# Patient Record
Sex: MALE | Race: WHITE | Employment: FULL TIME | URBAN - NONMETROPOLITAN AREA
[De-identification: names, ages, dates, MRNs, and addresses within clinical notes are randomized per-mention and may not be internally consistent; named-entity substitution may affect disease eponyms.]

---

## 2023-07-18 ENCOUNTER — HOSPITAL ENCOUNTER (OUTPATIENT)
Dept: PAIN MANAGEMENT | Age: 63
Discharge: HOME | End: 2023-07-18
Payer: COMMERCIAL

## 2023-07-18 VITALS
HEART RATE: 72 BPM | TEMPERATURE: 97.5 F | SYSTOLIC BLOOD PRESSURE: 130 MMHG | RESPIRATION RATE: 16 BRPM | OXYGEN SATURATION: 95 % | DIASTOLIC BLOOD PRESSURE: 75 MMHG

## 2023-07-18 PROCEDURE — 2580000003 HC RX 258

## 2023-07-18 PROCEDURE — 6360000002 HC RX W HCPCS

## 2023-07-18 PROCEDURE — A4216 STERILE WATER/SALINE, 10 ML: HCPCS

## 2023-07-18 PROCEDURE — 2500000003 HC RX 250 WO HCPCS

## 2023-07-18 PROCEDURE — 62323 NJX INTERLAMINAR LMBR/SAC: CPT

## 2023-07-18 RX ORDER — GLIPIZIDE AND METFORMIN HCL 2.5; 25 MG/1; MG/1
1 TABLET, FILM COATED ORAL
COMMUNITY

## 2023-07-18 RX ORDER — LIDOCAINE HYDROCHLORIDE 10 MG/ML
5 INJECTION, SOLUTION EPIDURAL; INFILTRATION; INTRACAUDAL; PERINEURAL ONCE
Status: DISCONTINUED | OUTPATIENT
Start: 2023-07-18 | End: 2023-07-20 | Stop reason: HOSPADM

## 2023-07-18 RX ORDER — METHYLPREDNISOLONE ACETATE 80 MG/ML
80 INJECTION, SUSPENSION INTRA-ARTICULAR; INTRALESIONAL; INTRAMUSCULAR; SOFT TISSUE ONCE
Status: DISCONTINUED | OUTPATIENT
Start: 2023-07-18 | End: 2023-07-20 | Stop reason: HOSPADM

## 2023-07-18 RX ORDER — PRAVASTATIN SODIUM 10 MG
10 TABLET ORAL DAILY
COMMUNITY

## 2023-07-18 RX ORDER — CYCLOBENZAPRINE HCL 10 MG
10 TABLET ORAL 2 TIMES DAILY PRN
COMMUNITY

## 2023-07-18 RX ORDER — LISINOPRIL 5 MG/1
5 TABLET ORAL DAILY
COMMUNITY

## 2023-07-18 RX ORDER — SODIUM CHLORIDE 9 MG/ML
5 INJECTION INTRAVENOUS ONCE
Status: DISCONTINUED | OUTPATIENT
Start: 2023-07-18 | End: 2023-07-20 | Stop reason: HOSPADM

## 2023-07-18 ASSESSMENT — PAIN - FUNCTIONAL ASSESSMENT
PAIN_FUNCTIONAL_ASSESSMENT: PREVENTS OR INTERFERES SOME ACTIVE ACTIVITIES AND ADLS
PAIN_FUNCTIONAL_ASSESSMENT: PREVENTS OR INTERFERES SOME ACTIVE ACTIVITIES AND ADLS
PAIN_FUNCTIONAL_ASSESSMENT: NONE - DENIES PAIN

## 2023-07-18 ASSESSMENT — PAIN DESCRIPTION - DESCRIPTORS
DESCRIPTORS: ACHING;DISCOMFORT
DESCRIPTORS: ACHING;DISCOMFORT

## 2023-07-18 ASSESSMENT — PAIN DESCRIPTION - FREQUENCY: FREQUENCY: INTERMITTENT

## 2023-07-18 ASSESSMENT — PAIN SCALES - GENERAL: PAINLEVEL_OUTOF10: 5

## 2023-07-18 ASSESSMENT — PAIN DESCRIPTION - ORIENTATION: ORIENTATION: LOWER

## 2023-07-18 ASSESSMENT — PAIN DESCRIPTION - LOCATION: LOCATION: BACK

## 2023-07-18 ASSESSMENT — PAIN DESCRIPTION - DIRECTION: RADIATING_TOWARDS: RIGHT SIDE

## 2023-07-18 NOTE — INTERVAL H&P NOTE
Update History & Physical    The patient's History and Physical  was reviewed with the patient and I examined the patient. There was no change. The surgical site was confirmed by the patient and me. Plan: The risks, benefits, expected outcome, and alternative to the recommended procedure have been discussed with the patient. Patient understands and wants to proceed with the procedure.      Electronically signed by Zenia Lovett MD on 7/18/2023 at 11:57 AM

## 2023-10-03 ENCOUNTER — HOSPITAL ENCOUNTER (OUTPATIENT)
Dept: GENERAL RADIOLOGY | Facility: HOSPITAL | Age: 63
Discharge: HOME | End: 2023-10-03
Admitting: NURSE PRACTITIONER
Payer: COMMERCIAL

## 2023-10-03 PROCEDURE — 87205 SMEAR GRAM STAIN: CPT | Performed by: NURSE PRACTITIONER

## 2023-10-03 PROCEDURE — 87070 CULTURE OTHR SPECIMN AEROBIC: CPT | Performed by: NURSE PRACTITIONER

## 2023-10-03 PROCEDURE — 71101 X-RAY EXAM UNILAT RIBS/CHEST: CPT

## 2023-10-31 ENCOUNTER — HOSPITAL ENCOUNTER (OUTPATIENT)
Dept: PAIN MANAGEMENT | Age: 63
Discharge: HOME | End: 2023-10-31
Payer: COMMERCIAL

## 2023-10-31 VITALS
SYSTOLIC BLOOD PRESSURE: 137 MMHG | OXYGEN SATURATION: 94 % | DIASTOLIC BLOOD PRESSURE: 82 MMHG | RESPIRATION RATE: 18 BRPM | HEART RATE: 71 BPM | TEMPERATURE: 97.2 F

## 2023-10-31 DIAGNOSIS — R52 PAIN MANAGEMENT: ICD-10-CM

## 2023-10-31 PROCEDURE — 62323 NJX INTERLAMINAR LMBR/SAC: CPT

## 2023-10-31 PROCEDURE — 6360000002 HC RX W HCPCS

## 2023-10-31 PROCEDURE — A4216 STERILE WATER/SALINE, 10 ML: HCPCS

## 2023-10-31 PROCEDURE — 2580000003 HC RX 258

## 2023-10-31 PROCEDURE — 2500000003 HC RX 250 WO HCPCS

## 2023-10-31 RX ORDER — SODIUM CHLORIDE 9 MG/ML
5 INJECTION INTRAVENOUS ONCE
Status: DISCONTINUED | OUTPATIENT
Start: 2023-10-31 | End: 2023-11-02 | Stop reason: HOSPADM

## 2023-10-31 RX ORDER — METHYLPREDNISOLONE ACETATE 80 MG/ML
80 INJECTION, SUSPENSION INTRA-ARTICULAR; INTRALESIONAL; INTRAMUSCULAR; SOFT TISSUE ONCE
Status: DISCONTINUED | OUTPATIENT
Start: 2023-10-31 | End: 2023-11-02 | Stop reason: HOSPADM

## 2023-10-31 RX ORDER — LIDOCAINE HYDROCHLORIDE 10 MG/ML
5 INJECTION, SOLUTION EPIDURAL; INFILTRATION; INTRACAUDAL; PERINEURAL ONCE
Status: DISCONTINUED | OUTPATIENT
Start: 2023-10-31 | End: 2023-11-02 | Stop reason: HOSPADM

## 2023-10-31 ASSESSMENT — PAIN DESCRIPTION - FREQUENCY: FREQUENCY: INTERMITTENT

## 2023-10-31 ASSESSMENT — PAIN DESCRIPTION - DIRECTION: RADIATING_TOWARDS: DOES NOT RADIATE

## 2023-10-31 ASSESSMENT — PAIN - FUNCTIONAL ASSESSMENT
PAIN_FUNCTIONAL_ASSESSMENT: PREVENTS OR INTERFERES SOME ACTIVE ACTIVITIES AND ADLS
PAIN_FUNCTIONAL_ASSESSMENT: 0-10

## 2023-10-31 ASSESSMENT — PAIN DESCRIPTION - DESCRIPTORS: DESCRIPTORS: ACHING;DULL

## 2023-10-31 ASSESSMENT — PAIN DESCRIPTION - LOCATION: LOCATION: BACK

## 2023-10-31 ASSESSMENT — PAIN SCALES - GENERAL: PAINLEVEL_OUTOF10: 4

## 2023-10-31 ASSESSMENT — PAIN DESCRIPTION - ORIENTATION: ORIENTATION: LOWER

## 2023-10-31 ASSESSMENT — PAIN DESCRIPTION - PAIN TYPE: TYPE: CHRONIC PAIN

## 2023-10-31 NOTE — INTERVAL H&P NOTE
Update History & Physical    The patient's History and Physical  was reviewed with the patient and I examined the patient. There was no change. The surgical site was confirmed by the patient and me. Plan: The risks, benefits, expected outcome, and alternative to the recommended procedure have been discussed with the patient. Patient understands and wants to proceed with the procedure.      Electronically signed by Rashmi Blair MD on 10/31/2023 at 1:28 PM

## 2024-02-13 ENCOUNTER — HOSPITAL ENCOUNTER (OUTPATIENT)
Dept: PAIN MANAGEMENT | Age: 64
Discharge: HOME | End: 2024-02-13
Payer: COMMERCIAL

## 2024-02-13 VITALS
DIASTOLIC BLOOD PRESSURE: 86 MMHG | HEART RATE: 74 BPM | RESPIRATION RATE: 18 BRPM | SYSTOLIC BLOOD PRESSURE: 145 MMHG | OXYGEN SATURATION: 94 %

## 2024-02-13 VITALS
HEART RATE: 80 BPM | RESPIRATION RATE: 18 BRPM | SYSTOLIC BLOOD PRESSURE: 123 MMHG | TEMPERATURE: 97.1 F | DIASTOLIC BLOOD PRESSURE: 68 MMHG | OXYGEN SATURATION: 96 %

## 2024-02-13 DIAGNOSIS — R52 PAIN MANAGEMENT: ICD-10-CM

## 2024-02-13 PROCEDURE — 62323 NJX INTERLAMINAR LMBR/SAC: CPT

## 2024-02-13 PROCEDURE — 6360000002 HC RX W HCPCS

## 2024-02-13 PROCEDURE — 2500000003 HC RX 250 WO HCPCS

## 2024-02-13 PROCEDURE — 2580000003 HC RX 258

## 2024-02-13 PROCEDURE — A4216 STERILE WATER/SALINE, 10 ML: HCPCS

## 2024-02-13 RX ORDER — SODIUM CHLORIDE 9 MG/ML
5 INJECTION INTRAVENOUS ONCE
Status: DISCONTINUED | OUTPATIENT
Start: 2024-02-13 | End: 2024-02-15 | Stop reason: HOSPADM

## 2024-02-13 RX ORDER — METHYLPREDNISOLONE ACETATE 80 MG/ML
80 INJECTION, SUSPENSION INTRA-ARTICULAR; INTRALESIONAL; INTRAMUSCULAR; SOFT TISSUE ONCE
Status: DISCONTINUED | OUTPATIENT
Start: 2024-02-13 | End: 2024-02-15 | Stop reason: HOSPADM

## 2024-02-13 RX ORDER — LIDOCAINE HYDROCHLORIDE 10 MG/ML
5 INJECTION, SOLUTION EPIDURAL; INFILTRATION; INTRACAUDAL; PERINEURAL ONCE
Status: DISCONTINUED | OUTPATIENT
Start: 2024-02-13 | End: 2024-02-15 | Stop reason: HOSPADM

## 2024-02-13 NOTE — INTERVAL H&P NOTE
Update History & Physical    The patient's History and Physical  was reviewed with the patient and I examined the patient. There was no change. The surgical site was confirmed by the patient and me.     Plan: The risks, benefits, expected outcome, and alternative to the recommended procedure have been discussed with the patient. Patient understands and wants to proceed with the procedure.     Electronically signed by Jt Lloyd MD on 2/13/2024 at 9:33 AM

## 2024-05-14 ENCOUNTER — HOSPITAL ENCOUNTER (OUTPATIENT)
Dept: PAIN MANAGEMENT | Age: 64
Discharge: HOME | End: 2024-05-14
Payer: COMMERCIAL

## 2024-05-14 VITALS
RESPIRATION RATE: 18 BRPM | SYSTOLIC BLOOD PRESSURE: 145 MMHG | DIASTOLIC BLOOD PRESSURE: 82 MMHG | TEMPERATURE: 97.8 F | OXYGEN SATURATION: 94 % | HEART RATE: 72 BPM

## 2024-05-14 DIAGNOSIS — R52 PAIN MANAGEMENT: ICD-10-CM

## 2024-05-14 PROCEDURE — 6360000002 HC RX W HCPCS

## 2024-05-14 PROCEDURE — 2500000003 HC RX 250 WO HCPCS

## 2024-05-14 PROCEDURE — 62323 NJX INTERLAMINAR LMBR/SAC: CPT

## 2024-05-14 PROCEDURE — 2580000003 HC RX 258

## 2024-05-14 PROCEDURE — A4216 STERILE WATER/SALINE, 10 ML: HCPCS

## 2024-05-14 RX ORDER — LIDOCAINE HYDROCHLORIDE 10 MG/ML
5 INJECTION, SOLUTION EPIDURAL; INFILTRATION; INTRACAUDAL; PERINEURAL ONCE
Status: DISCONTINUED | OUTPATIENT
Start: 2024-05-14 | End: 2024-05-16 | Stop reason: HOSPADM

## 2024-05-14 RX ORDER — SODIUM CHLORIDE 9 MG/ML
5 INJECTION, SOLUTION INTRAMUSCULAR; INTRAVENOUS; SUBCUTANEOUS ONCE
Status: DISCONTINUED | OUTPATIENT
Start: 2024-05-14 | End: 2024-05-16 | Stop reason: HOSPADM

## 2024-05-14 RX ORDER — METHYLPREDNISOLONE ACETATE 80 MG/ML
80 INJECTION, SUSPENSION INTRA-ARTICULAR; INTRALESIONAL; INTRAMUSCULAR; SOFT TISSUE ONCE
Status: DISCONTINUED | OUTPATIENT
Start: 2024-05-14 | End: 2024-05-16 | Stop reason: HOSPADM

## 2024-05-14 ASSESSMENT — PAIN - FUNCTIONAL ASSESSMENT: PAIN_FUNCTIONAL_ASSESSMENT: 0-10

## 2024-05-14 NOTE — INTERVAL H&P NOTE
Update History & Physical    The patient's History and Physical  was reviewed with the patient and I examined the patient. There was no change. The surgical site was confirmed by the patient and me.     Plan: The risks, benefits, expected outcome, and alternative to the recommended procedure have been discussed with the patient. Patient understands and wants to proceed with the procedure.     Electronically signed by Jt Lloyd MD on 5/14/2024 at 9:37 AM

## 2024-08-20 ENCOUNTER — HOSPITAL ENCOUNTER (OUTPATIENT)
Dept: PAIN MANAGEMENT | Age: 64
Discharge: HOME | End: 2024-08-20
Payer: COMMERCIAL

## 2024-08-20 VITALS
TEMPERATURE: 96.5 F | DIASTOLIC BLOOD PRESSURE: 91 MMHG | SYSTOLIC BLOOD PRESSURE: 143 MMHG | HEART RATE: 73 BPM | RESPIRATION RATE: 18 BRPM | OXYGEN SATURATION: 95 %

## 2024-08-20 DIAGNOSIS — R52 PAIN MANAGEMENT: ICD-10-CM

## 2024-08-20 PROCEDURE — 2500000003 HC RX 250 WO HCPCS

## 2024-08-20 PROCEDURE — 62323 NJX INTERLAMINAR LMBR/SAC: CPT

## 2024-08-20 PROCEDURE — 6360000002 HC RX W HCPCS

## 2024-08-20 PROCEDURE — 2580000003 HC RX 258

## 2024-08-20 RX ORDER — LIDOCAINE HYDROCHLORIDE 10 MG/ML
5 INJECTION, SOLUTION EPIDURAL; INFILTRATION; INTRACAUDAL; PERINEURAL ONCE
Status: DISCONTINUED | OUTPATIENT
Start: 2024-08-20 | End: 2024-08-22 | Stop reason: HOSPADM

## 2024-08-20 RX ORDER — METHYLPREDNISOLONE ACETATE 80 MG/ML
80 INJECTION, SUSPENSION INTRA-ARTICULAR; INTRALESIONAL; INTRAMUSCULAR; SOFT TISSUE ONCE
Status: DISCONTINUED | OUTPATIENT
Start: 2024-08-20 | End: 2024-08-22 | Stop reason: HOSPADM

## 2024-08-20 RX ORDER — SODIUM CHLORIDE 9 MG/ML
5 INJECTION, SOLUTION INTRAMUSCULAR; INTRAVENOUS; SUBCUTANEOUS ONCE
Status: DISCONTINUED | OUTPATIENT
Start: 2024-08-20 | End: 2024-08-22 | Stop reason: HOSPADM

## 2024-08-20 ASSESSMENT — PAIN - FUNCTIONAL ASSESSMENT: PAIN_FUNCTIONAL_ASSESSMENT: NONE - DENIES PAIN

## 2024-08-20 NOTE — INTERVAL H&P NOTE
Update History & Physical    The patient's History and Physical  was reviewed with the patient and I examined the patient. There was no change. The surgical site was confirmed by the patient and me.     Plan: The risks, benefits, expected outcome, and alternative to the recommended procedure have been discussed with the patient. Patient understands and wants to proceed with the procedure.     Electronically signed by Jt Lloyd MD on 8/20/2024 at 12:04 PM

## 2024-11-26 ENCOUNTER — HOSPITAL ENCOUNTER (OUTPATIENT)
Dept: PAIN MANAGEMENT | Age: 64
Discharge: HOME | End: 2024-11-26
Payer: COMMERCIAL

## 2024-11-26 VITALS
SYSTOLIC BLOOD PRESSURE: 154 MMHG | RESPIRATION RATE: 16 BRPM | HEART RATE: 72 BPM | OXYGEN SATURATION: 94 % | DIASTOLIC BLOOD PRESSURE: 95 MMHG

## 2024-11-26 DIAGNOSIS — R52 PAIN MANAGEMENT: ICD-10-CM

## 2024-11-26 PROCEDURE — 2580000003 HC RX 258

## 2024-11-26 PROCEDURE — 62323 NJX INTERLAMINAR LMBR/SAC: CPT

## 2024-11-26 PROCEDURE — 6360000002 HC RX W HCPCS

## 2024-11-26 RX ORDER — SODIUM CHLORIDE 9 MG/ML
5 INJECTION, SOLUTION INTRAMUSCULAR; INTRAVENOUS; SUBCUTANEOUS ONCE
Status: DISCONTINUED | OUTPATIENT
Start: 2024-11-26 | End: 2024-11-28 | Stop reason: HOSPADM

## 2024-11-26 RX ORDER — TRIAMCINOLONE ACETONIDE 40 MG/ML
80 INJECTION, SUSPENSION INTRA-ARTICULAR; INTRAMUSCULAR ONCE
Status: DISCONTINUED | OUTPATIENT
Start: 2024-11-26 | End: 2024-11-28 | Stop reason: HOSPADM

## 2024-11-26 RX ORDER — LIDOCAINE HYDROCHLORIDE 10 MG/ML
5 INJECTION, SOLUTION EPIDURAL; INFILTRATION; INTRACAUDAL; PERINEURAL ONCE
Status: DISCONTINUED | OUTPATIENT
Start: 2024-11-26 | End: 2024-11-28 | Stop reason: HOSPADM

## 2024-11-26 ASSESSMENT — PAIN - FUNCTIONAL ASSESSMENT: PAIN_FUNCTIONAL_ASSESSMENT: 0-10

## 2024-11-26 NOTE — INTERVAL H&P NOTE
Update History & Physical    The patient's History and Physical   was reviewed with the patient and I examined the patient. There was no change. The surgical site was confirmed by the patient and me.     Plan: The risks, benefits, expected outcome, and alternative to the recommended procedure have been discussed with the patient. Patient understands and wants to proceed with the procedure.     Electronically signed by Jt Lloyd MD on 11/26/2024 at 1:15 PM

## 2025-01-28 ENCOUNTER — TRANSCRIBE ORDERS (OUTPATIENT)
Dept: ADMINISTRATIVE | Facility: HOSPITAL | Age: 65
End: 2025-01-28
Payer: COMMERCIAL

## 2025-01-28 DIAGNOSIS — M54.16 LUMBAR RADICULOPATHY: Primary | ICD-10-CM

## 2025-02-11 ENCOUNTER — OFFICE VISIT (OUTPATIENT)
Age: 65
End: 2025-02-11
Payer: COMMERCIAL

## 2025-02-11 VITALS — HEIGHT: 71 IN | BODY MASS INDEX: 31.92 KG/M2 | WEIGHT: 228 LBS

## 2025-02-11 DIAGNOSIS — M25.511 RIGHT SHOULDER PAIN, UNSPECIFIED CHRONICITY: Primary | ICD-10-CM

## 2025-02-11 DIAGNOSIS — S43.014A ANTERIOR SHOULDER DISLOCATION, RIGHT, INITIAL ENCOUNTER: ICD-10-CM

## 2025-02-11 PROCEDURE — 99204 OFFICE O/P NEW MOD 45 MIN: CPT | Performed by: ORTHOPAEDIC SURGERY

## 2025-02-11 RX ORDER — CELECOXIB 200 MG/1
CAPSULE ORAL
COMMUNITY
Start: 2025-02-02

## 2025-02-11 ASSESSMENT — ENCOUNTER SYMPTOMS
ALLERGIC/IMMUNOLOGIC NEGATIVE: 1
RESPIRATORY NEGATIVE: 1
EYES NEGATIVE: 1
GASTROINTESTINAL NEGATIVE: 1

## 2025-02-11 NOTE — PROGRESS NOTES
[facility]  [facility]  [address]  [facility] [place] [facility] [place] [address] [phone]       Rodney Garcia (:  1960) is a 64 y.o. male,New patient, here for evaluation of the following chief complaint(s):  Consultation (Right Shoulder)        Assessment & Plan  1. Right shoulder dislocation.  The patient experienced a mechanical dislocation of the right shoulder after a fall approximately 1.5 weeks ago. He was treated in the emergency room where the shoulder was reduced under anesthesia. X-rays show a congruent glenohumeral joint with minor arthritic changes and acromioclavicular joint arthrosis, which are consistent with age-related changes. The presence of a type 3 acromion predisposes him to rotator cuff impingement and potential injury. Given his history of type 2 diabetes and previous frozen shoulder, he is at a higher risk for adhesive capsulitis. An MRI of the shoulder will be ordered to assess the soft tissues. He is advised to continue using the sling for immobilization and to engage in gentle range of motion exercises. Formal physical therapy will be considered based on his functional recovery at the 3-week marvel.    2. Type 2 diabetes mellitus.  The patient is currently managed with metformin. His diabetic condition increases his risk for adhesive capsulitis.    3. Back pain.  He is scheduled for an MRI of his back on Thursday and has a follow-up appointment with Dr. Flynn in [place] on the .    Follow-up  The patient will follow up in 2 weeks.    PROCEDURE  The patient had a plate inserted in his shoulder following a previous injury.       ICD-10-CM    1. Right shoulder pain, unspecified chronicity  M25.511 XR SHOULDER RIGHT (MIN 2 VIEWS)      2. Anterior shoulder dislocation, right, initial encounter  S43.014A           Return in about 2 weeks (around 2025).    SUBJECTIVE/OBJECTIVE:  History of Present Illness  The

## 2025-02-13 ENCOUNTER — HOSPITAL ENCOUNTER (OUTPATIENT)
Dept: MRI IMAGING | Facility: HOSPITAL | Age: 65
Discharge: HOME | End: 2025-02-13
Admitting: PHYSICAL MEDICINE & REHABILITATION
Payer: COMMERCIAL

## 2025-02-13 DIAGNOSIS — M54.16 LUMBAR RADICULOPATHY: ICD-10-CM

## 2025-02-13 PROCEDURE — 72148 MRI LUMBAR SPINE W/O DYE: CPT

## 2025-03-04 ENCOUNTER — TELEPHONE (OUTPATIENT)
Age: 65
End: 2025-03-04

## 2025-03-04 NOTE — TELEPHONE ENCOUNTER
Returned patients phone call and he stated he wanted to hold off on MRI and any treatment at this time, patient stated he would call when he is ready to pursue any intervention

## 2025-03-18 ENCOUNTER — HOSPITAL ENCOUNTER (OUTPATIENT)
Dept: PAIN MANAGEMENT | Age: 65
Discharge: HOME | End: 2025-03-18
Payer: COMMERCIAL

## 2025-03-18 VITALS
DIASTOLIC BLOOD PRESSURE: 85 MMHG | SYSTOLIC BLOOD PRESSURE: 139 MMHG | HEART RATE: 68 BPM | RESPIRATION RATE: 16 BRPM | OXYGEN SATURATION: 97 % | TEMPERATURE: 96 F

## 2025-03-18 DIAGNOSIS — R52 PAIN MANAGEMENT: ICD-10-CM

## 2025-03-18 PROCEDURE — 2580000003 HC RX 258

## 2025-03-18 PROCEDURE — 6360000002 HC RX W HCPCS

## 2025-03-18 PROCEDURE — 62323 NJX INTERLAMINAR LMBR/SAC: CPT

## 2025-03-18 RX ORDER — SODIUM CHLORIDE 9 MG/ML
5 INJECTION, SOLUTION INTRAMUSCULAR; INTRAVENOUS; SUBCUTANEOUS ONCE
Status: DISCONTINUED | OUTPATIENT
Start: 2025-03-18 | End: 2025-03-20 | Stop reason: HOSPADM

## 2025-03-18 RX ORDER — TRIAMCINOLONE ACETONIDE 40 MG/ML
80 INJECTION, SUSPENSION INTRA-ARTICULAR; INTRAMUSCULAR ONCE
Status: DISCONTINUED | OUTPATIENT
Start: 2025-03-18 | End: 2025-03-20 | Stop reason: HOSPADM

## 2025-03-18 RX ORDER — LIDOCAINE HYDROCHLORIDE 10 MG/ML
5 INJECTION, SOLUTION EPIDURAL; INFILTRATION; INTRACAUDAL; PERINEURAL ONCE
Status: DISCONTINUED | OUTPATIENT
Start: 2025-03-18 | End: 2025-03-20 | Stop reason: HOSPADM

## 2025-03-18 ASSESSMENT — PAIN - FUNCTIONAL ASSESSMENT: PAIN_FUNCTIONAL_ASSESSMENT: 0-10

## 2025-03-18 NOTE — INTERVAL H&P NOTE
Update History & Physical    The patient's History and Physical  was reviewed with the patient and I examined the patient. There was no change. The surgical site was confirmed by the patient and me.     Plan: The risks, benefits, expected outcome, and alternative to the recommended procedure have been discussed with the patient. Patient understands and wants to proceed with the procedure.     Electronically signed by Jt Lloyd MD on 3/18/2025 at 11:41 AM

## 2025-03-18 NOTE — PROGRESS NOTES
Procedure:  Level of Consciousness: [x]Alert [x]Oriented []Disoriented []Lethargic  Anxiety Level: [x]Calm []Anxious []Depressed []Other  Skin: [x]Warm [x]Dry []Cool []Moist []Intact []Other  Cardiovascular: []Palpitations: [x]Never []Occasionally []Frequently  Chest Pain: [x]No []Yes  Respiratory:  []Unlabored []Labored []Cough ([] Productive []Unproductive)  HCG Required: [x]No []Yes   Results: []Negative []Positive  Knowledge Level:        [x]Patient/Other verbalized understanding of pre-procedure instructions.        [x]Assessment of post-op care needs (transportation, responsible caregiver)        [x]Able to discuss health care problems and how to deal with it.  Factors that Affect Teaching:        Language Barrier: [x]No []Yes - why:        Hearing Loss:        [x]No []Yes            Corrective Device:  []Yes []No        Vision Loss:           []No [x]Yes            Corrective Device:  [x]Yes []No        Memory Loss:       [x]No []Yes            []Short Term []Long Term  Motivational Level:  [x]Asks Questions                  []Extremely Anxious       [x]Seems Interested               []Seems Uninterested                  []Denies need for Education  Risk for Injury:  [x]Patient oriented to person, place and time  []History of frequent falls/loss of balance  Nutritional:  []Change in appetite   []Weight Gain   []Weight Loss  Functional:  []Requires assistance with ADL's

## 2025-05-05 ENCOUNTER — HOSPITAL ENCOUNTER (INPATIENT)
Age: 65
LOS: 13 days | Discharge: NURSING, REHAB OR LONG-TERM CARE | DRG: 853 | End: 2025-05-19
Attending: PEDIATRICS | Admitting: STUDENT IN AN ORGANIZED HEALTH CARE EDUCATION/TRAINING PROGRAM
Payer: COMMERCIAL

## 2025-05-05 ENCOUNTER — APPOINTMENT (OUTPATIENT)
Dept: GENERAL RADIOLOGY | Age: 65
DRG: 853 | End: 2025-05-05
Payer: COMMERCIAL

## 2025-05-05 DIAGNOSIS — Q21.12 PFO (PATENT FORAMEN OVALE): ICD-10-CM

## 2025-05-05 DIAGNOSIS — B34.8 RHINOVIRUS INFECTION: ICD-10-CM

## 2025-05-05 DIAGNOSIS — G06.1 SPINAL EPIDURAL ABSCESS: ICD-10-CM

## 2025-05-05 DIAGNOSIS — G06.1 ABSCESS IN EPIDURAL SPACE OF LUMBAR SPINE: ICD-10-CM

## 2025-05-05 DIAGNOSIS — M25.552 LEFT HIP PAIN: ICD-10-CM

## 2025-05-05 DIAGNOSIS — R55 SYNCOPE, UNSPECIFIED SYNCOPE TYPE: ICD-10-CM

## 2025-05-05 DIAGNOSIS — R41.82 ALTERED MENTAL STATUS, UNSPECIFIED ALTERED MENTAL STATUS TYPE: ICD-10-CM

## 2025-05-05 DIAGNOSIS — A41.9 SEPSIS, DUE TO UNSPECIFIED ORGANISM, UNSPECIFIED WHETHER ACUTE ORGAN DYSFUNCTION PRESENT (HCC): Primary | ICD-10-CM

## 2025-05-05 DIAGNOSIS — M43.16 SPONDYLOLISTHESIS OF LUMBAR REGION: ICD-10-CM

## 2025-05-05 DIAGNOSIS — I38 ENDOCARDITIS, UNSPECIFIED CHRONICITY, UNSPECIFIED ENDOCARDITIS TYPE: ICD-10-CM

## 2025-05-05 DIAGNOSIS — R53.1 GENERALIZED WEAKNESS: ICD-10-CM

## 2025-05-05 LAB
ALBUMIN SERPL-MCNC: 3.2 G/DL (ref 3.5–5.2)
ALP SERPL-CCNC: 122 U/L (ref 40–129)
ALT SERPL-CCNC: 39 U/L (ref 10–50)
ANION GAP SERPL CALCULATED.3IONS-SCNC: 18 MMOL/L (ref 8–16)
AST SERPL-CCNC: 27 U/L (ref 10–50)
BACTERIA URNS QL MICRO: NEGATIVE /HPF
BASOPHILS # BLD: 0.1 K/UL (ref 0–0.2)
BASOPHILS NFR BLD: 0.4 % (ref 0–1)
BILIRUB SERPL-MCNC: 0.7 MG/DL (ref 0.2–1.2)
BILIRUB UR QL STRIP: NEGATIVE
BUN SERPL-MCNC: 13 MG/DL (ref 8–23)
CALCIUM SERPL-MCNC: 9.3 MG/DL (ref 8.8–10.2)
CHLORIDE SERPL-SCNC: 96 MMOL/L (ref 98–107)
CLARITY UR: CLEAR
CO2 SERPL-SCNC: 20 MMOL/L (ref 22–29)
COLOR UR: YELLOW
CREAT SERPL-MCNC: 0.5 MG/DL (ref 0.7–1.2)
CRYSTALS URNS MICRO: ABNORMAL /HPF
EOSINOPHIL # BLD: 0 K/UL (ref 0–0.6)
EOSINOPHIL NFR BLD: 0.1 % (ref 0–5)
EPI CELLS #/AREA URNS AUTO: 1 /HPF (ref 0–5)
ERYTHROCYTE [DISTWIDTH] IN BLOOD BY AUTOMATED COUNT: 13.3 % (ref 11.5–14.5)
GLUCOSE SERPL-MCNC: 208 MG/DL (ref 70–99)
GLUCOSE UR STRIP.AUTO-MCNC: =>1000 MG/DL
HCT VFR BLD AUTO: 39.1 % (ref 42–52)
HGB BLD-MCNC: 13 G/DL (ref 14–18)
HGB UR STRIP.AUTO-MCNC: ABNORMAL MG/L
HYALINE CASTS #/AREA URNS AUTO: 1 /HPF (ref 0–8)
IMM GRANULOCYTES # BLD: 0.1 K/UL
KETONES UR STRIP.AUTO-MCNC: 80 MG/DL
LEUKOCYTE ESTERASE UR QL STRIP.AUTO: NEGATIVE
LYMPHOCYTES # BLD: 0.8 K/UL (ref 1.1–4.5)
LYMPHOCYTES NFR BLD: 5.1 % (ref 20–40)
MCH RBC QN AUTO: 29.9 PG (ref 27–31)
MCHC RBC AUTO-ENTMCNC: 33.2 G/DL (ref 33–37)
MCV RBC AUTO: 89.9 FL (ref 80–94)
MONOCYTES # BLD: 0.9 K/UL (ref 0–0.9)
MONOCYTES NFR BLD: 6.3 % (ref 0–10)
NEUTROPHILS # BLD: 13 K/UL (ref 1.5–7.5)
NEUTS SEG NFR BLD: 87.4 % (ref 50–65)
NITRITE UR QL STRIP.AUTO: NEGATIVE
PH UR STRIP.AUTO: 5 [PH] (ref 5–8)
PLATELET # BLD AUTO: 389 K/UL (ref 130–400)
PMV BLD AUTO: 9.3 FL (ref 9.4–12.4)
POTASSIUM SERPL-SCNC: 3.8 MMOL/L (ref 3.5–5.1)
PROT SERPL-MCNC: 6.7 G/DL (ref 6.4–8.3)
PROT UR STRIP.AUTO-MCNC: 30 MG/DL
RBC # BLD AUTO: 4.35 M/UL (ref 4.7–6.1)
RBC #/AREA URNS AUTO: 6 /HPF (ref 0–4)
SODIUM SERPL-SCNC: 134 MMOL/L (ref 136–145)
SP GR UR STRIP.AUTO: 1.04 (ref 1–1.03)
UROBILINOGEN UR STRIP.AUTO-MCNC: 0.2 E.U./DL
WBC # BLD AUTO: 14.9 K/UL (ref 4.8–10.8)
WBC #/AREA URNS AUTO: 1 /HPF (ref 0–5)

## 2025-05-05 PROCEDURE — 86140 C-REACTIVE PROTEIN: CPT

## 2025-05-05 PROCEDURE — 84484 ASSAY OF TROPONIN QUANT: CPT

## 2025-05-05 PROCEDURE — 81001 URINALYSIS AUTO W/SCOPE: CPT

## 2025-05-05 PROCEDURE — 85652 RBC SED RATE AUTOMATED: CPT

## 2025-05-05 PROCEDURE — 83880 ASSAY OF NATRIURETIC PEPTIDE: CPT

## 2025-05-05 PROCEDURE — 99285 EMERGENCY DEPT VISIT HI MDM: CPT

## 2025-05-05 PROCEDURE — 71045 X-RAY EXAM CHEST 1 VIEW: CPT

## 2025-05-05 PROCEDURE — 85025 COMPLETE CBC W/AUTO DIFF WBC: CPT

## 2025-05-05 PROCEDURE — 73502 X-RAY EXAM HIP UNI 2-3 VIEWS: CPT

## 2025-05-05 PROCEDURE — 80053 COMPREHEN METABOLIC PANEL: CPT

## 2025-05-05 PROCEDURE — 84145 PROCALCITONIN (PCT): CPT

## 2025-05-05 PROCEDURE — 83735 ASSAY OF MAGNESIUM: CPT

## 2025-05-05 PROCEDURE — 93005 ELECTROCARDIOGRAM TRACING: CPT | Performed by: PEDIATRICS

## 2025-05-05 PROCEDURE — 96365 THER/PROPH/DIAG IV INF INIT: CPT

## 2025-05-05 PROCEDURE — 36415 COLL VENOUS BLD VENIPUNCTURE: CPT

## 2025-05-05 RX ORDER — TRAMADOL HYDROCHLORIDE 50 MG/1
50 TABLET ORAL EVERY 6 HOURS PRN
Status: ON HOLD | COMMUNITY
Start: 2025-05-05 | End: 2025-05-19 | Stop reason: HOSPADM

## 2025-05-05 RX ORDER — SODIUM CHLORIDE 0.9 % (FLUSH) 0.9 %
5-40 SYRINGE (ML) INJECTION EVERY 12 HOURS SCHEDULED
Status: DISCONTINUED | OUTPATIENT
Start: 2025-05-05 | End: 2025-05-06 | Stop reason: SDUPTHER

## 2025-05-05 RX ORDER — SODIUM CHLORIDE 0.9 % (FLUSH) 0.9 %
5-40 SYRINGE (ML) INJECTION PRN
Status: DISCONTINUED | OUTPATIENT
Start: 2025-05-05 | End: 2025-05-06 | Stop reason: SDUPTHER

## 2025-05-05 RX ORDER — 0.9 % SODIUM CHLORIDE 0.9 %
30 INTRAVENOUS SOLUTION INTRAVENOUS ONCE
Status: COMPLETED | OUTPATIENT
Start: 2025-05-05 | End: 2025-05-06

## 2025-05-05 RX ORDER — SODIUM CHLORIDE 9 MG/ML
INJECTION, SOLUTION INTRAVENOUS PRN
Status: DISCONTINUED | OUTPATIENT
Start: 2025-05-05 | End: 2025-05-06 | Stop reason: SDUPTHER

## 2025-05-05 ASSESSMENT — PAIN DESCRIPTION - DESCRIPTORS: DESCRIPTORS: ACHING;DISCOMFORT

## 2025-05-05 ASSESSMENT — PAIN SCALES - GENERAL: PAINLEVEL_OUTOF10: 4

## 2025-05-05 ASSESSMENT — PAIN - FUNCTIONAL ASSESSMENT: PAIN_FUNCTIONAL_ASSESSMENT: 0-10

## 2025-05-05 ASSESSMENT — PAIN DESCRIPTION - LOCATION: LOCATION: HIP

## 2025-05-05 ASSESSMENT — PAIN DESCRIPTION - ORIENTATION: ORIENTATION: LEFT

## 2025-05-05 ASSESSMENT — PAIN DESCRIPTION - PAIN TYPE: TYPE: ACUTE PAIN

## 2025-05-06 ENCOUNTER — APPOINTMENT (OUTPATIENT)
Dept: CT IMAGING | Age: 65
DRG: 853 | End: 2025-05-06
Payer: COMMERCIAL

## 2025-05-06 ENCOUNTER — APPOINTMENT (OUTPATIENT)
Age: 65
DRG: 853 | End: 2025-05-06
Attending: STUDENT IN AN ORGANIZED HEALTH CARE EDUCATION/TRAINING PROGRAM
Payer: COMMERCIAL

## 2025-05-06 PROBLEM — J96.01 ACUTE HYPOXIC RESPIRATORY FAILURE (HCC): Status: ACTIVE | Noted: 2025-05-06

## 2025-05-06 LAB
ANION GAP SERPL CALCULATED.3IONS-SCNC: 16 MMOL/L (ref 8–16)
B PARAP IS1001 DNA NPH QL NAA+NON-PROBE: NOT DETECTED
B PERT.PT PRMT NPH QL NAA+NON-PROBE: NOT DETECTED
BASE EXCESS ARTERIAL: -5.2 MMOL/L (ref -2–2)
BASOPHILS # BLD: 0.1 K/UL (ref 0–0.2)
BASOPHILS NFR BLD: 0.4 % (ref 0–1)
BNP BLD-MCNC: 108 PG/ML (ref 0–124)
BUN SERPL-MCNC: 12 MG/DL (ref 8–23)
C PNEUM DNA NPH QL NAA+NON-PROBE: NOT DETECTED
CALCIUM SERPL-MCNC: 8.8 MG/DL (ref 8.8–10.2)
CARBOXYHEMOGLOBIN ARTERIAL: 2.2 % (ref 0–5)
CHLORIDE SERPL-SCNC: 103 MMOL/L (ref 98–107)
CO2 SERPL-SCNC: 18 MMOL/L (ref 22–29)
CREAT SERPL-MCNC: 0.5 MG/DL (ref 0.7–1.2)
CRP SERPL-MCNC: 453 MG/L (ref 0–5)
ECHO AO ASC DIAM: 2.9 CM
ECHO AO ASCENDING AORTA INDEX: 1.35 CM/M2
ECHO AO ROOT DIAM: 1.8 CM
ECHO AO ROOT INDEX: 0.84 CM/M2
ECHO AO SINUS VALSALVA DIAM: 3.6 CM
ECHO AO SINUS VALSALVA INDEX: 1.67 CM/M2
ECHO AO ST JNCT DIAM: 2.8 CM
ECHO AV AREA PEAK VELOCITY: 3.3 CM2
ECHO AV AREA VTI: 3.4 CM2
ECHO AV AREA/BSA PEAK VELOCITY: 1.5 CM2/M2
ECHO AV AREA/BSA VTI: 1.6 CM2/M2
ECHO AV MEAN GRADIENT: 5 MMHG
ECHO AV MEAN GRADIENT: 5 MMHG
ECHO AV MEAN VELOCITY: 1 M/S
ECHO AV MEAN VELOCITY: 1 M/S
ECHO AV PEAK GRADIENT: 8 MMHG
ECHO AV PEAK VELOCITY: 1.4 M/S
ECHO AV PEAK VELOCITY: 1.4 M/S
ECHO AV VTI: 24.7 CM
ECHO BSA: 2.18 M2
ECHO EST RA PRESSURE: 3 MMHG
ECHO IVC PROX: 1 CM
ECHO LA AREA 4C: 17.4 CM2
ECHO LA DIAMETER INDEX: 1.67 CM/M2
ECHO LA DIAMETER: 3.6 CM
ECHO LA MAJOR AXIS: 5.4 CM
ECHO LA TO AORTIC ROOT RATIO: 2
ECHO LA VOL MOD A4C: 45 ML (ref 18–58)
ECHO LA VOLUME INDEX MOD A4C: 21 ML/M2 (ref 16–34)
ECHO LV E' LATERAL VELOCITY: 12 CM/S
ECHO LV E' SEPTAL VELOCITY: 8.49 CM/S
ECHO LV EDV A2C: 122 ML
ECHO LV EDV A4C: 120 ML
ECHO LV EDV INDEX A4C: 56 ML/M2
ECHO LV EDV NDEX A2C: 57 ML/M2
ECHO LV EF PHYSICIAN: 55 %
ECHO LV EJECTION FRACTION A2C: 56 %
ECHO LV EJECTION FRACTION A4C: 56 %
ECHO LV EJECTION FRACTION BIPLANE: 56 % (ref 55–100)
ECHO LV ESV A2C: 53 ML
ECHO LV ESV A4C: 53 ML
ECHO LV ESV INDEX A2C: 25 ML/M2
ECHO LV ESV INDEX A4C: 25 ML/M2
ECHO LV FRACTIONAL SHORTENING: 27 % (ref 28–44)
ECHO LV INTERNAL DIMENSION DIASTOLE INDEX: 1.72 CM/M2
ECHO LV INTERNAL DIMENSION DIASTOLIC: 3.7 CM (ref 4.2–5.9)
ECHO LV INTERNAL DIMENSION SYSTOLIC INDEX: 1.26 CM/M2
ECHO LV INTERNAL DIMENSION SYSTOLIC: 2.7 CM
ECHO LV IVSD: 1.2 CM (ref 0.6–1)
ECHO LV MASS 2D: 129.3 G (ref 88–224)
ECHO LV MASS INDEX 2D: 60.2 G/M2 (ref 49–115)
ECHO LV POSTERIOR WALL DIASTOLIC: 1 CM (ref 0.6–1)
ECHO LV RELATIVE WALL THICKNESS RATIO: 0.54
ECHO LVOT AREA: 3.1 CM2
ECHO LVOT AV VTI INDEX: 1.09
ECHO LVOT DIAM: 2 CM
ECHO LVOT MEAN GRADIENT: 3 MMHG
ECHO LVOT MEAN GRADIENT: 3 MMHG
ECHO LVOT PEAK GRADIENT: 9 MMHG
ECHO LVOT PEAK VELOCITY: 1.5 M/S
ECHO LVOT STROKE VOLUME INDEX: 39.4 ML/M2
ECHO LVOT SV: 84.8 ML
ECHO LVOT VTI: 27 CM
ECHO MV A VELOCITY: 0.83 M/S
ECHO MV AREA VTI: 5.3 CM2
ECHO MV E DECELERATION TIME (DT): 55 MS
ECHO MV E VELOCITY: 0.49 M/S
ECHO MV E/A RATIO: 0.59
ECHO MV E/E' LATERAL: 4.08
ECHO MV E/E' RATIO (AVERAGED): 4.93
ECHO MV E/E' SEPTAL: 5.77
ECHO MV LVOT VTI INDEX: 0.59
ECHO MV MAX VELOCITY: 0.7 M/S
ECHO MV MEAN GRADIENT: 1 MMHG
ECHO MV MEAN VELOCITY: 0.5 M/S
ECHO MV PEAK GRADIENT: 2 MMHG
ECHO MV VTI: 15.9 CM
ECHO RA AREA 4C: 11.1 CM2
ECHO RA END SYSTOLIC VOLUME APICAL 4 CHAMBER INDEX BSA: 10 ML/M2
ECHO RA VOLUME: 21 ML
ECHO RIGHT VENTRICULAR SYSTOLIC PRESSURE (RVSP): 22 MMHG
ECHO RV BASAL DIMENSION: 3.5 CM
ECHO RV INTERNAL DIMENSION: 3.1 CM
ECHO RV LONGITUDINAL DIMENSION: 7.5 CM
ECHO RV MID DIMENSION: 2.8 CM
ECHO RV TAPSE: 1.8 CM (ref 1.7–?)
ECHO TV REGURGITANT MAX VELOCITY: 2.16 M/S
ECHO TV REGURGITANT PEAK GRADIENT: 19 MMHG
EKG P AXIS: 22 DEGREES
EKG P AXIS: 32 DEGREES
EKG P-R INTERVAL: 140 MS
EKG P-R INTERVAL: 140 MS
EKG Q-T INTERVAL: 234 MS
EKG Q-T INTERVAL: 296 MS
EKG QRS DURATION: 72 MS
EKG QRS DURATION: 76 MS
EKG QTC CALCULATION (BAZETT): 344 MS
EKG QTC CALCULATION (BAZETT): 406 MS
EKG T AXIS: 27 DEGREES
EKG T AXIS: 43 DEGREES
EOSINOPHIL # BLD: 0.1 K/UL (ref 0–0.6)
EOSINOPHIL NFR BLD: 0.4 % (ref 0–5)
ERYTHROCYTE [DISTWIDTH] IN BLOOD BY AUTOMATED COUNT: 13.5 % (ref 11.5–14.5)
ERYTHROCYTE [SEDIMENTATION RATE] IN BLOOD BY WESTERGREN METHOD: 66 MM/HR (ref 0–15)
FIO2: 21 %
FLUAV RNA NPH QL NAA+NON-PROBE: NOT DETECTED
FLUBV RNA NPH QL NAA+NON-PROBE: NOT DETECTED
GLUCOSE BLD-MCNC: 117 MG/DL (ref 70–99)
GLUCOSE BLD-MCNC: 141 MG/DL (ref 70–99)
GLUCOSE BLD-MCNC: 150 MG/DL (ref 70–99)
GLUCOSE BLD-MCNC: 168 MG/DL (ref 70–99)
GLUCOSE BLD-MCNC: 249 MG/DL (ref 70–99)
GLUCOSE SERPL-MCNC: 143 MG/DL (ref 70–99)
HADV DNA NPH QL NAA+NON-PROBE: NOT DETECTED
HCO3 ARTERIAL: 18.6 MMOL/L (ref 22–26)
HCOV 229E RNA NPH QL NAA+NON-PROBE: NOT DETECTED
HCOV HKU1 RNA NPH QL NAA+NON-PROBE: NOT DETECTED
HCOV NL63 RNA NPH QL NAA+NON-PROBE: NOT DETECTED
HCOV OC43 RNA NPH QL NAA+NON-PROBE: NOT DETECTED
HCT VFR BLD AUTO: 37.2 % (ref 42–52)
HEMOGLOBIN, ART, EXTENDED: 12 G/DL (ref 14–18)
HGB BLD-MCNC: 11.9 G/DL (ref 14–18)
HMPV RNA NPH QL NAA+NON-PROBE: NOT DETECTED
HPIV1 RNA NPH QL NAA+NON-PROBE: NOT DETECTED
HPIV2 RNA NPH QL NAA+NON-PROBE: NOT DETECTED
HPIV3 RNA NPH QL NAA+NON-PROBE: NOT DETECTED
HPIV4 RNA NPH QL NAA+NON-PROBE: NOT DETECTED
IMM GRANULOCYTES # BLD: 0.1 K/UL
LACTATE BLDV-SCNC: 1.6 MG/DL (ref 0.5–1.9)
LACTATE BLDV-SCNC: 2 MG/DL (ref 0.5–1.9)
LYMPHOCYTES # BLD: 1.1 K/UL (ref 1.1–4.5)
LYMPHOCYTES NFR BLD: 7.5 % (ref 20–40)
M PNEUMO DNA NPH QL NAA+NON-PROBE: NOT DETECTED
MAGNESIUM SERPL-MCNC: 2.1 MG/DL (ref 1.6–2.4)
MCH RBC QN AUTO: 29.2 PG (ref 27–31)
MCHC RBC AUTO-ENTMCNC: 32 G/DL (ref 33–37)
MCV RBC AUTO: 91.4 FL (ref 80–94)
METHEMOGLOBIN ARTERIAL: 1.2 %
MODE: ABNORMAL
MONOCYTES # BLD: 1.2 K/UL (ref 0–0.9)
MONOCYTES NFR BLD: 8.6 % (ref 0–10)
MRSA DNA SPEC QL NAA+PROBE: NOT DETECTED
NEUTROPHILS # BLD: 11.6 K/UL (ref 1.5–7.5)
NEUTS SEG NFR BLD: 82.3 % (ref 50–65)
O2 CONTENT ARTERIAL: 15.6 ML/DL
O2 SAT, ARTERIAL: 92.2 %
O2 THERAPY: ABNORMAL
PCO2 ARTERIAL: 30 MMHG (ref 35–45)
PERFORMED ON: ABNORMAL
PH ARTERIAL: 7.4 (ref 7.35–7.45)
PLATELET # BLD AUTO: 342 K/UL (ref 130–400)
PMV BLD AUTO: 8.8 FL (ref 9.4–12.4)
PO2 ARTERIAL: 60 MMHG (ref 80–100)
POTASSIUM BLD-SCNC: 3.2 MMOL/L
POTASSIUM SERPL-SCNC: 3.7 MMOL/L (ref 3.5–5)
PROCALCITONIN: 0.58 NG/ML (ref 0–0.09)
RBC # BLD AUTO: 4.07 M/UL (ref 4.7–6.1)
RSV RNA NPH QL NAA+NON-PROBE: NOT DETECTED
RV+EV RNA NPH QL NAA+NON-PROBE: DETECTED
SAMPLE SOURCE: ABNORMAL
SARS-COV-2 RNA NPH QL NAA+NON-PROBE: NOT DETECTED
SODIUM SERPL-SCNC: 137 MMOL/L (ref 136–145)
SPONT RATE(BPM): 16
TROPONIN, HIGH SENSITIVITY: 16 NG/L (ref 0–22)
WBC # BLD AUTO: 14 K/UL (ref 4.8–10.8)

## 2025-05-06 PROCEDURE — 70450 CT HEAD/BRAIN W/O DYE: CPT

## 2025-05-06 PROCEDURE — 6360000002 HC RX W HCPCS: Performed by: STUDENT IN AN ORGANIZED HEALTH CARE EDUCATION/TRAINING PROGRAM

## 2025-05-06 PROCEDURE — 72131 CT LUMBAR SPINE W/O DYE: CPT

## 2025-05-06 PROCEDURE — 71275 CT ANGIOGRAPHY CHEST: CPT

## 2025-05-06 PROCEDURE — 6360000002 HC RX W HCPCS: Performed by: PEDIATRICS

## 2025-05-06 PROCEDURE — 2500000003 HC RX 250 WO HCPCS: Performed by: PEDIATRICS

## 2025-05-06 PROCEDURE — 94760 N-INVAS EAR/PLS OXIMETRY 1: CPT

## 2025-05-06 PROCEDURE — 87040 BLOOD CULTURE FOR BACTERIA: CPT

## 2025-05-06 PROCEDURE — 6370000000 HC RX 637 (ALT 250 FOR IP): Performed by: STUDENT IN AN ORGANIZED HEALTH CARE EDUCATION/TRAINING PROGRAM

## 2025-05-06 PROCEDURE — 83605 ASSAY OF LACTIC ACID: CPT

## 2025-05-06 PROCEDURE — 87077 CULTURE AEROBIC IDENTIFY: CPT

## 2025-05-06 PROCEDURE — 87150 DNA/RNA AMPLIFIED PROBE: CPT

## 2025-05-06 PROCEDURE — 87641 MR-STAPH DNA AMP PROBE: CPT

## 2025-05-06 PROCEDURE — 93306 TTE W/DOPPLER COMPLETE: CPT

## 2025-05-06 PROCEDURE — 2580000003 HC RX 258: Performed by: PEDIATRICS

## 2025-05-06 PROCEDURE — 87186 SC STD MICRODIL/AGAR DIL: CPT

## 2025-05-06 PROCEDURE — 73701 CT LOWER EXTREMITY W/DYE: CPT

## 2025-05-06 PROCEDURE — 36600 WITHDRAWAL OF ARTERIAL BLOOD: CPT

## 2025-05-06 PROCEDURE — 93306 TTE W/DOPPLER COMPLETE: CPT | Performed by: INTERNAL MEDICINE

## 2025-05-06 PROCEDURE — 80048 BASIC METABOLIC PNL TOTAL CA: CPT

## 2025-05-06 PROCEDURE — 97165 OT EVAL LOW COMPLEX 30 MIN: CPT

## 2025-05-06 PROCEDURE — 2580000003 HC RX 258: Performed by: STUDENT IN AN ORGANIZED HEALTH CARE EDUCATION/TRAINING PROGRAM

## 2025-05-06 PROCEDURE — 6360000004 HC RX CONTRAST MEDICATION: Performed by: PEDIATRICS

## 2025-05-06 PROCEDURE — 97530 THERAPEUTIC ACTIVITIES: CPT

## 2025-05-06 PROCEDURE — 82803 BLOOD GASES ANY COMBINATION: CPT

## 2025-05-06 PROCEDURE — 36415 COLL VENOUS BLD VENIPUNCTURE: CPT

## 2025-05-06 PROCEDURE — 97162 PT EVAL MOD COMPLEX 30 MIN: CPT

## 2025-05-06 PROCEDURE — 6370000000 HC RX 637 (ALT 250 FOR IP): Performed by: INTERNAL MEDICINE

## 2025-05-06 PROCEDURE — 93010 ELECTROCARDIOGRAM REPORT: CPT | Performed by: INTERNAL MEDICINE

## 2025-05-06 PROCEDURE — 87076 CULTURE ANAEROBE IDENT EACH: CPT

## 2025-05-06 PROCEDURE — 1200000000 HC SEMI PRIVATE

## 2025-05-06 PROCEDURE — 82962 GLUCOSE BLOOD TEST: CPT

## 2025-05-06 PROCEDURE — 0202U NFCT DS 22 TRGT SARS-COV-2: CPT

## 2025-05-06 PROCEDURE — 85025 COMPLETE CBC W/AUTO DIFF WBC: CPT

## 2025-05-06 RX ORDER — ACETAMINOPHEN 325 MG/1
650 TABLET ORAL EVERY 6 HOURS PRN
Status: DISCONTINUED | OUTPATIENT
Start: 2025-05-06 | End: 2025-05-19 | Stop reason: HOSPADM

## 2025-05-06 RX ORDER — GLUCAGON 1 MG/ML
1 KIT INJECTION PRN
Status: DISCONTINUED | OUTPATIENT
Start: 2025-05-06 | End: 2025-05-19 | Stop reason: HOSPADM

## 2025-05-06 RX ORDER — SODIUM CHLORIDE 9 MG/ML
INJECTION, SOLUTION INTRAVENOUS CONTINUOUS
Status: ACTIVE | OUTPATIENT
Start: 2025-05-06 | End: 2025-05-07

## 2025-05-06 RX ORDER — POLYETHYLENE GLYCOL 3350 17 G/17G
17 POWDER, FOR SOLUTION ORAL DAILY PRN
Status: DISCONTINUED | OUTPATIENT
Start: 2025-05-06 | End: 2025-05-13

## 2025-05-06 RX ORDER — ONDANSETRON 2 MG/ML
4 INJECTION INTRAMUSCULAR; INTRAVENOUS EVERY 6 HOURS PRN
Status: DISCONTINUED | OUTPATIENT
Start: 2025-05-06 | End: 2025-05-19 | Stop reason: HOSPADM

## 2025-05-06 RX ORDER — DEXTROSE MONOHYDRATE 100 MG/ML
INJECTION, SOLUTION INTRAVENOUS CONTINUOUS PRN
Status: DISCONTINUED | OUTPATIENT
Start: 2025-05-06 | End: 2025-05-19 | Stop reason: HOSPADM

## 2025-05-06 RX ORDER — ALBUTEROL SULFATE 0.83 MG/ML
2.5 SOLUTION RESPIRATORY (INHALATION) EVERY 6 HOURS PRN
Status: DISCONTINUED | OUTPATIENT
Start: 2025-05-06 | End: 2025-05-19 | Stop reason: HOSPADM

## 2025-05-06 RX ORDER — INSULIN GLARGINE 100 [IU]/ML
0.25 INJECTION, SOLUTION SUBCUTANEOUS DAILY
Status: DISCONTINUED | OUTPATIENT
Start: 2025-05-06 | End: 2025-05-08

## 2025-05-06 RX ORDER — PRAVASTATIN SODIUM 20 MG
10 TABLET ORAL DAILY
Status: DISCONTINUED | OUTPATIENT
Start: 2025-05-06 | End: 2025-05-19 | Stop reason: HOSPADM

## 2025-05-06 RX ORDER — SODIUM CHLORIDE 0.9 % (FLUSH) 0.9 %
5-40 SYRINGE (ML) INJECTION EVERY 12 HOURS SCHEDULED
Status: DISCONTINUED | OUTPATIENT
Start: 2025-05-06 | End: 2025-05-19 | Stop reason: HOSPADM

## 2025-05-06 RX ORDER — SODIUM CHLORIDE 9 MG/ML
INJECTION, SOLUTION INTRAVENOUS PRN
Status: DISCONTINUED | OUTPATIENT
Start: 2025-05-06 | End: 2025-05-19 | Stop reason: HOSPADM

## 2025-05-06 RX ORDER — BENZONATATE 100 MG/1
100 CAPSULE ORAL 3 TIMES DAILY PRN
Status: DISCONTINUED | OUTPATIENT
Start: 2025-05-06 | End: 2025-05-19 | Stop reason: HOSPADM

## 2025-05-06 RX ORDER — INSULIN LISPRO 100 [IU]/ML
0-8 INJECTION, SOLUTION INTRAVENOUS; SUBCUTANEOUS
Status: DISCONTINUED | OUTPATIENT
Start: 2025-05-06 | End: 2025-05-14

## 2025-05-06 RX ORDER — ENOXAPARIN SODIUM 100 MG/ML
40 INJECTION SUBCUTANEOUS DAILY
Status: DISCONTINUED | OUTPATIENT
Start: 2025-05-06 | End: 2025-05-09

## 2025-05-06 RX ORDER — ONDANSETRON 4 MG/1
4 TABLET, ORALLY DISINTEGRATING ORAL EVERY 8 HOURS PRN
Status: DISCONTINUED | OUTPATIENT
Start: 2025-05-06 | End: 2025-05-19 | Stop reason: HOSPADM

## 2025-05-06 RX ORDER — TRAMADOL HYDROCHLORIDE 50 MG/1
50 TABLET ORAL EVERY 6 HOURS PRN
Status: DISCONTINUED | OUTPATIENT
Start: 2025-05-06 | End: 2025-05-07

## 2025-05-06 RX ORDER — IOPAMIDOL 755 MG/ML
70 INJECTION, SOLUTION INTRAVASCULAR
Status: COMPLETED | OUTPATIENT
Start: 2025-05-06 | End: 2025-05-06

## 2025-05-06 RX ORDER — SODIUM CHLORIDE 0.9 % (FLUSH) 0.9 %
5-40 SYRINGE (ML) INJECTION PRN
Status: DISCONTINUED | OUTPATIENT
Start: 2025-05-06 | End: 2025-05-19 | Stop reason: HOSPADM

## 2025-05-06 RX ADMIN — SODIUM CHLORIDE: 0.9 INJECTION, SOLUTION INTRAVENOUS at 03:04

## 2025-05-06 RX ADMIN — TIZANIDINE 4 MG: 4 TABLET ORAL at 08:45

## 2025-05-06 RX ADMIN — CEFEPIME 2000 MG: 2 INJECTION, POWDER, FOR SOLUTION INTRAVENOUS at 22:30

## 2025-05-06 RX ADMIN — SODIUM CHLORIDE, PRESERVATIVE FREE 10 ML: 5 INJECTION INTRAVENOUS at 08:55

## 2025-05-06 RX ADMIN — SODIUM CHLORIDE 2259 ML: 0.9 INJECTION, SOLUTION INTRAVENOUS at 00:55

## 2025-05-06 RX ADMIN — CEFEPIME 2000 MG: 2 INJECTION, POWDER, FOR SOLUTION INTRAVENOUS at 06:22

## 2025-05-06 RX ADMIN — CEFEPIME 2000 MG: 2 INJECTION, POWDER, FOR SOLUTION INTRAVENOUS at 00:56

## 2025-05-06 RX ADMIN — ACETAMINOPHEN 650 MG: 325 TABLET ORAL at 08:35

## 2025-05-06 RX ADMIN — VANCOMYCIN HYDROCHLORIDE 2250 MG: 10 INJECTION, POWDER, LYOPHILIZED, FOR SOLUTION INTRAVENOUS at 01:24

## 2025-05-06 RX ADMIN — PRAVASTATIN SODIUM 10 MG: 20 TABLET ORAL at 08:35

## 2025-05-06 RX ADMIN — TRAMADOL HYDROCHLORIDE 50 MG: 50 TABLET, COATED ORAL at 20:15

## 2025-05-06 RX ADMIN — INSULIN GLARGINE 24 UNITS: 100 INJECTION, SOLUTION SUBCUTANEOUS at 08:36

## 2025-05-06 RX ADMIN — TRAMADOL HYDROCHLORIDE 50 MG: 50 TABLET, COATED ORAL at 08:45

## 2025-05-06 RX ADMIN — VANCOMYCIN HYDROCHLORIDE 1500 MG: 10 INJECTION, POWDER, LYOPHILIZED, FOR SOLUTION INTRAVENOUS at 12:04

## 2025-05-06 RX ADMIN — TIZANIDINE 4 MG: 4 TABLET ORAL at 20:15

## 2025-05-06 RX ADMIN — IOPAMIDOL 70 ML: 755 INJECTION, SOLUTION INTRAVENOUS at 00:36

## 2025-05-06 RX ADMIN — ENOXAPARIN SODIUM 40 MG: 100 INJECTION SUBCUTANEOUS at 08:35

## 2025-05-06 RX ADMIN — CEFEPIME 2000 MG: 2 INJECTION, POWDER, FOR SOLUTION INTRAVENOUS at 15:30

## 2025-05-06 ASSESSMENT — PAIN DESCRIPTION - DESCRIPTORS: DESCRIPTORS: ACHING

## 2025-05-06 ASSESSMENT — PAIN SCALES - GENERAL
PAINLEVEL_OUTOF10: 6
PAINLEVEL_OUTOF10: 4

## 2025-05-06 ASSESSMENT — PAIN DESCRIPTION - ORIENTATION: ORIENTATION: MID

## 2025-05-06 ASSESSMENT — PAIN DESCRIPTION - LOCATION: LOCATION: GENERALIZED

## 2025-05-06 NOTE — PROGRESS NOTES
Occupational Therapy  Facility/Department: [facility] 4 ONCOLOGY UNIT  Occupational Therapy Initial Assessment    Name: Rodney Garcia  : 1960  MRN: [id number]  Date of Service: 2025    Discharge Recommendations:  Patient would benefit from continued therapy after discharge          Patient Diagnosis(es): The primary encounter diagnosis was Sepsis, due to unspecified organism, unspecified whether acute organ dysfunction present (HCC). Diagnoses of Altered mental status, unspecified altered mental status type, Generalized weakness, Left hip pain, Rhinovirus infection, and Syncope, unspecified syncope type were also pertinent to this visit.  Past Medical History:  has no past medical history on file.  Past Surgical History:  has a past surgical history that includes Ankle fracture surgery and shoulder surgery ().    Treatment Diagnosis: Acute hypoxic respiratory failure      Assessment  Assessment: Evaluation completed and treatment initiated. Pt would benefit from further skilled occupational therapy services to upgrade safety and functional independence.  Treatment Diagnosis: Acute hypoxic respiratory failure  REQUIRES OT FOLLOW-UP: Yes  Activity Tolerance  Activity Tolerance: Patient limited by fatigue     Plan  Occupational Therapy Plan  Times Per Week: 3-5    Restrictions  Restrictions/Precautions  Restrictions/Precautions: Isolation, Fall Risk  Activity Level: Up with Assist  Required Braces or Orthoses?: No  Position Activity Restriction  Other Position/Activity Restrictions: droplet precautions-rhinovirus    Subjective  General  Chart Reviewed: Yes  Patient assessed for rehabilitation services?: Yes  Family / Caregiver Present: No  Diagnosis: Acute hypoxic respiratory failure  Subjective  Subjective: Pt in bed upon arrival and agreeable to participate in therapy with encouragement     Social/Functional History  Social/Functional History  Lives With: Alone  Type of Home: House  Home Layout: Two level, Able  to Live on Main level with bedroom/bathroom  Home Access: Stairs to enter with rails  Entrance Stairs - Number of Steps: 4  Bathroom Shower/Tub: Walk-in shower  Bathroom Equipment: None  Home Equipment: None  Has the patient had two or more falls in the past year or any fall with injury in the past year?: Yes  Prior Level of Assist for ADLs: Independent  Prior Level of Assist for Homemaking: Independent  Prior Level of Assist for Ambulation: Independent household ambulator, with or without device  Prior Level of Assist for Transfers: Independent  Active : Yes    Objective  Temp: 97.7 °F (36.5 °C)  Pulse: 84  Heart Rate Source: Monitor  Respirations: 17  SpO2: 93 %  O2 Device: None (Room air)  BP: 120/62  MAP (Calculated): 81  BP Location: Right upper arm  BP Method: Automatic  Patient Position: Supine          Observation/Palpation  Posture: Fair  Observation: pt diaphoretic in bed (RN alerted)  Safety Devices  Type of Devices: Call light within reach;Heels elevated for pressure relief;Bed alarm in place;Left in bed;Nurse notified;Patient at risk for falls     Toilet Transfers  Toilet Transfer: Unable to assess  Toilet Transfers Comments: Will likely require SS for toilet t/f     ADL  Feeding: Independent;Setup  Grooming: Supervision  UE Bathing: Contact guard assistance  LE Bathing: Maximum assistance;Moderate assistance  UE Dressing: Contact guard assistance  LE Dressing: Maximum assistance;Moderate assistance  Putting On/Taking Off Footwear: Dependent/Total  Toileting: Moderate assistance;Maximum assistance     Activity Tolerance  Activity Tolerance: Patient limited by fatigue;Patient limited by pain;Treatment limited secondary to decreased cognition;Patient limited by endurance;Other (comment)  Activity Tolerance Comments: easily falls asleep  Bed mobility  Supine to Sit: Partial/Moderate assistance;Substantial/Maximal assistance;2 Person assistance  Sit to Supine: Partial/Moderate assistance  Scooting:

## 2025-05-06 NOTE — ED NOTES
ED TO INPATIENT SBAR HANDOFF    Patient Name: Rodney Garcia   : 1960  64 y.o.   Family/Caregiver Present: No  Code Status Order: Full Code    C-SSRS: Risk of Suicide: No Risk  Sitter No  Restraints:         Situation  Chief Complaint:   Chief Complaint   Patient presents with    Fall     Left hip pain     Patient Diagnosis: Acute hypoxic respiratory failure (HCC) [J96.01]     Brief Description of Patient's Condition: Family at the bedside . Patient states that he has had recent episodes of falling. He states that he was diagnosed a few weeks ago with a TIA. He was on a plavix regimen but he states that this was stopped on Friday of this past week. Today he states he has fallen at least twice. The second time he had a witnessed fall with his wife present he had just taken a shower and fell off the stool in the bathroom and hit his head. She denies any LOC. He complains of left hip pain. No obvious deformity is noted but he has a palpable raised are on the left hip. Wife states that he is scheduled for a halter monitor to be placed . The wife also states that he did have a left hip ultrasound done earlier today. Wife states that he told here that he had fell earlier this morning . She says she went to work and discovered him around 6pm this evening after the second fall   Mental Status: oriented and alert  Arrived from: home    Imaging:   CTA PULMONARY W CONTRAST   Final Result       1. No pulmonary embolus evident   2. Bibasilar atelectasis   3. Coronary atherosclerotic disease.   4. Low-density liver lesion in the left lobe measuring 1.3 cm.  Indeterminate on this exam.   5. Mild thickening of the distal esophageal wall.  Correlate with any evidence of reflux on clinical history.                   All CT scans are performed using dose optimization techniques as appropriate to the performed exam and includes at least one of the following:  Automated exposure control, adjustment of the mA and/or kV according  to size, and the use of iterative    reconstruction technique.        All CT scans are performed using dose optimization techniques as appropriate to the performed exam and include    at least one of the following: Automated exposure control, adjustment of the mA and/or kV according to size, and the use of iterative reconstruction technique.        ______________________________________    Electronically signed by: OPAL CHOU M.D.   Date:     05/06/2025   Time:    01:05       CT LUMBAR SPINE WO CONTRAST   Final Result   Impression:       No acute lumbar abnormality       Degenerative changes as described.  No severe central canal stenosis. Severe bilateral foraminal stenosis at L5-L6.       Six non-rib bearing lumbar elements        All CT scans are performed using dose optimization techniques as appropriate to the performed exam and include    at least one of the following: Automated exposure control, adjustment of the mA and/or kV according to size, and the use of iterative reconstruction technique.        ______________________________________    Electronically signed by: LILIA KIRK M.D.   Date:     05/06/2025   Time:    01:01       CT HIP LEFT W CONTRAST   Final Result       1. Multifocal osteoarthritis   2. Some variable density in the gluteus vickey which may reflect muscle contusion or strain   3. Superficial soft tissue swelling lateral to the hip.   4. Findings of the hamstring as noted with distal calcification.  Hamstring injury not excluded with additional fragmented enthesophyte close to the ischial tuberosity.        All CT scans are performed using dose optimization techniques as appropriate to the performed exam and include    at least one of the following: Automated exposure control, adjustment of the mA and/or kV according to size, and the use of iterative reconstruction technique.        ______________________________________    Electronically signed by: OPAL CHOU M.D.   Date:

## 2025-05-06 NOTE — PROGRESS NOTES
Physical Therapy  Facility/Department: [facility] ONCOLOGY UNIT  Physical Therapy Initial Assessment    Name: Rodney Garcia  : 1960  MRN: [id number]  Date of Service: 2025    Discharge Recommendations:  Continue to assess pending progress, 24 hour supervision or assist, Patient would benefit from continued therapy after discharge          Patient Diagnosis(es): The primary encounter diagnosis was Sepsis, due to unspecified organism, unspecified whether acute organ dysfunction present (HCC). Diagnoses of Altered mental status, unspecified altered mental status type, Generalized weakness, Left hip pain, Rhinovirus infection, and Syncope, unspecified syncope type were also pertinent to this visit.  Past Medical History:  has no past medical history on file.  Past Surgical History:  has a past surgical history that includes Ankle fracture surgery and shoulder surgery ().    Assessment  Body Structures, Functions, Activity Limitations Requiring Skilled Therapeutic Intervention: Decreased functional mobility ;Decreased ADL status;Decreased ROM;Decreased cognition;Decreased safe awareness;Decreased body mechanics;Decreased strength;Decreased endurance;Decreased balance;Decreased posture;Increased pain;Decreased coordination  Assessment: Pt. will benefit from cont. PT to decrease impairments. Pt. a fall risk and should not attempt mobility on her own. Pt needs 24 hr care. Pt. was diaphoretic with mobility. RN notified. Pt. encouraged to sit up in chair, but declined today.  Treatment Diagnosis: impaired gait and mobility  Therapy Prognosis: Good  Decision Making: Medium Complexity  Barriers to Learning: cognition, drowsy  Requires PT Follow-Up: Yes  Activity Tolerance  Activity Tolerance: Patient limited by fatigue;Patient limited by pain;Treatment limited secondary to decreased cognition;Patient limited by endurance;Other (comment)  Activity Tolerance Comments: easily falls asleep    Plan  Physical Therapy  on EOB x 5 mins SBA, diaphoretic  Transfers  Sit to Stand: Partial/Moderate assistance;Minimal Assistance;2 Person Assistance  Stand to Sit: Partial/Moderate assistance;Minimal Assistance;2 Person Assistance  Comment: unable to take steps  Ambulation  Comments: unable to take steps     Balance  Posture: Poor  Sitting - Static: Fair;+  Sitting - Dynamic: Fair;-  Standing - Static: Poor;+  Standing - Dynamic: Poor          OutComes Score                                                  AM-PAC - Mobility              Tinneti Score       Goals  Short Term Goals  Time Frame for Short Term Goals: 2 wks  Short Term Goal 1: supine to sit indep  Short Term Goal 2: sit to stand indep  Short Term Goal 3: amb. 100' with RW SBA  Short Term Goal 4: bed to chair SBA  Patient Goals   Patient Goals : go home       Education  Patient Education  Education Given To: Patient  Education Provided: Role of Therapy;Plan of Care;Transfer Training;Mobility Training;Fall Prevention Strategies  Education Provided Comments: use of call light, staff A  Education Method: Demonstration;Verbal  Barriers to Learning: Cognition  Education Outcome: Continued education needed;Unable to verbalize;Unable to demonstrate understanding      Therapy Time   Individual Concurrent Group Co-treatment   Time In           Time Out           Minutes                   Mel Rivera, PT     Electronically signed by Mel Rivera, PT on 5/6/2025 at 1:48 PM

## 2025-05-06 NOTE — PROGRESS NOTES
[facility]   Pharmacy Pharmacokinetic Monitoring Service - Vancomycin     Rodney Garcia is a 64 y.o. male starting on vancomycin therapy for sepsis. Pharmacy consulted by Dr. Farley for monitoring and adjustment.    Target Concentration: Goal AUC/HENRI 400-600 mg*hr/L    Additional Antimicrobials: Cefepime    Pertinent Laboratory Values:   Wt Readings from Last 1 Encounters:   05/05/25 94.8 kg (209 lb)     Temp Readings from Last 1 Encounters:   05/05/25 99.1 °F (37.3 °C)     Estimated Creatinine Clearance: 178 mL/min (A) (based on SCr of 0.5 mg/dL (L)).  Recent Labs     05/05/25  2255   CREATININE 0.5*   BUN 13   WBC 14.9*     Procalcitonin: None    Pertinent Cultures:  Culture Date Source Results   5/5/25 Respiratory panel Sent   5/5/25 Blood  x2 Sent   MRSA Nasal Swab: not ordered. Order placed by pharmacy.    Plan:  Dosing recommendations based on Bayesian software  Start vancomycin 2250 mg IV once, followed by 1500 mg IV q 12 hrs   Anticipated AUC of 467 and trough concentration of 10.5 at steady state  Renal labs as indicated   Vancomycin concentration ordered for 5/7 @ 1200   Pharmacy will continue to monitor patient and adjust therapy as indicated    Thank you for the consult,  DONALDO ALVAREZ RPH  5/6/2025 12:13 AM

## 2025-05-06 NOTE — PLAN OF CARE
Problem: Pain  Goal: Verbalizes/displays adequate comfort level or baseline comfort level  Outcome: Progressing     Problem: Safety - Adult  Goal: Free from fall injury  Outcome: Progressing  Flowsheets (Taken 5/6/2025 1045)  Free From Fall Injury: Instruct family/caregiver on patient safety

## 2025-05-06 NOTE — PLAN OF CARE
Patient admitted this a.m.  Writer assumes care of patient this a.m.  Patient seen and examined.  Doing well.  Laying comfortably in bed in no acute distress.  Continue current workup and management

## 2025-05-06 NOTE — ED PROVIDER NOTES
[facility] 4 ONCOLOGY UNIT  eMERGENCY dEPARTMENT eNCOUnter      Pt Name: Rodney Garcia  MRN: [id number]  Birthdate 1960  Date of evaluation: 5/5/2025  Provider: Mel Farley MD    CHIEF COMPLAINT       Chief Complaint   Patient presents with    Fall     Left hip pain         HISTORY OF PRESENT ILLNESS   (Location/Symptom, Timing/Onset,Context/Setting, Quality, Duration, Modifying Factors, Severity)  Note limiting factors.   Rodney Garcia is a 64 y.o. male who presents to the emergency department with left hip pain and fall.  Patient states that he has been having left hip pain and low back pain for the past 5 days.  Patient states that he has \"a knot\" overlying his left hip.  Patient had an ultrasound performed of his left hip at 11 AM today.  Patient later fell around 6 PM.  Patient states \"I lost my balance.\"  Patient was so weak that his wife states he could not get up.\"  Patient states that he was told that he would need a CT scan of his left hip before they can decide what the ultrasound results meant.  Patient denies head injury or loss of consciousness.  Patient states \"I have back pain over my L4-L5 region.\"    HPI    NursingNotes were reviewed.    REVIEW OF SYSTEMS    (2-9 systems for level 4, 10 or more for level 5)     Review of Systems   Constitutional:  Negative for chills and fever.   HENT:  Negative for congestion and rhinorrhea.    Respiratory:  Negative for cough and shortness of breath.    Cardiovascular:  Negative for chest pain and palpitations.   Gastrointestinal:  Negative for abdominal pain, diarrhea, nausea and vomiting.   Genitourinary:  Negative for difficulty urinating and dysuria.   Musculoskeletal:  Positive for back pain and gait problem. Negative for neck pain.        Left hip pain and swelling   Skin:  Negative for color change and rash.   Neurological:  Positive for weakness. Negative for syncope and numbness.   Psychiatric/Behavioral:  Negative for agitation and confusion.    All  reviewed    64-year-old male with history of recent falls and generalized weakness presents to the emergency department after falling at home.  Lab, EKG, and radiology results reviewed.  Patient is noted to be tachycardic and tachypneic on arrival.  Patient is ill in appearance.  Patient is started on IV fluids and IV antibiotics including cefepime and vancomycin.  Patient's white blood cell count is elevated at 14.9 with 87% neutrophils.  CRP is 453.  Sed rate 66.  Lactic acid is 2.0.  Patient is positive for rhinovirus infection.  Discussed with , hospitalist, who will admit patient for further evaluation and treatment.    Is this patient to be included in the SEP-1 core measure? Yes SEP-1 CORE MEASURE DATA      Sepsis Criteria   Severe Sepsis Criteria   Septic Shock Criteria       Must meet 2:    []Temp >100.9 F (38.3 C) or < 96.8 F (36 C)  [x]HR > 90  []RR > 20  [x]WBC > 12 or < 4 or 10% bands    AND:    [x] Infection Confirmed or Suspected.     Must meet 1:    []Lactate > 2       or   []Signs of Organ Dysfunction:    - SBP < 90 or MAP < 65  -Creatinine > 2 or increased from baseline  -Urine Output < 0.5 ml/kg/hr  -Bilirubin > 2  -INR > 1.5 (not anticoagulated)  -Platelets < 100,000  -Acute Respiratory Failure as evidenced by new need for NIPPV or mechanical ventilation   Must meet 1:    []Lactate > 4        or   []SBP < 90 or MAP < 65 for at least two readings in the first hour after fluid bolus administration    []Vasopressors initiated (if hypotension persists after fluid resuscitation)   Patient Vitals for the past 6 hrs:   BP Temp Pulse Resp SpO2 Height Weight Percent Weight Change   05/06/25 0300 (!) 160/71 98.4 °F (36.9 °C) (!) 101 20 -- -- -- --   05/06/25 0358 -- -- (!) 101 -- -- -- -- --   05/06/25 0733 (!) 160/71 -- -- -- -- 1.803 m (5' 11\") 94.8 kg (209 lb) 0   05/06/25 0806 136/63 97.5 °F (36.4 °C) (!) 106 16 96 % -- -- --   05/06/25 0831 -- -- (!) 108 17 98 % -- -- --      Recent Labs

## 2025-05-06 NOTE — H&P
Hospitalist: History and Physical    Date: 2025 Time: 1:52 AM    Name: Rodney Garcia : 1960 MRN: [id number]    Code Status: Full Code No additional code details    PCP: Tu Puente MD    Patient's Chief Complaint Is: Weakness, multiple falls  HPI: Patient is a 64 y.o. male with a past medical history of  CVA, PFO, DM, HNT ad HLD . Patient presented to [facility] ED due weakness and inability to ambulate. He fell at 600 PM this evening and was unable to get off the ground. Daughter said that he feels very weak to the point he cannot move. He recently seen at [facility] where he was diagnosed with CVA and sepsis. Patient denies fever, chills, visual problem, dysphagia,  shortness of breath, chest pain, cough, bowel or bladder incontinence, abdominal pain, diarrhea or dysuria.   Upon evaluation in ED, he was found to have HR of 120, RR of 30, oxygen saturation of 90 %.  Blood work obtained in ED significant for leukocytosis, elevated procal, elevated lactic acid, hyponatremia and hyperglycemia.    CTA chest reported as :  1. No pulmonary embolus evident  2. Bibasilar atelectasis  3. Coronary atherosclerotic disease.  4. Low-density liver lesion in the left lobe measuring 1.3 cm.  Indeterminate on this exam.  5. Mild thickening of the distal esophageal wall.  Correlate with any evidence of reflux on clinical history.    CT head :  1. No acute intracranial abnormality. Chronic changes as described.     CT Lumbar spine:  No acute lumbar abnormality     Degenerative changes as described.  No severe central canal stenosis. Severe bilateral foraminal stenosis at L5-L6.    CT left hip :  1. Multifocal osteoarthritis  2. Some variable density in the gluteus vickey which may reflect muscle contusion or strain  3. Superficial soft tissue swelling lateral to the hip.  4. Findings of the hamstring as noted with distal calcification.  Hamstring injury not excluded with additional fragmented  from Contacting Others or Doint Things Outside the Home?: Not on file     Physical Sign of Abuse Present: Not on file   Housing Stability: Low Risk  (4/7/2025)    Received from [facility]    Housing Stability Vital Sign     Unable to Pay for Housing in the Last Year: No     Number of Times Moved in the Last Year: 0     Homeless in the Last Year: No     Allergies   Allergen Reactions    Other Other (See Comments)     Prior to Admission medications    Medication Sig Start Date End Date Taking? Authorizing Provider   traMADol (ULTRAM) 50 MG tablet Take 1 tablet by mouth every 6 hours as needed. 5/5/25 5/10/25 Yes Cameron Wong MD   tiZANidine (ZANAFLEX) 4 MG tablet Take 1 tablet by mouth every 6 hours as needed 5/5/25  Yes Cameron Wong MD   celecoxib (CELEBREX) 200 MG capsule TAKE 1 CAPSULE BY MOUTH TWICE DAILY WITH FOOD 2/2/25   Cameron Wong MD   empagliflozin (JARDIANCE) 10 MG tablet Take 1 tablet by mouth daily    Cameron Wong MD   glipiZIDE-metFORMIN (METAGLIP) 2.5-250 MG per tablet Take 1 tablet by mouth 2 times daily (before meals)    Cameron Wong MD   lisinopril (PRINIVIL;ZESTRIL) 5 MG tablet Take 1 tablet by mouth daily    Cameron Wong MD   pravastatin (PRAVACHOL) 10 MG tablet Take 1 tablet by mouth daily    ProviderCameron MD I have reviewed all pertinent history. Prior medical records and laboratory evaluation reviewed. Imaging independently reviewed.    Review of Systems:   As per HPI, otherwise all other ROS performed and found to be negative at this time.    Physical Exam:  Vitals:    05/06/25 0101   BP: 136/82   Pulse: (!) 119   Resp: 18   Temp: 99.7 °F (37.6 °C)   SpO2: 93%     CONSTITUTIONAL: Appears well developed and nourished, well groomed, in no apparent distress.   PSYCH: Judgement and insight are normal. Mental status alert and oriented to person, place and time. Mood and affect are normal  EYES: MARIAN, symmetrical.

## 2025-05-06 NOTE — ED NOTES
Family at the bedside . Patient states that he has had recent episodes of falling. He states that he was diagnosed a few weeks ago with a TIA. He was on a plavix regimen but he states that this was stopped on Friday of this past week. Today he states he has fallen at least twice. The second time he had a witnessed fall with his wife present he had just taken a shower and fell off the stool in the bathroom and hit his head. She denies any LOC. He complains of left hip pain. No obvious deformity is noted but he has a palpable raised are on the left hip. Wife states that he is scheduled for a halter monitor to be placed . The wife also states that he did have a left hip ultrasound done earlier today. Wife states that he told here that he had fell earlier this morning . She says she went to work and discovered him around 6pm this evening after the second fall

## 2025-05-06 NOTE — PROGRESS NOTES
4 Eyes Skin Assessment     NAME:  Rodney Garcia  YOB: 1960  MEDICAL RECORD NUMBER:  [id number]    The patient is being assessed for  Admission    I agree that at least one RN has performed a thorough Head to Toe Skin Assessment on the patient. ALL assessment sites listed below have been assessed.      Areas assessed by both nurses:    Head, Face, Ears, Shoulders, Back, Chest, Arms, Elbows, Hands, Sacrum. Buttock, Coccyx, Ischium, Legs. Feet and Heels, and Under Medical Devices         Does the Patient have a Wound? No noted wound(s)       Lorenzo Prevention initiated by RN: No  Wound Care Orders initiated by RN: No    Pressure Injury (Stage 3,4, Unstageable, DTI, NWPT, and Complex wounds) if present, place Wound referral order by RN under : No    New Ostomies, if present place, Ostomy referral order under : No     Nurse 1 eSignature: Electronically signed by Hermila Leal RN on 5/6/25 at 1:33 PM CDT    **SHARE this note so that the co-signing nurse can place an eSignature**    Nurse 2 eSignature: Electronically signed by Jenny Leal RN on 5/6/25 at 1:35 PM CDT

## 2025-05-07 ENCOUNTER — APPOINTMENT (OUTPATIENT)
Dept: ULTRASOUND IMAGING | Age: 65
DRG: 853 | End: 2025-05-07
Payer: COMMERCIAL

## 2025-05-07 ENCOUNTER — OUTSIDE FACILITY SERVICE (OUTPATIENT)
Age: 65
End: 2025-05-07
Payer: COMMERCIAL

## 2025-05-07 PROBLEM — R26.2 AMBULATORY DYSFUNCTION: Status: ACTIVE | Noted: 2025-05-07

## 2025-05-07 PROBLEM — E11.9 DIABETES (HCC): Status: ACTIVE | Noted: 2025-05-07

## 2025-05-07 PROBLEM — L03.116 CELLULITIS OF LEFT HIP: Status: ACTIVE | Noted: 2025-05-07

## 2025-05-07 LAB
A BAUMANNII DNA BLD POS QL NAA+NON-PROBE: NOT DETECTED
ANION GAP SERPL CALCULATED.3IONS-SCNC: 14 MMOL/L (ref 8–16)
BASOPHILS # BLD: 0.1 K/UL (ref 0–0.2)
BASOPHILS NFR BLD: 0.5 % (ref 0–1)
BUN SERPL-MCNC: 16 MG/DL (ref 8–23)
C ALBICANS DNA BLD POS QL NAA+NON-PROBE: NOT DETECTED
C AURIS DNA BLD POS QL NAA+PROBE: NOT DETECTED
C GLABRATA DNA BLD POS QL NAA+NON-PROBE: NOT DETECTED
C KRUSEI DNA BLD POS QL NAA+NON-PROBE: NOT DETECTED
C PARAP DNA BLD POS QL NAA+NON-PROBE: NOT DETECTED
C TROPICLS DNA BLD POS QL NAA+NON-PROBE: NOT DETECTED
CALCIUM SERPL-MCNC: 8.7 MG/DL (ref 8.8–10.2)
CHLORIDE SERPL-SCNC: 101 MMOL/L (ref 98–107)
CO2 SERPL-SCNC: 20 MMOL/L (ref 22–29)
CREAT SERPL-MCNC: 0.5 MG/DL (ref 0.7–1.2)
CRYPTOCOCCUS NEOFORMANS/GATTII BY PCR: NOT DETECTED
E CLOAC COMP DNA BLD POS NAA+NON-PROBE: NOT DETECTED
E COLI DNA BLD POS QL NAA+NON-PROBE: NOT DETECTED
E FAECALIS DNA BLD POS QL NAA+PROBE: NOT DETECTED
E FAECIUM DNA BLD POS QL NAA+PROBE: NOT DETECTED
ENTEROBACT DNA BLD POS QL NAA+NON-PROBE: NOT DETECTED
ENTEROCOC DNA BLD POS QL NAA+NON-PROBE: NOT DETECTED
EOSINOPHIL # BLD: 0.2 K/UL (ref 0–0.6)
EOSINOPHIL NFR BLD: 1.5 % (ref 0–5)
ERYTHROCYTE [DISTWIDTH] IN BLOOD BY AUTOMATED COUNT: 13.7 % (ref 11.5–14.5)
GLUCOSE BLD-MCNC: 157 MG/DL (ref 70–99)
GLUCOSE BLD-MCNC: 187 MG/DL (ref 70–99)
GLUCOSE BLD-MCNC: 222 MG/DL (ref 70–99)
GLUCOSE BLD-MCNC: 236 MG/DL (ref 70–99)
GLUCOSE SERPL-MCNC: 149 MG/DL (ref 70–99)
GN BLD CULTURE PNL BLD POS NAA+PROBE: NOT DETECTED
GP B STREP DNA BLD POS QL NAA+NON-PROBE: NOT DETECTED
HBA1C MFR BLD: 9.3 % (ref 4–5.6)
HCT VFR BLD AUTO: 35.2 % (ref 42–52)
HGB BLD-MCNC: 11.3 G/DL (ref 14–18)
IMM GRANULOCYTES # BLD: 0.3 K/UL
K OXYTOCA DNA BLD POS QL NAA+NON-PROBE: NOT DETECTED
K PNEUMON DNA SPEC QL NAA+PROBE: NOT DETECTED
K. AEROGENES DNA SPEC QL NAA+PROBE: NOT DETECTED
L MONOCYTOG DNA BLD POS QL NAA+NON-PROBE: NOT DETECTED
LYMPHOCYTES # BLD: 1.6 K/UL (ref 1.1–4.5)
LYMPHOCYTES NFR BLD: 11.6 % (ref 20–40)
MAGNESIUM SERPL-MCNC: 1.8 MG/DL (ref 1.6–2.4)
MCH RBC QN AUTO: 29.7 PG (ref 27–31)
MCHC RBC AUTO-ENTMCNC: 32.1 G/DL (ref 33–37)
MCV RBC AUTO: 92.4 FL (ref 80–94)
MONOCYTES # BLD: 1.1 K/UL (ref 0–0.9)
MONOCYTES NFR BLD: 7.7 % (ref 0–10)
N MEN DNA BLD POS QL NAA+NON-PROBE: NOT DETECTED
NEUTROPHILS # BLD: 10.5 K/UL (ref 1.5–7.5)
NEUTS SEG NFR BLD: 76.8 % (ref 50–65)
P AERUGINOSA DNA BLD POS NAA+NON-PROBE: NOT DETECTED
PERFORMED ON: ABNORMAL
PLATELET # BLD AUTO: 339 K/UL (ref 130–400)
PMV BLD AUTO: 9.2 FL (ref 9.4–12.4)
POTASSIUM SERPL-SCNC: 3.3 MMOL/L (ref 3.5–5)
PROTEUS SP DNA BLD POS QL NAA+NON-PROBE: NOT DETECTED
RBC # BLD AUTO: 3.81 M/UL (ref 4.7–6.1)
S AUREUS DNA BLD POS QL NAA+NON-PROBE: NOT DETECTED
S AUREUS+CONS DNA BLD POS NAA+NON-PROBE: NOT DETECTED
S EPIDERMIDIS DNA BLD POS QL NAA+PROBE: NOT DETECTED
S LUGDUNENSIS DNA BLD POS QL NAA+PROBE: NOT DETECTED
S MALTOPH DNA BLD POS QL NAA+PROBE: NOT DETECTED
S MARCESCENS DNA BLD POS NAA+NON-PROBE: NOT DETECTED
S PNEUM DNA BLD POS QL NAA+NON-PROBE: NOT DETECTED
S PYO DNA BLD POS QL NAA+NON-PROBE: NOT DETECTED
SALMONELLA DNA BLD POS QL NAA+PROBE: NOT DETECTED
SODIUM SERPL-SCNC: 135 MMOL/L (ref 136–145)
STREPTOCOCCUS DNA BLD POS NAA+NON-PROBE: DETECTED
VANCOMYCIN TROUGH SERPL-MCNC: 6.4 UG/ML (ref 10–20)
WBC # BLD AUTO: 13.6 K/UL (ref 4.8–10.8)

## 2025-05-07 PROCEDURE — 76882 US LMTD JT/FCL EVL NVASC XTR: CPT

## 2025-05-07 PROCEDURE — 82962 GLUCOSE BLOOD TEST: CPT

## 2025-05-07 PROCEDURE — 6370000000 HC RX 637 (ALT 250 FOR IP): Performed by: STUDENT IN AN ORGANIZED HEALTH CARE EDUCATION/TRAINING PROGRAM

## 2025-05-07 PROCEDURE — 6360000002 HC RX W HCPCS: Performed by: PEDIATRICS

## 2025-05-07 PROCEDURE — 1200000000 HC SEMI PRIVATE

## 2025-05-07 PROCEDURE — 87077 CULTURE AEROBIC IDENTIFY: CPT

## 2025-05-07 PROCEDURE — 2580000003 HC RX 258: Performed by: STUDENT IN AN ORGANIZED HEALTH CARE EDUCATION/TRAINING PROGRAM

## 2025-05-07 PROCEDURE — 94760 N-INVAS EAR/PLS OXIMETRY 1: CPT

## 2025-05-07 PROCEDURE — 36415 COLL VENOUS BLD VENIPUNCTURE: CPT

## 2025-05-07 PROCEDURE — 2500000003 HC RX 250 WO HCPCS: Performed by: STUDENT IN AN ORGANIZED HEALTH CARE EDUCATION/TRAINING PROGRAM

## 2025-05-07 PROCEDURE — 6370000000 HC RX 637 (ALT 250 FOR IP): Performed by: NURSE PRACTITIONER

## 2025-05-07 PROCEDURE — 6360000002 HC RX W HCPCS: Performed by: STUDENT IN AN ORGANIZED HEALTH CARE EDUCATION/TRAINING PROGRAM

## 2025-05-07 PROCEDURE — 80202 ASSAY OF VANCOMYCIN: CPT

## 2025-05-07 PROCEDURE — 87040 BLOOD CULTURE FOR BACTERIA: CPT

## 2025-05-07 PROCEDURE — 80048 BASIC METABOLIC PNL TOTAL CA: CPT

## 2025-05-07 PROCEDURE — 83735 ASSAY OF MAGNESIUM: CPT

## 2025-05-07 PROCEDURE — 83036 HEMOGLOBIN GLYCOSYLATED A1C: CPT

## 2025-05-07 PROCEDURE — 2580000003 HC RX 258: Performed by: PEDIATRICS

## 2025-05-07 PROCEDURE — 6370000000 HC RX 637 (ALT 250 FOR IP): Performed by: INTERNAL MEDICINE

## 2025-05-07 PROCEDURE — 99222 1ST HOSP IP/OBS MODERATE 55: CPT

## 2025-05-07 PROCEDURE — 85025 COMPLETE CBC W/AUTO DIFF WBC: CPT

## 2025-05-07 RX ORDER — POTASSIUM CHLORIDE 7.45 MG/ML
10 INJECTION INTRAVENOUS PRN
Status: DISCONTINUED | OUTPATIENT
Start: 2025-05-07 | End: 2025-05-19 | Stop reason: HOSPADM

## 2025-05-07 RX ORDER — OXYCODONE AND ACETAMINOPHEN 10; 325 MG/1; MG/1
1 TABLET ORAL EVERY 4 HOURS PRN
Refills: 0 | Status: DISCONTINUED | OUTPATIENT
Start: 2025-05-07 | End: 2025-05-11

## 2025-05-07 RX ORDER — POTASSIUM CHLORIDE 1500 MG/1
40 TABLET, EXTENDED RELEASE ORAL PRN
Status: DISCONTINUED | OUTPATIENT
Start: 2025-05-07 | End: 2025-05-19 | Stop reason: HOSPADM

## 2025-05-07 RX ADMIN — POTASSIUM CHLORIDE 40 MEQ: 1500 TABLET, EXTENDED RELEASE ORAL at 09:09

## 2025-05-07 RX ADMIN — VANCOMYCIN HYDROCHLORIDE 1500 MG: 10 INJECTION, POWDER, LYOPHILIZED, FOR SOLUTION INTRAVENOUS at 01:14

## 2025-05-07 RX ADMIN — PRAVASTATIN SODIUM 10 MG: 20 TABLET ORAL at 08:04

## 2025-05-07 RX ADMIN — VANCOMYCIN HYDROCHLORIDE 1250 MG: 10 INJECTION, POWDER, LYOPHILIZED, FOR SOLUTION INTRAVENOUS at 13:39

## 2025-05-07 RX ADMIN — INSULIN LISPRO 2 UNITS: 100 INJECTION, SOLUTION INTRAVENOUS; SUBCUTANEOUS at 21:55

## 2025-05-07 RX ADMIN — SODIUM CHLORIDE, PRESERVATIVE FREE 10 ML: 5 INJECTION INTRAVENOUS at 21:56

## 2025-05-07 RX ADMIN — CEFEPIME 2000 MG: 2 INJECTION, POWDER, FOR SOLUTION INTRAVENOUS at 08:02

## 2025-05-07 RX ADMIN — OXYCODONE AND ACETAMINOPHEN 1 TABLET: 10; 325 TABLET ORAL at 17:25

## 2025-05-07 RX ADMIN — TRAMADOL HYDROCHLORIDE 50 MG: 50 TABLET, COATED ORAL at 04:30

## 2025-05-07 RX ADMIN — TIZANIDINE 4 MG: 4 TABLET ORAL at 23:31

## 2025-05-07 RX ADMIN — TIZANIDINE 4 MG: 4 TABLET ORAL at 04:31

## 2025-05-07 RX ADMIN — TIZANIDINE 4 MG: 4 TABLET ORAL at 10:24

## 2025-05-07 RX ADMIN — OXYCODONE AND ACETAMINOPHEN 1 TABLET: 10; 325 TABLET ORAL at 10:22

## 2025-05-07 RX ADMIN — TIZANIDINE 4 MG: 4 TABLET ORAL at 17:31

## 2025-05-07 RX ADMIN — OXYCODONE AND ACETAMINOPHEN 1 TABLET: 10; 325 TABLET ORAL at 21:55

## 2025-05-07 RX ADMIN — INSULIN GLARGINE 24 UNITS: 100 INJECTION, SOLUTION SUBCUTANEOUS at 08:08

## 2025-05-07 RX ADMIN — CEFEPIME 2000 MG: 2 INJECTION, POWDER, FOR SOLUTION INTRAVENOUS at 23:33

## 2025-05-07 RX ADMIN — ACETAMINOPHEN 650 MG: 325 TABLET ORAL at 15:31

## 2025-05-07 RX ADMIN — CEFEPIME 2000 MG: 2 INJECTION, POWDER, FOR SOLUTION INTRAVENOUS at 15:32

## 2025-05-07 RX ADMIN — INSULIN LISPRO 2 UNITS: 100 INJECTION, SOLUTION INTRAVENOUS; SUBCUTANEOUS at 12:21

## 2025-05-07 RX ADMIN — ENOXAPARIN SODIUM 40 MG: 100 INJECTION SUBCUTANEOUS at 08:04

## 2025-05-07 ASSESSMENT — PAIN SCALES - GENERAL
PAINLEVEL_OUTOF10: 8
PAINLEVEL_OUTOF10: 1
PAINLEVEL_OUTOF10: 6
PAINLEVEL_OUTOF10: 6
PAINLEVEL_OUTOF10: 3

## 2025-05-07 ASSESSMENT — PAIN DESCRIPTION - DESCRIPTORS
DESCRIPTORS: ACHING;DISCOMFORT
DESCRIPTORS: ACHING;DISCOMFORT

## 2025-05-07 ASSESSMENT — PAIN DESCRIPTION - LOCATION
LOCATION: GENERALIZED
LOCATION: BACK
LOCATION: HIP
LOCATION: HIP

## 2025-05-07 ASSESSMENT — PAIN DESCRIPTION - ORIENTATION
ORIENTATION: LEFT
ORIENTATION: LEFT

## 2025-05-07 ASSESSMENT — PAIN SCALES - WONG BAKER: WONGBAKER_NUMERICALRESPONSE: HURTS LITTLE MORE

## 2025-05-07 NOTE — CONSULTS
INFECTIOUS DISEASES CONSULT NOTE    Patient:  Rodney Garcia 64 y.o. male  ROOM # [unfilled]  YOB: 1960  MRN: [id number]  CSN:  [id number]  Admit date: 5/5/2025   Admitting Physician: Curly Cleaning MD  Primary Care Physician: Tu Puente MD  REFERRING PROVIDER: No ref. provider found    Reason for Consultation: \"Strep bacteremia\"    History of Present Illness/Chief Complaint: Pleasant 64-year-old man.  Hard to get definite history from the patient.  It was hard for me to piece together from his description a clear sequence of events and time course for his symptoms.  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  He indicates he was moving some balbuena and they fell on him.  He describes being choked for a while but unable to wiggle himself out.  He indicates he went to [facility] and was evaluated.  He was treated and as best I can tell the symptoms resolved.  He did not report any hip pain or discomfort at that time.  He indicates about a week ago he noticed a knot or swelling like area along the left hip.  He describes having some weakness and just feeling somewhat unsteady.  He indicates he was able to work all week last week.  Indicates he works as a  at [employer].  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  Indicates on Friday of last week he went to a primary care physician's office to evaluate the swelling in the left hip area.  He indicates he was treated and released.  He describes them indicating no definite abscess was identified at that time.  Sounds like he had some persistent symptoms through the weekend and some ongoing problems with weakness and soreness involving the left leg.  He indicates he had a fall at home on Monday.  He indicates due to ongoing symptoms on Monday family called an ambulance and he wanted to be brought to [facility] for further evaluation.  He also indicates over the past

## 2025-05-07 NOTE — PROGRESS NOTES
[facility]   Pharmacy Pharmacokinetic Monitoring Service - Vancomycin    Consulting Provider: Dr. Farley  Indication: Sepsis  Therapeutic Target: AUC/HENRI 400-600 mg*hr/L  Day of Therapy: 2  Additional Antimicrobials: cefepime    Pertinent Laboratory Values:   Wt Readings from Last 1 Encounters:   05/06/25 94.8 kg (209 lb)     Temp Readings from Last 1 Encounters:   05/07/25 98.1 °F (36.7 °C) (Oral)     Estimated Creatinine Clearance: 178 mL/min (A) (based on SCr of 0.5 mg/dL (L)).  Recent Labs     05/06/25  0537 05/07/25  0243   CREATININE 0.5* 0.5*   BUN 12 16   WBC 14.0* 13.6*     Procalcitonin: 0.58 5/5    Pertinent Cultures:  Culture Date Source Results   5/6/25 Blood x2 G(+) cocci in chains and/or pairs resembling strep   5/6/25 Blood, molecular strep   5/7/25 Blood x2 Sent    MRSA Nasal Swab: N/A. Non-respiratory infection.    Recent vancomycin administrations                     vancomycin (VANCOCIN) 1500 mg in sodium chloride 0.9 % 250 mL IVPB (mg) 1,500 mg New Bag 05/07/25 0114     1,500 mg New Bag 05/06/25 1204    vancomycin (VANCOCIN) 2,250 mg in sodium chloride 0.9 % 500 mL IVPB (mg) 2,250 mg New Bag 05/06/25 0124                    Assessment:  Date/Time Current Dose Concentration Timing of Concentration (h) AUC   5/7/25 1250 1500mg IV q12h 6.4 10 h 48 min 375   Note: Serum concentrations collected for AUC dosing may appear elevated if collected in close proximity to the dose administered, this is not necessarily an indication of toxicity    Plan:  Current dosing regimen is sub-therapeutic  Increase dose to 1250mg IV q8h  Repeat vancomycin concentration ordered for 5/8 @ 1300   Pharmacy will continue to monitor patient and adjust therapy as indicated    Loading dose: N/A  Regimen: 1250 mg IV every 8 hours.  Start time: 13:14 on 05/07/2025  Exposure target: AUC24 (range)400-600 mg/L.hr   AIQ35-59: 442 mg/L.hr  AUC24,ss: 466 mg/L.hr  Probability of AUC24 > 400: 88 %  Ctrough,ss: 12.1

## 2025-05-07 NOTE — PLAN OF CARE
Problem: Pain  Goal: Verbalizes/displays adequate comfort level or baseline comfort level  Outcome: Progressing     Problem: Safety - Adult  Goal: Free from fall injury  Outcome: Progressing  Flowsheets (Taken 5/7/2025 1251)  Free From Fall Injury: Instruct family/caregiver on patient safety     Problem: ABCDS Injury Assessment  Goal: Absence of physical injury  Outcome: Progressing     Problem: Chronic Conditions and Co-morbidities  Goal: Patient's chronic conditions and co-morbidity symptoms are monitored and maintained or improved  Outcome: Progressing

## 2025-05-07 NOTE — PROGRESS NOTES
Occupational Therapy  Facility/Department: [facility] ONCOLOGY UNIT    NAME: Rodney Garcia  : 1960  MRN: [id number]    Date of Service: 2025    Attempt. Nursing just got pt back to bed upon arrival. Pt reports he is worn out. Will follow up at a later time.     Zoe Suárez OT  Electronically signed by Zoe Suárez OT on 2025 at 11:27 AM

## 2025-05-07 NOTE — CONSULTS
Orthopaedic Inpatient Consultation    NAME:  Rodney Garcia   :    1960  MRN:    [id number]    2025  9:31 PM        CHIEF COMPLAINT:  left hip pain      HISTORY OF PRESENT ILLNESS:   The patient is a 64 y.o. male who presents with the above complaint. He reports recent diagnosis and treatment of CVA and sepsis at [facility] that lead to residual weakness over the last few weeks. He fell on  and was unable to stand up on his own. He presented [facility] ED shortly after. His chief complaint at time of admission is left hip pain that was present prior to fall, but worsened by trauma. Left hip CT showed possible hamstring injury/avulsion and left hip XR showed possible femoral acetabular impingement. Orthopedics consulted for further management.     Past Medical History:    History reviewed. No pertinent past medical history.    Past Surgical History:        Procedure Laterality Date    ANKLE FRACTURE SURGERY      SHOULDER SURGERY         Current Medications:   Prior to Admission medications    Medication Sig Start Date End Date Taking? Authorizing Provider   traMADol (ULTRAM) 50 MG tablet Take 1 tablet by mouth every 6 hours as needed. 5/5/25 5/10/25 Yes Cameron Wong MD   celecoxib (CELEBREX) 200 MG capsule TAKE 1 CAPSULE BY MOUTH TWICE DAILY WITH FOOD 25  Yes Cameron Wong MD   lisinopril (PRINIVIL;ZESTRIL) 5 MG tablet Take 1 tablet by mouth daily   Yes Cameron Wong MD   pravastatin (PRAVACHOL) 10 MG tablet Take 1 tablet by mouth daily   Yes Cameron Wong MD   tiZANidine (ZANAFLEX) 4 MG tablet Take 1 tablet by mouth every 6 hours as needed 25   Cameron Wong MD   empagliflozin (JARDIANCE) 10 MG tablet Take 1 tablet by mouth daily    Cameron Wong MD   glipiZIDE-metFORMIN (METAGLIP) 2.5-250 MG per tablet Take 1 tablet by mouth 2 times daily (before meals)    Cameron Wong MD       Allergies:  Patient has no active

## 2025-05-07 NOTE — PROGRESS NOTES
Physical Therapy  Name: Rodney Garcia  MRN:  [id number]  Date of service:  5/7/2025    Pt in bed, nsg present    nsg staff reports that pt just got back to bed from chair and is worn out   pt declines therapy at this time  nsg staff report they used ss to tfer pt bed<>chair    Electronically signed by Mckenzie Modi PTA on 5/7/2025 at 11:15 AM

## 2025-05-07 NOTE — PROGRESS NOTES
[facility]      Progress Note    Patient:  Rodney Garcia  YOB: 1960  Date of Service: 5/7/2025  MRN: [id number]   Acct: [id number]   Primary Care Physician: Tu Puente MD  Advance Directive: Full Code  Admit Date: 5/5/2025       Hospital Day: 1    Portions of this note have been copied forward, however, updated to reflect the most current clinical status of this patient.     CHIEF COMPLAINT weakness and fall    SUBJECTIVE: Patient complaining of severe left hip pain      CUMULATIVE HOSPITAL COURSE:   Patient is 64-year-old male with past medical history of CVA PFO diabetes hypertension and hyperlipidemia.  He presented to [facility] due to weakness and inability to ambulate.  He fell around 6 PM and was unable to get up off the ground.  Daughter reports he has been so weak lately he cannot move around.  He was seen recently at [facility] where he was diagnosed with CVA and sepsis.  Patient denies fever chills visual problem dysphagia shortness of breath chest pain cough or urinary symptoms.  ER eval white count 14.9 hemoglobin 13 hematocrit 39.1 glucose 208  Pro-Brandon 0.58 hemoglobin A1c 9.3.  X-ray hip and pelvis shows moderate to severe bilateral hip osteoarthritic changes prominent femoral head neck offset may suggest cam type femoral acetabular impingement no acute fracture, CTA pulmonary no embolus bilateral atelectasis, lumbar spine shows degenerative changes but no acute process CT of the left hip shows multifocal osteoarthritis superficial soft tissues swelling in the lateral to the hip.  Ultrasound of the area was done today showed cellulitis without visible abscess.  Will consult Ortho about possible impingement.  And hamstring injury. ID consulted as well.  Patient blood culture positive in 1 bottle for strep and these were repeated today  Objective:   VITALS:  /65   Pulse 89   Temp 98.1 °F (36.7 °C) (Oral)   Resp 18   Ht 1.803 m (5' 11\")   Wt 94.8

## 2025-05-08 ENCOUNTER — APPOINTMENT (OUTPATIENT)
Dept: MRI IMAGING | Age: 65
DRG: 853 | End: 2025-05-08
Payer: COMMERCIAL

## 2025-05-08 ENCOUNTER — HOSPITAL ENCOUNTER (INPATIENT)
Age: 65
Discharge: HOME | DRG: 853 | End: 2025-05-10
Attending: INTERNAL MEDICINE
Payer: COMMERCIAL

## 2025-05-08 ENCOUNTER — APPOINTMENT (OUTPATIENT)
Dept: ULTRASOUND IMAGING | Age: 65
DRG: 853 | End: 2025-05-08
Payer: COMMERCIAL

## 2025-05-08 ENCOUNTER — APPOINTMENT (OUTPATIENT)
Age: 65
DRG: 853 | End: 2025-05-08
Attending: INTERNAL MEDICINE
Payer: COMMERCIAL

## 2025-05-08 DIAGNOSIS — J96.01 ACUTE HYPOXIC RESPIRATORY FAILURE (HCC): Primary | ICD-10-CM

## 2025-05-08 PROBLEM — I63.9 CVA (CEREBRAL VASCULAR ACCIDENT) (HCC): Status: ACTIVE | Noted: 2025-05-08

## 2025-05-08 PROBLEM — R78.81 BACTEREMIA: Status: ACTIVE | Noted: 2025-05-08

## 2025-05-08 LAB
ANION GAP SERPL CALCULATED.3IONS-SCNC: 10 MMOL/L (ref 8–16)
BASOPHILS # BLD: 0.1 K/UL (ref 0–0.2)
BASOPHILS NFR BLD: 0.7 % (ref 0–1)
BUN SERPL-MCNC: 15 MG/DL (ref 8–23)
CALCIUM SERPL-MCNC: 9 MG/DL (ref 8.8–10.2)
CHLORIDE SERPL-SCNC: 100 MMOL/L (ref 98–107)
CO2 SERPL-SCNC: 25 MMOL/L (ref 22–29)
CREAT SERPL-MCNC: 0.7 MG/DL (ref 0.7–1.2)
ECHO AV MEAN GRADIENT: 3 MMHG
ECHO AV MEAN VELOCITY: 0.8 M/S
ECHO AV PEAK GRADIENT: 6 MMHG
ECHO AV PEAK VELOCITY: 1.2 M/S
ECHO AV VTI: 25.6 CM
ECHO BSA: 2.18 M2
ECHO BSA: 2.18 M2
ECHO LV EF PHYSICIAN: 60 %
ECHO MV MAX VELOCITY: 0.6 M/S
ECHO MV MEAN GRADIENT: 1 MMHG
ECHO MV MEAN VELOCITY: 0.4 M/S
ECHO MV PEAK GRADIENT: 2 MMHG
ECHO MV VTI: 16.1 CM
EOSINOPHIL # BLD: 0.3 K/UL (ref 0–0.6)
EOSINOPHIL NFR BLD: 1.8 % (ref 0–5)
ERYTHROCYTE [DISTWIDTH] IN BLOOD BY AUTOMATED COUNT: 13.6 % (ref 11.5–14.5)
GLUCOSE BLD-MCNC: 150 MG/DL (ref 70–99)
GLUCOSE BLD-MCNC: 167 MG/DL (ref 70–99)
GLUCOSE BLD-MCNC: 174 MG/DL (ref 70–99)
GLUCOSE BLD-MCNC: 198 MG/DL (ref 70–99)
GLUCOSE BLD-MCNC: 225 MG/DL (ref 70–99)
GLUCOSE SERPL-MCNC: 188 MG/DL (ref 70–99)
HCT VFR BLD AUTO: 33.5 % (ref 42–52)
HGB BLD-MCNC: 10.7 G/DL (ref 14–18)
IMM GRANULOCYTES # BLD: 0.6 K/UL
LYMPHOCYTES # BLD: 1.4 K/UL (ref 1.1–4.5)
LYMPHOCYTES NFR BLD: 9.5 % (ref 20–40)
MAGNESIUM SERPL-MCNC: 1.9 MG/DL (ref 1.6–2.4)
MCH RBC QN AUTO: 29.7 PG (ref 27–31)
MCHC RBC AUTO-ENTMCNC: 31.9 G/DL (ref 33–37)
MCV RBC AUTO: 93.1 FL (ref 80–94)
MONOCYTES # BLD: 0.8 K/UL (ref 0–0.9)
MONOCYTES NFR BLD: 5.9 % (ref 0–10)
NEUTROPHILS # BLD: 11 K/UL (ref 1.5–7.5)
NEUTS SEG NFR BLD: 78.1 % (ref 50–65)
PERFORMED ON: ABNORMAL
PLATELET # BLD AUTO: 366 K/UL (ref 130–400)
PMV BLD AUTO: 9.1 FL (ref 9.4–12.4)
POTASSIUM SERPL-SCNC: 3.5 MMOL/L (ref 3.5–5)
RBC # BLD AUTO: 3.6 M/UL (ref 4.7–6.1)
SODIUM SERPL-SCNC: 135 MMOL/L (ref 136–145)
WBC # BLD AUTO: 14.1 K/UL (ref 4.8–10.8)

## 2025-05-08 PROCEDURE — 72149 MRI LUMBAR SPINE W/DYE: CPT

## 2025-05-08 PROCEDURE — 2580000003 HC RX 258: Performed by: STUDENT IN AN ORGANIZED HEALTH CARE EDUCATION/TRAINING PROGRAM

## 2025-05-08 PROCEDURE — 93320 DOPPLER ECHO COMPLETE: CPT

## 2025-05-08 PROCEDURE — 2709999900 HC NON-CHARGEABLE SUPPLY: Performed by: INTERNAL MEDICINE

## 2025-05-08 PROCEDURE — 80048 BASIC METABOLIC PNL TOTAL CA: CPT

## 2025-05-08 PROCEDURE — 6360000002 HC RX W HCPCS: Performed by: NURSE PRACTITIONER

## 2025-05-08 PROCEDURE — 99152 MOD SED SAME PHYS/QHP 5/>YRS: CPT | Performed by: INTERNAL MEDICINE

## 2025-05-08 PROCEDURE — 6360000004 HC RX CONTRAST MEDICATION: Performed by: NURSE PRACTITIONER

## 2025-05-08 PROCEDURE — 6370000000 HC RX 637 (ALT 250 FOR IP): Performed by: INTERNAL MEDICINE

## 2025-05-08 PROCEDURE — 94760 N-INVAS EAR/PLS OXIMETRY 1: CPT

## 2025-05-08 PROCEDURE — 6360000002 HC RX W HCPCS: Performed by: STUDENT IN AN ORGANIZED HEALTH CARE EDUCATION/TRAINING PROGRAM

## 2025-05-08 PROCEDURE — 72158 MRI LUMBAR SPINE W/O & W/DYE: CPT

## 2025-05-08 PROCEDURE — 2500000003 HC RX 250 WO HCPCS: Performed by: STUDENT IN AN ORGANIZED HEALTH CARE EDUCATION/TRAINING PROGRAM

## 2025-05-08 PROCEDURE — 85025 COMPLETE CBC W/AUTO DIFF WBC: CPT

## 2025-05-08 PROCEDURE — 6370000000 HC RX 637 (ALT 250 FOR IP): Performed by: STUDENT IN AN ORGANIZED HEALTH CARE EDUCATION/TRAINING PROGRAM

## 2025-05-08 PROCEDURE — 6360000002 HC RX W HCPCS: Performed by: INTERNAL MEDICINE

## 2025-05-08 PROCEDURE — 2500000003 HC RX 250 WO HCPCS: Performed by: NURSE PRACTITIONER

## 2025-05-08 PROCEDURE — 76705 ECHO EXAM OF ABDOMEN: CPT

## 2025-05-08 PROCEDURE — A9577 INJ MULTIHANCE: HCPCS | Performed by: NURSE PRACTITIONER

## 2025-05-08 PROCEDURE — B24CZZ4 ULTRASONOGRAPHY OF PERICARDIUM, TRANSESOPHAGEAL: ICD-10-PCS | Performed by: INTERNAL MEDICINE

## 2025-05-08 PROCEDURE — 6370000000 HC RX 637 (ALT 250 FOR IP): Performed by: NURSE PRACTITIONER

## 2025-05-08 PROCEDURE — 99222 1ST HOSP IP/OBS MODERATE 55: CPT | Performed by: INTERNAL MEDICINE

## 2025-05-08 PROCEDURE — 97530 THERAPEUTIC ACTIVITIES: CPT

## 2025-05-08 PROCEDURE — 82962 GLUCOSE BLOOD TEST: CPT

## 2025-05-08 PROCEDURE — 83735 ASSAY OF MAGNESIUM: CPT

## 2025-05-08 PROCEDURE — 93312 ECHO TRANSESOPHAGEAL: CPT | Performed by: INTERNAL MEDICINE

## 2025-05-08 PROCEDURE — 93325 DOPPLER ECHO COLOR FLOW MAPG: CPT | Performed by: INTERNAL MEDICINE

## 2025-05-08 PROCEDURE — 36415 COLL VENOUS BLD VENIPUNCTURE: CPT

## 2025-05-08 PROCEDURE — 93320 DOPPLER ECHO COMPLETE: CPT | Performed by: INTERNAL MEDICINE

## 2025-05-08 PROCEDURE — 1200000000 HC SEMI PRIVATE

## 2025-05-08 RX ORDER — LIDOCAINE HYDROCHLORIDE 20 MG/ML
SOLUTION OROPHARYNGEAL PRN
Status: COMPLETED | OUTPATIENT
Start: 2025-05-08 | End: 2025-05-08

## 2025-05-08 RX ORDER — INSULIN GLARGINE 100 [IU]/ML
15 INJECTION, SOLUTION SUBCUTANEOUS 2 TIMES DAILY
Status: DISCONTINUED | OUTPATIENT
Start: 2025-05-08 | End: 2025-05-11

## 2025-05-08 RX ORDER — SODIUM CHLORIDE 9 MG/ML
INJECTION, SOLUTION INTRAVENOUS CONTINUOUS
Status: CANCELLED | OUTPATIENT
Start: 2025-05-08

## 2025-05-08 RX ORDER — FENTANYL CITRATE 50 UG/ML
INJECTION, SOLUTION INTRAMUSCULAR; INTRAVENOUS PRN
Status: COMPLETED | OUTPATIENT
Start: 2025-05-08 | End: 2025-05-08

## 2025-05-08 RX ORDER — MIDAZOLAM HYDROCHLORIDE 1 MG/ML
INJECTION, SOLUTION INTRAMUSCULAR; INTRAVENOUS PRN
Status: COMPLETED | OUTPATIENT
Start: 2025-05-08 | End: 2025-05-08

## 2025-05-08 RX ADMIN — FENTANYL CITRATE 50 MCG: 50 INJECTION INTRAMUSCULAR; INTRAVENOUS at 15:21

## 2025-05-08 RX ADMIN — SODIUM CHLORIDE, PRESERVATIVE FREE 10 ML: 5 INJECTION INTRAVENOUS at 20:01

## 2025-05-08 RX ADMIN — PRAVASTATIN SODIUM 10 MG: 20 TABLET ORAL at 08:01

## 2025-05-08 RX ADMIN — ENOXAPARIN SODIUM 40 MG: 100 INJECTION SUBCUTANEOUS at 08:01

## 2025-05-08 RX ADMIN — OXYCODONE AND ACETAMINOPHEN 1 TABLET: 10; 325 TABLET ORAL at 13:01

## 2025-05-08 RX ADMIN — TIZANIDINE 4 MG: 4 TABLET ORAL at 08:01

## 2025-05-08 RX ADMIN — GADOBENATE DIMEGLUMINE 20 ML: 529 INJECTION, SOLUTION INTRAVENOUS at 17:57

## 2025-05-08 RX ADMIN — CEFEPIME 2000 MG: 2 INJECTION, POWDER, FOR SOLUTION INTRAVENOUS at 09:30

## 2025-05-08 RX ADMIN — TIZANIDINE 4 MG: 4 TABLET ORAL at 20:01

## 2025-05-08 RX ADMIN — INSULIN GLARGINE 15 UNITS: 100 INJECTION, SOLUTION SUBCUTANEOUS at 20:01

## 2025-05-08 RX ADMIN — ACETAMINOPHEN 650 MG: 325 TABLET ORAL at 19:36

## 2025-05-08 RX ADMIN — MIDAZOLAM 1 MG: 1 INJECTION INTRAMUSCULAR; INTRAVENOUS at 15:21

## 2025-05-08 RX ADMIN — SODIUM CHLORIDE, PRESERVATIVE FREE 10 ML: 5 INJECTION INTRAVENOUS at 08:02

## 2025-05-08 RX ADMIN — OXYCODONE AND ACETAMINOPHEN 1 TABLET: 10; 325 TABLET ORAL at 18:16

## 2025-05-08 RX ADMIN — WATER 2000 MG: 1 INJECTION INTRAMUSCULAR; INTRAVENOUS; SUBCUTANEOUS at 18:12

## 2025-05-08 RX ADMIN — OXYCODONE AND ACETAMINOPHEN 1 TABLET: 10; 325 TABLET ORAL at 08:01

## 2025-05-08 RX ADMIN — LIDOCAINE HYDROCHLORIDE 15 ML: 20 SOLUTION ORAL; TOPICAL at 15:21

## 2025-05-08 RX ADMIN — TIZANIDINE 4 MG: 4 TABLET ORAL at 14:04

## 2025-05-08 RX ADMIN — INSULIN GLARGINE 24 UNITS: 100 INJECTION, SOLUTION SUBCUTANEOUS at 08:01

## 2025-05-08 RX ADMIN — OXYCODONE AND ACETAMINOPHEN 1 TABLET: 10; 325 TABLET ORAL at 03:41

## 2025-05-08 RX ADMIN — INSULIN LISPRO 2 UNITS: 100 INJECTION, SOLUTION INTRAVENOUS; SUBCUTANEOUS at 08:01

## 2025-05-08 ASSESSMENT — PAIN DESCRIPTION - ORIENTATION
ORIENTATION: RIGHT;LEFT
ORIENTATION: RIGHT;LEFT
ORIENTATION: LEFT
ORIENTATION: LEFT;RIGHT
ORIENTATION: RIGHT

## 2025-05-08 ASSESSMENT — PAIN - FUNCTIONAL ASSESSMENT
PAIN_FUNCTIONAL_ASSESSMENT: PREVENTS OR INTERFERES SOME ACTIVE ACTIVITIES AND ADLS
PAIN_FUNCTIONAL_ASSESSMENT: PREVENTS OR INTERFERES SOME ACTIVE ACTIVITIES AND ADLS

## 2025-05-08 ASSESSMENT — PAIN SCALES - GENERAL
PAINLEVEL_OUTOF10: 6
PAINLEVEL_OUTOF10: 4
PAINLEVEL_OUTOF10: 7
PAINLEVEL_OUTOF10: 6
PAINLEVEL_OUTOF10: 5
PAINLEVEL_OUTOF10: 7

## 2025-05-08 ASSESSMENT — PAIN DESCRIPTION - LOCATION
LOCATION: HIP
LOCATION: HIP;LEG
LOCATION: HIP

## 2025-05-08 ASSESSMENT — PAIN DESCRIPTION - FREQUENCY
FREQUENCY: CONTINUOUS
FREQUENCY: CONTINUOUS

## 2025-05-08 ASSESSMENT — PAIN DESCRIPTION - PAIN TYPE
TYPE: ACUTE PAIN
TYPE: ACUTE PAIN;CHRONIC PAIN

## 2025-05-08 ASSESSMENT — PAIN DESCRIPTION - DESCRIPTORS
DESCRIPTORS: ACHING;DISCOMFORT
DESCRIPTORS: ACHING;DISCOMFORT

## 2025-05-08 ASSESSMENT — PAIN DESCRIPTION - ONSET
ONSET: ON-GOING
ONSET: ON-GOING

## 2025-05-08 NOTE — CONSULTS
[facility]  Cardiology Consult      Requesting MD:  Curly Cleaning MD   Admit Status:         History obtained from:   [] Patient  [] Other (specify):     PROBLEM LIST:    Patient Active Problem List    Diagnosis Date Noted    Cellulitis of left hip 05/07/2025     Priority: Low    Ambulatory dysfunction 05/07/2025     Priority: Low    Diabetes (HCC) 05/07/2025     Priority: Low    Acute hypoxic respiratory failure (HCC) 05/06/2025     Priority: Low    Right shoulder pain 02/11/2025     Priority: Low    Anterior shoulder dislocation, right, initial encounter 02/11/2025     Priority: Low     1.  History of PFO with prior CVA 2007, briefly on Plavix.  2.  Left hip cellulitis with streptococcal bacteremia presentation 5/5/2025.    PRESENTATION: Rodney Garcia is a 64 y.o. year old male who presents with increased fatigue with reported fall 4/2/2025 apparently diagnosed with a right frontal CVA.  Was briefly on Plavix.  Noted a knot on his left thigh with increasing fatigue and weakness and came to the ER.  Blood cultures noted to be positive with the first set with Streptococcus.  CT scans with suggestion of cellulitis left hip area.  CTA with no PE.  Troponins negative.  .  ESR 66.  Asked evaluate patient for VILMA.  Currently on cefepime.    REVIEW OF SYSTEMS:  Review of Systems   Constitutional:  Positive for fatigue. Negative for activity change and diaphoresis.   HENT:  Negative for hearing loss, nosebleeds and tinnitus.    Eyes:  Negative for visual disturbance.   Respiratory:  Negative for cough, shortness of breath and wheezing.    Cardiovascular:  Negative for chest pain, palpitations and leg swelling.   Gastrointestinal:  Negative for abdominal distention, abdominal pain, blood in stool, diarrhea and vomiting.   Endocrine: Negative for cold intolerance, heat intolerance, polydipsia, polyphagia and polyuria.   Genitourinary:  Negative for difficulty urinating, flank pain

## 2025-05-08 NOTE — PROCEDURES
PROCEDURE NOTE  Date: 5/8/2025   Name: Rodney Garcia  YOB: 1960    Procedures    Transesophageal echocardiogram preliminary note:    No evidence to suggest mitral endocarditis by VILMA with no vegetations noted.  Trace MR, trace TR.  Aortic valve unremarkable.  Interatrial septum is thinned and somewhat floppy suggestive of atrial septal aneurysm with evidence of small PFO and positive bubble study without provocation with right-to-left shunting.    Plan: Consider MRI head to rule out prior history of CVA with old infarcts.  Would suggest a ZIO monitor at discharge and monitor on telemetry in hospital.  Will make an appointment with structural cardiology as well as an outpatient.  If MRI suggestive of prior infarcts, would strongly suggest consideration for anticoagulation with Eliquis.

## 2025-05-08 NOTE — PROGRESS NOTES
Occupational Therapy     05/08/25 1127   Subjective   Subjective Pt in bed upon arrival for therapy. Pt agreeable to paticipate.   Pain Assessment   Pain Assessment 0-10   Pain Level 5   Pain Location Hip   Pain Orientation Left;Right   Pain Descriptors Aching;Discomfort   Functional Pain Assessment Prevents or interferes some active activities and ADLs   Pain Type Acute pain;Chronic pain   Pain Frequency Continuous   Pain Onset On-going   Non-Pharmaceutical Pain Intervention(s) Ambulation/Increased Activity;Repositioned;Rest   Response to Pain Intervention Patient satisfied   Vitals   Temp 98.4 °F (36.9 °C)   Pulse 85   Heart Rate Source Monitor   Respirations 18   SpO2 92 %   O2 Device None (Room air)   /69   MAP (Calculated) 86   BP Location Left upper arm   Patient Position Supine   Cognition   Overall Cognitive Status WFL   Orientation   Overall Orientation Status WFL   Bed Mobility Training   Bed Mobility Training Yes   Overall Level of Assistance Partial/Moderate assistance   Interventions Verbal cues;Tactile cues;Manual cues   Supine to Sit Partial/Moderate assistance   Scooting Partial/Moderate assistance   Transfer Training   Transfer Training Yes   Overall Level of Assistance Partial/Moderate assistance;2 Person assistance   Interventions Verbal cues;Tactile cues;Manual cues   Sit to Stand Partial/Moderate assistance;2 Person assistance   Stand to Sit Partial/Moderate assistance;2 Person assistance   Bed to Chair Partial/Moderate assistance;2 Person assistance  (stand-pivot transfer.)   Balance   Sitting Intact   Standing Impaired   Standing - Static Poor   Standing - Dynamic Poor   ADL   Feeding Independent;Setup   Grooming Supervision   UE Bathing Contact guard assistance   LE Bathing Maximum assistance;Moderate assistance   UE Dressing Contact guard assistance   LE Dressing Maximum assistance;Moderate assistance   Putting On/Taking Off Footwear Dependent/Total   Toileting Maximum assistance

## 2025-05-08 NOTE — PLAN OF CARE
Problem: Pain  Goal: Verbalizes/displays adequate comfort level or baseline comfort level  5/8/2025 0039 by Bowen Guallpa RN  Outcome: Progressing  5/7/2025 1633 by Tegan Diehl LPN  Outcome: Progressing     Problem: Safety - Adult  Goal: Free from fall injury  5/8/2025 0039 by Bowen Guallpa RN  Outcome: Progressing  5/7/2025 1633 by Tegan Diehl LPN  Outcome: Progressing  Flowsheets (Taken 5/7/2025 1251)  Free From Fall Injury: Instruct family/caregiver on patient safety     Problem: ABCDS Injury Assessment  Goal: Absence of physical injury  5/8/2025 0039 by Bowen Guallpa RN  Outcome: Progressing  5/7/2025 1633 by Tegan Diehl LPN  Outcome: Progressing     Problem: Chronic Conditions and Co-morbidities  Goal: Patient's chronic conditions and co-morbidity symptoms are monitored and maintained or improved  5/8/2025 0039 by Bowen Guallpa RN  Outcome: Progressing  Flowsheets (Taken 5/7/2025 2158)  Care Plan - Patient's Chronic Conditions and Co-Morbidity Symptoms are Monitored and Maintained or Improved:   Monitor and assess patient's chronic conditions and comorbid symptoms for stability, deterioration, or improvement   Collaborate with multidisciplinary team to address chronic and comorbid conditions and prevent exacerbation or deterioration   Update acute care plan with appropriate goals if chronic or comorbid symptoms are exacerbated and prevent overall improvement and discharge  5/7/2025 1633 by Tegan Diehl LPN  Outcome: Progressing     Problem: Skin/Tissue Integrity  Goal: Skin integrity remains intact  Description: 1.  Monitor for areas of redness and/or skin breakdown2.  Assess vascular access sites hourly3.  Every 4-6 hours minimum:  Change oxygen saturation probe site4.  Every 4-6 hours:  If on nasal continuous positive airway pressure, respiratory therapy assess nares and determine need for appliance change or resting period  Outcome: Progressing

## 2025-05-08 NOTE — PROGRESS NOTES
Occupational Therapy     05/08/25 1404   Subjective   Subjective Pt up in the recliner and ready to get back to bed. Pt tolerated sitting up in recliner for 2 hours this date.   Pain Assessment   Pain Assessment 0-10   Pain Level 4   Pain Location Hip   Pain Orientation Right;Left   Pain Descriptors Aching;Discomfort   Functional Pain Assessment Prevents or interferes some active activities and ADLs   Pain Type Acute pain   Pain Frequency Continuous   Pain Onset On-going   Non-Pharmaceutical Pain Intervention(s) Ambulation/Increased Activity;Repositioned;Rest   Response to Pain Intervention Patient satisfied   Cognition   Overall Cognitive Status WFL   Orientation   Overall Orientation Status WFL   Bed Mobility Training   Bed Mobility Training Yes   Overall Level of Assistance Partial/Moderate assistance   Interventions Verbal cues;Tactile cues;Manual cues   Supine to Sit Partial/Moderate assistance   Sit to Supine Partial/Moderate assistance   Scooting Partial/Moderate assistance   Transfer Training   Transfer Training Yes   Overall Level of Assistance Partial/Moderate assistance  (Nathalie Stematt)   Interventions Verbal cues;Tactile cues;Manual cues   Sit to Stand Partial/Moderate assistance   Stand to Sit Partial/Moderate assistance   Bed to Chair Partial/Moderate assistance  (Nathalie Stedy)   Balance   Sitting Intact   Standing Impaired   Standing - Static Poor   Standing - Dynamic Poor   ADL   Feeding Independent;Setup   Grooming Supervision   UE Bathing Contact guard assistance   LE Bathing Maximum assistance;Moderate assistance   UE Dressing Contact guard assistance   LE Dressing Maximum assistance;Moderate assistance   Putting On/Taking Off Footwear Dependent/Total   Toileting Maximum assistance   Functional Mobility Maximum assistance   Functional Mobility Skilled Clinical Factors Mod assist Nathalie Rosa   Assessment   Assessment Tx focused on transfer back to bed using Nathalie Stematt. Pt able to complete STS mobility

## 2025-05-08 NOTE — PROGRESS NOTES
[facility]      Progress Note    Patient:  Rodney Garcia  YOB: 1960  Date of Service: 5/8/2025  MRN: [id number]   Acct: [id number]   Primary Care Physician: Tu Puente MD  Advance Directive: Full Code  Admit Date: 5/5/2025       Hospital Day: 2    Portions of this note have been copied forward, however, updated to reflect the most current clinical status of this patient.     CHIEF COMPLAINT weakness and fall    SUBJECTIVE: Patient complaining of severe left hip pain      CUMULATIVE HOSPITAL COURSE:   Patient is 64-year-old male with past medical history of CVA PFO diabetes hypertension and hyperlipidemia.  He presented to [facility] due to weakness and inability to ambulate.  He fell around 6 PM and was unable to get up off the ground.  Daughter reports he has been so weak lately he cannot move around.  He was seen recently at [facility] where he was diagnosed with CVA and sepsis.  Patient denies fever chills visual problem dysphagia shortness of breath chest pain cough or urinary symptoms.  ER eval white count 14.9 hemoglobin 13 hematocrit 39.1 glucose 208  Pro-Brandon 0.58 hemoglobin A1c 9.3.  X-ray hip and pelvis shows moderate to severe bilateral hip osteoarthritic changes prominent femoral head neck offset may suggest cam type femoral acetabular impingement no acute fracture, CTA pulmonary no embolus bilateral atelectasis, lumbar spine shows degenerative changes but no acute process CT of the left hip shows multifocal osteoarthritis superficial soft tissues swelling in the lateral to the hip.  Ultrasound of the area was done today showed cellulitis without visible abscess.  Will consult Ortho about possible impingement.  And hamstring injury. ID consulted as well.  Patient blood culture positive in 1 bottle for strep and these were repeated today.     5/8/2025  Patient more alert today and got a clear picture of the sequence of events.Pt had a fall at home early April

## 2025-05-08 NOTE — PROGRESS NOTES
Physical Therapy  Name: Rodney Garcia  MRN:  [id number]  Date of service:  5/8/2025 05/08/25 1048   Restrictions/Precautions   Restrictions/Precautions Isolation;Fall Risk   Activity Level Up with Assist   Position Activity Restriction   Other Position/Activity Restrictions droplet precautions-rhinovirus   General   Family/Caregiver Present No   Referring Practitioner Curly Cleaning MD   Subjective   Subjective agreeable to attempt transfer to chair.   Oxygen Therapy   O2 Device None (Room air)   Bed Mobility   Supine to Sit Moderate assistance;Maximal assistance   Transfers   Sit to Stand Partial/Moderate assistance;Minimal Assistance;2 Person Assistance   Stand to Sit Partial/Moderate assistance;Minimal Assistance;2 Person Assistance   Bed to Chair Partial/Moderate assistance;2 Person Assistance   Exercises   Hip Abduction 10   Knee Short Arc Quad 10   Ankle Pumps 10   Short Term Goals   Time Frame for Short Term Goals 2 wks   Short Term Goal 1 supine to sit indep   Short Term Goal 2 sit to stand indep   Short Term Goal 3 amb. 100' with RW SBA   Short Term Goal 4 bed to chair SBA   Conditions Requiring Skilled Therapeutic Intervention   Body Structures, Functions, Activity Limitations Requiring Skilled Therapeutic Intervention Decreased functional mobility ;Decreased ADL status;Decreased ROM;Decreased cognition;Decreased safe awareness;Decreased body mechanics;Decreased strength;Decreased endurance;Decreased balance;Decreased posture;Increased pain;Decreased coordination   Assessment Patient was able to take small steps to chair.   Treatment Diagnosis impaired gait and mobility   Treatment Initiated  t/fs, bed mobility   Discharge Recommendations Continue to assess pending progress;24 hour supervision or assist;Patient would benefit from continued therapy after discharge   Activity Tolerance   Activity Tolerance Patient limited by fatigue;Patient limited by pain;Treatment limited secondary to decreased

## 2025-05-08 NOTE — PROGRESS NOTES
TRANSFER - OUT REPORT:    Verbal report given to Kira on Rodney Garcia being transferred to [facility] for routine progression of patient care       Report consisted of patient's Situation, Background, Assessment and   Recommendations(SBAR).     Information from the following report(s) Nurse Handoff Report was reviewed with the receiving nurse.    Opportunity for questions and clarification was provided.      Patient transported with:   Registered Nurse

## 2025-05-08 NOTE — PLAN OF CARE
Patient continues to work to meet care plan goals while hospitalized, education provided.      Problem: Pain  Goal: Verbalizes/displays adequate comfort level or baseline comfort level  5/8/2025 0039 by Bowen Guallpa RN  Outcome: Progressing     Problem: Safety - Adult  Goal: Free from fall injury  5/8/2025 1345 by Mary Smyth RN  Outcome: Progressing  5/8/2025 0039 by Bowen Guallpa RN  Outcome: Progressing     Problem: ABCDS Injury Assessment  Goal: Absence of physical injury  5/8/2025 1345 by Mary Smyth RN  Outcome: Progressing  5/8/2025 0039 by Bowen Guallpa RN  Outcome: Progressing     Problem: Chronic Conditions and Co-morbidities  Goal: Patient's chronic conditions and co-morbidity symptoms are monitored and maintained or improved  Recent Flowsheet Documentation  Taken 5/8/2025 0930 by Mary Smyth RN  Care Plan - Patient's Chronic Conditions and Co-Morbidity Symptoms are Monitored and Maintained or Improved:   Collaborate with multidisciplinary team to address chronic and comorbid conditions and prevent exacerbation or deterioration   Monitor and assess patient's chronic conditions and comorbid symptoms for stability, deterioration, or improvement   Update acute care plan with appropriate goals if chronic or comorbid symptoms are exacerbated and prevent overall improvement and discharge  5/8/2025 0039 by Bowen Guallpa RN  Outcome: Progressing  Flowsheets (Taken 5/7/2025 2158)  Care Plan - Patient's Chronic Conditions and Co-Morbidity Symptoms are Monitored and Maintained or Improved:   Monitor and assess patient's chronic conditions and comorbid symptoms for stability, deterioration, or improvement   Collaborate with multidisciplinary team to address chronic and comorbid conditions and prevent exacerbation or deterioration   Update acute care plan with appropriate goals if chronic or comorbid symptoms are exacerbated and prevent overall improvement and

## 2025-05-09 ENCOUNTER — OUTSIDE FACILITY SERVICE (OUTPATIENT)
Age: 65
End: 2025-05-09

## 2025-05-09 ENCOUNTER — APPOINTMENT (OUTPATIENT)
Dept: MRI IMAGING | Age: 65
DRG: 853 | End: 2025-05-09
Payer: COMMERCIAL

## 2025-05-09 LAB
ALBUMIN SERPL-MCNC: 2.7 G/DL (ref 3.5–5.2)
ALP SERPL-CCNC: 139 U/L (ref 40–129)
ALT SERPL-CCNC: 129 U/L (ref 10–50)
ANION GAP SERPL CALCULATED.3IONS-SCNC: 16 MMOL/L (ref 8–16)
AST SERPL-CCNC: 79 U/L (ref 10–50)
BASOPHILS # BLD: 0.1 K/UL (ref 0–0.2)
BASOPHILS NFR BLD: 0.7 % (ref 0–1)
BILIRUB SERPL-MCNC: 0.8 MG/DL (ref 0.2–1.2)
BUN SERPL-MCNC: 12 MG/DL (ref 8–23)
CALCIUM SERPL-MCNC: 9.1 MG/DL (ref 8.8–10.2)
CHLORIDE SERPL-SCNC: 96 MMOL/L (ref 98–107)
CO2 SERPL-SCNC: 24 MMOL/L (ref 22–29)
CREAT SERPL-MCNC: 0.4 MG/DL (ref 0.7–1.2)
EOSINOPHIL # BLD: 0.3 K/UL (ref 0–0.6)
EOSINOPHIL NFR BLD: 1.4 % (ref 0–5)
ERYTHROCYTE [DISTWIDTH] IN BLOOD BY AUTOMATED COUNT: 13.7 % (ref 11.5–14.5)
GLUCOSE BLD-MCNC: 157 MG/DL (ref 70–99)
GLUCOSE BLD-MCNC: 247 MG/DL (ref 70–99)
GLUCOSE BLD-MCNC: 269 MG/DL (ref 70–99)
GLUCOSE BLD-MCNC: 290 MG/DL (ref 70–99)
GLUCOSE SERPL-MCNC: 197 MG/DL (ref 70–99)
HCT VFR BLD AUTO: 35.8 % (ref 42–52)
HGB BLD-MCNC: 11.7 G/DL (ref 14–18)
IMM GRANULOCYTES # BLD: 1.1 K/UL
LYMPHOCYTES # BLD: 1.2 K/UL (ref 1.1–4.5)
LYMPHOCYTES NFR BLD: 6.2 % (ref 20–40)
MAGNESIUM SERPL-MCNC: 1.8 MG/DL (ref 1.6–2.4)
MCH RBC QN AUTO: 29.5 PG (ref 27–31)
MCHC RBC AUTO-ENTMCNC: 32.7 G/DL (ref 33–37)
MCV RBC AUTO: 90.2 FL (ref 80–94)
MONOCYTES # BLD: 1.2 K/UL (ref 0–0.9)
MONOCYTES NFR BLD: 5.9 % (ref 0–10)
NEUTROPHILS # BLD: 15.5 K/UL (ref 1.5–7.5)
NEUTS SEG NFR BLD: 80 % (ref 50–65)
PERFORMED ON: ABNORMAL
PLATELET # BLD AUTO: 376 K/UL (ref 130–400)
PMV BLD AUTO: 9 FL (ref 9.4–12.4)
POTASSIUM SERPL-SCNC: 3.3 MMOL/L (ref 3.5–5)
PROT SERPL-MCNC: 6.1 G/DL (ref 6.4–8.3)
RBC # BLD AUTO: 3.97 M/UL (ref 4.7–6.1)
SODIUM SERPL-SCNC: 136 MMOL/L (ref 136–145)
WBC # BLD AUTO: 19.4 K/UL (ref 4.8–10.8)

## 2025-05-09 PROCEDURE — 6370000000 HC RX 637 (ALT 250 FOR IP): Performed by: INTERNAL MEDICINE

## 2025-05-09 PROCEDURE — 99223 1ST HOSP IP/OBS HIGH 75: CPT | Performed by: PSYCHIATRY & NEUROLOGY

## 2025-05-09 PROCEDURE — 1200000000 HC SEMI PRIVATE

## 2025-05-09 PROCEDURE — 85025 COMPLETE CBC W/AUTO DIFF WBC: CPT

## 2025-05-09 PROCEDURE — A9577 INJ MULTIHANCE: HCPCS | Performed by: PSYCHIATRY & NEUROLOGY

## 2025-05-09 PROCEDURE — 82962 GLUCOSE BLOOD TEST: CPT

## 2025-05-09 PROCEDURE — 99232 SBSQ HOSP IP/OBS MODERATE 35: CPT | Performed by: INTERNAL MEDICINE

## 2025-05-09 PROCEDURE — 6370000000 HC RX 637 (ALT 250 FOR IP): Performed by: NURSE PRACTITIONER

## 2025-05-09 PROCEDURE — 99223 1ST HOSP IP/OBS HIGH 75: CPT | Performed by: NEUROLOGICAL SURGERY

## 2025-05-09 PROCEDURE — 94760 N-INVAS EAR/PLS OXIMETRY 1: CPT

## 2025-05-09 PROCEDURE — 6360000002 HC RX W HCPCS: Performed by: NURSE PRACTITIONER

## 2025-05-09 PROCEDURE — 80053 COMPREHEN METABOLIC PANEL: CPT

## 2025-05-09 PROCEDURE — 6370000000 HC RX 637 (ALT 250 FOR IP): Performed by: STUDENT IN AN ORGANIZED HEALTH CARE EDUCATION/TRAINING PROGRAM

## 2025-05-09 PROCEDURE — 70553 MRI BRAIN STEM W/O & W/DYE: CPT

## 2025-05-09 PROCEDURE — 2500000003 HC RX 250 WO HCPCS: Performed by: STUDENT IN AN ORGANIZED HEALTH CARE EDUCATION/TRAINING PROGRAM

## 2025-05-09 PROCEDURE — 6360000002 HC RX W HCPCS: Performed by: STUDENT IN AN ORGANIZED HEALTH CARE EDUCATION/TRAINING PROGRAM

## 2025-05-09 PROCEDURE — 83735 ASSAY OF MAGNESIUM: CPT

## 2025-05-09 PROCEDURE — 36415 COLL VENOUS BLD VENIPUNCTURE: CPT

## 2025-05-09 PROCEDURE — 2500000003 HC RX 250 WO HCPCS: Performed by: NURSE PRACTITIONER

## 2025-05-09 PROCEDURE — OUTSIDEPOS PR OUTSIDE POS PLACEHOLDER: Performed by: INTERNAL MEDICINE

## 2025-05-09 PROCEDURE — 6360000004 HC RX CONTRAST MEDICATION: Performed by: PSYCHIATRY & NEUROLOGY

## 2025-05-09 PROCEDURE — 97530 THERAPEUTIC ACTIVITIES: CPT

## 2025-05-09 RX ORDER — HYDROMORPHONE HYDROCHLORIDE 1 MG/ML
1 INJECTION, SOLUTION INTRAMUSCULAR; INTRAVENOUS; SUBCUTANEOUS ONCE
Status: COMPLETED | OUTPATIENT
Start: 2025-05-09 | End: 2025-05-09

## 2025-05-09 RX ORDER — ENOXAPARIN SODIUM 100 MG/ML
40 INJECTION SUBCUTANEOUS DAILY
Status: DISCONTINUED | OUTPATIENT
Start: 2025-05-10 | End: 2025-05-19 | Stop reason: HOSPADM

## 2025-05-09 RX ADMIN — HYDROMORPHONE HYDROCHLORIDE 1 MG: 1 INJECTION, SOLUTION INTRAMUSCULAR; INTRAVENOUS; SUBCUTANEOUS at 11:41

## 2025-05-09 RX ADMIN — ENOXAPARIN SODIUM 40 MG: 100 INJECTION SUBCUTANEOUS at 08:46

## 2025-05-09 RX ADMIN — GADOBENATE DIMEGLUMINE 20 ML: 529 INJECTION, SOLUTION INTRAVENOUS at 12:16

## 2025-05-09 RX ADMIN — INSULIN LISPRO 4 UNITS: 100 INJECTION, SOLUTION INTRAVENOUS; SUBCUTANEOUS at 17:14

## 2025-05-09 RX ADMIN — TIZANIDINE 4 MG: 4 TABLET ORAL at 23:30

## 2025-05-09 RX ADMIN — OXYCODONE AND ACETAMINOPHEN 1 TABLET: 10; 325 TABLET ORAL at 04:07

## 2025-05-09 RX ADMIN — WATER 2000 MG: 1 INJECTION INTRAMUSCULAR; INTRAVENOUS; SUBCUTANEOUS at 17:15

## 2025-05-09 RX ADMIN — TIZANIDINE 4 MG: 4 TABLET ORAL at 17:13

## 2025-05-09 RX ADMIN — OXYCODONE AND ACETAMINOPHEN 1 TABLET: 10; 325 TABLET ORAL at 21:03

## 2025-05-09 RX ADMIN — TIZANIDINE 4 MG: 4 TABLET ORAL at 04:07

## 2025-05-09 RX ADMIN — SODIUM CHLORIDE, PRESERVATIVE FREE 10 ML: 5 INJECTION INTRAVENOUS at 08:53

## 2025-05-09 RX ADMIN — INSULIN LISPRO 4 UNITS: 100 INJECTION, SOLUTION INTRAVENOUS; SUBCUTANEOUS at 21:04

## 2025-05-09 RX ADMIN — OXYCODONE AND ACETAMINOPHEN 1 TABLET: 10; 325 TABLET ORAL at 17:14

## 2025-05-09 RX ADMIN — INSULIN GLARGINE 15 UNITS: 100 INJECTION, SOLUTION SUBCUTANEOUS at 08:47

## 2025-05-09 RX ADMIN — INSULIN GLARGINE 15 UNITS: 100 INJECTION, SOLUTION SUBCUTANEOUS at 21:04

## 2025-05-09 RX ADMIN — INSULIN LISPRO 2 UNITS: 100 INJECTION, SOLUTION INTRAVENOUS; SUBCUTANEOUS at 11:42

## 2025-05-09 RX ADMIN — OXYCODONE AND ACETAMINOPHEN 1 TABLET: 10; 325 TABLET ORAL at 12:47

## 2025-05-09 RX ADMIN — SODIUM CHLORIDE, PRESERVATIVE FREE 10 ML: 5 INJECTION INTRAVENOUS at 21:04

## 2025-05-09 RX ADMIN — APIXABAN 5 MG: 5 TABLET, FILM COATED ORAL at 09:20

## 2025-05-09 RX ADMIN — TIZANIDINE 4 MG: 4 TABLET ORAL at 10:49

## 2025-05-09 RX ADMIN — PRAVASTATIN SODIUM 10 MG: 20 TABLET ORAL at 08:47

## 2025-05-09 RX ADMIN — OXYCODONE AND ACETAMINOPHEN 1 TABLET: 10; 325 TABLET ORAL at 08:50

## 2025-05-09 ASSESSMENT — PAIN DESCRIPTION - ORIENTATION
ORIENTATION: RIGHT;LEFT;MID
ORIENTATION: LOWER

## 2025-05-09 ASSESSMENT — PAIN DESCRIPTION - PAIN TYPE: TYPE: ACUTE PAIN

## 2025-05-09 ASSESSMENT — PAIN - FUNCTIONAL ASSESSMENT: PAIN_FUNCTIONAL_ASSESSMENT: PREVENTS OR INTERFERES SOME ACTIVE ACTIVITIES AND ADLS

## 2025-05-09 ASSESSMENT — PAIN SCALES - GENERAL
PAINLEVEL_OUTOF10: 6
PAINLEVEL_OUTOF10: 6
PAINLEVEL_OUTOF10: 5
PAINLEVEL_OUTOF10: 6
PAINLEVEL_OUTOF10: 7
PAINLEVEL_OUTOF10: 5
PAINLEVEL_OUTOF10: 6
PAINLEVEL_OUTOF10: 7

## 2025-05-09 ASSESSMENT — PAIN DESCRIPTION - ONSET: ONSET: ON-GOING

## 2025-05-09 ASSESSMENT — PAIN DESCRIPTION - DESCRIPTORS: DESCRIPTORS: ACHING;DISCOMFORT

## 2025-05-09 ASSESSMENT — PAIN DESCRIPTION - FREQUENCY: FREQUENCY: CONTINUOUS

## 2025-05-09 ASSESSMENT — PAIN DESCRIPTION - LOCATION
LOCATION: BACK
LOCATION: BACK
LOCATION: BACK;HIP
LOCATION: BACK

## 2025-05-09 NOTE — PROGRESS NOTES
Infectious Diseases Progress Note    Patient:  Rodney Garcia  YOB: 1960  MRN: [id number]   Admit date: 5/5/2025   Admitting Physician: Curly Cleaning MD  Primary Care Physician: Tu Puente MD    Chief Complaint/Interval History: He is a little more awake and interactive than he was when I saw him the other day.  Remains a little bit difficult to get a clear description of symptoms from him.  I do get the sense that some of the primary issues were fever and low back pain.  He had a hard time telling me when it started.  My impression was early April.  Per discussion with hospitalist yesterday, he had had positive blood cultures locally in early April for Streptococcus intermedius.  Organism similar to what he is having here.  We have talked yesterday about arrangements for VILMA.  Cardiology was consulted.  VILMA did not show vegetations.  Also talked with hospitalist yesterday about MRI of the lumbar spine with and without gadolinium.  He appears to have epidural abscesses paraspinal abscess-see results below.  He indicates he was able to walk short distances with a walker and assistance yesterday.  He indicated both legs were functioning equally.  He has had some low back and left hip pain.  He has no rash or skin itching with antibiotic treatment.  Also talked with hospitalist yesterday about transitioning from cefepime to ceftriaxone.    In/Out    Intake/Output Summary (Last 24 hours) at 5/9/2025 0817  Last data filed at 5/9/2025 0817  Gross per 24 hour   Intake --   Output 730 ml   Net -730 ml     Allergies: No Active Allergies  Current Meds: insulin glargine (LANTUS) injection vial 15 Units, BID  cefTRIAXone (ROCEPHIN) 2,000 mg in sterile water 20 mL IV syringe, Q24H  potassium chloride (KLOR-CON M) extended release tablet 40 mEq, PRN   Or  potassium bicarb-citric acid (EFFER-K) effervescent tablet 40 mEq, PRN   Or  potassium chloride 10 mEq/100 mL IVPB (Peripheral Line),  05/07/25  0243 05/08/25  0203   * 135*   K 3.3* 3.5    100   CO2 20* 25   BUN 16 15   CREATININE 0.5* 0.7   GLUCOSE 149* 188*     CultureResults:  Blood cultures May 5, 2025-gram-positive cocci in pairs and chains  Blood cultures May 6, 2025-Streptococcus intermedius  Blood cultures May 7, 2025-gram-positive cocci in pairs and chains  Blood cultures May 7, 2025-no growth to date    Radiology:     MRI lumbar spine with and without gadolinium enhancement:  IMPRESSION:  1. Multiple enhancing epidural abscesses at L3-L4 to the S2 sacral canal, causing severe spinal canal stenosis at these levels.  2. Multiple intramuscular abscesses in bilateral paraspinous muscles and inferior left psoas.  3. Moderate bilateral facet joint effusions at L4-L5.  Septic joint cannot be excluded.  4. Mild marrow edema in the L5 vertebral body, bilateral L4-L5 pedicles and facets as well as L4-L5 spinous processes.  Osteomyelitis cannot be excluded.  5. Degenerative changes as above.    Liver ultrasound done yesterday:  IMPRESSION:  1.  Non visualization of the low density lesion seen on the recent CT. MRI liver mass protocol is recommended for better characterization.  2. Cholecystectomy, again noted.  3. No biliary tree dilatation.  4. Fatty liver.  5. Complete obscuration of the pancreas by bowel gas.    Additional Studies Reviewed:  Transesophageal echocardiogram done yesterday:    Left Ventricle: Normal left ventricular systolic function with a visually estimated EF of 55 - 60%. Left ventricle size is normal. Normal wall thickness. Normal wall motion. Normal diastolic function.    Right Ventricle: Right ventricle size is normal. Normal systolic function.    Aortic Valve: Trileaflet valve. No regurgitation. No stenosis.    Mitral Valve: Valve structure is normal. Trace regurgitation. No stenosis noted.    Tricuspid Valve: Trace regurgitation.    Left Atrium: Left atrium size is normal. Normal sized appendage. Normal

## 2025-05-09 NOTE — PLAN OF CARE
Problem: Pain  Goal: Verbalizes/displays adequate comfort level or baseline comfort level  5/9/2025 1239 by Beth Francisco RN  Outcome: Progressing  5/9/2025 0023 by Bowen Guallpa RN  Outcome: Progressing     Problem: Safety - Adult  Goal: Free from fall injury  5/9/2025 1239 by Beth Francisco RN  Outcome: Progressing  Flowsheets (Taken 5/9/2025 1238)  Free From Fall Injury: Instruct family/caregiver on patient safety  5/9/2025 0023 by Bowen Guallpa RN  Outcome: Progressing     Problem: ABCDS Injury Assessment  Goal: Absence of physical injury  5/9/2025 1239 by Beth Francisco RN  Outcome: Progressing  Flowsheets (Taken 5/9/2025 1238)  Absence of Physical Injury: Implement safety measures based on patient assessment  5/9/2025 0023 by Bowen Guallpa RN  Outcome: Progressing     Problem: Chronic Conditions and Co-morbidities  Goal: Patient's chronic conditions and co-morbidity symptoms are monitored and maintained or improved  5/9/2025 1239 by Beth Francisco RN  Outcome: Progressing  5/9/2025 0023 by Bowen Guallpa RN  Outcome: Progressing  Flowsheets (Taken 5/8/2025 2003)  Care Plan - Patient's Chronic Conditions and Co-Morbidity Symptoms are Monitored and Maintained or Improved:   Monitor and assess patient's chronic conditions and comorbid symptoms for stability, deterioration, or improvement   Collaborate with multidisciplinary team to address chronic and comorbid conditions and prevent exacerbation or deterioration   Update acute care plan with appropriate goals if chronic or comorbid symptoms are exacerbated and prevent overall improvement and discharge     Problem: Skin/Tissue Integrity  Goal: Skin integrity remains intact  Description: 1.  Monitor for areas of redness and/or skin breakdown2.  Assess vascular access sites hourly3.  Every 4-6 hours minimum:  Change oxygen saturation probe site4.  Every 4-6 hours:  If on nasal continuous positive airway pressure,

## 2025-05-09 NOTE — PROGRESS NOTES
Occupational Therapy     05/09/25 1200   Subjective   Subjective Pt in bed upon arrival for therapy. Pt agreeable to participate with encouragement.   Pain Assessment   Pain Assessment 0-10   Pain Level 6   Pain Location Back;Hip   Pain Orientation Right;Left;Mid   Pain Descriptors Aching;Discomfort   Functional Pain Assessment Prevents or interferes some active activities and ADLs   Pain Type Acute pain   Pain Frequency Continuous   Pain Onset On-going   Non-Pharmaceutical Pain Intervention(s) Ambulation/Increased Activity;Repositioned;Rest   Response to Pain Intervention None (pain unchanged or no improvement)   Cognition   Overall Cognitive Status WFL   Orientation   Overall Orientation Status WFL   Bed Mobility Training   Bed Mobility Training Yes   Overall Level of Assistance Partial/Moderate assistance   Interventions Verbal cues;Tactile cues;Manual cues   Supine to Sit Partial/Moderate assistance   Scooting Minimal assistance;Partial/Moderate assistance   Transfer Training   Transfer Training Yes   Overall Level of Assistance Minimal assistance;Partial/Moderate assistance;2 Person assistance   Interventions Verbal cues;Tactile cues;Manual cues   Sit to Stand Minimal assistance;Partial/Moderate assistance;2 Person assistance   Stand to Sit Minimal assistance;Partial/Moderate assistance;2 Person assistance   Bed to Chair Minimal assistance;Partial/Moderate assistance;2 Person assistance   Balance   Sitting Intact   Standing Impaired  (knees flexed)   Standing - Static Poor   Standing - Dynamic Poor   Assessment   Assessment Tx focused on sitting EOB to don socks with Max assist. Pt instructed on STS mobility with HH assist x2. Pt required Mod assist x2 to maintain standing balance while trying to take a step. Pt used RW and only required Min assist x2 for transfer to recliner. Pt assisted with repositioning in bed for comfort. Chair alarm donned with all needs in reach.   Activity Tolerance Patient limited by

## 2025-05-09 NOTE — PROGRESS NOTES
M.D. Date:     04/14/2025 Time:    09:07     CT CHEST WO CONTRAST  Result Date: 4/13/2025  EXAM: CHEST CT WITHOUT CONTRAST HISTORY: Leukocytosis, weakness, pneumonia. TECHNIQUE: CT acquisition of the chest from the thoracic inlet to the upper abdomen without IV contrast administration.  2-D coronal and sagittal reformatted images were obtained from the axial source images. Limited diagnostic sensitivity without contrast. IV Contrast: None. CT Dose Reduction Techniques Performed: Yes. COMPARISON: None. FINDINGS: Breathing motion degrades diagnoses sensitivity. Airways are patent. Scattered calcified pulmonary granulomata. No segmental consolidation, pleural effusion or pneumothorax. Mild increased interstitial markings and prominence of interlobular septa. Heart appears enlarged without pericardial effusion. Calcified mediastinal and hilar lymph nodes without lymphadenopathy. Normal aorta. Coronary artery calcification. Small left hepatic lobe cyst. Changes of cholecystectomy. Degenerative spine changes. Healed left clavicular fracture. Chronic appearing bone changes and lytic appearance of the left scapular body without associated soft tissue mass. Healed right fifth and 6th rib fractures. Nonspecific sclerotic focus in the right 4th rib. IMPRESSION: 1.  Cardiomegaly with interstitial prominence and prominence of interlobular septae suggestive of early congestive failure or fluid overload. No associated pleural effusion. 2. No evidence of lung consolidation. 3. No intrathoracic lymphadenopathy. 4. Coronary artery calcification and remote granulomatous changes with degenerative spine changes. 5. Healed left clavicular fracture and healed right fifth and 6th rib fractures. 6. Chronic appearing bone changes with lytic appearance involving the left scapular body possibly representing remote traumatic changes and nonfusion of a fracture. A lytic bone lesion cannot entirely be excluded. No associated soft tissue mass.  MD Mook, MD 5/9/2025 12:42 PM    Thisdictation was generated by voice recognition computer software.  Although all attempts are made to edit the dictation for accuracy, there may be errors in the transcription that are not intended.

## 2025-05-09 NOTE — CONSULTS
[facility] Neurosurgery Consultation        REASON FOR CONSULTATION:  Back pain, epidural abscess      HISTORY OF PRESENT ILLNESS:      The patient is a 64 y.o. male who presented to the [facility] ED with complaints of weakness, hip pain and multiple recent falls.  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  He has also been treated for a stroke within the past month.  He has continued with complaints of dizziness, weakness and multiple falls.  In the ED he was found to be tachycardic, tachypneic, with an elevated WBC count, lactic acid and procaclitonin.  He was admitted with possible sepsis and blood cultures have grown strep.  He has complained of back pain and hip pain since admission.  He does have a history of chronic back pain and was recently evaluated for surgery in [place] and he was told he was not a candidate for surgery because his Hgb A1c was 9.4%.  He does see Dr. Lloyd in pain management and receives injections.      He underwent an MRI as part of his infectious workup and this revealed evidence of widespread infection in the lumbar spine with a likely epidural abscess.    He was recently started on Eliquis due to his history of stroke.    MEDICAL HISTORY:             History reviewed. No pertinent past medical history.    Past Surgical History:   Procedure Laterality Date    ANKLE FRACTURE SURGERY      SHOULDER SURGERY  2008         Current Facility-Administered Medications:     [Held by provider] apixaban (ELIQUIS) tablet 5 mg, 5 mg, Oral, BID, Jovany Delvalle MD, 5 mg at 05/09/25 0920    [START ON 5/10/2025] enoxaparin (LOVENOX) injection 40 mg, 40 mg, SubCUTAneous, Daily, Jovany Delvalle MD    insulin glargine (LANTUS) injection vial 15 Units, 15 Units, SubCUTAneous, BID, Jaky Arzate APRN - CNP, 15 Units at 05/09/25 0847    cefTRIAXone (ROCEPHIN) 2,000  intraparenchymal hematomas. 3. Numerous punctate susceptibility artifacts scattered in bilateral cerebral hemispheres and bilateral cerebellum, not visualized on prior GRE sequence (SWI was not performed).  Findings could represent new tiny intraparenchymal hematomas versus remote microhemorrhage or cerebral amyloid angiopathy. 4. Interval resolution of previously seen tiny subacute infarcts. 5. Chronic white matter microvascular ischemic changes.   ______________________________________ Electronically signed by: KUSUM AGUILLON M.D. Date:     05/09/2025 Time:    12:47     MRI LUMBAR SPINE W WO CONTRAST  Result Date: 5/9/2025  1. Multiple enhancing epidural abscesses at L3-L4 to the S2 sacral canal, causing severe spinal canal stenosis at these levels. 2. Multiple intramuscular abscesses in bilateral paraspinous muscles and inferior left psoas. 3. Moderate bilateral facet joint effusions at L4-L5.  Septic joint cannot be excluded. 4. Mild marrow edema in the L5 vertebral body, bilateral L4-L5 pedicles and facets as well as L4-L5 spinous processes.  Osteomyelitis cannot be excluded. 5. Degenerative changes as above.     ______________________________________ Electronically signed by: KUSUM AGUILLON M.D. Date:     05/09/2025 Time:    07:32     US LIVER  Result Date: 5/9/2025   1.  Non visualization of the low density lesion seen on the recent CT. MRI liver mass protocol is recommended for better characterization. 2. Cholecystectomy, again noted. 3. No biliary tree dilatation. 4. Fatty liver. 5. Complete obscuration of the pancreas by bowel gas.          ______________________________________ Electronically signed by: LAKE OLMOS M.D. Date:     05/09/2025 Time:    06:28         IMAGING:    MRI lumbar spine reviewed.  There is grade I spondylolisthesis at L4/5.  There are baseline degenerative changes.  From L3~L5 there is a ventral epidural fluid collection consistent with abscess.  There is either septic destruction or

## 2025-05-09 NOTE — CARE COORDINATION
Case Management Assessment  Initial Evaluation    Date/Time of Evaluation: 5/9/2025 10:22 AM  Assessment Completed by: SINTIA Deras    If patient is discharged prior to next notation, then this note serves as note for discharge by case management.    Patient Name: Rodney Garcia                   YOB: 1960  Diagnosis: Generalized weakness [R53.1]  Left hip pain [M25.552]  Rhinovirus infection [B34.8]  Altered mental status, unspecified altered mental status type [R41.82]  Syncope, unspecified syncope type [R55]  Sepsis, due to unspecified organism, unspecified whether acute organ dysfunction present (HCC) [A41.9]  Acute hypoxic respiratory failure (HCC) [J96.01]                   Date / Time: 5/5/2025  9:31 PM    Patient Admission Status: Inpatient   Readmission Risk (Low < 19, Mod (19-27), High > 27): Readmission Risk Score: 14.8    Current PCP: Tu Puente MD  PCP verified by CM? Yes    Chart Reviewed: Yes      History Provided by: Patient  Patient Orientation: Alert and Oriented    Patient Cognition: Alert    Hospitalization in the last 30 days (Readmission):  No    If yes, Readmission Assessment in CM Navigator will be completed.    Advance Directives:      Code Status: Full Code   Patient's Primary Decision Maker is: Legal Next of Kin      Discharge Planning:    Patient lives with: Spouse/Significant Other Type of Home: House  Primary Care Giver: Self  Patient Support Systems include: Spouse/Significant Other, Children   Current Financial resources: Other (Comment) ([employer])  Current community resources: None  Current services prior to admission: None            Current DME:              Type of Home Care services:  None    ADLS  Prior functional level: Independent in ADLs/IADLs  Current functional level: Independent in ADLs/IADLs    PT AM-PAC:   /24  OT AM-PAC:   /24    Family can provide assistance at DC: Yes  Would you like Case Management to discuss the discharge plan with any other  family members/significant others, and if so, who? Yes (spouse)  Plans to Return to Present Housing: Yes  Other Identified Issues/Barriers to RETURNING to current housing: na  Potential Assistance needed at discharge: N/A            Potential DME:    Patient expects to discharge to: House  Plan for transportation at discharge: Family    Financial    Payor: [employer] / Plan: [employer] / Product Type: *No Product type* /     Does insurance require precert for SNF: Yes    Potential assistance Purchasing Medications: No  Meds-to-Beds request: Yes      [facility] [address] [place] [address] [address] [facility] -  [phone] - F [phone] [address] [facility] [place] [address]  Phone: [phone] Fax: [phone]      Notes:    Factors facilitating achievement of predicted outcomes: Family support, Motivated, Cooperative, and Pleasant    Barriers to discharge: Decreased endurance and Medical complications    Additional Case Management Notes: SW met with pt to discuss dc planning, pt lives with spouse and daughter, he is independent but has a cane if needed, he wants to return home at dc, denies any other needs    The Plan for Transition of Care is related to the following treatment goals of Generalized weakness [R53.1]  Left hip pain [M25.552]  Rhinovirus infection [B34.8]  Altered mental status, unspecified altered mental status type [R41.82]  Syncope, unspecified syncope type [R55]  Sepsis, due to unspecified organism, unspecified whether acute organ dysfunction present (HCC) [A41.9]  Acute hypoxic respiratory failure (HCC) [J96.01]    IF APPLICABLE: The Patient and/or patient representative Rodney and his family were provided with a choice of provider and agrees with the discharge plan. Freedom of choice list with basic dialogue that supports the patient's individualized plan of care/goals and shares the quality data associated with the providers was provided to: Patient   Patient Representative Name:

## 2025-05-09 NOTE — PLAN OF CARE
Problem: Pain  Goal: Verbalizes/displays adequate comfort level or baseline comfort level  Outcome: Progressing     Problem: Safety - Adult  Goal: Free from fall injury  5/9/2025 0023 by Bowen Guallpa RN  Outcome: Progressing  5/8/2025 1345 by Mary Smyth RN  Outcome: Progressing     Problem: ABCDS Injury Assessment  Goal: Absence of physical injury  5/9/2025 0023 by Bowen Guallpa RN  Outcome: Progressing  5/8/2025 1345 by Mary Smyth RN  Outcome: Progressing     Problem: Chronic Conditions and Co-morbidities  Goal: Patient's chronic conditions and co-morbidity symptoms are monitored and maintained or improved  Outcome: Progressing  Flowsheets (Taken 5/8/2025 2003)  Care Plan - Patient's Chronic Conditions and Co-Morbidity Symptoms are Monitored and Maintained or Improved:   Monitor and assess patient's chronic conditions and comorbid symptoms for stability, deterioration, or improvement   Collaborate with multidisciplinary team to address chronic and comorbid conditions and prevent exacerbation or deterioration   Update acute care plan with appropriate goals if chronic or comorbid symptoms are exacerbated and prevent overall improvement and discharge     Problem: Skin/Tissue Integrity  Goal: Skin integrity remains intact  Description: 1.  Monitor for areas of redness and/or skin breakdown2.  Assess vascular access sites hourly3.  Every 4-6 hours minimum:  Change oxygen saturation probe site4.  Every 4-6 hours:  If on nasal continuous positive airway pressure, respiratory therapy assess nares and determine need for appliance change or resting period  5/9/2025 0023 by Bowen Guallpa RN  Outcome: Progressing  Flowsheets (Taken 5/8/2025 2003)  Skin Integrity Remains Intact:   Monitor for areas of redness and/or skin breakdown   Assess vascular access sites hourly   Turn and reposition as indicated   Assess need for specialty bed  5/8/2025 1345 by Mary Smyth RN  Outcome:

## 2025-05-09 NOTE — CONSULTS
[facility] Consult      Patient:   Rodney Garcia  MR#:    [id number]  Account Number:                   [id number]      Room:    [facility]420-02   YOB: 1960  Date of Progress Note: 5/9/2025  Time of Note                           11:03 AM  Attending Physician:  Curly Cleaning MD  Consulting Physician:  Leif Reyna DO       CHIEF COMPLAINT:  Prior stroke, epidural abscess, PFO      HISTORY OF PRESENT ILLNESS:   This is a 64 y.o. male who was admitted with worsening back pain, bacteremia, mulitple epidural abscesses.  The patient also had a recent stroke about a month ago with MRI showing acute infarcts in the left MCA distribution and subacute infarcts in the bilateral cerebral hemispheres.  Appearing somewhat embolic.  CTA head neck at that time was negative.  Echo at that time was showed a slightly reduced ejection fraction, bubble study was nondiagnostic.  VILMA here did reveal a PFO.    REVIEW OF SYSTEMS:  Constitutional - No fever or chills.    HENT -  No Scalp tenderness.  No tinnitus or significant hearing loss.  No nose bleeding, no sore throat.  Eyes - No sudden vision change or eye pain  Respiratory - No significant shortness of breath or cough  Cardiovascular - No chest pain. No palpitations or significant leg swelling  Gastrointestinal - No abdominal swelling or pain.    Genitourinary - No difficulty urinating, dysuria  Musculoskeletal - No back pain or myalgia.  Skin - No color change or rash  Neurologic - No seizures.  No lateralizing weakness.  No numbness.  Hematologic - No easy bruising or spontaneous bleeding.  Psychiatric - No anxiety.     PAST MEDICAL HISTORY:  History reviewed. No pertinent past medical history.    PAST SURGICAL HISTORY:      Procedure Laterality Date    ANKLE FRACTURE SURGERY      SHOULDER SURGERY  2008       SOCIAL HISTORY:   TOBACCO:   reports that he is a non-smoker but has been exposed to tobacco smoke. He quit smokeless tobacco use about 5     Tele, apolinaro at discharge    Cardiology following, RoPE score is 5, should consider PFO closure, planned follow up with structural cardiology as an outpatient, will not be able to anticoagulate at present due to spinal epidural abscess.  Neurosurgery evaluation pending. Infectious disease following on Rocephin.     Antiplatelet therapy when able, statin, risk factor maximization.       Please feel free to call with any questions.   [phone] (cell phone).    Leif Reyna DO  Board Certified Neurology

## 2025-05-09 NOTE — PROGRESS NOTES
[facility]      [facility] Note    Patient:  Rodney Garcia  YOB: 1960  Date of Service: 5/9/2025  MRN: [id number]   Acct: [id number]   Primary Care Physician: Tu Puente MD  Advance Directive: Full Code  Admit Date: 5/5/2025       Hospital Day: 3    Portions of this note have been copied forward, however, updated to reflect the most current clinical status of this patient.     CHIEF COMPLAINT weakness and fall    SUBJECTIVE: Patient complaining of severe left hip pain      CUMULATIVE HOSPITAL COURSE:   Patient is 64-year-old male with past medical history of CVA PFO diabetes hypertension and hyperlipidemia.  He presented to [facility] due to weakness and inability to ambulate.  He fell around 6 PM and was unable to get up off the ground.  Daughter reports he has been so weak lately he cannot move around.  He was seen recently at [facility] where he was diagnosed with CVA and sepsis.  Patient denies fever chills visual problem dysphagia shortness of breath chest pain cough or urinary symptoms.  ER eval white count 14.9 hemoglobin 13 hematocrit 39.1 glucose 208  Pro-Brandon 0.58 hemoglobin A1c 9.3.  X-ray hip and pelvis shows moderate to severe bilateral hip osteoarthritic changes prominent femoral head neck offset may suggest cam type femoral acetabular impingement no acute fracture, CTA pulmonary no embolus bilateral atelectasis, lumbar spine shows degenerative changes but no acute process CT of the left hip shows multifocal osteoarthritis superficial soft tissues swelling in the lateral to the hip.  Ultrasound of the area was done today showed cellulitis without visible abscess.  Will consult Ortho about possible impingement.  And hamstring injury. ID consulted as well.  Patient blood culture positive in 1 bottle for strep and these were repeated today.     5/8/2025  Patient more alert today and got a clear picture of the sequence of events.Pt had a fall at home early April  collection.  Generalized involutional atrophy, mild.  Chronic microvascular change of the periventricular white matter, mild.  No acute large vessel territorial infarct is seen.  The bony cranium appears normal. The visualized paranasal sinuses are clear. Soft tissues without significant abnormality.      1.  No acute intracranial abnormality.  Chronic changes as described.  All CT scans are performed using dose optimization techniques as appropriate to the performed exam and include at least one of the following: Automated exposure control, adjustment of the mA and/or kV according to size, and the use of iterative reconstruction technique.  ______________________________________ Electronically signed by: LILIA KIRK M.D. Date:     05/06/2025 Time:    01:00     Ultrasound lower extremity left non vascular  Result Date: 5/5/2025  EXAM:  ULTRASOUND OF THE LEFT LOWER EXTREMITY (NONVASCULAR) COMPARISON:  None available. HISTORY:  Pain and mass along the left posterior buttock/hip for 1 week. Recent stroke. TECHNIQUE:  Transverse and longitudinal gray-scale and color-flow sonographic images were obtained through the site of clinical concern in the left posterior hip/buttock. Cine images were also provided. FINDINGS:  Edematous appearance of the subcutaneous tissues at the site of clinical concern with some ill-defined subcutaneous fluid without a discrete drainable fluid collection. No hypervascular soft tissue mass identified. IMPRESSION:   Edematous appearance of the subcutaneous tissues at the site of clinical concern with ill-defined subcutaneous fluid without a discrete drainable fluid collection. No hypervascular soft tissue mass identified. ______________________________________ Electronically signed by: SAM WHARTON M.D. Date:     05/05/2025 Time:    14:43             Assessment/Plan   Principal Problem:    Cellulitis of left hip  Active Problems:    Ambulatory dysfunction    Diabetes (HCC)

## 2025-05-10 ENCOUNTER — OUTSIDE FACILITY SERVICE (OUTPATIENT)
Age: 65
End: 2025-05-10

## 2025-05-10 ENCOUNTER — APPOINTMENT (OUTPATIENT)
Dept: GENERAL RADIOLOGY | Age: 65
DRG: 853 | End: 2025-05-10
Payer: COMMERCIAL

## 2025-05-10 PROBLEM — G06.1 ABSCESS IN EPIDURAL SPACE OF LUMBAR SPINE: Status: ACTIVE | Noted: 2025-05-10

## 2025-05-10 LAB
ALBUMIN SERPL-MCNC: 2.4 G/DL (ref 3.5–5.2)
ALP SERPL-CCNC: 125 U/L (ref 40–129)
ALT SERPL-CCNC: 93 U/L (ref 10–50)
ANION GAP SERPL CALCULATED.3IONS-SCNC: 12 MMOL/L (ref 8–16)
AST SERPL-CCNC: 46 U/L (ref 10–50)
BACTERIA BLD CULT ORG #2: ABNORMAL
BACTERIA BLD CULT: ABNORMAL
BACTERIA BLD CULT: ABNORMAL
BACTERIA URNS QL MICRO: NEGATIVE /HPF
BASOPHILS # BLD: 0.2 K/UL (ref 0–0.2)
BASOPHILS NFR BLD: 0.9 % (ref 0–1)
BILIRUB SERPL-MCNC: 0.4 MG/DL (ref 0.2–1.2)
BILIRUB UR QL STRIP: NEGATIVE
BUN SERPL-MCNC: 10 MG/DL (ref 8–23)
CALCIUM SERPL-MCNC: 8.5 MG/DL (ref 8.8–10.2)
CHLORIDE SERPL-SCNC: 95 MMOL/L (ref 98–107)
CLARITY UR: CLEAR
CO2 SERPL-SCNC: 26 MMOL/L (ref 22–29)
COLOR UR: YELLOW
CREAT SERPL-MCNC: 0.5 MG/DL (ref 0.7–1.2)
CRYSTALS URNS MICRO: ABNORMAL /HPF
EOSINOPHIL # BLD: 0.3 K/UL (ref 0–0.6)
EOSINOPHIL NFR BLD: 1.7 % (ref 0–5)
EPI CELLS #/AREA URNS AUTO: 1 /HPF (ref 0–5)
ERYTHROCYTE [DISTWIDTH] IN BLOOD BY AUTOMATED COUNT: 13.8 % (ref 11.5–14.5)
GLUCOSE BLD-MCNC: 165 MG/DL (ref 70–99)
GLUCOSE BLD-MCNC: 188 MG/DL (ref 70–99)
GLUCOSE BLD-MCNC: 213 MG/DL (ref 70–99)
GLUCOSE BLD-MCNC: 259 MG/DL (ref 70–99)
GLUCOSE SERPL-MCNC: 150 MG/DL (ref 70–99)
GLUCOSE UR STRIP.AUTO-MCNC: =>1000 MG/DL
HCT VFR BLD AUTO: 35.2 % (ref 42–52)
HGB BLD-MCNC: 11.4 G/DL (ref 14–18)
HGB UR STRIP.AUTO-MCNC: NEGATIVE MG/L
HYALINE CASTS #/AREA URNS AUTO: 2 /HPF (ref 0–8)
IMM GRANULOCYTES # BLD: 1.3 K/UL
KETONES UR STRIP.AUTO-MCNC: 15 MG/DL
LEUKOCYTE ESTERASE UR QL STRIP.AUTO: NEGATIVE
LYMPHOCYTES # BLD: 1.2 K/UL (ref 1.1–4.5)
LYMPHOCYTES NFR BLD: 7.1 % (ref 20–40)
MAGNESIUM SERPL-MCNC: 1.8 MG/DL (ref 1.6–2.4)
MCH RBC QN AUTO: 29.5 PG (ref 27–31)
MCHC RBC AUTO-ENTMCNC: 32.4 G/DL (ref 33–37)
MCV RBC AUTO: 91 FL (ref 80–94)
MONOCYTES # BLD: 0.9 K/UL (ref 0–0.9)
MONOCYTES NFR BLD: 5.7 % (ref 0–10)
NEUTROPHILS # BLD: 12.4 K/UL (ref 1.5–7.5)
NEUTS SEG NFR BLD: 76.6 % (ref 50–65)
NITRITE UR QL STRIP.AUTO: NEGATIVE
ORGANISM: ABNORMAL
ORGANISM: ABNORMAL
PERFORMED ON: ABNORMAL
PH UR STRIP.AUTO: 5.5 [PH] (ref 5–8)
PLATELET # BLD AUTO: 398 K/UL (ref 130–400)
PMV BLD AUTO: 9.2 FL (ref 9.4–12.4)
POTASSIUM SERPL-SCNC: 3.1 MMOL/L (ref 3.5–5)
PROT SERPL-MCNC: 5.8 G/DL (ref 6.4–8.3)
PROT UR STRIP.AUTO-MCNC: 30 MG/DL
RBC # BLD AUTO: 3.87 M/UL (ref 4.7–6.1)
RBC #/AREA URNS AUTO: 5 /HPF (ref 0–4)
SODIUM SERPL-SCNC: 133 MMOL/L (ref 136–145)
SP GR UR STRIP.AUTO: 1.02 (ref 1–1.03)
UROBILINOGEN UR STRIP.AUTO-MCNC: 0.2 E.U./DL
WBC # BLD AUTO: 16.2 K/UL (ref 4.8–10.8)
WBC #/AREA URNS AUTO: 2 /HPF (ref 0–5)

## 2025-05-10 PROCEDURE — 6370000000 HC RX 637 (ALT 250 FOR IP): Performed by: INTERNAL MEDICINE

## 2025-05-10 PROCEDURE — 87040 BLOOD CULTURE FOR BACTERIA: CPT

## 2025-05-10 PROCEDURE — 6370000000 HC RX 637 (ALT 250 FOR IP): Performed by: STUDENT IN AN ORGANIZED HEALTH CARE EDUCATION/TRAINING PROGRAM

## 2025-05-10 PROCEDURE — 85025 COMPLETE CBC W/AUTO DIFF WBC: CPT

## 2025-05-10 PROCEDURE — 99233 SBSQ HOSP IP/OBS HIGH 50: CPT | Performed by: PSYCHIATRY & NEUROLOGY

## 2025-05-10 PROCEDURE — 82962 GLUCOSE BLOOD TEST: CPT

## 2025-05-10 PROCEDURE — 36415 COLL VENOUS BLD VENIPUNCTURE: CPT

## 2025-05-10 PROCEDURE — 99232 SBSQ HOSP IP/OBS MODERATE 35: CPT | Performed by: NEUROLOGICAL SURGERY

## 2025-05-10 PROCEDURE — 6360000002 HC RX W HCPCS: Performed by: NURSE PRACTITIONER

## 2025-05-10 PROCEDURE — 2500000003 HC RX 250 WO HCPCS: Performed by: STUDENT IN AN ORGANIZED HEALTH CARE EDUCATION/TRAINING PROGRAM

## 2025-05-10 PROCEDURE — 1200000000 HC SEMI PRIVATE

## 2025-05-10 PROCEDURE — 6370000000 HC RX 637 (ALT 250 FOR IP): Performed by: NURSE PRACTITIONER

## 2025-05-10 PROCEDURE — 6360000002 HC RX W HCPCS: Performed by: NEUROLOGICAL SURGERY

## 2025-05-10 PROCEDURE — 2500000003 HC RX 250 WO HCPCS: Performed by: NURSE PRACTITIONER

## 2025-05-10 PROCEDURE — 94760 N-INVAS EAR/PLS OXIMETRY 1: CPT

## 2025-05-10 PROCEDURE — OUTSIDEPOS PR OUTSIDE POS PLACEHOLDER: Performed by: INTERNAL MEDICINE

## 2025-05-10 PROCEDURE — 83735 ASSAY OF MAGNESIUM: CPT

## 2025-05-10 PROCEDURE — 80053 COMPREHEN METABOLIC PANEL: CPT

## 2025-05-10 PROCEDURE — 81001 URINALYSIS AUTO W/SCOPE: CPT

## 2025-05-10 RX ORDER — HYDROMORPHONE HYDROCHLORIDE 1 MG/ML
1 INJECTION, SOLUTION INTRAMUSCULAR; INTRAVENOUS; SUBCUTANEOUS
Status: DISCONTINUED | OUTPATIENT
Start: 2025-05-10 | End: 2025-05-11

## 2025-05-10 RX ORDER — HYDROMORPHONE HYDROCHLORIDE 1 MG/ML
0.5 INJECTION, SOLUTION INTRAMUSCULAR; INTRAVENOUS; SUBCUTANEOUS
Status: DISCONTINUED | OUTPATIENT
Start: 2025-05-10 | End: 2025-05-11

## 2025-05-10 RX ORDER — HYDROMORPHONE HYDROCHLORIDE 1 MG/ML
0.5 INJECTION, SOLUTION INTRAMUSCULAR; INTRAVENOUS; SUBCUTANEOUS ONCE
Status: DISCONTINUED | OUTPATIENT
Start: 2025-05-10 | End: 2025-05-10

## 2025-05-10 RX ADMIN — POTASSIUM CHLORIDE 40 MEQ: 1500 TABLET, EXTENDED RELEASE ORAL at 05:54

## 2025-05-10 RX ADMIN — INSULIN LISPRO 4 UNITS: 100 INJECTION, SOLUTION INTRAVENOUS; SUBCUTANEOUS at 12:22

## 2025-05-10 RX ADMIN — TIZANIDINE 4 MG: 4 TABLET ORAL at 11:05

## 2025-05-10 RX ADMIN — HYDROMORPHONE HYDROCHLORIDE 1 MG: 1 INJECTION, SOLUTION INTRAMUSCULAR; INTRAVENOUS; SUBCUTANEOUS at 17:19

## 2025-05-10 RX ADMIN — WATER 2000 MG: 1 INJECTION INTRAMUSCULAR; INTRAVENOUS; SUBCUTANEOUS at 17:31

## 2025-05-10 RX ADMIN — PRAVASTATIN SODIUM 10 MG: 20 TABLET ORAL at 11:05

## 2025-05-10 RX ADMIN — OXYCODONE AND ACETAMINOPHEN 1 TABLET: 10; 325 TABLET ORAL at 01:04

## 2025-05-10 RX ADMIN — HYDROMORPHONE HYDROCHLORIDE 1 MG: 1 INJECTION, SOLUTION INTRAMUSCULAR; INTRAVENOUS; SUBCUTANEOUS at 20:29

## 2025-05-10 RX ADMIN — INSULIN GLARGINE 15 UNITS: 100 INJECTION, SOLUTION SUBCUTANEOUS at 20:37

## 2025-05-10 RX ADMIN — OXYCODONE AND ACETAMINOPHEN 1 TABLET: 10; 325 TABLET ORAL at 11:05

## 2025-05-10 RX ADMIN — TIZANIDINE 4 MG: 4 TABLET ORAL at 05:00

## 2025-05-10 RX ADMIN — SODIUM CHLORIDE, PRESERVATIVE FREE 10 ML: 5 INJECTION INTRAVENOUS at 09:43

## 2025-05-10 RX ADMIN — TIZANIDINE 4 MG: 4 TABLET ORAL at 17:18

## 2025-05-10 RX ADMIN — POTASSIUM CHLORIDE 40 MEQ: 1500 TABLET, EXTENDED RELEASE ORAL at 11:06

## 2025-05-10 RX ADMIN — INSULIN GLARGINE 15 UNITS: 100 INJECTION, SOLUTION SUBCUTANEOUS at 11:06

## 2025-05-10 RX ADMIN — HYDROMORPHONE HYDROCHLORIDE 1 MG: 1 INJECTION, SOLUTION INTRAMUSCULAR; INTRAVENOUS; SUBCUTANEOUS at 23:25

## 2025-05-10 RX ADMIN — SODIUM CHLORIDE, PRESERVATIVE FREE 10 ML: 5 INJECTION INTRAVENOUS at 20:37

## 2025-05-10 RX ADMIN — TIZANIDINE 4 MG: 4 TABLET ORAL at 23:25

## 2025-05-10 RX ADMIN — INSULIN LISPRO 2 UNITS: 100 INJECTION, SOLUTION INTRAVENOUS; SUBCUTANEOUS at 17:18

## 2025-05-10 RX ADMIN — OXYCODONE AND ACETAMINOPHEN 1 TABLET: 10; 325 TABLET ORAL at 19:28

## 2025-05-10 RX ADMIN — HYDROMORPHONE HYDROCHLORIDE 1 MG: 1 INJECTION, SOLUTION INTRAMUSCULAR; INTRAVENOUS; SUBCUTANEOUS at 09:43

## 2025-05-10 RX ADMIN — OXYCODONE AND ACETAMINOPHEN 1 TABLET: 10; 325 TABLET ORAL at 15:03

## 2025-05-10 RX ADMIN — OXYCODONE AND ACETAMINOPHEN 1 TABLET: 10; 325 TABLET ORAL at 05:00

## 2025-05-10 RX ADMIN — HYDROMORPHONE HYDROCHLORIDE 1 MG: 1 INJECTION, SOLUTION INTRAMUSCULAR; INTRAVENOUS; SUBCUTANEOUS at 13:52

## 2025-05-10 ASSESSMENT — PAIN DESCRIPTION - LOCATION
LOCATION: BACK

## 2025-05-10 ASSESSMENT — PAIN DESCRIPTION - ORIENTATION
ORIENTATION: MID;LOWER
ORIENTATION: LOWER

## 2025-05-10 ASSESSMENT — PAIN - FUNCTIONAL ASSESSMENT
PAIN_FUNCTIONAL_ASSESSMENT: PREVENTS OR INTERFERES SOME ACTIVE ACTIVITIES AND ADLS
PAIN_FUNCTIONAL_ASSESSMENT: PREVENTS OR INTERFERES WITH MANY ACTIVE NOT PASSIVE ACTIVITIES

## 2025-05-10 ASSESSMENT — PAIN SCALES - GENERAL
PAINLEVEL_OUTOF10: 7
PAINLEVEL_OUTOF10: 6
PAINLEVEL_OUTOF10: 9
PAINLEVEL_OUTOF10: 8
PAINLEVEL_OUTOF10: 6
PAINLEVEL_OUTOF10: 7
PAINLEVEL_OUTOF10: 9
PAINLEVEL_OUTOF10: 7
PAINLEVEL_OUTOF10: 7

## 2025-05-10 ASSESSMENT — PAIN DESCRIPTION - DESCRIPTORS
DESCRIPTORS: DISCOMFORT;SHARP
DESCRIPTORS: ACHING;SHOOTING
DESCRIPTORS: DISCOMFORT
DESCRIPTORS: SHARP
DESCRIPTORS: DISCOMFORT

## 2025-05-10 NOTE — PROGRESS NOTES
At 1222, when this RN administered Insulin to patient, patient asked when his next dose of pain medication would be available. This RN had previously written all available times of next due dose on the board for patient. RN stated to patient his next dose of dilaudid would be available at 1243. This RN relayed to patient that he would need to request his pain medication since it is a PRN order.    At 1345, RN was doing hourly rounds on patient. Pt asked, \"Did I get my dose of pain medication at 1243?\"   This RN replied, \"No, you have to ask for it, remember?\"  Pt states, \"I'm asking.\"    This RN returns directly to the room with IV dilaudid. Pt asks RN, \"So, if I'm asleep, I don't get my pain medicine?\"  RN replied, \"It is not policy to wake a sleeping patient to administer pain medication.\"  Pt replies, \"Well, what happened to treating the patient first?\"     RN restated that the patient's request for pain medication would be fulfilled.    Patient is currently requiring oxygen, and in respect for patient safety, this RN continues to attempt education that the patient must request his pain medication since he is alert, oriented, and able to verbalize his needs.

## 2025-05-10 NOTE — PLAN OF CARE
Problem: Safety - Adult  Goal: Free from fall injury  Outcome: Progressing     Problem: ABCDS Injury Assessment  Goal: Absence of physical injury  Outcome: Progressing     Problem: Chronic Conditions and Co-morbidities  Goal: Patient's chronic conditions and co-morbidity symptoms are monitored and maintained or improved  Outcome: Progressing  Flowsheets (Taken 5/9/2025 2106)  Care Plan - Patient's Chronic Conditions and Co-Morbidity Symptoms are Monitored and Maintained or Improved:   Monitor and assess patient's chronic conditions and comorbid symptoms for stability, deterioration, or improvement   Collaborate with multidisciplinary team to address chronic and comorbid conditions and prevent exacerbation or deterioration   Update acute care plan with appropriate goals if chronic or comorbid symptoms are exacerbated and prevent overall improvement and discharge     Problem: Skin/Tissue Integrity  Goal: Skin integrity remains intact  Description: 1.  Monitor for areas of redness and/or skin breakdown2.  Assess vascular access sites hourly3.  Every 4-6 hours minimum:  Change oxygen saturation probe site4.  Every 4-6 hours:  If on nasal continuous positive airway pressure, respiratory therapy assess nares and determine need for appliance change or resting period  Outcome: Progressing  Flowsheets (Taken 5/9/2025 2106)  Skin Integrity Remains Intact:   Monitor for areas of redness and/or skin breakdown   Assess need for specialty bed   Positioning devices   Turn and reposition as indicated     Problem: Pain  Goal: Verbalizes/displays adequate comfort level or baseline comfort level  Outcome: Not Progressing

## 2025-05-10 NOTE — PLAN OF CARE
Problem: Pain  Goal: Verbalizes/displays adequate comfort level or baseline comfort level  5/10/2025 1402 by Chinyere Merida RN  Outcome: Not Progressing  5/10/2025 0636 by Bowen Guallpa RN  Outcome: Not Progressing     Problem: Safety - Adult  Goal: Free from fall injury  5/10/2025 1402 by Chinyere Merida RN  Outcome: Not Progressing  5/10/2025 0636 by Bowen Guallpa RN  Outcome: Progressing     Problem: ABCDS Injury Assessment  Goal: Absence of physical injury  5/10/2025 1402 by Chinyere Merida RN  Outcome: Not Progressing  5/10/2025 0636 by Bowen Guallpa RN  Outcome: Progressing     Problem: Chronic Conditions and Co-morbidities  Goal: Patient's chronic conditions and co-morbidity symptoms are monitored and maintained or improved  5/10/2025 1402 by Chinyere Merida RN  Outcome: Not Progressing  Flowsheets (Taken 5/10/2025 0943)  Care Plan - Patient's Chronic Conditions and Co-Morbidity Symptoms are Monitored and Maintained or Improved: Monitor and assess patient's chronic conditions and comorbid symptoms for stability, deterioration, or improvement  5/10/2025 0636 by Bowen Guallpa RN  Outcome: Progressing  Flowsheets (Taken 5/9/2025 2106)  Care Plan - Patient's Chronic Conditions and Co-Morbidity Symptoms are Monitored and Maintained or Improved:   Monitor and assess patient's chronic conditions and comorbid symptoms for stability, deterioration, or improvement   Collaborate with multidisciplinary team to address chronic and comorbid conditions and prevent exacerbation or deterioration   Update acute care plan with appropriate goals if chronic or comorbid symptoms are exacerbated and prevent overall improvement and discharge     Problem: Skin/Tissue Integrity  Goal: Skin integrity remains intact  Description: 1.  Monitor for areas of redness and/or skin breakdown2.  Assess vascular access sites hourly3.  Every 4-6 hours minimum:  Change oxygen saturation probe  site4.  Every 4-6 hours:  If on nasal continuous positive airway pressure, respiratory therapy assess nares and determine need for appliance change or resting period  5/10/2025 1402 by Chinyere Merida, RN  Outcome: Not Progressing  Flowsheets (Taken 5/10/2025 0943)  Skin Integrity Remains Intact: Monitor for areas of redness and/or skin breakdown  5/10/2025 0636 by Bowen Guallpa, LUCIANA  Outcome: Progressing  Flowsheets (Taken 5/9/2025 2106)  Skin Integrity Remains Intact:   Monitor for areas of redness and/or skin breakdown   Assess need for specialty bed   Positioning devices   Turn and reposition as indicated

## 2025-05-10 NOTE — PROGRESS NOTES
[facility] Neurology Progress Note      Patient:   Rodney Garcia  MR#:    [id number]   Room:    [facility]420-   YOB: 1960  Date of Progress Note: 5/10/2025  Time of Note                           12:51 PM  Consulting Physician:  Leif Reyna DO  Attending Physician:  Curly Cleaning MD      INTERVAL HISTORY:  No acute events, no new focal complaints, no worsening LE weakness.     REVIEW OF SYSTEMS:  Constitutional: No fevers No chills  Neck:No stiffness  Respiratory: No shortness of breath  Cardiovascular: No chest pain No palpitations  Gastrointestinal: No abdominal pain    Genitourinary: No Dysuria  Neurological: No headache, no confusion    PHYSICAL EXAM:    Constitutional -   BP (!) 143/81   Pulse (!) 103   Temp 97.2 °F (36.2 °C)   Resp 16   Ht 1.803 m (5' 11\")   Wt 96.3 kg (212 lb 4.9 oz)   SpO2 90%   BMI 29.61 kg/m²   General appearance: No acute distress   EYES -   Conjunctiva normal  Pupillary exam as below, see CN exam in the neurologic exam  ENT-    No scars, masses, or lesions over external nose or ears  Oropharynx without erythema, palate midline  Cardiovascular -   No clubbing, cyanosis, or edema   Pulmonary-   Good expansion, normal effort without use of accessory muscles  Musculoskeletal -   No significant wasting of muscles noted  Gait as below, see gait exam in the neurologic exam  Muscle strength, tone, stability as below see the motor exam in the neurologic exam.   No bony deformities  Skin -   Warm, dry, and intact to inspection and palpation.    No rash, erythema, or pallor  Psychiatric -   Mood, affect, and behavior appear normal    Memory as below see mental status examination in the neurologic exam        NEUROLOGICAL EXAM     Mental status    [x] Awake, alert, oriented   [x] Affect attention and concentration appear appropriate  [x] Recent and remote memory appears unremarkable  [x] Speech normal without dysarthria or aphasia, comprehension and repetition intact.  ventricle size is normal. Normal wall thickness. Normal wall motion. Normal diastolic function.   Right Ventricle: Right ventricle size is normal. Normal systolic function.   Aortic Valve: Trileaflet valve. No regurgitation. No stenosis.   Mitral Valve: Valve structure is normal. Trace regurgitation. No stenosis noted.   Tricuspid Valve: Trace regurgitation.   Left Atrium: Left atrium size is normal. Normal sized appendage. Normal appendage flow velocity. No left atrial appendage thrombus noted. No left atrial appendage mass noted.   Interatrial Septum: Grade II Positive (10 to 20 bubbles). Agitated saline study was positive without provocation. Right to left shunt was noted. PFO present. Hypermobile interatrial septum. The septum is bowing into the LA.   Right Atrium: Right atrium size is normal.   Pericardium: Trivial pericardial effusion present. No indication of cardiac tamponade.   Image quality is adequate. No evidence to suggest endocarditis by transesophageal echocardiography. Evidence of hypermobile/borderline atrial septal aneurysm with PFO and right-to-left shunting.       Lab Results   Component Value Date    WBC 16.2 (H) 05/10/2025    HGB 11.4 (L) 05/10/2025    HCT 35.2 (L) 05/10/2025    MCV 91.0 05/10/2025     05/10/2025     Lab Results   Component Value Date     (L) 05/10/2025    K 3.1 (L) 05/10/2025    CL 95 (L) 05/10/2025    CO2 26 05/10/2025    BUN 10 05/10/2025    CREATININE 0.5 (L) 05/10/2025    GLUCOSE 150 (H) 05/10/2025    CALCIUM 8.5 (L) 05/10/2025    BILITOT 0.4 05/10/2025    ALKPHOS 125 05/10/2025    AST 46 05/10/2025    ALT 93 (H) 05/10/2025    LABGLOM >90 05/10/2025   No results found for: \"INR\", \"PROTIME\"    RECORD REVIEW:   Previous medical records, medications were reviewed at today's visit.  Nursing/physician notes, imaging, labs and vitals reviewed. PT,OT and/or speech notes reviewed      ASSESSMENT:  64 y.o. admitted with Streptococcus intermedius bacteremia, back pain,

## 2025-05-10 NOTE — PROGRESS NOTES
05/10/25 1200   Subjective   Subjective Not right now (  OOB  ) I am not feeling too good my back is hurting..   PT Plan of Care   Saturday R       Electronically signed by Ruben Gerber PTA on 5/10/2025 at 12:08 PM

## 2025-05-10 NOTE — PROGRESS NOTES
NEUROSURGERY PROGRESS NOTE      Chief Complaint:   Chief Complaint   Patient presents with    Fall     Left hip pain         Interval Update:    Patient seen and examined.  He is lying comfortably in bed.  He continues to endorse soreness in his back.  He denies any lower extremity numbness or weakness and does not describe any clear radicular pain.  He remains on IV antibiotics.  Anticoagulation is being held.  He has been afebrile for the past 24h.      HPI:     The patient is a 64 y.o. male who presented to the [facility] ED with complaints of weakness, hip pain and multiple recent falls.  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  He has also been treated for a stroke within the past month.  He has continued with complaints of dizziness, weakness and multiple falls.  In the ED he was found to be tachycardic, tachypneic, with an elevated WBC count, lactic acid and procaclitonin.  He was admitted with possible sepsis and blood cultures have grown strep.  He has complained of back pain and hip pain since admission.  He does have a history of chronic back pain and was recently evaluated for surgery in [place] and he was told he was not a candidate for surgery because his Hgb A1c was 9.4%.  He does see Dr. Lloyd in pain management and receives injections.       He underwent an MRI as part of his infectious workup and this revealed evidence of widespread infection in the lumbar spine with a likely epidural abscess.     He was recently started on Eliquis due to his history of stroke.      Objective:    /66   Pulse 85   Temp 98.6 °F (37 °C)   Resp 14   Ht 1.803 m (5' 11\")   Wt 96.3 kg (212 lb 4.9 oz)   SpO2 94%   BMI 29.61 kg/m²       Intake/Output Summary (Last 24 hours) at 5/10/2025 0922  Last data filed at 5/9/2025 2026  Gross per 24 hour   Intake --

## 2025-05-10 NOTE — PROGRESS NOTES
Infectious Diseases Progress Note    Patient:  Rodney Garcia  YOB: 1960  MRN: [id number]   Admit date: 5/5/2025   Admitting Physician: Curly Cleaning MD  Primary Care Physician: Tu Puente MD    Chief Complaint: \"Well, a little better I think, my main problem is my pain medications.\"    Interval History: He reports no pain currently.  He indicat he had some ongoing problems with back pain.  He indicates he is asked for his pain medication in advance at times.  He indicates that he has been trying to take them on a schedule.  Explained there written for him to ask for when he is experiencing pain and needs relief.  I explained based on the frequency he is requiring medicines, at some point they may be able to transition him to a longer acting treatment.  He is not having fevers or chills.  He is not having diarrhea, rash, or skin itching.  He asked me a lot of questions about when he could return to work.  I explained his MRI and blood culture results.  I explained he was likely going to need surgery the beginning of next week and that we would be looking at at least a 6 to 8-week course of antibiotic therapy.  He wondered about short-term disability.  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  I explained with how long he has been ill, has ongoing back pain, need for surgery next week, need for IV antibiotic treatment, need for physical therapy that I felt his recovery would be measured over months not weeks.  It would seem short-term disability would be very appropriate for him based on what he will need.  He continues to lose his train of thought at times in conversation.  He has a hard time maintaining consistent focus.    In/Out    Intake/Output Summary (Last 24 hours) at 5/10/2025 1225  Last data filed at 5/9/2025 2026  Gross per 24 hour   Intake --   Output 300 ml   Net -300 ml     Allergies: No Active Allergies  Current Meds: HYDROmorphone HCl PF

## 2025-05-10 NOTE — PROGRESS NOTES
[facility]      [facility]    Patient:  Rodney Garcia  YOB: 1960  Date of Service: 5/10/2025  MRN: [id number]   Acct: [id number]   Primary Care Physician: Tu Puente MD  Advance Directive: Full Code  Admit Date: 5/5/2025       Hospital Day: 4    Portions of this note have been copied forward, however, updated to reflect the most current clinical status of this patient.     CHIEF COMPLAINT weakness and fall    SUBJECTIVE: Patient complaining of severe left hip pain      CUMULATIVE HOSPITAL COURSE:   Patient is 64-year-old male with past medical history of CVA PFO diabetes hypertension and hyperlipidemia.  He presented to [facility] due to weakness and inability to ambulate.  He fell around 6 PM and was unable to get up off the ground.  Daughter reports he has been so weak lately he cannot move around.  He was seen recently at [facility] where he was diagnosed with CVA and sepsis.  Patient denies fever chills visual problem dysphagia shortness of breath chest pain cough or urinary symptoms.  ER eval white count 14.9 hemoglobin 13 hematocrit 39.1 glucose 208  Pro-Brandon 0.58 hemoglobin A1c 9.3.  X-ray hip and pelvis shows moderate to severe bilateral hip osteoarthritic changes prominent femoral head neck offset may suggest cam type femoral acetabular impingement no acute fracture, CTA pulmonary no embolus bilateral atelectasis, lumbar spine shows degenerative changes but no acute process CT of the left hip shows multifocal osteoarthritis superficial soft tissues swelling in the lateral to the hip.  Ultrasound of the area was done today showed cellulitis without visible abscess.  Will consult Ortho about possible impingement.  And hamstring injury. ID consulted as well.  Patient blood culture positive in 1 bottle for strep and these were repeated today.     5/8/2025  Patient more alert today and got a clear picture of the sequence of events.Pt had a fall at home early  left hip was performed with IV contrast. Multiplanar reformats were made.  HISTORY:  Pain and swelling.  Recent fall.  COMPARISON:  Plain films same date.  FINDINGS:  Soft tissue swelling is present in the subcutaneous tissues lateral to the hip.  Mild skin thickening.  Correlate with any evidence of induration or inflammatory change.  No significant capsular distension from the fluid collection.  Osteoarthritis is present in the hip.  Degenerative changes are present in the sacroiliac joints, right hip and symphysis pubis..  Diverticulosis of the colon incidentally noted.  There is some variable density in the gluteus vickey centrally which could reflect muscle strain edema.  There is calcification indeterminate significance within the biceps.  Could reflect prior muscle injury.  Somewhat distant from the origin of the hamstring avulsion is not excluded.  Some enthesophyte formation is present along the ischial  tuberosity as well. No findings to indicate active contrast extravasation.       1. Multifocal osteoarthritis 2. Some variable density in the gluteus vickey which may reflect muscle contusion or strain 3. Superficial soft tissue swelling lateral to the hip. 4. Findings of the hamstring as noted with distal calcification.  Hamstring injury not excluded with additional fragmented enthesophyte close to the ischial tuberosity.  All CT scans are performed using dose optimization techniques as appropriate to the performed exam and include at least one of the following: Automated exposure control, adjustment of the mA and/or kV according to size, and the use of iterative reconstruction technique.  ______________________________________ Electronically signed by: OPAL CHOU M.D. Date:     05/06/2025 Time:    01:01     CT Head W/O Contrast  Result Date: 5/6/2025  EXAM: CT BRAIN WITHOUT CONTRAST    HISTORY:  Altered mental status, weakness    TECHNIQUE:  CT of the brain without intravenous contrast.  FINDINGS:

## 2025-05-11 ENCOUNTER — APPOINTMENT (OUTPATIENT)
Dept: GENERAL RADIOLOGY | Age: 65
DRG: 853 | End: 2025-05-11
Payer: COMMERCIAL

## 2025-05-11 LAB
ALBUMIN SERPL-MCNC: 2.3 G/DL (ref 3.5–5.2)
ALP SERPL-CCNC: 144 U/L (ref 40–129)
ALT SERPL-CCNC: 70 U/L (ref 10–50)
ANION GAP SERPL CALCULATED.3IONS-SCNC: 8 MMOL/L (ref 8–16)
AST SERPL-CCNC: 33 U/L (ref 10–50)
BASOPHILS # BLD: 0 K/UL (ref 0–0.2)
BASOPHILS NFR BLD: 0.2 % (ref 0–1)
BILIRUB SERPL-MCNC: 0.4 MG/DL (ref 0.2–1.2)
BUN SERPL-MCNC: 11 MG/DL (ref 8–23)
CALCIUM SERPL-MCNC: 8.2 MG/DL (ref 8.8–10.2)
CHLORIDE SERPL-SCNC: 95 MMOL/L (ref 98–107)
CO2 SERPL-SCNC: 30 MMOL/L (ref 22–29)
CREAT SERPL-MCNC: 0.5 MG/DL (ref 0.7–1.2)
EOSINOPHIL # BLD: 0.4 K/UL (ref 0–0.6)
EOSINOPHIL NFR BLD: 2.2 % (ref 0–5)
ERYTHROCYTE [DISTWIDTH] IN BLOOD BY AUTOMATED COUNT: 13.8 % (ref 11.5–14.5)
GLUCOSE BLD-MCNC: 174 MG/DL (ref 70–99)
GLUCOSE BLD-MCNC: 201 MG/DL (ref 70–99)
GLUCOSE BLD-MCNC: 202 MG/DL (ref 70–99)
GLUCOSE BLD-MCNC: 209 MG/DL (ref 70–99)
GLUCOSE SERPL-MCNC: 199 MG/DL (ref 70–99)
HCT VFR BLD AUTO: 34.8 % (ref 42–52)
HGB BLD-MCNC: 11.2 G/DL (ref 14–18)
IMM GRANULOCYTES # BLD: 1.8 K/UL
LYMPHOCYTES # BLD: 1.2 K/UL (ref 1.1–4.5)
LYMPHOCYTES NFR BLD: 7.3 % (ref 20–40)
MAGNESIUM SERPL-MCNC: 1.9 MG/DL (ref 1.6–2.4)
MCH RBC QN AUTO: 29.3 PG (ref 27–31)
MCHC RBC AUTO-ENTMCNC: 32.2 G/DL (ref 33–37)
MCV RBC AUTO: 91.1 FL (ref 80–94)
MONOCYTES # BLD: 1 K/UL (ref 0–0.9)
MONOCYTES NFR BLD: 6.3 % (ref 0–10)
NEUTROPHILS # BLD: 12 K/UL (ref 1.5–7.5)
NEUTS SEG NFR BLD: 73.2 % (ref 50–65)
PERFORMED ON: ABNORMAL
PLATELET # BLD AUTO: 419 K/UL (ref 130–400)
PMV BLD AUTO: 8.9 FL (ref 9.4–12.4)
POTASSIUM SERPL-SCNC: 3.5 MMOL/L (ref 3.5–5)
PROT SERPL-MCNC: 5.7 G/DL (ref 6.4–8.3)
RBC # BLD AUTO: 3.82 M/UL (ref 4.7–6.1)
SODIUM SERPL-SCNC: 133 MMOL/L (ref 136–145)
WBC # BLD AUTO: 16.3 K/UL (ref 4.8–10.8)

## 2025-05-11 PROCEDURE — 2500000003 HC RX 250 WO HCPCS: Performed by: NURSE PRACTITIONER

## 2025-05-11 PROCEDURE — 87040 BLOOD CULTURE FOR BACTERIA: CPT

## 2025-05-11 PROCEDURE — 94760 N-INVAS EAR/PLS OXIMETRY 1: CPT

## 2025-05-11 PROCEDURE — 1200000000 HC SEMI PRIVATE

## 2025-05-11 PROCEDURE — 6370000000 HC RX 637 (ALT 250 FOR IP): Performed by: STUDENT IN AN ORGANIZED HEALTH CARE EDUCATION/TRAINING PROGRAM

## 2025-05-11 PROCEDURE — 85025 COMPLETE CBC W/AUTO DIFF WBC: CPT

## 2025-05-11 PROCEDURE — 80053 COMPREHEN METABOLIC PANEL: CPT

## 2025-05-11 PROCEDURE — 2500000003 HC RX 250 WO HCPCS: Performed by: STUDENT IN AN ORGANIZED HEALTH CARE EDUCATION/TRAINING PROGRAM

## 2025-05-11 PROCEDURE — 99232 SBSQ HOSP IP/OBS MODERATE 35: CPT | Performed by: NEUROLOGICAL SURGERY

## 2025-05-11 PROCEDURE — 72110 X-RAY EXAM L-2 SPINE 4/>VWS: CPT

## 2025-05-11 PROCEDURE — 6370000000 HC RX 637 (ALT 250 FOR IP): Performed by: INTERNAL MEDICINE

## 2025-05-11 PROCEDURE — 99233 SBSQ HOSP IP/OBS HIGH 50: CPT | Performed by: PSYCHIATRY & NEUROLOGY

## 2025-05-11 PROCEDURE — 82962 GLUCOSE BLOOD TEST: CPT

## 2025-05-11 PROCEDURE — 6360000002 HC RX W HCPCS: Performed by: NURSE PRACTITIONER

## 2025-05-11 PROCEDURE — 6360000002 HC RX W HCPCS: Performed by: NEUROLOGICAL SURGERY

## 2025-05-11 PROCEDURE — 83735 ASSAY OF MAGNESIUM: CPT

## 2025-05-11 PROCEDURE — 36415 COLL VENOUS BLD VENIPUNCTURE: CPT

## 2025-05-11 PROCEDURE — 6360000002 HC RX W HCPCS: Performed by: INTERNAL MEDICINE

## 2025-05-11 PROCEDURE — 6370000000 HC RX 637 (ALT 250 FOR IP): Performed by: NURSE PRACTITIONER

## 2025-05-11 RX ORDER — MORPHINE SULFATE 2 MG/ML
2 INJECTION, SOLUTION INTRAMUSCULAR; INTRAVENOUS ONCE
Status: COMPLETED | OUTPATIENT
Start: 2025-05-11 | End: 2025-05-11

## 2025-05-11 RX ORDER — MORPHINE SULFATE 2 MG/ML
2 INJECTION, SOLUTION INTRAMUSCULAR; INTRAVENOUS
Status: DISCONTINUED | OUTPATIENT
Start: 2025-05-11 | End: 2025-05-15

## 2025-05-11 RX ORDER — OXYCODONE AND ACETAMINOPHEN 5; 325 MG/1; MG/1
1 TABLET ORAL EVERY 4 HOURS PRN
Refills: 0 | Status: DISCONTINUED | OUTPATIENT
Start: 2025-05-11 | End: 2025-05-15

## 2025-05-11 RX ORDER — INSULIN GLARGINE 100 [IU]/ML
17 INJECTION, SOLUTION SUBCUTANEOUS 2 TIMES DAILY
Status: DISCONTINUED | OUTPATIENT
Start: 2025-05-11 | End: 2025-05-19 | Stop reason: HOSPADM

## 2025-05-11 RX ADMIN — HYDROMORPHONE HYDROCHLORIDE 1 MG: 1 INJECTION, SOLUTION INTRAMUSCULAR; INTRAVENOUS; SUBCUTANEOUS at 02:32

## 2025-05-11 RX ADMIN — MORPHINE SULFATE 2 MG: 2 INJECTION, SOLUTION INTRAMUSCULAR; INTRAVENOUS at 08:06

## 2025-05-11 RX ADMIN — OXYCODONE AND ACETAMINOPHEN 1 TABLET: 10; 325 TABLET ORAL at 00:29

## 2025-05-11 RX ADMIN — MORPHINE SULFATE 2 MG: 2 INJECTION, SOLUTION INTRAMUSCULAR; INTRAVENOUS at 21:36

## 2025-05-11 RX ADMIN — HYDROMORPHONE HYDROCHLORIDE 1 MG: 1 INJECTION, SOLUTION INTRAMUSCULAR; INTRAVENOUS; SUBCUTANEOUS at 05:25

## 2025-05-11 RX ADMIN — INSULIN LISPRO 2 UNITS: 100 INJECTION, SOLUTION INTRAVENOUS; SUBCUTANEOUS at 21:22

## 2025-05-11 RX ADMIN — PRAVASTATIN SODIUM 10 MG: 20 TABLET ORAL at 07:24

## 2025-05-11 RX ADMIN — INSULIN LISPRO 2 UNITS: 100 INJECTION, SOLUTION INTRAVENOUS; SUBCUTANEOUS at 12:16

## 2025-05-11 RX ADMIN — OXYCODONE AND ACETAMINOPHEN 1 TABLET: 10; 325 TABLET ORAL at 04:21

## 2025-05-11 RX ADMIN — TIZANIDINE 4 MG: 4 TABLET ORAL at 21:36

## 2025-05-11 RX ADMIN — SODIUM CHLORIDE, PRESERVATIVE FREE 10 ML: 5 INJECTION INTRAVENOUS at 07:25

## 2025-05-11 RX ADMIN — INSULIN GLARGINE 15 UNITS: 100 INJECTION, SOLUTION SUBCUTANEOUS at 08:07

## 2025-05-11 RX ADMIN — WATER 2000 MG: 1 INJECTION INTRAMUSCULAR; INTRAVENOUS; SUBCUTANEOUS at 17:50

## 2025-05-11 RX ADMIN — OXYCODONE HYDROCHLORIDE AND ACETAMINOPHEN 1 TABLET: 5; 325 TABLET ORAL at 11:28

## 2025-05-11 RX ADMIN — SODIUM CHLORIDE, PRESERVATIVE FREE 10 ML: 5 INJECTION INTRAVENOUS at 21:23

## 2025-05-11 RX ADMIN — INSULIN LISPRO 2 UNITS: 100 INJECTION, SOLUTION INTRAVENOUS; SUBCUTANEOUS at 17:50

## 2025-05-11 RX ADMIN — INSULIN GLARGINE 17 UNITS: 100 INJECTION, SOLUTION SUBCUTANEOUS at 21:22

## 2025-05-11 RX ADMIN — POTASSIUM CHLORIDE 40 MEQ: 1500 TABLET, EXTENDED RELEASE ORAL at 05:25

## 2025-05-11 RX ADMIN — ENOXAPARIN SODIUM 40 MG: 100 INJECTION SUBCUTANEOUS at 07:25

## 2025-05-11 RX ADMIN — OXYCODONE HYDROCHLORIDE AND ACETAMINOPHEN 1 TABLET: 5; 325 TABLET ORAL at 20:17

## 2025-05-11 RX ADMIN — ACETAMINOPHEN 650 MG: 325 TABLET ORAL at 20:18

## 2025-05-11 ASSESSMENT — PAIN SCALES - GENERAL
PAINLEVEL_OUTOF10: 6
PAINLEVEL_OUTOF10: 7
PAINLEVEL_OUTOF10: 5
PAINLEVEL_OUTOF10: 7
PAINLEVEL_OUTOF10: 8
PAINLEVEL_OUTOF10: 7
PAINLEVEL_OUTOF10: 6
PAINLEVEL_OUTOF10: 7

## 2025-05-11 ASSESSMENT — PAIN DESCRIPTION - LOCATION
LOCATION: BACK

## 2025-05-11 ASSESSMENT — PAIN DESCRIPTION - DESCRIPTORS
DESCRIPTORS: ACHING;DISCOMFORT
DESCRIPTORS: ACHING;THROBBING
DESCRIPTORS: ACHING;DISCOMFORT

## 2025-05-11 ASSESSMENT — PAIN SCALES - WONG BAKER: WONGBAKER_NUMERICALRESPONSE: HURTS A LITTLE BIT

## 2025-05-11 ASSESSMENT — PAIN DESCRIPTION - ORIENTATION
ORIENTATION: LOWER

## 2025-05-11 NOTE — PROGRESS NOTES
NEUROSURGERY PROGRESS NOTE      Chief Complaint:   Chief Complaint   Patient presents with    Fall     Left hip pain         Interval Update:    Patient seen and examined.  He is lying comfortably in bed.  He continues to endorse soreness in his back.  He denies lower extremity numbness or weakness and does not describe any clear radicular pain.  He remains on IV antibiotics.  Anticoagulation is being held.  He has been afebrile.  He refused his standing x-rays yesterday due to complaints of back pain.  He also refused PT.  He does not appear particularly motivated to participate in his care.      HPI:     The patient is a 64 y.o. male who presented to the [facility] ED with complaints of weakness, hip pain and multiple recent falls.  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  He has also been treated for a stroke within the past month.  He has continued with complaints of dizziness, weakness and multiple falls.  In the ED he was found to be tachycardic, tachypneic, with an elevated WBC count, lactic acid and procaclitonin.  He was admitted with possible sepsis and blood cultures have grown strep.  He has complained of back pain and hip pain since admission.  He does have a history of chronic back pain and was recently evaluated for surgery in [place] and he was told he was not a candidate for surgery because his Hgb A1c was 9.4%.  He does see Dr. Lloyd in pain management and receives injections.       He underwent an MRI as part of his infectious workup and this revealed evidence of widespread infection in the lumbar spine with a likely epidural abscess.     He was recently started on Eliquis due to his history of stroke.      Objective:    /72   Pulse 95   Temp 97.5 °F (36.4 °C) (Temporal)   Resp 18   Ht 1.803 m (5' 11\")   Wt 96.3 kg (212 lb 4.9 oz)

## 2025-05-11 NOTE — PLAN OF CARE
Care plan reviewed      Problem: Pain  Goal: Verbalizes/displays adequate comfort level or baseline comfort level  5/10/2025 2351 by Li Johnson RN  Outcome: Progressing  5/10/2025 1402 by Chinyere Merida RN  Outcome: Not Progressing     Problem: Safety - Adult  Goal: Free from fall injury  5/10/2025 2351 by Li Johnson RN  Outcome: Progressing  5/10/2025 1402 by Chinyere Merida RN  Outcome: Not Progressing     Problem: ABCDS Injury Assessment  Goal: Absence of physical injury  5/10/2025 2351 by Li Johnson RN  Outcome: Progressing  5/10/2025 1402 by Chinyere Merida RN  Outcome: Not Progressing     Problem: Chronic Conditions and Co-morbidities  Goal: Patient's chronic conditions and co-morbidity symptoms are monitored and maintained or improved  5/10/2025 2351 by Li Johnson RN  Outcome: Progressing  Flowsheets (Taken 5/10/2025 2018)  Care Plan - Patient's Chronic Conditions and Co-Morbidity Symptoms are Monitored and Maintained or Improved: Monitor and assess patient's chronic conditions and comorbid symptoms for stability, deterioration, or improvement  5/10/2025 1402 by Chinyere Merida RN  Outcome: Not Progressing  Flowsheets (Taken 5/10/2025 0943)  Care Plan - Patient's Chronic Conditions and Co-Morbidity Symptoms are Monitored and Maintained or Improved: Monitor and assess patient's chronic conditions and comorbid symptoms for stability, deterioration, or improvement     Problem: Skin/Tissue Integrity  Goal: Skin integrity remains intact  Description: 1.  Monitor for areas of redness and/or skin breakdown2.  Assess vascular access sites hourly3.  Every 4-6 hours minimum:  Change oxygen saturation probe site4.  Every 4-6 hours:  If on nasal continuous positive airway pressure, respiratory therapy assess nares and determine need for appliance change or resting period  5/10/2025 2351 by Li Johnson RN  Outcome: Progressing  Flowsheets (Taken 5/10/2025 2348)  Skin Integrity Remains

## 2025-05-11 NOTE — PLAN OF CARE
Problem: Pain  Goal: Verbalizes/displays adequate comfort level or baseline comfort level  5/11/2025 0932 by Johnna Leal RN  Outcome: Progressing  5/10/2025 2351 by Li Johnson RN  Outcome: Progressing     Problem: Safety - Adult  Goal: Free from fall injury  5/11/2025 0932 by Johnna Leal RN  Outcome: Progressing  5/10/2025 2351 by Li Johnson RN  Outcome: Progressing     Problem: ABCDS Injury Assessment  Goal: Absence of physical injury  5/11/2025 0932 by Johnna Leal RN  Outcome: Progressing  5/10/2025 2351 by Li Johnson RN  Outcome: Progressing     Problem: Chronic Conditions and Co-morbidities  Goal: Patient's chronic conditions and co-morbidity symptoms are monitored and maintained or improved  5/11/2025 0932 by Johnna Leal RN  Outcome: Progressing  5/10/2025 2351 by Li Johnson RN  Outcome: Progressing  Flowsheets (Taken 5/10/2025 2018)  Care Plan - Patient's Chronic Conditions and Co-Morbidity Symptoms are Monitored and Maintained or Improved: Monitor and assess patient's chronic conditions and comorbid symptoms for stability, deterioration, or improvement     Problem: Skin/Tissue Integrity  Goal: Skin integrity remains intact  Description: 1.  Monitor for areas of redness and/or skin breakdown2.  Assess vascular access sites hourly3.  Every 4-6 hours minimum:  Change oxygen saturation probe site4.  Every 4-6 hours:  If on nasal continuous positive airway pressure, respiratory therapy assess nares and determine need for appliance change or resting period  5/11/2025 0932 by Johnna Leal RN  Outcome: Progressing  5/10/2025 2351 by Li Johnson RN  Outcome: Progressing  Flowsheets (Taken 5/10/2025 2348)  Skin Integrity Remains Intact: Monitor for areas of redness and/or skin breakdown

## 2025-05-11 NOTE — PROGRESS NOTES
[facility]      [facility]    Patient:  Rodney Garcia  YOB: 1960  Date of Service: 5/11/2025  MRN: [id number]   Acct: [id number]   Primary Care Physician: Tu Puente MD  Advance Directive: Full Code  Admit Date: 5/5/2025       Hospital Day: 5    Portions of this note have been copied forward, however, updated to reflect the most current clinical status of this patient.     CHIEF COMPLAINT weakness and fall    SUBJECTIVE: Patient complaining of severe left hip pain      CUMULATIVE HOSPITAL COURSE:   Patient is 64-year-old male with past medical history of CVA PFO diabetes hypertension and hyperlipidemia.  He presented to [facility] due to weakness and inability to ambulate.  He fell around 6 PM and was unable to get up off the ground.  Daughter reports he has been so weak lately he cannot move around.  He was seen recently at [facility] where he was diagnosed with CVA and sepsis.  Patient denies fever chills visual problem dysphagia shortness of breath chest pain cough or urinary symptoms.  ER eval white count 14.9 hemoglobin 13 hematocrit 39.1 glucose 208  Pro-Brandon 0.58 hemoglobin A1c 9.3.  X-ray hip and pelvis shows moderate to severe bilateral hip osteoarthritic changes prominent femoral head neck offset may suggest cam type femoral acetabular impingement no acute fracture, CTA pulmonary no embolus bilateral atelectasis, lumbar spine shows degenerative changes but no acute process CT of the left hip shows multifocal osteoarthritis superficial soft tissues swelling in the lateral to the hip.  Ultrasound of the area was done today showed cellulitis without visible abscess.  Will consult Ortho about possible impingement.  And hamstring injury. ID consulted as well.  Patient blood culture positive in 1 bottle for strep and these were repeated today.     5/8/2025  Patient more alert today and got a clear picture of the sequence of events.Pt had a fall at home early  Prominent femoral head neck offset may suggest cam type femoral acetabular impingement. No acute fracture or dislocation detected. Soft tissues intact.  No osseous lesions.  Degenerative changes inferior left SI joint.        ______________________________________ Electronically signed by: LEANDRA YU M.D. Date:     05/06/2025 Time:    06:29     CTA PULMONARY W CONTRAST  Result Date: 5/6/2025  EXAM:  CTA OF THE PULMONARY ARTERIES WITH CONTRAST  TECHNIQUE: CTA of the pulmonary arteries was performed with contrast. 2-D and 3-D reconstructions were performed.  HISTORY:  Difficulty breathing.  Decreased oxygen saturation.  COMPARISON:  None  FINDINGS:  Pulmonary arteries: No pulmonary embolus evident.  Lungs/pleura: Bibasilar atelectasis. No suspicious nodules.  Calcified granuloma on the right.  No consolidation.  There is some atelectasis along the lingula.  Mediastinum: No adenopathy.  Cardiovascular: No pericardial effusion. Coronary atherosclerosis.  Aorta is normal in caliber. No dissection evident.  Chest wall/axillae/thoracic inlet: No mass or adenopathy.  Imaged upper abdomen: Mild distal esophageal wall thickening.  Low density finding in the liver measuring 1.3 cm.  Indeterminate.  Bones: Old rib fractures are present.       1. No pulmonary embolus evident 2. Bibasilar atelectasis 3. Coronary atherosclerotic disease. 4. Low-density liver lesion in the left lobe measuring 1.3 cm.  Indeterminate on this exam. 5. Mild thickening of the distal esophageal wall.  Correlate with any evidence of reflux on clinical history.     All CT scans are performed using dose optimization techniques as appropriate to the performed exam and includes at least one of the following:  Automated exposure control, adjustment of the mA and/or kV according to size, and the use of iterative reconstruction technique.  All CT scans are performed using dose optimization techniques as appropriate to the performed exam and include at

## 2025-05-11 NOTE — PROGRESS NOTES
and repetition intact.   COMMENTS:   Cranial Nerves [x] No VF deficit to confrontation  [x] PERRLA, EOMI, no nystagmus, conjugate eye movements, no ptosis  [x] Face symmetric  [x] Facial sensation intact  [x] Tongue midline no atrophy or fasciculations present  [x] Palate midline, hearing to finger rub normal  [x] Shoulder shrug and SCM testing normal  COMMENTS:   Motor   [] 5/5 strength x 4 extremities  [x] Normal bulk and tone  [x] No tremor present  [x] No rigidity or bradykinesia noted  COMMENTS: 4+/5 BLE proximally, 5/5 distally    Sensory  [x] Sensation intact to light touch, pin prick, vibration, and proprioception BLE  [] Sensation intact to light touch, pin prick, vibration, and proprioception BUE  COMMENTS:   Coordination [x] FTN normal bilaterally   [] HTS normal bilaterally  [] VAN normal.   COMMENTS:   Reflexes  [] Symmetric and non-pathological  [x] Toes downgoing bilaterally  [x] No clonus present  COMMENTS: Hypoactive LE    Gait                  [] Normal steady gait    [] Ataxic    [] Spastic     [] Magnetic     [] Shuffling  [x] Not assessed  COMMENTS:        LABS/IMAGING:    MRI BRAIN W WO CONTRAST  Result Date: 5/9/2025  EXAM:  BRAIN MRI WITHOUT AND WITH CONTRAST  HISTORY:  Stroke.  Epidural abscess.  TECHNIQUE:  Multiplanar and multisequence MRI of the brain before and after the administration of 20 cc of MultiHance contrast intravenously.  COMPARISON:  Brain MRI dated 04/08/2025.  Brain CT dated 05/06/2025.  FINDINGS:  Motion artifacts are noted.  There has been interval development of two 4 mm T2 hyperintense lesions in subcortical white matter of bilateral frontal lobes, with susceptibility artifact, which could represent tiny intraparenchymal hematomas. There are numerous punctate susceptibility artifacts scattered in bilateral cerebral hemispheres and bilateral cerebellum, not visualized on prior GRE sequence (SWI was not performed).  There is no evidence associated T2/FLAIR hyperintensity.

## 2025-05-12 ENCOUNTER — OUTSIDE FACILITY SERVICE (OUTPATIENT)
Age: 65
End: 2025-05-12

## 2025-05-12 ENCOUNTER — ANESTHESIA EVENT (OUTPATIENT)
Dept: OPERATING ROOM | Age: 65
End: 2025-05-12
Payer: COMMERCIAL

## 2025-05-12 PROBLEM — L03.116 CELLULITIS OF LEFT HIP: Status: RESOLVED | Noted: 2025-05-07 | Resolved: 2025-05-12

## 2025-05-12 PROBLEM — G06.1 SPINAL EPIDURAL ABSCESS: Status: ACTIVE | Noted: 2025-05-05

## 2025-05-12 PROBLEM — M43.16 SPONDYLOLISTHESIS OF LUMBAR REGION: Status: ACTIVE | Noted: 2025-05-05

## 2025-05-12 LAB
ABO/RH: NORMAL
ALBUMIN SERPL-MCNC: 2.6 G/DL (ref 3.5–5.2)
ALP SERPL-CCNC: 110 U/L (ref 40–129)
ALT SERPL-CCNC: 53 U/L (ref 10–50)
ANION GAP SERPL CALCULATED.3IONS-SCNC: 11 MMOL/L (ref 8–16)
ANTIBODY SCREEN: NORMAL
AST SERPL-CCNC: 25 U/L (ref 10–50)
BACTERIA BLD CULT ORG #2: NORMAL
BASOPHILS # BLD: 0 K/UL (ref 0–0.2)
BASOPHILS NFR BLD: 0 % (ref 0–1)
BILIRUB SERPL-MCNC: 0.5 MG/DL (ref 0.2–1.2)
BUN SERPL-MCNC: 12 MG/DL (ref 8–23)
CALCIUM SERPL-MCNC: 8.6 MG/DL (ref 8.8–10.2)
CHLORIDE SERPL-SCNC: 95 MMOL/L (ref 98–107)
CO2 SERPL-SCNC: 30 MMOL/L (ref 22–29)
CREAT SERPL-MCNC: 0.4 MG/DL (ref 0.7–1.2)
CRP SERPL-MCNC: 228 MG/L (ref 0–5)
EOSINOPHIL # BLD: 0.16 K/UL (ref 0–0.6)
EOSINOPHIL NFR BLD: 1 % (ref 0–5)
ERYTHROCYTE [DISTWIDTH] IN BLOOD BY AUTOMATED COUNT: 14 % (ref 11.5–14.5)
GLUCOSE BLD-MCNC: 162 MG/DL (ref 70–99)
GLUCOSE BLD-MCNC: 186 MG/DL (ref 70–99)
GLUCOSE BLD-MCNC: 246 MG/DL (ref 70–99)
GLUCOSE BLD-MCNC: 283 MG/DL (ref 70–99)
GLUCOSE SERPL-MCNC: 178 MG/DL (ref 70–99)
HCT VFR BLD AUTO: 39.9 % (ref 42–52)
HGB BLD-MCNC: 12.7 G/DL (ref 14–18)
IMM GRANULOCYTES # BLD: 2.1 K/UL
LYMPHOCYTES # BLD: 1.9 K/UL (ref 1.1–4.5)
LYMPHOCYTES NFR BLD: 11 % (ref 20–40)
MACROCYTES BLD QL SMEAR: ABNORMAL
MAGNESIUM SERPL-MCNC: 2 MG/DL (ref 1.6–2.4)
MCH RBC QN AUTO: 29.5 PG (ref 27–31)
MCHC RBC AUTO-ENTMCNC: 31.8 G/DL (ref 33–37)
MCV RBC AUTO: 92.6 FL (ref 80–94)
MONOCYTES # BLD: 0.3 K/UL (ref 0–0.9)
MONOCYTES NFR BLD: 2 % (ref 0–10)
NEUTROPHILS # BLD: 13.6 K/UL (ref 1.5–7.5)
NEUTS BAND NFR BLD MANUAL: 4 % (ref 0–5)
NEUTS SEG NFR BLD: 81 % (ref 50–65)
OVALOCYTES BLD QL SMEAR: ABNORMAL
PERFORMED ON: ABNORMAL
PLATELET # BLD AUTO: 440 K/UL (ref 130–400)
PLATELET SLIDE REVIEW: ABNORMAL
PMV BLD AUTO: 9 FL (ref 9.4–12.4)
POLYCHROMASIA BLD QL SMEAR: ABNORMAL
POTASSIUM SERPL-SCNC: 3.4 MMOL/L (ref 3.5–5)
PROCALCITONIN: 0.21 NG/ML (ref 0–0.09)
PROT SERPL-MCNC: 5.5 G/DL (ref 6.4–8.3)
RBC # BLD AUTO: 4.31 M/UL (ref 4.7–6.1)
REASON FOR REJECTION: NORMAL
REJECTED TEST: NORMAL
SCHISTOCYTES BLD QL SMEAR: ABNORMAL
SODIUM SERPL-SCNC: 136 MMOL/L (ref 136–145)
STOMATOCYTES BLD QL SMEAR: ABNORMAL
VARIANT LYMPHS NFR BLD: 1 % (ref 0–8)
WBC # BLD AUTO: 16 K/UL (ref 4.8–10.8)

## 2025-05-12 PROCEDURE — 97530 THERAPEUTIC ACTIVITIES: CPT

## 2025-05-12 PROCEDURE — 6370000000 HC RX 637 (ALT 250 FOR IP): Performed by: STUDENT IN AN ORGANIZED HEALTH CARE EDUCATION/TRAINING PROGRAM

## 2025-05-12 PROCEDURE — 84145 PROCALCITONIN (PCT): CPT

## 2025-05-12 PROCEDURE — 99233 SBSQ HOSP IP/OBS HIGH 50: CPT | Performed by: NEUROLOGICAL SURGERY

## 2025-05-12 PROCEDURE — 82962 GLUCOSE BLOOD TEST: CPT

## 2025-05-12 PROCEDURE — 86923 COMPATIBILITY TEST ELECTRIC: CPT

## 2025-05-12 PROCEDURE — 6360000002 HC RX W HCPCS: Performed by: NURSE PRACTITIONER

## 2025-05-12 PROCEDURE — 86140 C-REACTIVE PROTEIN: CPT

## 2025-05-12 PROCEDURE — 6370000000 HC RX 637 (ALT 250 FOR IP): Performed by: NURSE PRACTITIONER

## 2025-05-12 PROCEDURE — 30233N1 TRANSFUSION OF NONAUTOLOGOUS RED BLOOD CELLS INTO PERIPHERAL VEIN, PERCUTANEOUS APPROACH: ICD-10-PCS | Performed by: INTERNAL MEDICINE

## 2025-05-12 PROCEDURE — 86850 RBC ANTIBODY SCREEN: CPT

## 2025-05-12 PROCEDURE — 2700000000 HC OXYGEN THERAPY PER DAY

## 2025-05-12 PROCEDURE — 1200000000 HC SEMI PRIVATE

## 2025-05-12 PROCEDURE — 99232 SBSQ HOSP IP/OBS MODERATE 35: CPT | Performed by: INTERNAL MEDICINE

## 2025-05-12 PROCEDURE — 80053 COMPREHEN METABOLIC PANEL: CPT

## 2025-05-12 PROCEDURE — P9016 RBC LEUKOCYTES REDUCED: HCPCS

## 2025-05-12 PROCEDURE — 83735 ASSAY OF MAGNESIUM: CPT

## 2025-05-12 PROCEDURE — 86901 BLOOD TYPING SEROLOGIC RH(D): CPT

## 2025-05-12 PROCEDURE — 99233 SBSQ HOSP IP/OBS HIGH 50: CPT | Performed by: PSYCHIATRY & NEUROLOGY

## 2025-05-12 PROCEDURE — 86900 BLOOD TYPING SEROLOGIC ABO: CPT

## 2025-05-12 PROCEDURE — OUTSIDEPOS PR OUTSIDE POS PLACEHOLDER: Performed by: INTERNAL MEDICINE

## 2025-05-12 PROCEDURE — 2500000003 HC RX 250 WO HCPCS: Performed by: STUDENT IN AN ORGANIZED HEALTH CARE EDUCATION/TRAINING PROGRAM

## 2025-05-12 PROCEDURE — 6370000000 HC RX 637 (ALT 250 FOR IP): Performed by: INTERNAL MEDICINE

## 2025-05-12 PROCEDURE — 85025 COMPLETE CBC W/AUTO DIFF WBC: CPT

## 2025-05-12 PROCEDURE — 97535 SELF CARE MNGMENT TRAINING: CPT

## 2025-05-12 PROCEDURE — 36415 COLL VENOUS BLD VENIPUNCTURE: CPT

## 2025-05-12 PROCEDURE — 94760 N-INVAS EAR/PLS OXIMETRY 1: CPT

## 2025-05-12 PROCEDURE — 2580000003 HC RX 258: Performed by: INTERNAL MEDICINE

## 2025-05-12 PROCEDURE — 6360000002 HC RX W HCPCS: Performed by: INTERNAL MEDICINE

## 2025-05-12 RX ADMIN — MORPHINE SULFATE 2 MG: 2 INJECTION, SOLUTION INTRAMUSCULAR; INTRAVENOUS at 09:54

## 2025-05-12 RX ADMIN — AMPICILLIN SODIUM 2000 MG: 2 INJECTION, POWDER, FOR SOLUTION INTRAMUSCULAR; INTRAVENOUS at 16:34

## 2025-05-12 RX ADMIN — INSULIN LISPRO 2 UNITS: 100 INJECTION, SOLUTION INTRAVENOUS; SUBCUTANEOUS at 11:32

## 2025-05-12 RX ADMIN — TIZANIDINE 4 MG: 4 TABLET ORAL at 13:09

## 2025-05-12 RX ADMIN — SODIUM CHLORIDE, PRESERVATIVE FREE 10 ML: 5 INJECTION INTRAVENOUS at 08:27

## 2025-05-12 RX ADMIN — INSULIN GLARGINE 17 UNITS: 100 INJECTION, SOLUTION SUBCUTANEOUS at 21:28

## 2025-05-12 RX ADMIN — AMPICILLIN SODIUM 2000 MG: 2 INJECTION, POWDER, FOR SOLUTION INTRAMUSCULAR; INTRAVENOUS at 13:11

## 2025-05-12 RX ADMIN — INSULIN LISPRO 2 UNITS: 100 INJECTION, SOLUTION INTRAVENOUS; SUBCUTANEOUS at 16:38

## 2025-05-12 RX ADMIN — AMPICILLIN SODIUM 2000 MG: 2 INJECTION, POWDER, FOR SOLUTION INTRAMUSCULAR; INTRAVENOUS at 11:34

## 2025-05-12 RX ADMIN — OXYCODONE HYDROCHLORIDE AND ACETAMINOPHEN 1 TABLET: 5; 325 TABLET ORAL at 18:02

## 2025-05-12 RX ADMIN — OXYCODONE HYDROCHLORIDE AND ACETAMINOPHEN 1 TABLET: 5; 325 TABLET ORAL at 01:59

## 2025-05-12 RX ADMIN — INSULIN LISPRO 4 UNITS: 100 INJECTION, SOLUTION INTRAVENOUS; SUBCUTANEOUS at 21:29

## 2025-05-12 RX ADMIN — MORPHINE SULFATE 2 MG: 2 INJECTION, SOLUTION INTRAMUSCULAR; INTRAVENOUS at 05:56

## 2025-05-12 RX ADMIN — MORPHINE SULFATE 2 MG: 2 INJECTION, SOLUTION INTRAMUSCULAR; INTRAVENOUS at 16:42

## 2025-05-12 RX ADMIN — AMPICILLIN SODIUM 2000 MG: 2 INJECTION, POWDER, FOR SOLUTION INTRAMUSCULAR; INTRAVENOUS at 21:40

## 2025-05-12 RX ADMIN — TIZANIDINE 4 MG: 4 TABLET ORAL at 02:02

## 2025-05-12 RX ADMIN — MORPHINE SULFATE 2 MG: 2 INJECTION, SOLUTION INTRAMUSCULAR; INTRAVENOUS at 21:28

## 2025-05-12 RX ADMIN — OXYCODONE HYDROCHLORIDE AND ACETAMINOPHEN 1 TABLET: 5; 325 TABLET ORAL at 08:26

## 2025-05-12 RX ADMIN — INSULIN GLARGINE 17 UNITS: 100 INJECTION, SOLUTION SUBCUTANEOUS at 08:26

## 2025-05-12 RX ADMIN — PRAVASTATIN SODIUM 10 MG: 20 TABLET ORAL at 08:26

## 2025-05-12 RX ADMIN — MORPHINE SULFATE 2 MG: 2 INJECTION, SOLUTION INTRAMUSCULAR; INTRAVENOUS at 12:54

## 2025-05-12 RX ADMIN — TIZANIDINE 4 MG: 4 TABLET ORAL at 18:02

## 2025-05-12 RX ADMIN — POTASSIUM CHLORIDE 40 MEQ: 1500 TABLET, EXTENDED RELEASE ORAL at 08:26

## 2025-05-12 ASSESSMENT — PAIN SCALES - GENERAL
PAINLEVEL_OUTOF10: 3
PAINLEVEL_OUTOF10: 7
PAINLEVEL_OUTOF10: 8
PAINLEVEL_OUTOF10: 7
PAINLEVEL_OUTOF10: 7
PAINLEVEL_OUTOF10: 8
PAINLEVEL_OUTOF10: 7
PAINLEVEL_OUTOF10: 3
PAINLEVEL_OUTOF10: 4
PAINLEVEL_OUTOF10: 4
PAINLEVEL_OUTOF10: 9
PAINLEVEL_OUTOF10: 7
PAINLEVEL_OUTOF10: 2
PAINLEVEL_OUTOF10: 1
PAINLEVEL_OUTOF10: 8
PAINLEVEL_OUTOF10: 2

## 2025-05-12 ASSESSMENT — PAIN DESCRIPTION - LOCATION
LOCATION: HIP
LOCATION: BACK
LOCATION: GENERALIZED

## 2025-05-12 ASSESSMENT — PAIN SCALES - WONG BAKER
WONGBAKER_NUMERICALRESPONSE: HURTS A LITTLE BIT

## 2025-05-12 ASSESSMENT — PAIN DESCRIPTION - DESCRIPTORS
DESCRIPTORS: ACHING
DESCRIPTORS: ACHING;DISCOMFORT
DESCRIPTORS: ACHING;DISCOMFORT

## 2025-05-12 ASSESSMENT — ENCOUNTER SYMPTOMS: SHORTNESS OF BREATH: 1

## 2025-05-12 ASSESSMENT — PAIN DESCRIPTION - PAIN TYPE: TYPE: ACUTE PAIN

## 2025-05-12 ASSESSMENT — PAIN DESCRIPTION - ONSET: ONSET: ON-GOING

## 2025-05-12 ASSESSMENT — PAIN DESCRIPTION - ORIENTATION
ORIENTATION: RIGHT
ORIENTATION: LOWER;MID

## 2025-05-12 ASSESSMENT — PAIN DESCRIPTION - FREQUENCY: FREQUENCY: CONTINUOUS

## 2025-05-12 ASSESSMENT — PAIN - FUNCTIONAL ASSESSMENT: PAIN_FUNCTIONAL_ASSESSMENT: PREVENTS OR INTERFERES SOME ACTIVE ACTIVITIES AND ADLS

## 2025-05-12 NOTE — PROGRESS NOTES
Physical Therapy  Name: Rodney Garcia  MRN:  [id number]  Date of service:  5/12/2025 05/12/25 1044   Restrictions/Precautions   Restrictions/Precautions Isolation;Fall Risk   Activity Level Up with Assist   Position Activity Restriction   Other Position/Activity Restrictions droplet precautions-rhinovirus   General   Family/Caregiver Present No   Referring Practitioner Curly Cleaning MD   Subjective   Subjective agreeable to attempt transfer to chair.   Oxygen Therapy   O2 Device Nasal cannula   Bed Mobility   Supine to Sit Moderate assistance;Maximal assistance   Transfers   Sit to Stand Partial/Moderate assistance;Minimal Assistance;2 Person Assistance   Stand to Sit Partial/Moderate assistance;Minimal Assistance;2 Person Assistance   Ambulation   Comments steps with RW to chair beside bed.   Exercises   Heelslides 10   Ankle Pumps 10   Short Term Goals   Time Frame for Short Term Goals 2 wks   Short Term Goal 1 supine to sit indep   Short Term Goal 2 sit to stand indep   Short Term Goal 3 amb. 100' with RW SBA   Short Term Goal 4 bed to chair SBA   Conditions Requiring Skilled Therapeutic Intervention   Body Structures, Functions, Activity Limitations Requiring Skilled Therapeutic Intervention Decreased functional mobility ;Decreased ADL status;Decreased ROM;Decreased cognition;Decreased safe awareness;Decreased body mechanics;Decreased strength;Decreased endurance;Decreased balance;Decreased posture;Increased pain;Decreased coordination   Assessment Patient was able to take small steps to chair.  He reports that he is having surger tomorrow on his back.   Treatment Diagnosis impaired gait and mobility   Barriers to Learning cognition, drowsy   Discharge Recommendations Continue to assess pending progress;24 hour supervision or assist;Patient would benefit from continued therapy after discharge   Physical Therapy Plan   General Plan 5-7 times per week   Therapy Duration 2 Weeks   Current Treatment

## 2025-05-12 NOTE — PLAN OF CARE
Problem: Pain  Goal: Verbalizes/displays adequate comfort level or baseline comfort level  Outcome: Progressing     Problem: Safety - Adult  Goal: Free from fall injury  Outcome: Progressing     Problem: ABCDS Injury Assessment  Goal: Absence of physical injury  Outcome: Progressing     Problem: Chronic Conditions and Co-morbidities  Goal: Patient's chronic conditions and co-morbidity symptoms are monitored and maintained or improved  Outcome: Progressing     Problem: Skin/Tissue Integrity  Goal: Skin integrity remains intact  Description: 1.  Monitor for areas of redness and/or skin breakdown2.  Assess vascular access sites hourly3.  Every 4-6 hours minimum:  Change oxygen saturation probe site4.  Every 4-6 hours:  If on nasal continuous positive airway pressure, respiratory therapy assess nares and determine need for appliance change or resting period  Outcome: Progressing

## 2025-05-12 NOTE — PROGRESS NOTES
Cardiology Progress Note Jovany Delvalle MD      Patient:  Rodney Garcia  [id number]    Patient Active Problem List    Diagnosis Date Noted    Abscess in epidural space of lumbar spine 05/10/2025     Priority: Low    Bacteremia 05/08/2025     Priority: Low    CVA (cerebral vascular accident) ([facility]) 05/08/2025     Priority: Low    Ambulatory dysfunction 05/07/2025     Priority: Low    Diabetes (HCC) 05/07/2025     Priority: Low    Acute hypoxic respiratory failure (HCC) 05/06/2025     Priority: Low    Spinal epidural abscess 05/05/2025     Priority: Low    Spondylolisthesis of lumbar region 05/05/2025     Priority: Low    Right shoulder pain 02/11/2025     Priority: Low    Anterior shoulder dislocation, right, initial encounter 02/11/2025     Priority: Low       Admit Date:  5/5/2025    Admission Problem List: Present on Admission:   (Resolved) Cellulitis of left hip   Ambulatory dysfunction   Diabetes ([facility])   Bacteremia   CVA (cerebral vascular accident) (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)         1.  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  2.  Left hip cellulitis with streptococcal bacteremia presentation 5/5/2025, multiple epidural abscesses.    Subjective:  Mr. Garcia reports no significant change.  Fever noted 5/11/2025.  Still has weakness in the lower extremities.  Scheduled for possible neurosurgical intervention tomorrow.    Objective:   /75   Pulse 84   Temp 97 °F (36.1 °C) (Temporal)   Resp 18   Ht 1.803 m (5' 11\")   Wt 94.9 kg (209 lb 3.5 oz)   SpO2 94%   BMI 29.18 kg/m²       Intake/Output Summary (Last 24 hours) at 5/12/2025 1826  Last data filed at 5/12/2025 0756  Gross per 24 hour   Intake 0 ml   Output 525 ml   Net -525 ml       Prior to Admission medications    Medication  Comments: No palp organomegaly   Musculoskeletal:         General: No deformity.   Skin:     General: Skin is warm.      Coloration: Skin is not pale.      Findings: No erythema or rash.   Neurological:      Mental Status: He is alert and oriented to person, place, and time.      Motor: No abnormal muscle tone.      Coordination: Coordination normal.      Deep Tendon Reflexes: Reflexes normal.                 Lab Data:  CBC:   Recent Labs     05/10/25  0112 05/11/25  0159 05/12/25  0203   WBC 16.2* 16.3* 16.0*   HGB 11.4* 11.2* 12.7*   HCT 35.2* 34.8* 39.9*   MCV 91.0 91.1 92.6    419* 440*     BMP:   Recent Labs     05/10/25  0112 05/11/25  0159 05/12/25  0428   * 133* 136   K 3.1* 3.5 3.4*   CL 95* 95* 95*   CO2 26 30* 30*   BUN 10 11 12   CREATININE 0.5* 0.5* 0.4*     LIVER PROFILE:   Recent Labs     05/10/25  0112 05/11/25  0159 05/12/25  0428   AST 46 33 25   ALT 93* 70* 53*   BILITOT 0.4 0.4 0.5   ALKPHOS 125 144* 110     PT/INR: No results for input(s): \"PROTIME\", \"INR\" in the last 72 hours.  APTT: No results for input(s): \"APTT\" in the last 72 hours.  BNP:  No results for input(s): \"BNP\" in the last 72 hours.  CK, CKMB, Troponin: @LABRCNT (CKTOTAL:3, CKMB:3, TROPONINI:3)@    IMAGING:  XR LUMBAR SPINE (MIN 4 VIEWS)  Result Date: 5/11/2025  EXAM: LUMBAR SPINE (AP, NEUTRAL LATERAL, FLEXION LATERAL AND EXTENSION LATERAL VIEWS)  HISTORY:  Evaluate instability.  COMPARISON: Lumbar spine radiographs 4/7/2025. MRI lumbar spine 5/8/2025. CT lumbar spine 5/6/2025.  FINDINGS:  There are 6 non rib bearing lumbar type vertebral bodies. The lowest fully formed intervertebral disc spaces referred to as L5/S1 for purposes of this dictation, utilizing the nomenclature employed on recent MRI 5/8/2025. No acute compression fracture. 3.2 mm anterolisthesis at L4/L5 without significant excursion between flexion and extension. Trace anterolisthesis at L5/S1 without significant excursion between flexion and extension.

## 2025-05-12 NOTE — PROGRESS NOTES
Occupational Therapy     05/12/25 1200   Subjective   Subjective Pt in bed upon arrival for therapy. Pt agreeable to get to recliner for lunch this date.   Pain Assessment   Pain Assessment 0-10   Pain Level 7   Pain Location Back   Pain Orientation Lower;Mid   Pain Descriptors Aching;Discomfort   Functional Pain Assessment Prevents or interferes some active activities and ADLs   Pain Type Acute pain   Pain Frequency Continuous   Pain Onset On-going   Non-Pharmaceutical Pain Intervention(s) Ambulation/Increased Activity;Repositioned;Rest;Nurse notified (comment)   Response to Pain Intervention Patient satisfied   Vitals   O2 Device Nasal cannula   Cognition   Overall Cognitive Status WFL   Orientation   Overall Orientation Status WFL   Bed Mobility Training   Bed Mobility Training Yes   Overall Level of Assistance Partial/Moderate assistance   Interventions Verbal cues;Tactile cues;Manual cues   Supine to Sit Partial/Moderate assistance   Scooting Minimal assistance;Partial/Moderate assistance   Transfer Training   Transfer Training Yes   Overall Level of Assistance Minimal assistance;2 Person assistance  (RW)   Interventions Verbal cues;Tactile cues;Manual cues   Sit to Stand Minimal assistance;2 Person assistance   Stand to Sit Minimal assistance;2 Person assistance   Bed to Chair Minimal assistance;2 Person assistance   Balance   Sitting Intact   Standing Impaired  (poor posture, flexed knees due to pain.)   Standing - Static Poor   Standing - Dynamic Poor   ADL   Feeding Independent;Setup   Grooming Supervision   UE Bathing Contact guard assistance   LE Bathing Maximum assistance;Moderate assistance   UE Dressing Contact guard assistance   LE Dressing Maximum assistance;Moderate assistance   Putting On/Taking Off Footwear Dependent/Total   Toileting Maximum assistance   Functional Mobility Minimal assistance;Moderate assistance   Functional Mobility Skilled Clinical Factors x2 for safety; RW or SS.   Assessment

## 2025-05-12 NOTE — PROGRESS NOTES
Infectious Diseases Progress Note    Patient:  Rodney Garcia  YOB: 1960  MRN: [id number]   Admit date: 5/5/2025   Admitting Physician: Curly Cleaning MD  Primary Care Physician: Tu Puente MD    Chief Complaint: \"They will leave a urinal at my bedside \"    Interval History: He indicates he still has some \"spots\" of back pain.  He indicates he was not sure why they are not going away.  He had his wife on speaker phone today so we could all talk together.  I explained he had had an infection of the back for quite a while.  Explained that there are abscesses that may need irrigation/debridement to facilitate healing.  Explained there may be some instability per discussion with Dr. Rivera neurosurgery that may need instrumentation for stabilization.  I talked with them about sometimes the necessary irrigation/debridement to facilitate infection treatment at that ideally he would not require hardware until all infection resolved, but explained that sometimes hardware placement is necessary to facilitate stabilization which also facilitates cure of the infection.  Reviewed that we will plan long-term IV antibiotic treatment followed by chronic oral therapy.  Spoke with micro lab about susceptibility testing.  They indicate the organism is small and not growing adequately to provide additional susceptibility information but they are trying some additional testing.  Typically this bacteria should be susceptible to amoxicillin and 1st/3rd generation cephalosporins.  Dr. Rivera and I communicated yesterday.    In/Out    Intake/Output Summary (Last 24 hours) at 5/12/2025 0657  Last data filed at 5/12/2025 0432  Gross per 24 hour   Intake 0 ml   Output 225 ml   Net -225 ml     Allergies: No Active Allergies  Current Meds: morphine (PF) injection 2 mg, Q3H PRN  oxyCODONE-acetaminophen (PERCOCET) 5-325 MG per tablet 1 tablet, Q4H PRN  insulin glargine (LANTUS) injection vial 17 Units, BID  [Held by provider]

## 2025-05-12 NOTE — PROGRESS NOTES
[facility] [place] Neurology Progress Note      Patient:   Rodney Garcia  MR#:    [id number]   Room:    [facility]-   YOB: 1960  Date of Progress Note: 5/12/2025  Time of Note                           11:51 AM  Consulting Physician:  Leif Reyna DO  Attending Physician:  Curly Cleaning MD      INTERVAL HISTORY:  No acute events, no new focal complaints, no worsening LE weakness, remains largely unchanged .     REVIEW OF SYSTEMS:  Constitutional: No fevers No chills  Neck:No stiffness  Respiratory: No shortness of breath  Cardiovascular: No chest pain No palpitations  Gastrointestinal: No abdominal pain    Genitourinary: No Dysuria  Neurological: No headache, no confusion    PHYSICAL EXAM:    Constitutional -   BP (!) 141/72   Pulse 99   Temp 97.7 °F (36.5 °C) (Temporal)   Resp 18   Ht 1.803 m (5' 11\")   Wt 94.9 kg (209 lb 3.5 oz)   SpO2 92%   BMI 29.18 kg/m²   General appearance: No acute distress   EYES -   Conjunctiva normal  Pupillary exam as below, see CN exam in the neurologic exam  ENT-    No scars, masses, or lesions over external nose or ears  Oropharynx without erythema, palate midline  Cardiovascular -   No clubbing, cyanosis, or edema   Pulmonary-   Good expansion, normal effort without use of accessory muscles  Musculoskeletal -   No significant wasting of muscles noted  Gait as below, see gait exam in the neurologic exam  Muscle strength, tone, stability as below see the motor exam in the neurologic exam.   No bony deformities  Skin -   Warm, dry, and intact to inspection and palpation.    No rash, erythema, or pallor  Psychiatric -   Mood, affect, and behavior appear normal    Memory as below see mental status examination in the neurologic exam        NEUROLOGICAL EXAM     Mental status    [x] Awake, alert, oriented   [x] Affect attention and concentration appear appropriate  [x] Recent and remote memory appears unremarkable  [x] Speech normal without dysarthria or aphasia,  at L3-L4 to the S2 sacral canal, causing severe spinal canal stenosis at these levels. 2. Multiple intramuscular abscesses in bilateral paraspinous muscles and inferior left psoas. 3. Moderate bilateral facet joint effusions at L4-L5.  Septic joint cannot be excluded. 4. Mild marrow edema in the L5 vertebral body, bilateral L4-L5 pedicles and facets as well as L4-L5 spinous processes.  Osteomyelitis cannot be excluded. 5. Degenerative changes as above.     ______________________________________ Electronically signed by: KUSUM AGUILLON M.D. Date:     05/09/2025 Time:    07:32     US LIVER  Result Date: 5/9/2025  EXAM: ULTRASOUND OF THE RIGHT UPPER QUADRANT (LIMITED ABDOMEN)  HISTORY: Low-density liver lesion seen in a recent CTA chest.  TECHNIQUE: Sonography of the right upper quadrant of the abdomen was performed.  Color Doppler imaging and spectral Doppler imaging of the portal vein was also performed.  Images were obtained and stored in a permanent archive.  COMPARISON: Correlation is made with the CTA chest from 5/6/2025.  FINDINGS:  Pancreas:  Completely obscured by bowel gas. Liver:  Diffuse increased echogenicity.  No visible liver mass. Main portal vein: Patent with normal antegrade flow. Biliary: Common bile duct measures 3.0 mm. Cholecystectomy, again noted. Right Kidney: Measures 12.3 cm.  No hydronephrosis.  No lesions.  No calculus. IVC: Unremarkable. Other:  No ascites.        1.  Non visualization of the low density lesion seen on the recent CT. MRI liver mass protocol is recommended for better characterization. 2. Cholecystectomy, again noted. 3. No biliary tree dilatation. 4. Fatty liver. 5. Complete obscuration of the pancreas by bowel gas.          ______________________________________ Electronically signed by: LAKE LOMOS M.D. Date:     05/09/2025 Time:    06:28     VILMA (with contrast/ 3D PRN)  Result Date: 5/8/2025    Left Ventricle: Normal left ventricular systolic function with a visually

## 2025-05-12 NOTE — ANESTHESIA PRE PROCEDURE
Department of Anesthesiology  Preprocedure Note       Name:  Rodney Garcia   Age:  64 y.o.  :  1960                                          MRN:  [id number]         Date:  2025      Surgeon: Surgeon(s):  Ayan Rivera MD    Procedure: Procedure(s):  L3/4 & L4/5 TLIF, evacuation of epidural abscess    Medications prior to admission:   Prior to Admission medications    Medication Sig Start Date End Date Taking? Authorizing Provider   celecoxib (CELEBREX) 200 MG capsule TAKE 1 CAPSULE BY MOUTH TWICE DAILY WITH FOOD 25  Yes Cameron Wong MD   lisinopril (PRINIVIL;ZESTRIL) 5 MG tablet Take 1 tablet by mouth daily   Yes Cameron Wong MD   pravastatin (PRAVACHOL) 10 MG tablet Take 1 tablet by mouth daily   Yes Cameron Wong MD   tiZANidine (ZANAFLEX) 4 MG tablet Take 1 tablet by mouth every 6 hours as needed 25   Cameron Wong MD   empagliflozin (JARDIANCE) 10 MG tablet Take 1 tablet by mouth daily    ProviderCameron MD   glipiZIDE-metFORMIN (METAGLIP) 2.5-250 MG per tablet Take 1 tablet by mouth 2 times daily (before meals)    ProviderCameron MD       Current medications:    Current Facility-Administered Medications   Medication Dose Route Frequency Provider Last Rate Last Admin   • ampicillin (OMNIPEN) 2,000 mg in sodium chloride 0.9 % 100 mL IVPB ([id number])  2,000 mg IntraVENous Q4H Jaren Bee MD   Stopped at 25 1349   • morphine (PF) injection 2 mg  2 mg IntraVENous Q3H PRN Jaky Arzate APRN - CNP   2 mg at 25 1254   • oxyCODONE-acetaminophen (PERCOCET) 5-325 MG per tablet 1 tablet  1 tablet Oral Q4H PRN Jaky Arzate APRN - CNP   1 tablet at 25 0826   • insulin glargine (LANTUS) injection vial 17 Units  17 Units SubCUTAneous BID Jaky Arzate APRN - CNP   17 Units at 25 0826   • [Held by provider] apixaban (ELIQUIS) tablet 5 mg  5 mg Oral BID Jovany Delvalle MD   5 mg at 25 0920   • [Held by provider] enoxaparin

## 2025-05-12 NOTE — PROGRESS NOTES
[facility]    Progress Note    Patient:  Rodney Garcia  YOB: 1960  Date of Service: 5/12/2025  MRN: [id number]   Acct: [id number]   Primary Care Physician: Tu Puente MD  Advance Directive: Full Code  Admit Date: 5/5/2025       Hospital Day: 6    Portions of this note have been copied forward, however, updated to reflect the most current clinical status of this patient.     CHIEF COMPLAINT: generalized weakness    CUMULATIVE HOSPITAL COURSE:     This patient is a 64-year-old male with hypertension, CVA, PFO and hyperlipidemia who presented to [facility] ED on 5/5 for evaluation of generalized weakness and difficulty with ambulation.  Of note, patient was recently discharged from [facility] after being treated for strep intermedius bacteremia with Zyvox and acute CVA with MRI showing acute infarcts in left MCA distribution and subacute infarcts in bilateral cerebral hemispheres, appearing somewhat embolic, bubble study nondiagnostic at that time.  He was discharged on plavix and switched to aspirin alone by neurology on 5/2. He presented to his PCP on 5/5 for evaluation of left hip pain, had an u/s the same day which showed edematous tissue without drainable fluid collection. Later that day, patient experienced a fall due to loss of balance thus presenting here for evaluation. Per family, patient has had multiple falls without LOC since being discharged from [facility].      In ED, CT of left hip showed soft tissue swelling and multifocal osteoarthritis. Xray hips showed severe bilateral hip osteoarthritic change, possible femoral acetabular impingement, no acute fractures. CTA showed no PE, bibasilar atelectasis, and low density liver lesion measuring 1.3 cm. Lab work indicated procalcitonin 0.58, , WBC 14.9, and lactic 2.0.  Respiratory panel was positive for human rhinovirus.  He was initiated on broad-spectrum antibiotics and admitted to hospitalist for further evaluation and  above.     ______________________________________ Electronically signed by: KUSUM AGUILLON M.D. Date:     05/09/2025 Time:    07:32     US LIVER  Result Date: 5/9/2025  EXAM: ULTRASOUND OF THE RIGHT UPPER QUADRANT (LIMITED ABDOMEN)  HISTORY: Low-density liver lesion seen in a recent CTA chest.  TECHNIQUE: Sonography of the right upper quadrant of the abdomen was performed.  Color Doppler imaging and spectral Doppler imaging of the portal vein was also performed.  Images were obtained and stored in a permanent archive.  COMPARISON: Correlation is made with the CTA chest from 5/6/2025.  FINDINGS:  Pancreas:  Completely obscured by bowel gas. Liver:  Diffuse increased echogenicity.  No visible liver mass. Main portal vein: Patent with normal antegrade flow. Biliary: Common bile duct measures 3.0 mm. Cholecystectomy, again noted. Right Kidney: Measures 12.3 cm.  No hydronephrosis.  No lesions.  No calculus. IVC: Unremarkable. Other:  No ascites.        1.  Non visualization of the low density lesion seen on the recent CT. MRI liver mass protocol is recommended for better characterization. 2. Cholecystectomy, again noted. 3. No biliary tree dilatation. 4. Fatty liver. 5. Complete obscuration of the pancreas by bowel gas.          ______________________________________ Electronically signed by: LAKE OLMOS M.D. Date:     05/09/2025 Time:    06:28     VILMA (with contrast/ 3D PRN)  Result Date: 5/8/2025    Left Ventricle: Normal left ventricular systolic function with a visually estimated EF of 55 - 60%. Left ventricle size is normal. Normal wall thickness. Normal wall motion. Normal diastolic function.   Right Ventricle: Right ventricle size is normal. Normal systolic function.   Aortic Valve: Trileaflet valve. No regurgitation. No stenosis.   Mitral Valve: Valve structure is normal. Trace regurgitation. No stenosis noted.   Tricuspid Valve: Trace regurgitation.   Left Atrium: Left atrium size is normal. Normal sized

## 2025-05-12 NOTE — PROGRESS NOTES
NEUROSURGERY PROGRESS NOTE      Chief Complaint:   Chief Complaint   Patient presents with    Fall     Left hip pain         Interval Update:    Patient seen and examined.  He is lying comfortably in bed.  He was febrile to 101 overnight.  He continues to endorse back pain.  He attempted x-rays yesterday but could not stand (due to back pain) and the x-ray were performed supine.      HPI:     The patient is a 64 y.o. male who presented to the [facility] ED with complaints of weakness, hip pain and multiple recent falls.  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  He has also been treated for a stroke within the past month.  He has continued with complaints of dizziness, weakness and multiple falls.  In the ED he was found to be tachycardic, tachypneic, with an elevated WBC count, lactic acid and procaclitonin.  He was admitted with possible sepsis and blood cultures have grown strep.  He has complained of back pain and hip pain since admission.  He does have a history of chronic back pain and was recently evaluated for surgery in [place] and he was told he was not a candidate for surgery because his Hgb A1c was 9.4%.  He does see Dr. Lloyd in pain management and receives injections.       He underwent an MRI as part of his infectious workup and this revealed evidence of widespread infection in the lumbar spine with a likely epidural abscess.     He was recently started on Eliquis due to his history of stroke.      Objective:    BP (!) 141/72   Pulse 99   Temp 97.7 °F (36.5 °C) (Temporal)   Resp 18   Ht 1.803 m (5' 11\")   Wt 94.9 kg (209 lb 3.5 oz)   SpO2 92%   BMI 29.18 kg/m²       Intake/Output Summary (Last 24 hours) at 5/12/2025 0842  Last data filed at 5/12/2025 0432  Gross per 24 hour   Intake 0 ml   Output 225 ml   Net -225 ml           Physical

## 2025-05-13 ENCOUNTER — ANESTHESIA (OUTPATIENT)
Dept: OPERATING ROOM | Age: 65
End: 2025-05-13
Payer: COMMERCIAL

## 2025-05-13 ENCOUNTER — APPOINTMENT (OUTPATIENT)
Dept: GENERAL RADIOLOGY | Age: 65
DRG: 853 | End: 2025-05-13
Payer: COMMERCIAL

## 2025-05-13 LAB
ALBUMIN SERPL-MCNC: 2.4 G/DL (ref 3.5–5.2)
ALP SERPL-CCNC: 103 U/L (ref 40–129)
ALT SERPL-CCNC: 41 U/L (ref 10–50)
ANION GAP SERPL CALCULATED.3IONS-SCNC: 9 MMOL/L (ref 8–16)
AST SERPL-CCNC: 25 U/L (ref 10–50)
BACTERIA BLD CULT: ABNORMAL
BACTERIA BLD CULT: ABNORMAL
BASOPHILS # BLD: 0 K/UL (ref 0–0.2)
BASOPHILS NFR BLD: 0 % (ref 0–1)
BILIRUB SERPL-MCNC: 0.4 MG/DL (ref 0.2–1.2)
BUN SERPL-MCNC: 11 MG/DL (ref 8–23)
CALCIUM SERPL-MCNC: 8.3 MG/DL (ref 8.8–10.2)
CHLORIDE SERPL-SCNC: 96 MMOL/L (ref 98–107)
CO2 SERPL-SCNC: 28 MMOL/L (ref 22–29)
CREAT SERPL-MCNC: 0.5 MG/DL (ref 0.7–1.2)
EOSINOPHIL # BLD: 0.16 K/UL (ref 0–0.6)
EOSINOPHIL NFR BLD: 1 % (ref 0–5)
ERYTHROCYTE [DISTWIDTH] IN BLOOD BY AUTOMATED COUNT: 14 % (ref 11.5–14.5)
GLUCOSE BLD-MCNC: 170 MG/DL (ref 70–99)
GLUCOSE BLD-MCNC: 170 MG/DL (ref 70–99)
GLUCOSE BLD-MCNC: 355 MG/DL (ref 70–99)
GLUCOSE SERPL-MCNC: 219 MG/DL (ref 70–99)
HCT VFR BLD AUTO: 34.5 % (ref 42–52)
HGB BLD-MCNC: 10.8 G/DL (ref 14–18)
IMM GRANULOCYTES # BLD: 1.6 K/UL
KOH PREP SPEC: NORMAL
LYMPHOCYTES # BLD: 1.4 K/UL (ref 1.1–4.5)
LYMPHOCYTES NFR BLD: 6 % (ref 20–40)
MCH RBC QN AUTO: 29.3 PG (ref 27–31)
MCHC RBC AUTO-ENTMCNC: 31.3 G/DL (ref 33–37)
MCV RBC AUTO: 93.5 FL (ref 80–94)
METAMYELOCYTES NFR BLD MANUAL: 2 %
MONOCYTES # BLD: 0.9 K/UL (ref 0–0.9)
MONOCYTES NFR BLD: 6 % (ref 0–10)
MYELOCYTES NFR BLD MANUAL: 2 %
NEUTROPHILS # BLD: 13 K/UL (ref 1.5–7.5)
NEUTS BAND NFR BLD MANUAL: 3 % (ref 0–5)
NEUTS SEG NFR BLD: 76 % (ref 50–65)
ORGANISM: ABNORMAL
OVALOCYTES BLD QL SMEAR: ABNORMAL
PERFORMED ON: ABNORMAL
PLATELET # BLD AUTO: 505 K/UL (ref 130–400)
PLATELET SLIDE REVIEW: ABNORMAL
PMV BLD AUTO: 9.1 FL (ref 9.4–12.4)
POLYCHROMASIA BLD QL SMEAR: ABNORMAL
POTASSIUM SERPL-SCNC: 3.6 MMOL/L (ref 3.5–5)
PROMYELOCYTES NFR BLD MANUAL: 1 %
PROT SERPL-MCNC: 5.5 G/DL (ref 6.4–8.3)
RBC # BLD AUTO: 3.69 M/UL (ref 4.7–6.1)
SODIUM SERPL-SCNC: 133 MMOL/L (ref 136–145)
STOMATOCYTES BLD QL SMEAR: ABNORMAL
VARIANT LYMPHS NFR BLD: 3 % (ref 0–8)
WBC # BLD AUTO: 15.5 K/UL (ref 4.8–10.8)

## 2025-05-13 PROCEDURE — 009U0ZZ DRAINAGE OF SPINAL CANAL, OPEN APPROACH: ICD-10-PCS | Performed by: NEUROLOGICAL SURGERY

## 2025-05-13 PROCEDURE — 87176 TISSUE HOMOGENIZATION CULTR: CPT

## 2025-05-13 PROCEDURE — 2709999900 HC NON-CHARGEABLE SUPPLY: Performed by: NEUROLOGICAL SURGERY

## 2025-05-13 PROCEDURE — 99233 SBSQ HOSP IP/OBS HIGH 50: CPT | Performed by: PSYCHIATRY & NEUROLOGY

## 2025-05-13 PROCEDURE — 6370000000 HC RX 637 (ALT 250 FOR IP): Performed by: INTERNAL MEDICINE

## 2025-05-13 PROCEDURE — 22842 INSERT SPINE FIXATION DEVICE: CPT | Performed by: NEUROLOGICAL SURGERY

## 2025-05-13 PROCEDURE — 87205 SMEAR GRAM STAIN: CPT

## 2025-05-13 PROCEDURE — 94760 N-INVAS EAR/PLS OXIMETRY 1: CPT

## 2025-05-13 PROCEDURE — 2500000003 HC RX 250 WO HCPCS: Performed by: STUDENT IN AN ORGANIZED HEALTH CARE EDUCATION/TRAINING PROGRAM

## 2025-05-13 PROCEDURE — 85025 COMPLETE CBC W/AUTO DIFF WBC: CPT

## 2025-05-13 PROCEDURE — 87077 CULTURE AEROBIC IDENTIFY: CPT

## 2025-05-13 PROCEDURE — 6370000000 HC RX 637 (ALT 250 FOR IP): Performed by: NEUROLOGICAL SURGERY

## 2025-05-13 PROCEDURE — C1713 ANCHOR/SCREW BN/BN,TIS/BN: HCPCS | Performed by: NEUROLOGICAL SURGERY

## 2025-05-13 PROCEDURE — 2500000003 HC RX 250 WO HCPCS: Performed by: NURSE ANESTHETIST, CERTIFIED REGISTERED

## 2025-05-13 PROCEDURE — 22634 ARTHRD CMBN 1NTRSPC EA ADDL: CPT | Performed by: NEUROLOGICAL SURGERY

## 2025-05-13 PROCEDURE — 71045 X-RAY EXAM CHEST 1 VIEW: CPT

## 2025-05-13 PROCEDURE — 72100 X-RAY EXAM L-S SPINE 2/3 VWS: CPT

## 2025-05-13 PROCEDURE — 63267 EXCISE INTRSPINL LESION LMBR: CPT | Performed by: NEUROLOGICAL SURGERY

## 2025-05-13 PROCEDURE — 6360000002 HC RX W HCPCS: Performed by: NURSE PRACTITIONER

## 2025-05-13 PROCEDURE — 7100000001 HC PACU RECOVERY - ADDTL 15 MIN: Performed by: NEUROLOGICAL SURGERY

## 2025-05-13 PROCEDURE — 2580000003 HC RX 258: Performed by: NEUROLOGICAL SURGERY

## 2025-05-13 PROCEDURE — 36415 COLL VENOUS BLD VENIPUNCTURE: CPT

## 2025-05-13 PROCEDURE — 3700000001 HC ADD 15 MINUTES (ANESTHESIA): Performed by: NEUROLOGICAL SURGERY

## 2025-05-13 PROCEDURE — 6360000002 HC RX W HCPCS: Performed by: INTERNAL MEDICINE

## 2025-05-13 PROCEDURE — 6360000002 HC RX W HCPCS: Performed by: NEUROLOGICAL SURGERY

## 2025-05-13 PROCEDURE — C1889 IMPLANT/INSERT DEVICE, NOC: HCPCS | Performed by: NEUROLOGICAL SURGERY

## 2025-05-13 PROCEDURE — 87070 CULTURE OTHR SPECIMN AEROBIC: CPT

## 2025-05-13 PROCEDURE — 6360000002 HC RX W HCPCS: Performed by: NURSE ANESTHETIST, CERTIFIED REGISTERED

## 2025-05-13 PROCEDURE — 2500000003 HC RX 250 WO HCPCS: Performed by: NEUROLOGICAL SURGERY

## 2025-05-13 PROCEDURE — 4A11X4G MONITORING OF PERIPHERAL NERVOUS ELECTRICAL ACTIVITY, INTRAOPERATIVE, EXTERNAL APPROACH: ICD-10-PCS | Performed by: NEUROLOGICAL SURGERY

## 2025-05-13 PROCEDURE — 80053 COMPREHEN METABOLIC PANEL: CPT

## 2025-05-13 PROCEDURE — 2700000000 HC OXYGEN THERAPY PER DAY

## 2025-05-13 PROCEDURE — C9359 IMPLNT,BON VOID FILLER-PUTTY: HCPCS | Performed by: NEUROLOGICAL SURGERY

## 2025-05-13 PROCEDURE — 22853 INSJ BIOMECHANICAL DEVICE: CPT | Performed by: NEUROLOGICAL SURGERY

## 2025-05-13 PROCEDURE — 22633 ARTHRD CMBN 1NTRSPC LUMBAR: CPT | Performed by: NEUROLOGICAL SURGERY

## 2025-05-13 PROCEDURE — 94761 N-INVAS EAR/PLS OXIMETRY MLT: CPT

## 2025-05-13 PROCEDURE — 82962 GLUCOSE BLOOD TEST: CPT

## 2025-05-13 PROCEDURE — 2580000003 HC RX 258: Performed by: NURSE ANESTHETIST, CERTIFIED REGISTERED

## 2025-05-13 PROCEDURE — 2720000010 HC SURG SUPPLY STERILE: Performed by: NEUROLOGICAL SURGERY

## 2025-05-13 PROCEDURE — 61783 SCAN PROC SPINAL: CPT | Performed by: NEUROLOGICAL SURGERY

## 2025-05-13 PROCEDURE — 87102 FUNGUS ISOLATION CULTURE: CPT

## 2025-05-13 PROCEDURE — 3700000000 HC ANESTHESIA ATTENDED CARE: Performed by: NEUROLOGICAL SURGERY

## 2025-05-13 PROCEDURE — 3600000004 HC SURGERY LEVEL 4 BASE: Performed by: NEUROLOGICAL SURGERY

## 2025-05-13 PROCEDURE — 0SG10J1 FUSION OF 2 OR MORE LUMBAR VERTEBRAL JOINTS WITH SYNTHETIC SUBSTITUTE, POSTERIOR APPROACH, POSTERIOR COLUMN, OPEN APPROACH: ICD-10-PCS | Performed by: NEUROLOGICAL SURGERY

## 2025-05-13 PROCEDURE — 0SG00AJ FUSION OF LUMBAR VERTEBRAL JOINT WITH INTERBODY FUSION DEVICE, POSTERIOR APPROACH, ANTERIOR COLUMN, OPEN APPROACH: ICD-10-PCS | Performed by: NEUROLOGICAL SURGERY

## 2025-05-13 PROCEDURE — 1200000000 HC SEMI PRIVATE

## 2025-05-13 PROCEDURE — 2580000003 HC RX 258: Performed by: INTERNAL MEDICINE

## 2025-05-13 PROCEDURE — 87075 CULTR BACTERIA EXCEPT BLOOD: CPT

## 2025-05-13 PROCEDURE — 87186 SC STD MICRODIL/AGAR DIL: CPT

## 2025-05-13 PROCEDURE — 99233 SBSQ HOSP IP/OBS HIGH 50: CPT | Performed by: NEUROLOGICAL SURGERY

## 2025-05-13 PROCEDURE — 7100000000 HC PACU RECOVERY - FIRST 15 MIN: Performed by: NEUROLOGICAL SURGERY

## 2025-05-13 PROCEDURE — 6370000000 HC RX 637 (ALT 250 FOR IP): Performed by: NURSE PRACTITIONER

## 2025-05-13 PROCEDURE — 3600000014 HC SURGERY LEVEL 4 ADDTL 15MIN: Performed by: NEUROLOGICAL SURGERY

## 2025-05-13 DEVICE — GRAFT BONE SUB 3ML DEMIN BONE MTRX PUTTY GRFTON: Type: IMPLANTABLE DEVICE | Site: SPINE LUMBAR | Status: FUNCTIONAL

## 2025-05-13 DEVICE — I-FACTOR PUTTY, 2.5CC SYRINGE
Type: IMPLANTABLE DEVICE | Site: SPINE LUMBAR | Status: FUNCTIONAL
Brand: I-FACTOR PEPTIDE ENHANCED BONE GRAFT

## 2025-05-13 DEVICE — DBM 7509215 MAGNIFUSE 1 X 5CM
Type: IMPLANTABLE DEVICE | Site: SPINE LUMBAR | Status: FUNCTIONAL
Brand: MAGNIFUSE® BONE GRAFT

## 2025-05-13 DEVICE — ROD 1475005060 4.75 CCM SEXTANT 60MM
Type: IMPLANTABLE DEVICE | Site: SPINE LUMBAR | Status: FUNCTIONAL
Brand: CD HORIZON® SPINAL SYSTEM

## 2025-05-13 DEVICE — SPACER 6068076 CATALYFT PL LONG 7MM
Type: IMPLANTABLE DEVICE | Site: SPINE LUMBAR | Status: FUNCTIONAL
Brand: CATALYFT PL EXPANDABLE INTERBODY SYSTEM

## 2025-05-13 RX ORDER — NALOXONE HYDROCHLORIDE 0.4 MG/ML
INJECTION, SOLUTION INTRAMUSCULAR; INTRAVENOUS; SUBCUTANEOUS PRN
Status: DISCONTINUED | OUTPATIENT
Start: 2025-05-13 | End: 2025-05-15

## 2025-05-13 RX ORDER — VANCOMYCIN HYDROCHLORIDE 1 G/20ML
INJECTION, POWDER, LYOPHILIZED, FOR SOLUTION INTRAVENOUS PRN
Status: DISCONTINUED | OUTPATIENT
Start: 2025-05-13 | End: 2025-05-13 | Stop reason: ALTCHOICE

## 2025-05-13 RX ORDER — KETAMINE HYDROCHLORIDE 50 MG/ML
INJECTION, SOLUTION INTRAMUSCULAR; INTRAVENOUS
Status: DISCONTINUED | OUTPATIENT
Start: 2025-05-13 | End: 2025-05-13 | Stop reason: SDUPTHER

## 2025-05-13 RX ORDER — POLYETHYLENE GLYCOL 3350 17 G/17G
17 POWDER, FOR SOLUTION ORAL DAILY PRN
Status: DISCONTINUED | OUTPATIENT
Start: 2025-05-13 | End: 2025-05-15

## 2025-05-13 RX ORDER — DEXAMETHASONE SODIUM PHOSPHATE 10 MG/ML
INJECTION, SOLUTION INTRAMUSCULAR; INTRAVENOUS
Status: DISCONTINUED | OUTPATIENT
Start: 2025-05-13 | End: 2025-05-13 | Stop reason: SDUPTHER

## 2025-05-13 RX ORDER — PROPOFOL 10 MG/ML
INJECTION, EMULSION INTRAVENOUS
Status: DISCONTINUED | OUTPATIENT
Start: 2025-05-13 | End: 2025-05-13 | Stop reason: SDUPTHER

## 2025-05-13 RX ORDER — FENTANYL CITRATE 50 UG/ML
INJECTION, SOLUTION INTRAMUSCULAR; INTRAVENOUS
Status: DISCONTINUED | OUTPATIENT
Start: 2025-05-13 | End: 2025-05-13 | Stop reason: SDUPTHER

## 2025-05-13 RX ORDER — BISACODYL 10 MG
10 SUPPOSITORY, RECTAL RECTAL DAILY PRN
Status: DISCONTINUED | OUTPATIENT
Start: 2025-05-13 | End: 2025-05-15

## 2025-05-13 RX ORDER — ONDANSETRON 2 MG/ML
INJECTION INTRAMUSCULAR; INTRAVENOUS
Status: DISCONTINUED | OUTPATIENT
Start: 2025-05-13 | End: 2025-05-13 | Stop reason: SDUPTHER

## 2025-05-13 RX ORDER — LIDOCAINE HYDROCHLORIDE 10 MG/ML
INJECTION, SOLUTION EPIDURAL; INFILTRATION; INTRACAUDAL; PERINEURAL
Status: DISCONTINUED | OUTPATIENT
Start: 2025-05-13 | End: 2025-05-13 | Stop reason: SDUPTHER

## 2025-05-13 RX ORDER — SODIUM CHLORIDE, SODIUM LACTATE, POTASSIUM CHLORIDE, CALCIUM CHLORIDE 600; 310; 30; 20 MG/100ML; MG/100ML; MG/100ML; MG/100ML
INJECTION, SOLUTION INTRAVENOUS
Status: DISCONTINUED | OUTPATIENT
Start: 2025-05-13 | End: 2025-05-13 | Stop reason: SDUPTHER

## 2025-05-13 RX ORDER — POLYETHYLENE GLYCOL 3350 17 G/17G
17 POWDER, FOR SOLUTION ORAL DAILY
Status: DISCONTINUED | OUTPATIENT
Start: 2025-05-13 | End: 2025-05-19 | Stop reason: HOSPADM

## 2025-05-13 RX ORDER — SENNA AND DOCUSATE SODIUM 50; 8.6 MG/1; MG/1
2 TABLET, FILM COATED ORAL DAILY
Status: DISCONTINUED | OUTPATIENT
Start: 2025-05-13 | End: 2025-05-19 | Stop reason: HOSPADM

## 2025-05-13 RX ORDER — ROCURONIUM BROMIDE 10 MG/ML
INJECTION, SOLUTION INTRAVENOUS
Status: DISCONTINUED | OUTPATIENT
Start: 2025-05-13 | End: 2025-05-13 | Stop reason: SDUPTHER

## 2025-05-13 RX ADMIN — SODIUM CHLORIDE, PRESERVATIVE FREE 10 ML: 5 INJECTION INTRAVENOUS at 22:11

## 2025-05-13 RX ADMIN — ROCURONIUM BROMIDE 50 MG: 10 INJECTION, SOLUTION INTRAVENOUS at 13:13

## 2025-05-13 RX ADMIN — MORPHINE SULFATE 2 MG: 2 INJECTION, SOLUTION INTRAMUSCULAR; INTRAVENOUS at 04:37

## 2025-05-13 RX ADMIN — SODIUM CHLORIDE, PRESERVATIVE FREE 10 ML: 5 INJECTION INTRAVENOUS at 08:32

## 2025-05-13 RX ADMIN — HYDROMORPHONE HYDROCHLORIDE 0.5 MG: 1 INJECTION, SOLUTION INTRAMUSCULAR; INTRAVENOUS; SUBCUTANEOUS at 18:18

## 2025-05-13 RX ADMIN — SODIUM CHLORIDE: 9 INJECTION, SOLUTION INTRAMUSCULAR; INTRAVENOUS; SUBCUTANEOUS at 20:37

## 2025-05-13 RX ADMIN — FENTANYL CITRATE 50 MCG: 0.05 INJECTION, SOLUTION INTRAMUSCULAR; INTRAVENOUS at 15:23

## 2025-05-13 RX ADMIN — PROPOFOL 180 MG: 10 INJECTION, EMULSION INTRAVENOUS at 13:13

## 2025-05-13 RX ADMIN — FENTANYL CITRATE 50 MCG: 0.05 INJECTION, SOLUTION INTRAMUSCULAR; INTRAVENOUS at 13:13

## 2025-05-13 RX ADMIN — INSULIN GLARGINE 17 UNITS: 100 INJECTION, SOLUTION SUBCUTANEOUS at 22:10

## 2025-05-13 RX ADMIN — ROCURONIUM BROMIDE 15 MG: 10 INJECTION, SOLUTION INTRAVENOUS at 15:53

## 2025-05-13 RX ADMIN — PHENYLEPHRINE HYDROCHLORIDE 100 MCG: 10 INJECTION INTRAVENOUS at 16:10

## 2025-05-13 RX ADMIN — TIZANIDINE 4 MG: 4 TABLET ORAL at 08:49

## 2025-05-13 RX ADMIN — DEXAMETHASONE SODIUM PHOSPHATE 10 MG: 10 INJECTION, SOLUTION INTRAMUSCULAR; INTRAVENOUS at 13:42

## 2025-05-13 RX ADMIN — LIDOCAINE HYDROCHLORIDE 50 MG: 10 INJECTION, SOLUTION EPIDURAL; INFILTRATION; INTRACAUDAL; PERINEURAL at 13:13

## 2025-05-13 RX ADMIN — Medication 10 MG: at 14:24

## 2025-05-13 RX ADMIN — PHENYLEPHRINE HYDROCHLORIDE 20 MCG/MIN: 10 INJECTION INTRAVENOUS at 16:24

## 2025-05-13 RX ADMIN — SUGAMMADEX 200 MG: 100 INJECTION, SOLUTION INTRAVENOUS at 17:28

## 2025-05-13 RX ADMIN — AMPICILLIN SODIUM 2000 MG: 2 INJECTION, POWDER, FOR SOLUTION INTRAMUSCULAR; INTRAVENOUS at 22:12

## 2025-05-13 RX ADMIN — PHENYLEPHRINE HYDROCHLORIDE 100 MCG: 10 INJECTION INTRAVENOUS at 16:22

## 2025-05-13 RX ADMIN — SODIUM CHLORIDE, SODIUM LACTATE, POTASSIUM CHLORIDE, AND CALCIUM CHLORIDE: 600; 310; 30; 20 INJECTION, SOLUTION INTRAVENOUS at 15:57

## 2025-05-13 RX ADMIN — OXYCODONE HYDROCHLORIDE AND ACETAMINOPHEN 1 TABLET: 5; 325 TABLET ORAL at 04:37

## 2025-05-13 RX ADMIN — AMPICILLIN SODIUM 2000 MG: 2 INJECTION, POWDER, FOR SOLUTION INTRAMUSCULAR; INTRAVENOUS at 10:44

## 2025-05-13 RX ADMIN — AMPICILLIN SODIUM 2000 MG: 2 INJECTION, POWDER, FOR SOLUTION INTRAMUSCULAR; INTRAVENOUS at 05:57

## 2025-05-13 RX ADMIN — ONDANSETRON 4 MG: 2 INJECTION INTRAMUSCULAR; INTRAVENOUS at 16:33

## 2025-05-13 RX ADMIN — AMPICILLIN SODIUM 2000 MG: 2 INJECTION, POWDER, FOR SOLUTION INTRAMUSCULAR; INTRAVENOUS at 01:42

## 2025-05-13 RX ADMIN — INSULIN LISPRO 8 UNITS: 100 INJECTION, SOLUTION INTRAVENOUS; SUBCUTANEOUS at 22:10

## 2025-05-13 RX ADMIN — FENTANYL CITRATE 50 MCG: 0.05 INJECTION, SOLUTION INTRAMUSCULAR; INTRAVENOUS at 14:40

## 2025-05-13 RX ADMIN — SODIUM CHLORIDE, SODIUM LACTATE, POTASSIUM CHLORIDE, AND CALCIUM CHLORIDE: 600; 310; 30; 20 INJECTION, SOLUTION INTRAVENOUS at 13:08

## 2025-05-13 RX ADMIN — TIZANIDINE 4 MG: 4 TABLET ORAL at 00:42

## 2025-05-13 RX ADMIN — MORPHINE SULFATE 2 MG: 2 INJECTION, SOLUTION INTRAMUSCULAR; INTRAVENOUS at 08:32

## 2025-05-13 RX ADMIN — MORPHINE SULFATE 2 MG: 2 INJECTION, SOLUTION INTRAMUSCULAR; INTRAVENOUS at 00:39

## 2025-05-13 RX ADMIN — FENTANYL CITRATE 100 MCG: 0.05 INJECTION, SOLUTION INTRAMUSCULAR; INTRAVENOUS at 13:43

## 2025-05-13 RX ADMIN — Medication 30 MG: at 13:13

## 2025-05-13 RX ADMIN — Medication 10 MG: at 15:20

## 2025-05-13 RX ADMIN — HYDROMORPHONE HYDROCHLORIDE 0.5 MG: 1 INJECTION, SOLUTION INTRAMUSCULAR; INTRAVENOUS; SUBCUTANEOUS at 16:55

## 2025-05-13 ASSESSMENT — PAIN DESCRIPTION - LOCATION
LOCATION: GENERALIZED
LOCATION: GENERALIZED
LOCATION: BACK

## 2025-05-13 ASSESSMENT — PAIN SCALES - GENERAL
PAINLEVEL_OUTOF10: 7
PAINLEVEL_OUTOF10: 4
PAINLEVEL_OUTOF10: 2
PAINLEVEL_OUTOF10: 8
PAINLEVEL_OUTOF10: 4
PAINLEVEL_OUTOF10: 2
PAINLEVEL_OUTOF10: 5
PAINLEVEL_OUTOF10: 6
PAINLEVEL_OUTOF10: 4
PAINLEVEL_OUTOF10: 4
PAINLEVEL_OUTOF10: 7
PAINLEVEL_OUTOF10: 7
PAINLEVEL_OUTOF10: 6

## 2025-05-13 ASSESSMENT — PAIN DESCRIPTION - PAIN TYPE: TYPE: ACUTE PAIN

## 2025-05-13 ASSESSMENT — PAIN DESCRIPTION - DESCRIPTORS
DESCRIPTORS: ACHING

## 2025-05-13 ASSESSMENT — LIFESTYLE VARIABLES: SMOKING_STATUS: 0

## 2025-05-13 ASSESSMENT — PAIN DESCRIPTION - ORIENTATION
ORIENTATION: MID;LOWER
ORIENTATION: MID;LOWER

## 2025-05-13 ASSESSMENT — PAIN DESCRIPTION - FREQUENCY: FREQUENCY: CONTINUOUS

## 2025-05-13 ASSESSMENT — PAIN - FUNCTIONAL ASSESSMENT: PAIN_FUNCTIONAL_ASSESSMENT: PREVENTS OR INTERFERES SOME ACTIVE ACTIVITIES AND ADLS

## 2025-05-13 ASSESSMENT — PAIN DESCRIPTION - ONSET: ONSET: ON-GOING

## 2025-05-13 NOTE — ANESTHESIA POSTPROCEDURE EVALUATION
Department of Anesthesiology  Postprocedure Note    Patient: Rodney Garcia  MRN: [id number]  YOB: 1960  Date of evaluation: 5/13/2025    Procedure Summary       Date: 05/13/25 Room / Location: [address]    Anesthesia Start: 1308 Anesthesia Stop: 1833    Procedure: L3/4 & L4/5 TLIF, evacuation of epidural abscess Diagnosis:       Spinal epidural abscess      Spondylolisthesis of lumbar region      (Spinal epidural abscess [G06.1])      (Spondylolisthesis of lumbar region [M43.16])    Surgeons: Ayan Rivera MD Responsible Provider:     Anesthesia Type: general ASA Status: 4            Anesthesia Type: No value filed.    Lynn Phase I: Lynn Score: 9    Lynn Phase II:      Anesthesia Post Evaluation    Patient location during evaluation: PACU  Patient participation: complete - patient participated  Level of consciousness: sleepy but conscious  Pain score: 0  Airway patency: patent  Nausea & Vomiting: no nausea and no vomiting  Cardiovascular status: hemodynamically stable and tachycardic  Respiratory status: acceptable and face mask  Hydration status: stable  Comments: BP 96/64   Pulse (!) 124 Comment: pt -104 during procedure  Temp 97.5 °F (36.4 °C)   Resp 17   Ht 1.803 m (5' 11\")   Wt 94.9 kg (209 lb 3.5 oz)   SpO2 96%   BMI 29.18 kg/m²     Pain management: adequate    No notable events documented.

## 2025-05-13 NOTE — PROGRESS NOTES
[facility]    Progress Note    Patient:  Rodney Garcia  YOB: 1960  Date of Service: 5/13/2025  MRN: [id number]   Acct: [id number]   Primary Care Physician: Tu Puente MD  Advance Directive: Full Code  Admit Date: 5/5/2025       Hospital Day: 7    Portions of this note have been copied forward, however, updated to reflect the most current clinical status of this patient.     CHIEF COMPLAINT: generalized weakness    SUBJECTIVE: C/o generalized weakness, appears withdrawn    CUMULATIVE HOSPITAL COURSE:     This patient is a 64-year-old male with hypertension, CVA, PFO and hyperlipidemia who presented to [facility] ED on 5/5 for evaluation of generalized weakness and difficulty with ambulation.  Of note, patient was recently discharged from [facility] after being treated for strep intermedius bacteremia with Zyvox and acute CVA with MRI showing acute infarcts in left MCA distribution and subacute infarcts in bilateral cerebral hemispheres, appearing somewhat embolic, bubble study nondiagnostic at that time.  He was discharged on plavix and switched to aspirin alone by neurology on 5/2. He presented to his PCP on 5/5 for evaluation of left hip pain, had an u/s the same day which showed edematous tissue without drainable fluid collection. Later that day, patient experienced a fall due to loss of balance thus presenting here for evaluation. Per family, patient has had multiple falls without LOC since being discharged from [facility].      In ED, CT of left hip showed soft tissue swelling and multifocal osteoarthritis. Xray hips showed severe bilateral hip osteoarthritic change, possible femoral acetabular impingement, no acute fractures. CTA showed no PE, bibasilar atelectasis, and low density liver lesion measuring 1.3 cm. Lab work indicated procalcitonin 0.58, , WBC 14.9, and lactic 2.0.  Respiratory panel was positive for human rhinovirus.  He was initiated on broad-spectrum antibiotics  sulci demonstrate a normal pattern.  Scattered foci of T2/FLAIR hyperintense signal are present in the subcortical and periventricular white matter, most commonly seen in chronic white matter microvascular ischemic changes.  The basilar cisterns are patent.  The paranasal sinuses and mastoid air cells are well aerated.  The orbits are within normal limits.  No calvarial abnormality is identified.      1. Motion degraded study. 2. Interval development of two 4 mm T2 hyperintense lesions in subcortical white matter of bilateral frontal lobes, with susceptibility artifact, which could represent tiny intraparenchymal hematomas. 3. Numerous punctate susceptibility artifacts scattered in bilateral cerebral hemispheres and bilateral cerebellum, not visualized on prior GRE sequence (SWI was not performed).  Findings could represent new tiny intraparenchymal hematomas versus remote microhemorrhage or cerebral amyloid angiopathy. 4. Interval resolution of previously seen tiny subacute infarcts. 5. Chronic white matter microvascular ischemic changes.   ______________________________________ Electronically signed by: KUSUM AGUILLON M.D. Date:     05/09/2025 Time:    12:47     MRI LUMBAR SPINE W WO CONTRAST  Result Date: 5/9/2025  EXAM:  LUMBAR SPINE MRI WITHOUT AND WITH CONTRAST  HISTORY:  Rule out spinal abscess  TECHNIQUE:  Multi-sequential multiplanar MRI lumbar spine before and after the administration of 20 ml of MultiHance contrast intravenously.  COMPARISON:  Lumbar spine CT dated 05/06/2025.  FINDINGS:  Multiple enhancing epidural fluid collections are noted at L3-L4 to the S2 sacral canal, which could represent epidural abscesses, causing severe spinal canal stenosis at these levels. Multiple enhancing fluid collections are noted in bilateral paraspinous muscles at L2 to sacral level, consistent with abscesses.  There is probable myositis and small abscesses in the inferior left psoas.  There are moderate bilateral

## 2025-05-13 NOTE — ANESTHESIA PROCEDURE NOTES
Arterial Line:    An arterial line was placed using ultrasound guidance, in the holding area for the following indication(s): continuous blood pressure monitoring.    A 20 gauge (size), 4.45 cm (length), Arrow (type) catheter was placed, Seldinger technique not used, into the right radial artery, secured by tape and Tegaderm.  Anesthesia type: Local  Local infiltration: Injection    Events:  patient tolerated procedure well with no complications.5/13/2025 1:15 PM5/13/2025 1:20 PM  Resident/CRNA: Mckenzie Salas APRN - CRNA  Performed: Resident/CRNA   Preanesthetic Checklist  Completed: patient identified, IV checked, site marked, risks and benefits discussed, surgical/procedural consents, equipment checked, pre-op evaluation, timeout performed, anesthesia consent given, oxygen available, monitors applied/VS acknowledged, fire risk safety assessment completed and verbalized and blood product R/B/A discussed and consented

## 2025-05-13 NOTE — PLAN OF CARE
Problem: Pain  Goal: Verbalizes/displays adequate comfort level or baseline comfort level  Outcome: Progressing     Problem: Safety - Adult  Goal: Free from fall injury  Outcome: Progressing     Problem: ABCDS Injury Assessment  Goal: Absence of physical injury  Outcome: Progressing     Problem: Chronic Conditions and Co-morbidities  Goal: Patient's chronic conditions and co-morbidity symptoms are monitored and maintained or improved  Outcome: Progressing     Problem: Skin/Tissue Integrity  Goal: Skin integrity remains intact  Description: 1.  Monitor for areas of redness and/or skin breakdown2.  Assess vascular access sites hourly3.  Every 4-6 hours minimum:  Change oxygen saturation probe site4.  Every 4-6 hours:  If on nasal continuous positive airway pressure, respiratory therapy assess nares and determine need for appliance change or resting period  Outcome: Progressing      Ryan MONICA Marcos's spouse, Rosa, calls stating patient has taken the muscle relaxer and it is not helping with his back pain. She also has some questions about the medications he is taking. Please call Rosa at [phone] to discuss additional options.

## 2025-05-13 NOTE — PROGRESS NOTES
Occupational Therapy  Pt in bed asleep upon arrival for therapy. Pt declines therapy this am and has surgery scheduled for this pm. Will continue to follow as able. Electronically signed by VINICIO Orosco on 5/13/2025 at 10:35 AM

## 2025-05-13 NOTE — PROGRESS NOTES
NEUROSURGERY PROGRESS NOTE      Chief Complaint:   Chief Complaint   Patient presents with    Fall     Left hip pain         Interval Update:    Patient seen and examined.  No overnight events.  He continues to endorse back pain with movement and leg weakness when standing.  He denies questions about the surgery that is planned for this afternoon.    HPI:     The patient is a 64 y.o. male who presented to the [facility] ED with complaints of weakness, hip pain and multiple recent falls.  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  He has also been treated for a stroke within the past month.  He has continued with complaints of dizziness, weakness and multiple falls.  In the ED he was found to be tachycardic, tachypneic, with an elevated WBC count, lactic acid and procaclitonin.  He was admitted with possible sepsis and blood cultures have grown strep.  He has complained of back pain and hip pain since admission.  He does have a history of chronic back pain and was recently evaluated for surgery in [place] and he was told he was not a candidate for surgery because his Hgb A1c was 9.4%.  He does see Dr. Lloyd in pain management and receives injections.       He underwent an MRI as part of his infectious workup and this revealed evidence of widespread infection in the lumbar spine with a likely epidural abscess.     He was recently started on Eliquis due to his history of stroke.      Objective:    /76   Pulse 78   Temp 97.7 °F (36.5 °C) (Temporal)   Resp 18   Ht 1.803 m (5' 11\")   Wt 94.9 kg (209 lb 3.5 oz)   SpO2 94%   BMI 29.18 kg/m²       Intake/Output Summary (Last 24 hours) at 5/13/2025 2762  Last data filed at 5/13/2025 044  Gross per 24 hour   Intake 400 ml   Output 1125 ml   Net -725 ml           Physical Exam:    General: alert, cooperative, no  cardiopulmonary complications were all discussed.  He voiced understanding and agreed to proceed.            Electronically signed by Ayan Rivera MD on 5/13/2025 at 8:28 AM

## 2025-05-13 NOTE — PROGRESS NOTES
[facility] Neurology Progress Note      Patient:   Rodney Garcia  MR#:    [id number]   Room:    [facility] OR [place]/NONE   YOB: 1960  Date of Progress Note: 5/13/2025  Time of Note                           2:00 PM  Consulting Physician:  Leif Reyna DO  Attending Physician:  Curly Cleaning MD      INTERVAL HISTORY:  No acute events, no new focal complaints, no worsening LE weakness, remains largely unchanged overnight.     REVIEW OF SYSTEMS:  Constitutional: No fevers No chills  Neck:No stiffness  Respiratory: No shortness of breath  Cardiovascular: No chest pain No palpitations  Gastrointestinal: No abdominal pain    Genitourinary: No Dysuria  Neurological: No headache, no confusion    PHYSICAL EXAM:    Constitutional -   BP (!) 142/78   Pulse 87   Temp 98.4 °F (36.9 °C) (Temporal)   Resp 27   Ht 1.803 m (5' 11\")   Wt 94.9 kg (209 lb 3.5 oz)   SpO2 95%   BMI 29.18 kg/m²   General appearance: No acute distress   EYES -   Conjunctiva normal  Pupillary exam as below, see CN exam in the neurologic exam  ENT-    No scars, masses, or lesions over external nose or ears  Oropharynx without erythema, palate midline  Cardiovascular -   No clubbing, cyanosis, or edema   Pulmonary-   Good expansion, normal effort without use of accessory muscles  Musculoskeletal -   No significant wasting of muscles noted  Gait as below, see gait exam in the neurologic exam  Muscle strength, tone, stability as below see the motor exam in the neurologic exam.   No bony deformities  Skin -   Warm, dry, and intact to inspection and palpation.    No rash, erythema, or pallor  Psychiatric -   Mood, affect, and behavior appear normal    Memory as below see mental status examination in the neurologic exam        NEUROLOGICAL EXAM     Mental status    [x] Awake, alert, oriented   [x] Affect attention and concentration appear appropriate  [x] Recent and remote memory appears unremarkable  [x] Speech normal without dysarthria  Streptococcus intermedius bacteremia, back pain, MRI L-spine with multiple epidural abscesses.  Recent prior stroke somewhat embolic appearing on MRI from april, CTA head and neck negative at that time, VILMA here negative for endocarditis, atrial septal aneurysm with PFO noted.  No clear recurrent stroke / TIA symptoms since his recent stroke.  MRI brain two small T2 hyperintense lesions in subcortical white matter of bilateral frontal lobes, with susceptibility artifact, which could represent tiny intraparenchymal hematomas. Numerous punctate susceptibility artifacts scattered in bilateral cerebral hemispheres and bilateral cerebellum, could represent new tiny intraparenchymal hematomas versus remote microhemorrhage or cerebral amyloid angiopathy. Interval resolution of previously seen tiny subacute infarcts. Exam remains stable overnight, plans for surgery today.         PLAN:   Will need short term follow up MRI brain in 1-2 weeks to ensure new small hemorrhagic changes are not abscess / seeding related.  No new stroke noted.     Tele, zio at discharge    Cardiology following, RoPE score is 5, consider PFO closure, planned follow up with structural cardiology as an outpatient, will not be able to anticoagulate at present due to spinal epidural abscess and brain MRI findings.   Neurosurgery following, plans surgery today.   Infectious disease following on Abx.     Hold off on antiplatelet therapy, continue statin, risk factor maximization.     Please feel free to call with any questions.   [phone] (cell phone).      Leif Reyna,   Board Certified Neurology

## 2025-05-13 NOTE — BRIEF OP NOTE
Brief Postoperative Note      Patient: Rodney Garcia  YOB: 1960  MRN: [id number]    Date of Procedure: 5/13/2025    Pre-Op Diagnosis Codes:      * Spinal epidural abscess [G06.1]     * Spondylolisthesis of lumbar region [M43.16]    Post-Op Diagnosis: Same       Procedure(s):    1) L3/4 and L4/5 laminectomy and bilateral facetectomy for drainage of epidural abscess and debridement of septic facets  2) L3/4 and L4/5 MIDLIF using Medtronic Solera cortical screws and Catalyft interbody cages filled with Shwetha and iFactor DBM  3) L3/4 and L4/5 posterolateral arthrodesis using [place] and iFactor DBM and Magnafuse  4) O-arm and stealth spinal navigation  5) Intraoperative fluoroscopy with interpretation  6) Intraoperative neuromonitoring with SSEP and bilateral lower extremity EMG    Surgeon(s):  Ayan Rivera MD    Assistant:  * No surgical staff found *    Anesthesia: General    Estimated Blood Loss (mL): 600    Complications: None    Specimens:   ID Type Source Tests Collected by Time Destination   1 : paraspinal swab Swab Spine CULTURE, FUNGUS, CULTURE, SURGICAL Ayan Rivera MD 5/13/2025 1421    2 : paraspinal tissue Tissue Spine CULTURE, FUNGUS, CULTURE, Ayan Villafuerte MD 5/13/2025 1431    3 : epidural puss Swab Spine CULTURE, FUNGUS, CULTURE, Ayan Villafuerte MD 5/13/2025 1517    4 : L4-5 facet Bone Spine CULTURE, FUNGUS, CULTURE, Ayan Villafuerte MD 5/13/2025 1525        Implants:  Implant Name Type Inv. Item Serial No.  Lot No. LRB No. Used Action   GRAFT BONE SUB 3ML DEMIN BONE MTRX PUTTY GRFTON - [id number]  GRAFT BONE SUB 3ML DEMIN BONE MTRX PUTTY GRFTON [id number] [employer] SPINALGRAFT TECH-WD  N/A 1 Implanted   GRAFT BONE SUB 3ML DEMIN BONE MTRX PUTTY GRFTON - [id number]  GRAFT BONE SUB 3ML DEMIN BONE MTRX PUTTY GRFTON [id number] [employer] SPINALGRAFT TECH-WD  N/A 1 Implanted   PUTTY BONE GRAFT FACTOR 2.5ML PEPTIDE ENHANCED W/SYRINGE -

## 2025-05-14 LAB
ALBUMIN SERPL-MCNC: 2.5 G/DL (ref 3.5–5.2)
ALP SERPL-CCNC: 95 U/L (ref 40–129)
ALT SERPL-CCNC: 34 U/L (ref 10–50)
ANION GAP SERPL CALCULATED.3IONS-SCNC: 16 MMOL/L (ref 8–16)
AST SERPL-CCNC: 22 U/L (ref 10–50)
BASO STIPL BLD QL SMEAR: ABNORMAL
BASOPHILS # BLD: 0 K/UL (ref 0–0.2)
BASOPHILS NFR BLD: 0 % (ref 0–1)
BILIRUB SERPL-MCNC: 0.4 MG/DL (ref 0.2–1.2)
BUN SERPL-MCNC: 18 MG/DL (ref 8–23)
CALCIUM SERPL-MCNC: 8.3 MG/DL (ref 8.8–10.2)
CHLORIDE SERPL-SCNC: 96 MMOL/L (ref 98–107)
CO2 SERPL-SCNC: 22 MMOL/L (ref 22–29)
CREAT SERPL-MCNC: 0.5 MG/DL (ref 0.7–1.2)
EOSINOPHIL # BLD: 0 K/UL (ref 0–0.6)
EOSINOPHIL NFR BLD: 0 % (ref 0–5)
ERYTHROCYTE [DISTWIDTH] IN BLOOD BY AUTOMATED COUNT: 14 % (ref 11.5–14.5)
GLUCOSE BLD-MCNC: 244 MG/DL (ref 70–99)
GLUCOSE BLD-MCNC: 249 MG/DL (ref 70–99)
GLUCOSE BLD-MCNC: 259 MG/DL (ref 70–99)
GLUCOSE BLD-MCNC: 277 MG/DL (ref 70–99)
GLUCOSE SERPL-MCNC: 258 MG/DL (ref 70–99)
HCT VFR BLD AUTO: 31 % (ref 42–52)
HGB BLD-MCNC: 9.8 G/DL (ref 14–18)
IMM GRANULOCYTES # BLD: 3.6 K/UL
LYMPHOCYTES # BLD: 1.8 K/UL (ref 1.1–4.5)
LYMPHOCYTES NFR BLD: 6 % (ref 20–40)
MCH RBC QN AUTO: 29.4 PG (ref 27–31)
MCHC RBC AUTO-ENTMCNC: 31.6 G/DL (ref 33–37)
MCV RBC AUTO: 93.1 FL (ref 80–94)
METAMYELOCYTES NFR BLD MANUAL: 9 %
MONOCYTES # BLD: 0.3 K/UL (ref 0–0.9)
MONOCYTES NFR BLD: 1 % (ref 0–10)
MYELOCYTES NFR BLD MANUAL: 3 %
NEUTROPHILS # BLD: 28.1 K/UL (ref 1.5–7.5)
NEUTS BAND NFR BLD MANUAL: 2 % (ref 0–5)
NEUTS SEG NFR BLD: 79 % (ref 50–65)
PERFORMED ON: ABNORMAL
PLATELET # BLD AUTO: 599 K/UL (ref 130–400)
PLATELET SLIDE REVIEW: ABNORMAL
PMV BLD AUTO: 9.1 FL (ref 9.4–12.4)
POLYCHROMASIA BLD QL SMEAR: ABNORMAL
POTASSIUM SERPL-SCNC: 4.6 MMOL/L (ref 3.5–5)
PROT SERPL-MCNC: 5.3 G/DL (ref 6.4–8.3)
RBC # BLD AUTO: 3.33 M/UL (ref 4.7–6.1)
SODIUM SERPL-SCNC: 134 MMOL/L (ref 136–145)
TOXIC GRANULATION: ABNORMAL
WBC # BLD AUTO: 30.2 K/UL (ref 4.8–10.8)

## 2025-05-14 PROCEDURE — 2700000000 HC OXYGEN THERAPY PER DAY

## 2025-05-14 PROCEDURE — 2500000003 HC RX 250 WO HCPCS: Performed by: NEUROLOGICAL SURGERY

## 2025-05-14 PROCEDURE — 2580000003 HC RX 258: Performed by: NEUROLOGICAL SURGERY

## 2025-05-14 PROCEDURE — 82962 GLUCOSE BLOOD TEST: CPT

## 2025-05-14 PROCEDURE — 6370000000 HC RX 637 (ALT 250 FOR IP): Performed by: NEUROLOGICAL SURGERY

## 2025-05-14 PROCEDURE — 97530 THERAPEUTIC ACTIVITIES: CPT

## 2025-05-14 PROCEDURE — 6370000000 HC RX 637 (ALT 250 FOR IP): Performed by: HOSPITALIST

## 2025-05-14 PROCEDURE — 36415 COLL VENOUS BLD VENIPUNCTURE: CPT

## 2025-05-14 PROCEDURE — 1200000000 HC SEMI PRIVATE

## 2025-05-14 PROCEDURE — 94761 N-INVAS EAR/PLS OXIMETRY MLT: CPT

## 2025-05-14 PROCEDURE — 2580000003 HC RX 258

## 2025-05-14 PROCEDURE — 85025 COMPLETE CBC W/AUTO DIFF WBC: CPT

## 2025-05-14 PROCEDURE — 6360000002 HC RX W HCPCS: Performed by: NEUROLOGICAL SURGERY

## 2025-05-14 PROCEDURE — 99233 SBSQ HOSP IP/OBS HIGH 50: CPT | Performed by: PSYCHIATRY & NEUROLOGY

## 2025-05-14 PROCEDURE — 99024 POSTOP FOLLOW-UP VISIT: CPT | Performed by: NEUROLOGICAL SURGERY

## 2025-05-14 PROCEDURE — 97110 THERAPEUTIC EXERCISES: CPT

## 2025-05-14 PROCEDURE — 6370000000 HC RX 637 (ALT 250 FOR IP)

## 2025-05-14 PROCEDURE — 97535 SELF CARE MNGMENT TRAINING: CPT

## 2025-05-14 PROCEDURE — 80053 COMPREHEN METABOLIC PANEL: CPT

## 2025-05-14 RX ORDER — INSULIN LISPRO 100 [IU]/ML
0-16 INJECTION, SOLUTION INTRAVENOUS; SUBCUTANEOUS
Status: DISCONTINUED | OUTPATIENT
Start: 2025-05-14 | End: 2025-05-19 | Stop reason: HOSPADM

## 2025-05-14 RX ORDER — SODIUM CHLORIDE 9 MG/ML
INJECTION, SOLUTION INTRAVENOUS CONTINUOUS
Status: DISCONTINUED | OUTPATIENT
Start: 2025-05-14 | End: 2025-05-19 | Stop reason: HOSPADM

## 2025-05-14 RX ORDER — 0.9 % SODIUM CHLORIDE 0.9 %
1000 INTRAVENOUS SOLUTION INTRAVENOUS ONCE
Status: COMPLETED | OUTPATIENT
Start: 2025-05-14 | End: 2025-05-14

## 2025-05-14 RX ADMIN — SODIUM CHLORIDE 1000 ML: 0.9 INJECTION, SOLUTION INTRAVENOUS at 13:44

## 2025-05-14 RX ADMIN — INSULIN LISPRO 4 UNITS: 100 INJECTION, SOLUTION INTRAVENOUS; SUBCUTANEOUS at 18:48

## 2025-05-14 RX ADMIN — AMPICILLIN SODIUM 2000 MG: 2 INJECTION, POWDER, FOR SOLUTION INTRAMUSCULAR; INTRAVENOUS at 13:46

## 2025-05-14 RX ADMIN — INSULIN GLARGINE 17 UNITS: 100 INJECTION, SOLUTION SUBCUTANEOUS at 20:25

## 2025-05-14 RX ADMIN — INSULIN LISPRO 8 UNITS: 100 INJECTION, SOLUTION INTRAVENOUS; SUBCUTANEOUS at 20:25

## 2025-05-14 RX ADMIN — POLYETHYLENE GLYCOL 3350 17 G: 17 POWDER, FOR SOLUTION ORAL at 09:39

## 2025-05-14 RX ADMIN — PRAVASTATIN SODIUM 10 MG: 20 TABLET ORAL at 09:39

## 2025-05-14 RX ADMIN — AMPICILLIN SODIUM 2000 MG: 2 INJECTION, POWDER, FOR SOLUTION INTRAMUSCULAR; INTRAVENOUS at 05:59

## 2025-05-14 RX ADMIN — INSULIN GLARGINE 17 UNITS: 100 INJECTION, SOLUTION SUBCUTANEOUS at 09:40

## 2025-05-14 RX ADMIN — AMPICILLIN SODIUM 2000 MG: 2 INJECTION, POWDER, FOR SOLUTION INTRAMUSCULAR; INTRAVENOUS at 09:37

## 2025-05-14 RX ADMIN — AMPICILLIN SODIUM 2000 MG: 2 INJECTION, POWDER, FOR SOLUTION INTRAMUSCULAR; INTRAVENOUS at 18:49

## 2025-05-14 RX ADMIN — AMPICILLIN SODIUM 2000 MG: 2 INJECTION, POWDER, FOR SOLUTION INTRAMUSCULAR; INTRAVENOUS at 21:59

## 2025-05-14 RX ADMIN — Medication 25 MG: at 09:39

## 2025-05-14 RX ADMIN — INSULIN LISPRO 4 UNITS: 100 INJECTION, SOLUTION INTRAVENOUS; SUBCUTANEOUS at 12:01

## 2025-05-14 RX ADMIN — SENNOSIDES AND DOCUSATE SODIUM 2 TABLET: 50; 8.6 TABLET ORAL at 09:39

## 2025-05-14 RX ADMIN — AMPICILLIN SODIUM 2000 MG: 2 INJECTION, POWDER, FOR SOLUTION INTRAMUSCULAR; INTRAVENOUS at 02:01

## 2025-05-14 RX ADMIN — INSULIN LISPRO 4 UNITS: 100 INJECTION, SOLUTION INTRAVENOUS; SUBCUTANEOUS at 09:41

## 2025-05-14 RX ADMIN — SODIUM CHLORIDE: 9 INJECTION, SOLUTION INTRAVENOUS at 13:38

## 2025-05-14 RX ADMIN — SODIUM CHLORIDE, PRESERVATIVE FREE 10 ML: 5 INJECTION INTRAVENOUS at 20:25

## 2025-05-14 ASSESSMENT — PAIN SCALES - GENERAL
PAINLEVEL_OUTOF10: 6
PAINLEVEL_OUTOF10: 4

## 2025-05-14 ASSESSMENT — PAIN DESCRIPTION - ONSET: ONSET: ON-GOING

## 2025-05-14 ASSESSMENT — PAIN - FUNCTIONAL ASSESSMENT: PAIN_FUNCTIONAL_ASSESSMENT: PREVENTS OR INTERFERES SOME ACTIVE ACTIVITIES AND ADLS

## 2025-05-14 ASSESSMENT — PAIN DESCRIPTION - ORIENTATION: ORIENTATION: MID;LOWER

## 2025-05-14 ASSESSMENT — PAIN DESCRIPTION - DESCRIPTORS: DESCRIPTORS: ACHING;DISCOMFORT

## 2025-05-14 ASSESSMENT — PAIN DESCRIPTION - PAIN TYPE: TYPE: ACUTE PAIN

## 2025-05-14 ASSESSMENT — PAIN DESCRIPTION - LOCATION: LOCATION: BACK

## 2025-05-14 ASSESSMENT — PAIN DESCRIPTION - FREQUENCY: FREQUENCY: CONTINUOUS

## 2025-05-14 NOTE — CARE COORDINATION
[facility]  Notification of Admission Decision      [] Patient has been accepted for admit to [facility] on :       Please write discharge orders and summary prior to discharge.    [] Patient acceptance to Rehab pending the following :    [] Eval in progress       [x] Patient determined to be ineligible for services at [facility] because : we do not accept patient's insurance      We recommend you consider : [place]       Thank you for your referral, we appreciate you. If you have any questions, please feel   free to contact me at [phone].  Electronically Signed by Layne Santamaria, Admissions Coordinator 5/14/2025 8:47 AM

## 2025-05-14 NOTE — OP NOTE
[id number] [place] [address]                            OPERATIVE REPORT      PATIENT NAME: NIESHA TORRES                : 1960  MED REC NO: [id number]                          ROOM: [id number]  ACCOUNT NO: [id number]                       ADMIT DATE: 2025  PROVIDER: Ayan Rivera MD      DATE OF PROCEDURE:  2025    SURGEON:  Ayan Rivera MD    PREOPERATIVE DIAGNOSES:    1. L3 through L5 spinal epidural abscess.  2. L4-5 septic facet joints.  3. L4-5 spondylolisthesis.    POSTOPERATIVE DIAGNOSES:    1. L3 through L5 spinal epidural abscess.  2. L4-5 septic facet joints.  3. L4-5 spondylolisthesis.    PROCEDURE PERFORMED:    1. L3-4 and L4-5 laminectomy and bilateral facetectomy for drainage of epidural abscess and debridement of septic facets.  2. L3-4 and L4-5 midline lumbar interbody fusion using Medtronic Solera cortical screws and Catalyft interbody cages filled with [place] and i-FACTOR DBM.  3. L3-4 and L4-5 posterolateral arthrodesis using Shwetha and i-FACTOR DBM and Magnifuse.  4. O-arm and Stealth spinal navigation.  5. Intraoperative fluoroscopy with interpretation.  6. Intraoperative neuromonitoring with SSEP and bilateral lower extremity EMG.    ESTIMATED BLOOD LOSS:  600 mL.    INDICATIONS:  Please see the medical record for full details.  Briefly, the patient is a 64-year-old male who presented to the [facility] with complaint of severe back and hip pain, multiple falls, and generalized weakness.  He was initially admitted to the hospital due to his weakness and falls and was found to have an elevated white blood cell count and ESR and CRP as well as procalcitonin and lactate.  Blood cultures were obtained and these were positive for Streptococcus intermedius.  Initially, Orthopedics was consulted due to concern of cellulitis in the hip and this was not found to be operative.  He continued to complain of severe  breaches were identified and we then placed an appropriately sized cortical screw using navigated guidance.  We placed 6.5 x 45 mm screws bilaterally at L3 and L4 and we placed 6.5 x 40 mm screws at L5.  Once the screws were in place, we again flooded the field with irrigation and the field was covered with drapes.  The O-arm imager was brought in and a confirmation spin was obtained and we were able to confirm appropriate placement of all instrumentation.  With a satisfactory placement of instrumentation confirmed, we again uncovered the patient and aspirated our irrigation.  At this time, we obtained a stimulation probe and used EMG and stimulated each cortical screw with the probe and no EMG activity was recorded up to a threshold of 20 mm.  With both physiologic and imaging confirmation of our instrumentation placement, we then used the high-speed drill to decorticate lateral to our instrumentation.  We then obtained Magnifuse DBM that had been presoaked in vancomycin irrigation.  This was then combined with the remainder of our i-FACTOR and [place] DBM.  This was wrapped in Surgicel pledgets and packed lateral to our instrumentation on both sides.  Once the posterolateral arthrodesis was completed, we then obtained 60 mm lordotic rods which were bent to match the contour of the spine with a [place] stella kendall.  These were placed in the polyaxial screw heads and secured with locking caps and the locking caps were final tightened at all levels.  Once the instrumentation and bone grafting material was in place, we obtained final AP and lateral fluoroscopic images again confirming appropriate placement of our instrumentation and stella length bilaterally.      Once we had our instrumentation securely in place, we then irrigated the field.  We then used the pulse lavage to irrigate.  We covered the laminectomy defect with a Ray-Scottie sponge and the epidural space was copiously irrigated with approximately 2 L of

## 2025-05-14 NOTE — PROGRESS NOTES
Occupational Therapy     05/14/25 1700   Subjective   Subjective Pt up in recliner and ready to get back to bed this pm.   Pain Assessment   Pain Assessment 0-10   Pain Level 6   Pain Location Back   Pain Orientation Mid;Lower   Pain Descriptors Aching;Discomfort   Functional Pain Assessment Prevents or interferes some active activities and ADLs   Pain Type Acute pain   Pain Frequency Continuous   Pain Onset On-going   Non-Pharmaceutical Pain Intervention(s) Ambulation/Increased Activity;Repositioned;Rest;Nurse notified (comment)   Response to Pain Intervention Progressing toward pain score goal   Vitals   O2 Device Nasal cannula   Cognition   Overall Cognitive Status WFL   Orientation   Overall Orientation Status WNL   Bed Mobility Training   Bed Mobility Training Yes   Overall Level of Assistance Partial/Moderate assistance   Interventions Verbal cues;Tactile cues;Manual cues   Transfer Training   Transfer Training Yes   Overall Level of Assistance Partial/Moderate assistance;2 Person assistance  (Nathalie Lee)   Balance   Sitting Intact   Standing Impaired   ADL   Feeding Setup;Supervision   Grooming Setup;Stand by assistance   UE Bathing Minimal assistance   LE Bathing Maximum assistance;Moderate assistance   UE Dressing Minimal assistance   LE Dressing Moderate assistance;Maximum assistance   Putting On/Taking Off Footwear Dependent/Total   Toileting Moderate assistance;Maximum assistance   Functional Mobility Moderate assistance   Functional Mobility Skilled Clinical Factors Nathalielashay Arringtonmatt   Assessment   Assessment Tx focused on Nathalie Lee transfer back to bed requiring Mod-Max assist x1 on first attempt. Pt able to stand with Mod assist x2 on second attempt. Pt assisted with bed mobility and repositioning for comfort.   Activity Tolerance Patient limited by pain;Patient limited by endurance   Discharge Recommendations Patient would benefit from continued therapy after discharge;24 hour supervision or assist

## 2025-05-14 NOTE — PROGRESS NOTES
Physical Therapy  Facility/Department: [facility] ONCOLOGY UNIT  Physical Therapy Re-Assessment post op L3/4 & L4/5 TLIF, evacuation of epidural abscess     Name: Rodney Garcia  : 1960  MRN: [id number]  Date of Service: 2025    Discharge Recommendations:  Continue to assess pending progress, 24 hour supervision or assist, Patient would benefit from continued therapy after discharge          Patient Diagnosis(es): The primary encounter diagnosis was Sepsis, due to unspecified organism, unspecified whether acute organ dysfunction present (HCC). Diagnoses of Altered mental status, unspecified altered mental status type, Generalized weakness, Left hip pain, Rhinovirus infection, Syncope, unspecified syncope type, Endocarditis, unspecified chronicity, unspecified endocarditis type, Spinal epidural abscess, and Spondylolisthesis of lumbar region were also pertinent to this visit.  Past Medical History:  has no past medical history on file.  Past Surgical History:  has a past surgical history that includes Ankle fracture surgery; shoulder surgery (); and lumbar fusion (N/A, 2025).    Assessment  Body Structures, Functions, Activity Limitations Requiring Skilled Therapeutic Intervention: Decreased functional mobility ;Decreased ADL status;Decreased ROM;Decreased cognition;Decreased safe awareness;Decreased body mechanics;Decreased strength;Decreased endurance;Decreased balance;Decreased posture;Increased pain;Decreased coordination  Assessment: Pt. unable to fully stand. Stood from EOB with flexed posture with RW and 2A. Pt. then scooted to drop arm chair in room with 2A. Pt. needs 24 hr care and additional rehab prior to being able to d/c home. Will work on progressive mobility as pt is able.  Treatment Diagnosis: impaired gait and mobility  Therapy Prognosis: Good  Decision Making: Medium Complexity  Barriers to Learning: cognition, drowsy  Requires PT Follow-Up: Yes  Activity Tolerance  Activity Tolerance:

## 2025-05-14 NOTE — PROGRESS NOTES
Facility/Department: [facility] ONCOLOGY UNIT  ReAssessment/ Daily Treatment Note  NAME: Rodney Garcia  : 1960  MRN: [id number]    Date of Service: 2025    Discharge Recommendations:  Patient would benefit from continued therapy after discharge, 24 hour supervision or assist       Assessment   Assessment: Reassessed s/p back surgery .  The patient continues to require skilled therapy to progress ADL and mobility. Standing level ADL and mobility is significantly impaired.  Treatment Diagnosis: Acute hypoxic respiratory failure, now s/p L3-4 laminectiomy, facetectomy, drainage and debridement of septic facets and midline lumbar interbody fusion    Activity Tolerance  Activity Tolerance: Patient Tolerated treatment well  Activity Tolerance Comments: activity modified per symptoms         Patient Diagnosis(es): The primary encounter diagnosis was Sepsis, due to unspecified organism, unspecified whether acute organ dysfunction present (HCC). Diagnoses of Altered mental status, unspecified altered mental status type, Generalized weakness, Left hip pain, Rhinovirus infection, Syncope, unspecified syncope type, Endocarditis, unspecified chronicity, unspecified endocarditis type, Spinal epidural abscess, and Spondylolisthesis of lumbar region were also pertinent to this visit.    has no past medical history on file.   has a past surgical history that includes Ankle fracture surgery; shoulder surgery (); and lumbar fusion (N/A, 2025).    Restrictions  Restrictions/Precautions  Restrictions/Precautions: Isolation, Fall Risk  Activity Level: Up with Assist  Required Braces or Orthoses?: Yes  Required Braces or Orthoses  Spinal: Lumbar Corset  Spinal Other: LSO  Position Activity Restriction  Other Position/Activity Restrictions: droplet precautions-rhinovirus    Subjective   General  Chart Reviewed: Yes  Patient assessed for rehabilitation services?: Yes  Family / Caregiver Present: No  Diagnosis: Acute hypoxic

## 2025-05-14 NOTE — PLAN OF CARE
Problem: Pain  Goal: Verbalizes/displays adequate comfort level or baseline comfort level  Outcome: Progressing     Problem: Safety - Adult  Goal: Free from fall injury  Outcome: Progressing     Problem: ABCDS Injury Assessment  Goal: Absence of physical injury  Outcome: Progressing     Problem: Chronic Conditions and Co-morbidities  Goal: Patient's chronic conditions and co-morbidity symptoms are monitored and maintained or improved  Outcome: Progressing  Flowsheets (Taken 5/13/2025 2214)  Care Plan - Patient's Chronic Conditions and Co-Morbidity Symptoms are Monitored and Maintained or Improved:   Monitor and assess patient's chronic conditions and comorbid symptoms for stability, deterioration, or improvement   Collaborate with multidisciplinary team to address chronic and comorbid conditions and prevent exacerbation or deterioration   Update acute care plan with appropriate goals if chronic or comorbid symptoms are exacerbated and prevent overall improvement and discharge     Problem: Skin/Tissue Integrity  Goal: Skin integrity remains intact  Description: 1.  Monitor for areas of redness and/or skin breakdown2.  Assess vascular access sites hourly3.  Every 4-6 hours minimum:  Change oxygen saturation probe site4.  Every 4-6 hours:  If on nasal continuous positive airway pressure, respiratory therapy assess nares and determine need for appliance change or resting period  Outcome: Progressing  Flowsheets (Taken 5/13/2025 2214)  Skin Integrity Remains Intact:   Monitor for areas of redness and/or skin breakdown   Assess vascular access sites hourly   Check visual cues for pain   Turn and reposition as indicated     Problem: Discharge Planning  Goal: Discharge to home or other facility with appropriate resources  Outcome: Progressing  Flowsheets (Taken 5/13/2025 2214)  Discharge to home or other facility with appropriate resources:   Identify discharge learning needs (meds, wound care, etc)   Arrange for needed

## 2025-05-14 NOTE — PROGRESS NOTES
Speech normal without dysarthria or aphasia, comprehension and repetition intact.   COMMENTS:   Cranial Nerves [x] No VF deficit to confrontation  [x] PERRLA, EOMI, no nystagmus, conjugate eye movements, no ptosis  [x] Face symmetric  [x] Facial sensation intact  [x] Tongue midline no atrophy or fasciculations present  [x] Palate midline, hearing to finger rub normal  [x] Shoulder shrug and SCM testing normal  COMMENTS:   Motor   [] 5/5 strength x 4 extremities  [x] Normal bulk and tone  [x] No tremor present  [x] No rigidity or bradykinesia noted  COMMENTS: 4+/5 BLE proximally, 5/5 distally    Sensory  [x] Sensation intact to light touch, pin prick, vibration, and proprioception BLE  [] Sensation intact to light touch, pin prick, vibration, and proprioception BUE  COMMENTS:   Coordination [x] FTN normal bilaterally   [] HTS normal bilaterally  [] VAN normal.   COMMENTS:   Reflexes  [] Symmetric and non-pathological  [x] Toes downgoing bilaterally  [x] No clonus present  COMMENTS: Hypoactive LE    Gait                  [] Normal steady gait    [] Ataxic    [] Spastic     [] Magnetic     [] Shuffling  [x] Not assessed  COMMENTS:        LABS/IMAGING:    MRI BRAIN W WO CONTRAST  Result Date: 5/9/2025  EXAM:  BRAIN MRI WITHOUT AND WITH CONTRAST  HISTORY:  Stroke.  Epidural abscess.  TECHNIQUE:  Multiplanar and multisequence MRI of the brain before and after the administration of 20 cc of MultiHance contrast intravenously.  COMPARISON:  Brain MRI dated 04/08/2025.  Brain CT dated 05/06/2025.  FINDINGS:  Motion artifacts are noted.  There has been interval development of two 4 mm T2 hyperintense lesions in subcortical white matter of bilateral frontal lobes, with susceptibility artifact, which could represent tiny intraparenchymal hematomas. There are numerous punctate susceptibility artifacts scattered in bilateral cerebral hemispheres and bilateral cerebellum, not visualized on prior GRE sequence (SWI was not  left ventricular systolic function with a visually estimated EF of 55 - 60%. Left ventricle size is normal. Normal wall thickness. Normal wall motion. Normal diastolic function.   Right Ventricle: Right ventricle size is normal. Normal systolic function.   Aortic Valve: Trileaflet valve. No regurgitation. No stenosis.   Mitral Valve: Valve structure is normal. Trace regurgitation. No stenosis noted.   Tricuspid Valve: Trace regurgitation.   Left Atrium: Left atrium size is normal. Normal sized appendage. Normal appendage flow velocity. No left atrial appendage thrombus noted. No left atrial appendage mass noted.   Interatrial Septum: Grade II Positive (10 to 20 bubbles). Agitated saline study was positive without provocation. Right to left shunt was noted. PFO present. Hypermobile interatrial septum. The septum is bowing into the LA.   Right Atrium: Right atrium size is normal.   Pericardium: Trivial pericardial effusion present. No indication of cardiac tamponade.   Image quality is adequate. No evidence to suggest endocarditis by transesophageal echocardiography. Evidence of hypermobile/borderline atrial septal aneurysm with PFO and right-to-left shunting.       Lab Results   Component Value Date    WBC 30.2 (H) 05/14/2025    HGB 9.8 (L) 05/14/2025    HCT 31.0 (L) 05/14/2025    MCV 93.1 05/14/2025     (H) 05/14/2025     Lab Results   Component Value Date     (L) 05/14/2025    K 4.6 05/14/2025    CL 96 (L) 05/14/2025    CO2 22 05/14/2025    BUN 18 05/14/2025    CREATININE 0.5 (L) 05/14/2025    GLUCOSE 258 (H) 05/14/2025    CALCIUM 8.3 (L) 05/14/2025    BILITOT 0.4 05/14/2025    ALKPHOS 95 05/14/2025    AST 22 05/14/2025    ALT 34 05/14/2025    LABGLOM >90 05/14/2025   No results found for: \"INR\", \"PROTIME\"    RECORD REVIEW:   Previous medical records, medications were reviewed at today's visit.  Nursing/physician notes, imaging, labs and vitals reviewed. PT,OT and/or speech notes

## 2025-05-14 NOTE — PROGRESS NOTES
Comprehensive Nutrition Assessment    Type and Reason for Visit:  LOS    Nutrition Recommendations/Plan:   Modify diet to regular 4 carb choices per meal  Glucerna at breakfast     Malnutrition Assessment:  Malnutrition Status:  At risk for malnutrition (05/14/25 1243)    Context:  Acute Illness     Findings of the 6 clinical characteristics of malnutrition:  Energy Intake:  Mild decrease in energy intake  Weight Loss:  Greater than 7.5% over 3 months     Body Fat Loss:  No body fat loss     Muscle Mass Loss:  No muscle mass loss    Fluid Accumulation:  Mild Extremities   Strength:  Not Performed    Nutrition Assessment:    LOS day 8. Pt reports good appetite although reports fair intake. Pt noted to have weight loss of 8.2% in past 90 days. Pt reports some weight decline is desired. A1c 9.3. Due to varied intake and hx of weight loss, will liberalize diet to 4 carb choices and add Glucerna at breakfast.    Nutrition Related Findings:    Na 134, Glu 244-355, A1c 9.3, +1 LLE edema Wound Type: Surgical Incision       Current Nutrition Intake & Therapies:    Average Meal Intake: Unable to assess  Average Supplements Intake: None Ordered  ADULT DIET; Regular; 3 carb choices (45 gm/meal)    Anthropometric Measures:  Height: 180.3 cm (5' 10.98\")  Ideal Body Weight (IBW): 172 lbs (78 kg)    Admission Body Weight: 94.8 kg (208 lb 15.9 oz) (stated)  Current Body Weight: 94.9 kg (209 lb 3.5 oz), 121.6 % IBW. Weight Source: Not specified  Current BMI (kg/m2): 29.2  Usual Body Weight: 103.4 kg (227 lb 15.3 oz) (per office visit 2-11-25)   % Weight Change (Calculated): -8.2  Weight Adjustment For: No Adjustment   BMI Categories: Overweight (BMI 25.0-29.9)    Estimated Daily Nutrient Needs:  Energy Requirements Based On: Kcal/kg  Weight Used for Energy Requirements: Current  Energy (kcal/day): 0849-2935 (20-25/kg)  Weight Used for Protein Requirements: Ideal  Protein (g/day): 101-156 (1.3-2/kg)  Method Used for Fluid

## 2025-05-14 NOTE — PROGRESS NOTES
NEUROSURGERY PROGRESS NOTE      Chief Complaint:   Chief Complaint   Patient presents with    Fall     Left hip pain         Date of Surgery: 5/13/2025    Procedure Performed:    1. L3-4 and L4-5 laminectomy and bilateral facetectomy for drainage of epidural abscess and debridement of septic facets.  2. L3-4 and L4-5 midline lumbar interbody fusion using Medtronic Solera cortical screws and Catalyft interbody cages filled with [place] and i-FACTOR DBM.      Interval Update:    Patient seen and examined.  He is now POD#1.  He actually reports improvement in his back pain.  He is using his PCA.  He denies any new numbness, weakness or radicular pain      HPI:     The patient is a 64 y.o. male who presented to the [facility] ED with complaints of weakness, hip pain and multiple recent falls.  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  He has also been treated for a stroke within the past month.  He has continued with complaints of dizziness, weakness and multiple falls.  In the ED he was found to be tachycardic, tachypneic, with an elevated WBC count, lactic acid and procaclitonin.  He was admitted with possible sepsis and blood cultures have grown strep.  He has complained of back pain and hip pain since admission.  He does have a history of chronic back pain and was recently evaluated for surgery in [place] and he was told he was not a candidate for surgery because his Hgb A1c was 9.4%.  He does see Dr. Lloyd in pain management and receives injections.       He underwent an MRI as part of his infectious workup and this revealed evidence of widespread infection in the lumbar spine with a likely epidural abscess.     He was recently started on Eliquis due to his history of stroke.      Objective:    /61   Pulse (!) 101   Temp 97.7 °F (36.5 °C) (Temporal)

## 2025-05-14 NOTE — PROGRESS NOTES
DIS Nurse Note  SUBJECTIVE: Patient assessed secondary to elevated Deterioration Index Score.      Deterioration Index Score:  Predictive Model Details          58 (Warning)  Factor Value    Calculated 5/14/2025 14:37 19% Supplemental oxygen Nasal cannula    Deterioration Index Model 18% Age 64 years old     17% Pulse 132     12% Systolic 91     7% WBC count abnormal (30.2 K/uL)     7% Cardiac rhythm Sinus tachy     6% Hematocrit abnormal (31.0 %)     6% Potassium 4.6 mmol/L     4% Respiratory rate 18     3% Sodium abnormal (134 mmol/L)     2% Pulse oximetry 93 %     1% Platelet count abnormal (599 K/uL)     0% Temperature 98.1 °F (36.7 °C)        Vital Signs:  Vitals:    05/14/25 1015 05/14/25 1043 05/14/25 1227 05/14/25 1246   BP: 91/66  91/66    Pulse:   (!) 132    Resp:       Temp:       TempSrc:       SpO2:  93% 93%    Weight:       Height:    1.803 m (5' 10.98\")        Provider Notified: Reviewed patient status  & DIS score with Provider Yuki Rubio     ASSESSMENT:   PT pod 1 neurosurgery, VSS with PCA pump and Hemovac in place. Getea d/c'd pt up to chair    Plan of Care: Continue plan of care, NS bolus and continuous fluids ordered.     Electronically signed by Karen Mast RN

## 2025-05-14 NOTE — PROGRESS NOTES
4 Eyes Skin Assessment     NAME:  Rodney Garcia  YOB: 1960  MEDICAL RECORD NUMBER:  [id number]    The patient is being assessed for  Transfer to New Unit    I agree that at least one RN has performed a thorough Head to Toe Skin Assessment on the patient. ALL assessment sites listed below have been assessed.      Areas assessed by both nurses:    Head, Face, Ears, Shoulders, Back, Chest, Arms, Elbows, Hands, Sacrum. Buttock, Coccyx, Ischium, Legs. Feet and Heels, and Under Medical Devices         Does the Patient have a Wound? No noted wound(s)       Lorenzo Prevention initiated by RN: Yes  Wound Care Orders initiated by RN: No    Pressure Injury (Stage 3,4, Unstageable, DTI, NWPT, and Complex wounds) if present, place Wound referral order by RN under : No    New Ostomies, if present place, Ostomy referral order under : No     Nurse 1 eSignature: Electronically signed by Bowen Guallpa RN on 5/13/25 at 7:28 PM CDT    **SHARE this note so that the co-signing nurse can place an eSignature**    Nurse 2 eSignature: Electronically signed by Deonna Raymond RN on 5/13/25 at 7:29 PM CDT

## 2025-05-14 NOTE — PROGRESS NOTES
Rodney Garcia arrived to room # 420.   Presented with: bacteremia  Mental Status: Patient is oriented, alert, coherent, logical, thought processes intact, and able to concentrate and follow conversation.   Vitals:    05/13/25 1919   BP: 105/69   Pulse: (!) 102   Resp:    Temp:    SpO2: 91%     Patient safety contract and falls prevention contract reviewed with patient Yes.  Oriented Patient to room.  Call light within reach. Yes.  Needs, issues or concerns expressed at this time: no.      Electronically signed by Bowen Guallpa RN on 5/13/2025 at 7:25 PM

## 2025-05-14 NOTE — PROGRESS NOTES
bottle  No further workup  Refer to (blood culture collected 5/5/25@9446) for sensitivity results    Bottle volume = 8 ml   Bottle volume = 7 ml  *  Isolated from Aerobic and Anaerobic bottle       No results for input(s): \"BC\", \"BLOODCULT2\", \"ORG\" in the last 72 hours.      Assessment/Plan:   Bacteremia  Abscess in epidural space of lumbar spine  Spinal epidural abscess  -ID following  -BC on admission grew strep intermedius  -Rocephin transitioned to Ampicillin on 5/12  -Repeat cx as of 5/10 show NGTD  -VILMA showed no vegetations  -L-spine MRI showing multiple epidural and paraspinal abscesses  -Neurosurgery following  -S/p L3/4 and 4/5 MIDLIF on 5/13  -Follow surgical cultures  -Pain control--Dilaudid PCA pump  -PT/OT  -Questionable liver lesion/abscess, consider MRI abdomen    Sinus tachycardia  -No c/o CP or SOB  -Continue tele  -Bolus & maintenance IVF  -Consider further workup if worsening    Ambulatory dysfunction  -PT/OT    Diabetes ([facility])  -A1c 9.3  -Medium dose SSI  -Accuchecks AC/HS  -Hypoglycemia protocol    CVA (cerebral vascular accident) ([facility])  PFO  -Neurology consulted  -MRI concerning for microhemorrhages  -Holding a/c currently  -Repeat MRI in 1-2 weeks  -Neuro checks    Constipation  -Daily MiraLAX, Senokot  -Added Movantik on 5/14  -No abd pain, n/v currently  -Encourage ambulation as tolerated    Antibiotic: Ampicillin     DVT Prophylaxis: SCDs     Discharge planning: TBD     Advance Directive: Full Code    Diet: ADULT DIET; Regular; 3 carb choices (45 gm/meal)     Consults Made:   IP CONSULT TO INFECTIOUS DISEASES  IP CONSULT TO ORTHOPEDIC SURGERY  IP CONSULT TO CARDIOLOGY  IP CONSULT TO NEUROLOGY  IP CONSULT TO NEUROSURGERY  IP CONSULT TO REHAB/TCU ADMISSION COORDINATOR    Further orders per clinical course/attending physician.    EMR Dragon/Transcription disclaimer:   Much of this encounter note is an electronic transcription/translation of spoken language to printed text. The electronic  translation of spoken language may permit erroneous words or phrases to be inadvertently transcribed; although attempts have made to review the note for such errors, some may still exist.

## 2025-05-15 ENCOUNTER — APPOINTMENT (OUTPATIENT)
Dept: GENERAL RADIOLOGY | Age: 65
DRG: 853 | End: 2025-05-15
Payer: COMMERCIAL

## 2025-05-15 ENCOUNTER — OUTSIDE FACILITY SERVICE (OUTPATIENT)
Age: 65
End: 2025-05-15
Payer: COMMERCIAL

## 2025-05-15 LAB
ALBUMIN SERPL-MCNC: 2.3 G/DL (ref 3.5–5.2)
ALP SERPL-CCNC: 78 U/L (ref 40–129)
ALT SERPL-CCNC: 25 U/L (ref 10–50)
ANION GAP SERPL CALCULATED.3IONS-SCNC: 10 MMOL/L (ref 8–16)
AST SERPL-CCNC: 24 U/L (ref 10–50)
BACTERIA BLD CULT: NORMAL
BASOPHILS # BLD: 0.1 K/UL (ref 0–0.2)
BASOPHILS NFR BLD: 0.4 % (ref 0–1)
BILIRUB SERPL-MCNC: 0.3 MG/DL (ref 0.2–1.2)
BLOOD BANK DISPENSE STATUS: NORMAL
BLOOD BANK PRODUCT CODE: NORMAL
BPU ID: NORMAL
BUN SERPL-MCNC: 18 MG/DL (ref 8–23)
CALCIUM SERPL-MCNC: 7.6 MG/DL (ref 8.8–10.2)
CHLORIDE SERPL-SCNC: 98 MMOL/L (ref 98–107)
CO2 SERPL-SCNC: 26 MMOL/L (ref 22–29)
CREAT SERPL-MCNC: 0.5 MG/DL (ref 0.7–1.2)
DESCRIPTION BLOOD BANK: NORMAL
EOSINOPHIL # BLD: 0.3 K/UL (ref 0–0.6)
EOSINOPHIL NFR BLD: 1.4 % (ref 0–5)
ERYTHROCYTE [DISTWIDTH] IN BLOOD BY AUTOMATED COUNT: 14.1 % (ref 11.5–14.5)
GLUCOSE BLD-MCNC: 139 MG/DL (ref 70–99)
GLUCOSE BLD-MCNC: 230 MG/DL (ref 70–99)
GLUCOSE BLD-MCNC: 257 MG/DL (ref 70–99)
GLUCOSE BLD-MCNC: 264 MG/DL (ref 70–99)
GLUCOSE SERPL-MCNC: 160 MG/DL (ref 70–99)
HCT VFR BLD AUTO: 21.9 % (ref 42–52)
HCT VFR BLD AUTO: 23.4 % (ref 42–52)
HCT VFR BLD AUTO: 27.5 % (ref 42–52)
HCT VFR BLD AUTO: 28.3 % (ref 42–52)
HGB BLD-MCNC: 6.8 G/DL (ref 14–18)
HGB BLD-MCNC: 7.2 G/DL (ref 14–18)
HGB BLD-MCNC: 8.8 G/DL (ref 14–18)
HGB BLD-MCNC: 9 G/DL (ref 14–18)
IMM GRANULOCYTES # BLD: 2 K/UL
LYMPHOCYTES # BLD: 2.3 K/UL (ref 1.1–4.5)
LYMPHOCYTES NFR BLD: 10.9 % (ref 20–40)
MCH RBC QN AUTO: 29.6 PG (ref 27–31)
MCHC RBC AUTO-ENTMCNC: 30.8 G/DL (ref 33–37)
MCV RBC AUTO: 96.3 FL (ref 80–94)
MONOCYTES # BLD: 1.1 K/UL (ref 0–0.9)
MONOCYTES NFR BLD: 5.3 % (ref 0–10)
NEUTROPHILS # BLD: 15.2 K/UL (ref 1.5–7.5)
NEUTS SEG NFR BLD: 72.6 % (ref 50–65)
PERFORMED ON: ABNORMAL
PLATELET # BLD AUTO: 498 K/UL (ref 130–400)
PMV BLD AUTO: 9.2 FL (ref 9.4–12.4)
POTASSIUM SERPL-SCNC: 3.7 MMOL/L (ref 3.5–5)
PROT SERPL-MCNC: 4.6 G/DL (ref 6.4–8.3)
RBC # BLD AUTO: 2.43 M/UL (ref 4.7–6.1)
SODIUM SERPL-SCNC: 134 MMOL/L (ref 136–145)
WBC # BLD AUTO: 20.9 K/UL (ref 4.8–10.8)

## 2025-05-15 PROCEDURE — 85018 HEMOGLOBIN: CPT

## 2025-05-15 PROCEDURE — 82962 GLUCOSE BLOOD TEST: CPT

## 2025-05-15 PROCEDURE — 94761 N-INVAS EAR/PLS OXIMETRY MLT: CPT

## 2025-05-15 PROCEDURE — 85014 HEMATOCRIT: CPT

## 2025-05-15 PROCEDURE — 36430 TRANSFUSION BLD/BLD COMPNT: CPT

## 2025-05-15 PROCEDURE — 2580000003 HC RX 258: Performed by: NEUROLOGICAL SURGERY

## 2025-05-15 PROCEDURE — 6370000000 HC RX 637 (ALT 250 FOR IP): Performed by: NEUROLOGICAL SURGERY

## 2025-05-15 PROCEDURE — 36415 COLL VENOUS BLD VENIPUNCTURE: CPT

## 2025-05-15 PROCEDURE — 2700000000 HC OXYGEN THERAPY PER DAY

## 2025-05-15 PROCEDURE — 97535 SELF CARE MNGMENT TRAINING: CPT

## 2025-05-15 PROCEDURE — 02HV33Z INSERTION OF INFUSION DEVICE INTO SUPERIOR VENA CAVA, PERCUTANEOUS APPROACH: ICD-10-PCS

## 2025-05-15 PROCEDURE — 99233 SBSQ HOSP IP/OBS HIGH 50: CPT | Performed by: PSYCHIATRY & NEUROLOGY

## 2025-05-15 PROCEDURE — 80053 COMPREHEN METABOLIC PANEL: CPT

## 2025-05-15 PROCEDURE — 85025 COMPLETE CBC W/AUTO DIFF WBC: CPT

## 2025-05-15 PROCEDURE — 2500000003 HC RX 250 WO HCPCS: Performed by: NEUROLOGICAL SURGERY

## 2025-05-15 PROCEDURE — 99024 POSTOP FOLLOW-UP VISIT: CPT | Performed by: NEUROLOGICAL SURGERY

## 2025-05-15 PROCEDURE — 97530 THERAPEUTIC ACTIVITIES: CPT

## 2025-05-15 PROCEDURE — 6370000000 HC RX 637 (ALT 250 FOR IP): Performed by: HOSPITALIST

## 2025-05-15 PROCEDURE — 6360000002 HC RX W HCPCS: Performed by: NEUROLOGICAL SURGERY

## 2025-05-15 PROCEDURE — 1200000000 HC SEMI PRIVATE

## 2025-05-15 PROCEDURE — 6370000000 HC RX 637 (ALT 250 FOR IP)

## 2025-05-15 PROCEDURE — 74018 RADEX ABDOMEN 1 VIEW: CPT

## 2025-05-15 RX ORDER — OXYCODONE AND ACETAMINOPHEN 10; 325 MG/1; MG/1
2 TABLET ORAL EVERY 4 HOURS PRN
Refills: 0 | Status: DISCONTINUED | OUTPATIENT
Start: 2025-05-15 | End: 2025-05-19 | Stop reason: HOSPADM

## 2025-05-15 RX ORDER — LACTULOSE 10 G/15ML
20 SOLUTION ORAL 3 TIMES DAILY
Status: DISCONTINUED | OUTPATIENT
Start: 2025-05-15 | End: 2025-05-15

## 2025-05-15 RX ORDER — BISACODYL 10 MG
10 SUPPOSITORY, RECTAL RECTAL DAILY
Status: DISCONTINUED | OUTPATIENT
Start: 2025-05-15 | End: 2025-05-19 | Stop reason: HOSPADM

## 2025-05-15 RX ORDER — HYDROMORPHONE HYDROCHLORIDE 1 MG/ML
0.5 INJECTION, SOLUTION INTRAMUSCULAR; INTRAVENOUS; SUBCUTANEOUS
Refills: 0 | Status: DISCONTINUED | OUTPATIENT
Start: 2025-05-15 | End: 2025-05-19 | Stop reason: HOSPADM

## 2025-05-15 RX ORDER — LACTULOSE 10 G/15ML
20 SOLUTION ORAL 2 TIMES DAILY
Status: COMPLETED | OUTPATIENT
Start: 2025-05-15 | End: 2025-05-15

## 2025-05-15 RX ORDER — SODIUM CHLORIDE 9 MG/ML
INJECTION, SOLUTION INTRAVENOUS PRN
Status: DISCONTINUED | OUTPATIENT
Start: 2025-05-15 | End: 2025-05-16

## 2025-05-15 RX ORDER — OXYCODONE AND ACETAMINOPHEN 10; 325 MG/1; MG/1
1 TABLET ORAL EVERY 4 HOURS PRN
Refills: 0 | Status: DISCONTINUED | OUTPATIENT
Start: 2025-05-15 | End: 2025-05-19 | Stop reason: HOSPADM

## 2025-05-15 RX ORDER — HYDROMORPHONE HYDROCHLORIDE 1 MG/ML
0.25 INJECTION, SOLUTION INTRAMUSCULAR; INTRAVENOUS; SUBCUTANEOUS
Refills: 0 | Status: DISCONTINUED | OUTPATIENT
Start: 2025-05-15 | End: 2025-05-19 | Stop reason: HOSPADM

## 2025-05-15 RX ADMIN — INSULIN LISPRO 4 UNITS: 100 INJECTION, SOLUTION INTRAVENOUS; SUBCUTANEOUS at 17:22

## 2025-05-15 RX ADMIN — AMPICILLIN SODIUM 2000 MG: 2 INJECTION, POWDER, FOR SOLUTION INTRAMUSCULAR; INTRAVENOUS at 21:08

## 2025-05-15 RX ADMIN — AMPICILLIN SODIUM 2000 MG: 2 INJECTION, POWDER, FOR SOLUTION INTRAMUSCULAR; INTRAVENOUS at 05:51

## 2025-05-15 RX ADMIN — INSULIN LISPRO 8 UNITS: 100 INJECTION, SOLUTION INTRAVENOUS; SUBCUTANEOUS at 21:07

## 2025-05-15 RX ADMIN — AMPICILLIN SODIUM 2000 MG: 2 INJECTION, POWDER, FOR SOLUTION INTRAMUSCULAR; INTRAVENOUS at 14:56

## 2025-05-15 RX ADMIN — AMPICILLIN SODIUM 2000 MG: 2 INJECTION, POWDER, FOR SOLUTION INTRAMUSCULAR; INTRAVENOUS at 01:42

## 2025-05-15 RX ADMIN — OXYCODONE AND ACETAMINOPHEN 2 TABLET: 10; 325 TABLET ORAL at 14:56

## 2025-05-15 RX ADMIN — Medication 25 MG: at 08:43

## 2025-05-15 RX ADMIN — INSULIN GLARGINE 17 UNITS: 100 INJECTION, SOLUTION SUBCUTANEOUS at 21:07

## 2025-05-15 RX ADMIN — AMPICILLIN SODIUM 2000 MG: 2 INJECTION, POWDER, FOR SOLUTION INTRAMUSCULAR; INTRAVENOUS at 10:18

## 2025-05-15 RX ADMIN — SODIUM CHLORIDE, PRESERVATIVE FREE 10 ML: 5 INJECTION INTRAVENOUS at 21:10

## 2025-05-15 RX ADMIN — POLYETHYLENE GLYCOL 3350 17 G: 17 POWDER, FOR SOLUTION ORAL at 08:44

## 2025-05-15 RX ADMIN — PRAVASTATIN SODIUM 10 MG: 20 TABLET ORAL at 08:43

## 2025-05-15 RX ADMIN — LACTULOSE 20 G: 20 SOLUTION ORAL at 09:54

## 2025-05-15 RX ADMIN — AMPICILLIN SODIUM 2000 MG: 2 INJECTION, POWDER, FOR SOLUTION INTRAMUSCULAR; INTRAVENOUS at 17:27

## 2025-05-15 RX ADMIN — SENNOSIDES AND DOCUSATE SODIUM 2 TABLET: 50; 8.6 TABLET ORAL at 08:43

## 2025-05-15 RX ADMIN — BISACODYL 10 MG: 10 SUPPOSITORY RECTAL at 09:54

## 2025-05-15 RX ADMIN — INSULIN LISPRO 8 UNITS: 100 INJECTION, SOLUTION INTRAVENOUS; SUBCUTANEOUS at 12:22

## 2025-05-15 RX ADMIN — INSULIN GLARGINE 17 UNITS: 100 INJECTION, SOLUTION SUBCUTANEOUS at 08:43

## 2025-05-15 RX ADMIN — LACTULOSE 20 G: 20 SOLUTION ORAL at 21:07

## 2025-05-15 RX ADMIN — OXYCODONE AND ACETAMINOPHEN 1 TABLET: 10; 325 TABLET ORAL at 10:12

## 2025-05-15 ASSESSMENT — PAIN SCALES - GENERAL
PAINLEVEL_OUTOF10: 2
PAINLEVEL_OUTOF10: 3
PAINLEVEL_OUTOF10: 5
PAINLEVEL_OUTOF10: 2
PAINLEVEL_OUTOF10: 8
PAINLEVEL_OUTOF10: 4
PAINLEVEL_OUTOF10: 5

## 2025-05-15 ASSESSMENT — PAIN DESCRIPTION - PAIN TYPE
TYPE: ACUTE PAIN
TYPE: ACUTE PAIN

## 2025-05-15 ASSESSMENT — PAIN DESCRIPTION - DESCRIPTORS
DESCRIPTORS: ACHING;DISCOMFORT
DESCRIPTORS: ACHING;THROBBING
DESCRIPTORS: ACHING;THROBBING

## 2025-05-15 ASSESSMENT — PAIN DESCRIPTION - ORIENTATION
ORIENTATION: LOWER
ORIENTATION: MID;LOWER
ORIENTATION: MID;LOWER

## 2025-05-15 ASSESSMENT — PAIN DESCRIPTION - FREQUENCY
FREQUENCY: CONTINUOUS
FREQUENCY: CONTINUOUS

## 2025-05-15 ASSESSMENT — PAIN DESCRIPTION - LOCATION
LOCATION: BACK

## 2025-05-15 ASSESSMENT — PAIN DESCRIPTION - ONSET
ONSET: ON-GOING
ONSET: ON-GOING

## 2025-05-15 ASSESSMENT — PAIN SCALES - WONG BAKER: WONGBAKER_NUMERICALRESPONSE: HURTS A LITTLE BIT

## 2025-05-15 NOTE — PROGRESS NOTES
Occupational Therapy  Pt receiving blood transfusion this am. Will continue to follow as able. Electronically signed by VINICIO Orosco on 5/15/2025 at 12:30 PM

## 2025-05-15 NOTE — CONSENT
Informed Consent for Blood Component Transfusion Note    I have discussed with the patient the rationale for blood component transfusion; its benefits in treating or preventing fatigue, organ damage, or death; and its risk which includes mild transfusion reactions, rare risk of blood borne infection, or more serious but rare reactions. I have discussed the alternatives to transfusion, including the risk and consequences of not receiving transfusion. The patient had an opportunity to ask questions and had agreed to proceed with transfusion of blood components.    Electronically signed by PEPITO Gomez CNP on 5/15/25 at 9:55 AM KELSY

## 2025-05-15 NOTE — PROGRESS NOTES
[facility]    Progress Note    Patient:  Rodney Garcia  YOB: 1960  Date of Service: 5/15/2025  MRN: [id number]   Acct: [id number]   Primary Care Physician: Tu Puente MD  Advance Directive: Full Code  Admit Date: 5/5/2025       Hospital Day: 9    Portions of this note have been copied forward, however, updated to reflect the most current clinical status of this patient.     CHIEF COMPLAINT: generalized weakness    SUBJECTIVE: Pain under control, still withdrawn.     CUMULATIVE HOSPITAL COURSE:     This patient is a 64-year-old male with hypertension, CVA, PFO and hyperlipidemia who presented to [facility] ED on 5/5 for evaluation of generalized weakness and difficulty with ambulation.  Of note, patient was recently discharged from [facility] after being treated for strep intermedius bacteremia with Zyvox and acute CVA with MRI showing acute infarcts in left MCA distribution and subacute infarcts in bilateral cerebral hemispheres, appearing somewhat embolic, bubble study nondiagnostic at that time.  He was discharged on plavix and switched to aspirin alone by neurology on 5/2. He presented to his PCP on 5/5 for evaluation of left hip pain, had an u/s the same day which showed edematous tissue without drainable fluid collection. Later that day, patient experienced a fall due to loss of balance thus presenting here for evaluation. Per family, patient has had multiple falls without LOC since [place] [facility]      In ED, CT of left hip showed soft tissue swelling and multifocal osteoarthritis. Xray hips showed severe bilateral hip osteoarthritic change, possible femoral acetabular impingement, no acute fractures. CTA showed no PE, bibasilar atelectasis, and low density liver lesion measuring 1.3 cm. Lab work indicated procalcitonin 0.58, , WBC 14.9, and lactic 2.0.  Respiratory panel was positive for human rhinovirus.  He was initiated on broad-spectrum antibiotics and  admitted to hospitalist for further evaluation and management.     Ultrasound L hip confirmed cellulitis without visible abscess. Blood cultures were positive for Streptococcus intermedius. ID was consulted and cefepime was transitioned to Rocephin. Per ID, unclear source of infection, doubt cellulitis, and recommended VILMA and MRI L-spine.  Cardiology consulted and s/p VILMA on 5/8 with no vegetations noted.  Recommending OP follow-up with structural cardiology for consideration of PFO closure, as well as anticoagulation with Eliquis moving forward.  He received 1 dose of Eliquis on 5/9.  Neurology consulted due to recent CVA--MRI brain indicates interval development of two 4 mm T2 hyperintense lesions in bilateral frontal lobes, could represent tiny intraparenchymal hematomas, numerous punctate artifacts scattered in bilateral cerebral hemispheres and cerebellum which could also indicate tiny intraparenchymal hematomas versus remote microhemorrhage.  Anticoagulation was discontinued. MRI L-spine obtained on 5/8 and indicates multiple enhancing epidural abscesses causing severe SCS, multiple intramuscular abscesses in bilateral paraspinous muscles and inferior left psoas, moderate effusion at L4-L5 cannot rule out septic joint. Per ID, rocephin transitioned to Ampicillin on 5/12.  Recommendations for long-term antibiotics placed by ID.  His repeat blood cultures as of 5/10 show NGTD. He is s/p L3/4 and L 4/5 midline lumbar interbody fusion (MIDLIF) on 5/13 per Dr. Rivera. Surgical cultures and fluid cultures sent and pending culture, currently show no growth to date.  He used a Dilaudid PCA pump briefly after surgery.  He has ongoing constipation despite MiraLAX, Senokot, and Movantik.  Added to low-dose and Dulcolax suppository today.  Will attempt enema if needed this afternoon.  Postoperatively on 5/14, he was tachycardic with no complaints of CP or SOB.  He was given fluid bolus and maintenance IVF with some

## 2025-05-15 NOTE — PROGRESS NOTES
Dilaudid gtt wasted with Alfnoso Schwarz RN. 11 mL wasted per facility protocol.     Electronically signed by SAVANNAH HUGO RN on 5/15/2025 at 10:11 AM

## 2025-05-15 NOTE — PROGRESS NOTES
[facility] Progress Note      Patient:   Rodney Garcia  MR#:    [id number]   Room:    [facility]-   YOB: 1960  Date of Progress Note: 5/15/2025  Time of Note                           12:54 PM  Consulting Physician:  Leif Reyna DO  Attending Physician:  Nando Albert MD      INTERVAL HISTORY:  No acute events, no new focal complaints, doing well post-op, no worsening LE weakness, remains largely unchanged overnight.     REVIEW OF SYSTEMS:  Constitutional: No fevers No chills  Neck:No stiffness  Respiratory: No shortness of breath  Cardiovascular: No chest pain No palpitations  Gastrointestinal: No abdominal pain    Genitourinary: No Dysuria  Neurological: No headache, no confusion    PHYSICAL EXAM:    Constitutional -   /68   Pulse 95   Temp 98.9 °F (37.2 °C) (Temporal)   Resp 16   Ht 1.803 m (5' 10.98\")   Wt 94.9 kg (209 lb 3.5 oz)   SpO2 96%   BMI 29.19 kg/m²   General appearance: No acute distress   EYES -   Conjunctiva normal  Pupillary exam as below, see CN exam in the neurologic exam  ENT-    No scars, masses, or lesions over external nose or ears  Oropharynx without erythema, palate midline  Cardiovascular -   No clubbing, cyanosis, or edema   Pulmonary-   Good expansion, normal effort without use of accessory muscles  Musculoskeletal -   No significant wasting of muscles noted  Gait as below, see gait exam in the neurologic exam  Muscle strength, tone, stability as below see the motor exam in the neurologic exam.   No bony deformities  Skin -   Warm, dry, and intact to inspection and palpation.    No rash, erythema, or pallor  Psychiatric -   Mood, affect, and behavior appear normal    Memory as below see mental status examination in the neurologic exam        NEUROLOGICAL EXAM     Mental status    [x] Awake, alert, oriented   [x] Affect attention and concentration appear appropriate  [x] Recent and remote memory appears unremarkable  [x] Speech normal without

## 2025-05-15 NOTE — PLAN OF CARE
Problem: Pain  Goal: Verbalizes/displays adequate comfort level or baseline comfort level  5/15/2025 1631 by Kira Fernandes RN  Outcome: Progressing  5/15/2025 0505 by Bowen Guallpa RN  Outcome: Progressing  5/15/2025 0505 by Bowen Guallpa RN  Outcome: Not Progressing     Problem: Safety - Adult  Goal: Free from fall injury  5/15/2025 1631 by Kira Fernandes RN  Outcome: Progressing  5/15/2025 0505 by Bowen Guallpa RN  Outcome: Progressing  5/15/2025 0505 by Bowen Guallpa RN  Outcome: Not Progressing     Problem: ABCDS Injury Assessment  Goal: Absence of physical injury  5/15/2025 1631 by Kira Fernandes RN  Outcome: Progressing  5/15/2025 0505 by Bowen Guallpa RN  Outcome: Progressing  5/15/2025 0505 by Bowen Guallpa RN  Outcome: Not Progressing     Problem: Chronic Conditions and Co-morbidities  Goal: Patient's chronic conditions and co-morbidity symptoms are monitored and maintained or improved  5/15/2025 1631 by Kira Fernandes RN  Outcome: Progressing  5/15/2025 0505 by Bowen Guallpa RN  Outcome: Progressing  5/15/2025 0505 by Bowen Guallpa RN  Outcome: Not Progressing  Flowsheets (Taken 5/14/2025 2027)  Care Plan - Patient's Chronic Conditions and Co-Morbidity Symptoms are Monitored and Maintained or Improved:   Monitor and assess patient's chronic conditions and comorbid symptoms for stability, deterioration, or improvement   Collaborate with multidisciplinary team to address chronic and comorbid conditions and prevent exacerbation or deterioration   Update acute care plan with appropriate goals if chronic or comorbid symptoms are exacerbated and prevent overall improvement and discharge     Problem: Skin/Tissue Integrity  Goal: Skin integrity remains intact  Description: 1.  Monitor for areas of redness and/or skin breakdown2.  Assess vascular access sites hourly3.  Every 4-6 hours minimum:  Change oxygen saturation probe site4.  Every 4-6  LUCIANA Malone  Outcome: Progressing  5/15/2025 0505 by Bowen Guallpa RN  Outcome: Progressing  5/15/2025 0505 by Bowen Guallpa RN  Outcome: Not Progressing     Problem: ABCDS Injury Assessment  Goal: Absence of physical injury  5/15/2025 1631 by Kira Fernandes RN  Outcome: Progressing  5/15/2025 0505 by Bowen Guallpa RN  Outcome: Progressing  5/15/2025 0505 by Bowen Guallpa RN  Outcome: Not Progressing     Problem: Chronic Conditions and Co-morbidities  Goal: Patient's chronic conditions and co-morbidity symptoms are monitored and maintained or improved  5/15/2025 1631 by Kira Fernandes RN  Outcome: Progressing  5/15/2025 0505 by Bowen Guallpa RN  Outcome: Progressing  5/15/2025 0505 by Bowen Guallpa RN  Outcome: Not Progressing  Flowsheets (Taken 5/14/2025 2027)  Care Plan - Patient's Chronic Conditions and Co-Morbidity Symptoms are Monitored and Maintained or Improved:   Monitor and assess patient's chronic conditions and comorbid symptoms for stability, deterioration, or improvement   Collaborate with multidisciplinary team to address chronic and comorbid conditions and prevent exacerbation or deterioration   Update acute care plan with appropriate goals if chronic or comorbid symptoms are exacerbated and prevent overall improvement and discharge     Problem: Skin/Tissue Integrity  Goal: Skin integrity remains intact  Description: 1.  Monitor for areas of redness and/or skin breakdown2.  Assess vascular access sites hourly3.  Every 4-6 hours minimum:  Change oxygen saturation probe site4.  Every 4-6 hours:  If on nasal continuous positive airway pressure, respiratory therapy assess nares and determine need for appliance change or resting period  5/15/2025 1631 by Kira Fernandes RN  Outcome: Progressing  Flowsheets (Taken 5/15/2025 0850)  Skin Integrity Remains Intact: Monitor for areas of redness and/or skin breakdown  5/15/2025 0505 by Bowen Guallpa

## 2025-05-15 NOTE — PROGRESS NOTES
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  He had surgery yesterday.  Communicated with Dr. Rivera.  A lot of purulent material identified.  He underwent irrigation and debridement.  Operative note reviewed.  Copy of the intraoperative procedure outlined below.  He reports no lower extremity numbness postoperatively.  He has no pain at his peripheral IV site.  He is tolerating ampicillin without diarrhea or rash.  He indicates he does not think he will be able to get rehab in [place] as he lives in [place].  Making favorable progress postsurgery.  Talked with him about PICC line placement.  Explained we will plan an extended course-likely 8-week course of IV antibiotic therapy.  He is agreeable.    \"Procedure(s):   1) L3/4 and L4/5 laminectomy and bilateral facetectomy for drainage of epidural abscess and debridement of septic facets  2) L3/4 and L4/5 MIDLIF using Medtronic Solera cortical screws and Catalyft interbody cages filled with Shwetha and iFactor DBM  3) L3/4 and L4/5 posterolateral arthrodesis using [place] and iFactor DBM and Magnafuse  4) O-arm and stealth spinal navigation  5) Intraoperative fluoroscopy with interpretation  6) Intraoperative neuromonitoring with SSEP and bilateral lower extremity EMG\"     In/Out    Intake/Output Summary (Last 24 hours) at 5/14/2025 2036  Last data filed at 5/14/2025 2019  Gross per 24 hour   Intake 4.8 ml   Output 2475 ml   Net -2470.2 ml     Allergies: No Known Allergies  Current Meds: naloxegol (MOVANTIK) tablet 25 mg, QAM AC  0.9 % sodium chloride infusion, Continuous  insulin lispro (HUMALOG,ADMELOG)

## 2025-05-15 NOTE — PROGRESS NOTES
Dilaudid gtt wasted with Kira Fernandes RN. 11 mL wasted per facility protocol.      Electronically signed by Ariella Schwarz RN on 5/15/2025 at 10:12 AM

## 2025-05-15 NOTE — PROGRESS NOTES
NEUROSURGERY PROGRESS NOTE      Chief Complaint:   Chief Complaint   Patient presents with    Fall     Left hip pain         Date of Surgery: 5/13/2025    Procedure Performed:    1. L3-4 and L4-5 laminectomy and bilateral facetectomy for drainage of epidural abscess and debridement of septic facets.  2. L3-4 and L4-5 midline lumbar interbody fusion using Medtronic Solera cortical screws and Catalyft interbody cages filled with Shwetha and i-FACTOR DBM.      Interval Update:    Patient seen and examined.  He is now POD#2.  He reports the expected postoperative soreness but is otherwise doing well.  He got out of bed to the chair yesterday.  He denies radicular pain, numbness or weakness.       HPI:     The patient is a 64 y.o. male who presented to the [facility] ED with complaints of weakness, hip pain and multiple recent falls.  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  He has also been treated for a stroke within the past month.  He has continued with complaints of dizziness, weakness and multiple falls.  In the ED he was found to be tachycardic, tachypneic, with an elevated WBC count, lactic acid and procaclitonin.  He was admitted with possible sepsis and blood cultures have grown strep.  He has complained of back pain and hip pain since admission.  He does have a history of chronic back pain and was recently evaluated for surgery in [place] and he was told he was not a candidate for surgery because his Hgb A1c was 9.4%.  He does see Dr. Lloyd in pain management and receives injections.       He underwent an MRI as part of his infectious workup and this revealed evidence of widespread infection in the lumbar spine with a likely epidural abscess.     He was recently started on Eliquis due to his history of stroke.      Objective:    BP 98/68   Pulse (!) 101

## 2025-05-15 NOTE — PROGRESS NOTES
Physical Therapy     05/15/25 1300   Subjective   Subjective attempted AM tx; pt currently getting blood transfusion and requested for therapy to come back after lunch.     Electronically signed by Glen Irene PTA on 5/15/2025 at 1:23 PM

## 2025-05-15 NOTE — PROGRESS NOTES
Occupational Therapy     05/15/25 1500   Subjective   Subjective Pt in bed upon arrival for therapy. Pt states \"I need to use the bathroom\". Agreeable to get up to recliner.   Pain Assessment   Pain Assessment 0-10   Pain Level 5   Pain Location Back   Pain Orientation Mid;Lower   Pain Descriptors Aching;Discomfort   Functional Pain Assessment Prevents or interferes some active activities and ADLs   Pain Type Acute pain   Pain Frequency Continuous   Pain Onset On-going   Non-Pharmaceutical Pain Intervention(s) Ambulation/Increased Activity;Repositioned;Rest   Response to Pain Intervention Patient satisfied   Cognition   Overall Cognitive Status WFL   Orientation   Overall Orientation Status WNL   Bed Mobility Training   Bed Mobility Training Yes   Overall Level of Assistance Partial/Moderate assistance   Interventions Verbal cues;Tactile cues;Manual cues   Supine to Sit Partial/Moderate assistance   Sit to Supine Partial/Moderate assistance   Scooting Minimal assistance;Partial/Moderate assistance   Transfer Training   Transfer Training Yes   Overall Level of Assistance Partial/Moderate assistance;2 Person assistance  (Nathalie Lee)   Interventions Verbal cues;Tactile cues;Manual cues   Toilet Transfer Partial/Moderate assistance;2 Person assistance  (Nathalie Lee to [facility])   Balance   Sitting Intact   Standing Impaired   Standing - Static Poor   Standing - Dynamic Poor   ADL   Feeding Setup;Supervision   Grooming Setup;Stand by assistance   UE Bathing Minimal assistance   LE Bathing Maximum assistance;Moderate assistance   UE Dressing Minimal assistance   LE Dressing Moderate assistance;Maximum assistance   Putting On/Taking Off Footwear Dependent/Total   Toileting Moderate assistance;Maximum assistance   Functional Mobility Moderate assistance   Functional Mobility Skilled Clinical Factors Nathalie Lee   Assessment   Assessment Tx focused on bed mobility training with back precautions in mind, Nathalie Lee transfer to [facility]

## 2025-05-15 NOTE — PLAN OF CARE
Bowen Rosas RN  Outcome: Not Progressing  Flowsheets (Taken 5/14/2025 2027)  Skin Integrity Remains Intact:   Monitor for areas of redness and/or skin breakdown   Assess vascular access sites hourly   Turn and reposition as indicated   Assess need for specialty bed   Check visual cues for pain     Problem: Discharge Planning  Goal: Discharge to home or other facility with appropriate resources  5/15/2025 0505 by Bowen Guallpa RN  Outcome: Progressing  5/15/2025 0505 by Bowen Guallpa RN  Outcome: Not Progressing  Flowsheets (Taken 5/14/2025 2027)  Discharge to home or other facility with appropriate resources:   Identify barriers to discharge with patient and caregiver   Arrange for needed discharge resources and transportation as appropriate   Identify discharge learning needs (meds, wound care, etc)     Problem: Nutrition Deficit:  Goal: Optimize nutritional status  5/15/2025 0505 by Bowen Guallpa RN  Outcome: Progressing  5/15/2025 0505 by Bowen Gulalpa RN  Outcome: Not Progressing     Problem: Pain  Goal: Verbalizes/displays adequate comfort level or baseline comfort level  5/15/2025 0505 by Bowen Guallpa RN  Outcome: Progressing  5/15/2025 0505 by Bowen Guallpa RN  Outcome: Not Progressing     Problem: Safety - Adult  Goal: Free from fall injury  5/15/2025 0505 by Bowen Guallpa RN  Outcome: Progressing  5/15/2025 0505 by Bowen Guallpa RN  Outcome: Not Progressing     Problem: ABCDS Injury Assessment  Goal: Absence of physical injury  5/15/2025 0505 by Bowen Guallpa RN  Outcome: Progressing  5/15/2025 0505 by Bowen Guallpa RN  Outcome: Not Progressing     Problem: Chronic Conditions and Co-morbidities  Goal: Patient's chronic conditions and co-morbidity symptoms are monitored and maintained or improved  5/15/2025 0505 by Bowen Guallpa RN  Outcome: Progressing  5/15/2025 0505 by Bowen Guallpa RN  Outcome: Not  Progressing  Flowsheets (Taken 5/14/2025 2027)  Care Plan - Patient's Chronic Conditions and Co-Morbidity Symptoms are Monitored and Maintained or Improved:   Monitor and assess patient's chronic conditions and comorbid symptoms for stability, deterioration, or improvement   Collaborate with multidisciplinary team to address chronic and comorbid conditions and prevent exacerbation or deterioration   Update acute care plan with appropriate goals if chronic or comorbid symptoms are exacerbated and prevent overall improvement and discharge     Problem: Skin/Tissue Integrity  Goal: Skin integrity remains intact  Description: 1.  Monitor for areas of redness and/or skin breakdown2.  Assess vascular access sites hourly3.  Every 4-6 hours minimum:  Change oxygen saturation probe site4.  Every 4-6 hours:  If on nasal continuous positive airway pressure, respiratory therapy assess nares and determine need for appliance change or resting period  5/15/2025 0505 by Bowen Guallpa RN  Outcome: Progressing  5/15/2025 0505 by Bowen Guallpa RN  Outcome: Not Progressing  Flowsheets (Taken 5/14/2025 2027)  Skin Integrity Remains Intact:   Monitor for areas of redness and/or skin breakdown   Assess vascular access sites hourly   Turn and reposition as indicated   Assess need for specialty bed   Check visual cues for pain     Problem: Discharge Planning  Goal: Discharge to home or other facility with appropriate resources  5/15/2025 0505 by Bowen Guallpa RN  Outcome: Progressing  5/15/2025 0505 by Bowen Guallpa RN  Outcome: Not Progressing  Flowsheets (Taken 5/14/2025 2027)  Discharge to home or other facility with appropriate resources:   Identify barriers to discharge with patient and caregiver   Arrange for needed discharge resources and transportation as appropriate   Identify discharge learning needs (meds, wound care, etc)     Problem: Nutrition Deficit:  Goal: Optimize nutritional status  5/15/2025

## 2025-05-15 NOTE — CARE COORDINATION
Referral sent to the following,   [facility], pt accepted, precert initiated on 5/15/25   P  [phone] F  Electronically signed by SINTIA Deras on 5/15/2025 at 9:55 AM

## 2025-05-15 NOTE — PROGRESS NOTES
Physical Therapy     05/15/25 1400   Restrictions/Precautions   Restrictions/Precautions Isolation;Fall Risk   Activity Level Up with Assist   Required Braces or Orthoses? Yes   Required Braces or Orthoses   Spinal Lumbar Corset   Spinal Other LSO   Position Activity Restriction   Other Position/Activity Restrictions droplet precautions-rhinovirus   General   Diagnosis sepsis, AMS, generlized weakness, L hip pain, rhinovirus, syncope, L3-L5 spinal epidural abscess, L4-5 septic facet joints, L4-5 spondylolisthesis.   Subjective   Subjective pt agreed to therapy with encouragement   Pain   Post-Pain 5   Pain Location Back   Pain Descriptor Sore   Pain Interventions Nurse notified;Repositioning   Bed mobility   Supine to Sit Minimal assistance;Partial/Moderate assistance   Bed Mobility Comments with HOB elevated and use of L rail   Transfers   Sit to Stand Partial/Moderate assistance   Stand to Sit Partial/Moderate assistance   Comment SS from EOB to BSC. maxA for clean up post bowel movement and then SS from BSC to recliner.   Short Term Goals   Time Frame for Short Term Goals 2 wks   Short Term Goal 1 supine to sit indep   Short Term Goal 2 sit to stand indep   Short Term Goal 3 amb. 100' with RW SBA   Short Term Goal 4 bed to chair SBA   Activity Tolerance   Activity Tolerance Patient limited by pain;Patient limited by fatigue   Assessment   Assessment pt able to tolerate brief periods of standing inside SS but unable to demonstrate strength needed for weigh shift and progression to RW. left in chair with heels floated and nursing notified of pt assist x 2 with SS for safety.   PT Plan of Care   Thursday X   Safety Devices   Type of Devices Call light within reach;Gait belt;Heels elevated for pressure relief;Nurse notified     Electronically signed by Glen Irene PTA on 5/15/2025 at 2:31 PM

## 2025-05-15 NOTE — PROGRESS NOTES
RN and male PCA was in the patient's room to assist in administering ordered suppository. RN explained procedure to patient. PCA rolled patient onto their side. After RN administered suppository, patient stated \"I feel like I was just raped.\"  Charge nurse made aware.

## 2025-05-16 ENCOUNTER — OUTSIDE FACILITY SERVICE (OUTPATIENT)
Age: 65
End: 2025-05-16

## 2025-05-16 ENCOUNTER — APPOINTMENT (OUTPATIENT)
Dept: GENERAL RADIOLOGY | Age: 65
DRG: 853 | End: 2025-05-16
Payer: COMMERCIAL

## 2025-05-16 LAB
ALBUMIN SERPL-MCNC: 2.4 G/DL (ref 3.5–5.2)
ALP SERPL-CCNC: 92 U/L (ref 40–129)
ALT SERPL-CCNC: 24 U/L (ref 10–50)
ANION GAP SERPL CALCULATED.3IONS-SCNC: 12 MMOL/L (ref 8–16)
AST SERPL-CCNC: 36 U/L (ref 10–50)
BACTERIA BLD CULT ORG #2: NORMAL
BASOPHILS # BLD: 0.1 K/UL (ref 0–0.2)
BASOPHILS NFR BLD: 0.5 % (ref 0–1)
BILIRUB SERPL-MCNC: 0.6 MG/DL (ref 0.2–1.2)
BUN SERPL-MCNC: 7 MG/DL (ref 8–23)
CALCIUM SERPL-MCNC: 7.9 MG/DL (ref 8.8–10.2)
CHLORIDE SERPL-SCNC: 98 MMOL/L (ref 98–107)
CO2 SERPL-SCNC: 26 MMOL/L (ref 22–29)
CREAT SERPL-MCNC: 0.4 MG/DL (ref 0.7–1.2)
EOSINOPHIL # BLD: 0.3 K/UL (ref 0–0.6)
EOSINOPHIL NFR BLD: 1.4 % (ref 0–5)
ERYTHROCYTE [DISTWIDTH] IN BLOOD BY AUTOMATED COUNT: 14 % (ref 11.5–14.5)
GLUCOSE BLD-MCNC: 153 MG/DL (ref 70–99)
GLUCOSE BLD-MCNC: 220 MG/DL (ref 70–99)
GLUCOSE BLD-MCNC: 220 MG/DL (ref 70–99)
GLUCOSE BLD-MCNC: 246 MG/DL (ref 70–99)
GLUCOSE SERPL-MCNC: 152 MG/DL (ref 70–99)
HCT VFR BLD AUTO: 26.3 % (ref 42–52)
HCT VFR BLD AUTO: 26.8 % (ref 42–52)
HGB BLD-MCNC: 8.5 G/DL (ref 14–18)
HGB BLD-MCNC: 8.8 G/DL (ref 14–18)
IMM GRANULOCYTES # BLD: 1.7 K/UL
LYMPHOCYTES # BLD: 1.6 K/UL (ref 1.1–4.5)
LYMPHOCYTES NFR BLD: 7.6 % (ref 20–40)
MAGNESIUM SERPL-MCNC: 1.9 MG/DL (ref 1.6–2.4)
MCH RBC QN AUTO: 30.1 PG (ref 27–31)
MCHC RBC AUTO-ENTMCNC: 32.3 G/DL (ref 33–37)
MCV RBC AUTO: 93.3 FL (ref 80–94)
MONOCYTES # BLD: 0.9 K/UL (ref 0–0.9)
MONOCYTES NFR BLD: 4.5 % (ref 0–10)
NEUTROPHILS # BLD: 15.9 K/UL (ref 1.5–7.5)
NEUTS SEG NFR BLD: 77.6 % (ref 50–65)
PERFORMED ON: ABNORMAL
PLATELET # BLD AUTO: 524 K/UL (ref 130–400)
PMV BLD AUTO: 8.9 FL (ref 9.4–12.4)
POTASSIUM SERPL-SCNC: 3.3 MMOL/L (ref 3.5–5)
PROT SERPL-MCNC: 5.1 G/DL (ref 6.4–8.3)
RBC # BLD AUTO: 2.82 M/UL (ref 4.7–6.1)
SODIUM SERPL-SCNC: 136 MMOL/L (ref 136–145)
WBC # BLD AUTO: 20.5 K/UL (ref 4.8–10.8)

## 2025-05-16 PROCEDURE — 71045 X-RAY EXAM CHEST 1 VIEW: CPT

## 2025-05-16 PROCEDURE — 85018 HEMOGLOBIN: CPT

## 2025-05-16 PROCEDURE — 6370000000 HC RX 637 (ALT 250 FOR IP)

## 2025-05-16 PROCEDURE — 97530 THERAPEUTIC ACTIVITIES: CPT

## 2025-05-16 PROCEDURE — 85014 HEMATOCRIT: CPT

## 2025-05-16 PROCEDURE — 2700000000 HC OXYGEN THERAPY PER DAY

## 2025-05-16 PROCEDURE — 80053 COMPREHEN METABOLIC PANEL: CPT

## 2025-05-16 PROCEDURE — 85025 COMPLETE CBC W/AUTO DIFF WBC: CPT

## 2025-05-16 PROCEDURE — 94761 N-INVAS EAR/PLS OXIMETRY MLT: CPT

## 2025-05-16 PROCEDURE — 36569 INSJ PICC 5 YR+ W/O IMAGING: CPT

## 2025-05-16 PROCEDURE — 6370000000 HC RX 637 (ALT 250 FOR IP): Performed by: NEUROLOGICAL SURGERY

## 2025-05-16 PROCEDURE — 99233 SBSQ HOSP IP/OBS HIGH 50: CPT | Performed by: PSYCHIATRY & NEUROLOGY

## 2025-05-16 PROCEDURE — 83735 ASSAY OF MAGNESIUM: CPT

## 2025-05-16 PROCEDURE — OUTSIDEPOS PR OUTSIDE POS PLACEHOLDER: Performed by: INTERNAL MEDICINE

## 2025-05-16 PROCEDURE — 82962 GLUCOSE BLOOD TEST: CPT

## 2025-05-16 PROCEDURE — 6360000002 HC RX W HCPCS: Performed by: NEUROLOGICAL SURGERY

## 2025-05-16 PROCEDURE — 97535 SELF CARE MNGMENT TRAINING: CPT

## 2025-05-16 PROCEDURE — 36415 COLL VENOUS BLD VENIPUNCTURE: CPT

## 2025-05-16 PROCEDURE — 2580000003 HC RX 258: Performed by: NEUROLOGICAL SURGERY

## 2025-05-16 PROCEDURE — 1200000000 HC SEMI PRIVATE

## 2025-05-16 PROCEDURE — 2580000003 HC RX 258

## 2025-05-16 PROCEDURE — 76937 US GUIDE VASCULAR ACCESS: CPT

## 2025-05-16 PROCEDURE — C1751 CATH, INF, PER/CENT/MIDLINE: HCPCS

## 2025-05-16 PROCEDURE — 99024 POSTOP FOLLOW-UP VISIT: CPT | Performed by: NEUROLOGICAL SURGERY

## 2025-05-16 PROCEDURE — 6370000000 HC RX 637 (ALT 250 FOR IP): Performed by: HOSPITALIST

## 2025-05-16 RX ADMIN — SODIUM CHLORIDE: 9 INJECTION, SOLUTION INTRAVENOUS at 12:10

## 2025-05-16 RX ADMIN — AMPICILLIN SODIUM 2000 MG: 2 INJECTION, POWDER, FOR SOLUTION INTRAMUSCULAR; INTRAVENOUS at 22:00

## 2025-05-16 RX ADMIN — POLYETHYLENE GLYCOL 3350 17 G: 17 POWDER, FOR SOLUTION ORAL at 12:07

## 2025-05-16 RX ADMIN — OXYCODONE AND ACETAMINOPHEN 2 TABLET: 10; 325 TABLET ORAL at 11:50

## 2025-05-16 RX ADMIN — INSULIN GLARGINE 17 UNITS: 100 INJECTION, SOLUTION SUBCUTANEOUS at 22:01

## 2025-05-16 RX ADMIN — INSULIN LISPRO 4 UNITS: 100 INJECTION, SOLUTION INTRAVENOUS; SUBCUTANEOUS at 22:01

## 2025-05-16 RX ADMIN — AMPICILLIN SODIUM 2000 MG: 2 INJECTION, POWDER, FOR SOLUTION INTRAMUSCULAR; INTRAVENOUS at 03:21

## 2025-05-16 RX ADMIN — INSULIN LISPRO 4 UNITS: 100 INJECTION, SOLUTION INTRAVENOUS; SUBCUTANEOUS at 17:28

## 2025-05-16 RX ADMIN — OXYCODONE AND ACETAMINOPHEN 1 TABLET: 10; 325 TABLET ORAL at 03:09

## 2025-05-16 RX ADMIN — PRAVASTATIN SODIUM 10 MG: 20 TABLET ORAL at 12:02

## 2025-05-16 RX ADMIN — AMPICILLIN SODIUM 2000 MG: 2 INJECTION, POWDER, FOR SOLUTION INTRAMUSCULAR; INTRAVENOUS at 12:02

## 2025-05-16 RX ADMIN — SENNOSIDES AND DOCUSATE SODIUM 2 TABLET: 50; 8.6 TABLET ORAL at 12:06

## 2025-05-16 RX ADMIN — AMPICILLIN SODIUM 2000 MG: 2 INJECTION, POWDER, FOR SOLUTION INTRAMUSCULAR; INTRAVENOUS at 17:06

## 2025-05-16 RX ADMIN — INSULIN LISPRO 4 UNITS: 100 INJECTION, SOLUTION INTRAVENOUS; SUBCUTANEOUS at 12:07

## 2025-05-16 RX ADMIN — INSULIN GLARGINE 17 UNITS: 100 INJECTION, SOLUTION SUBCUTANEOUS at 12:11

## 2025-05-16 RX ADMIN — Medication 25 MG: at 12:05

## 2025-05-16 RX ADMIN — POTASSIUM CHLORIDE 40 MEQ: 1500 TABLET, EXTENDED RELEASE ORAL at 12:06

## 2025-05-16 RX ADMIN — OXYCODONE AND ACETAMINOPHEN 1 TABLET: 10; 325 TABLET ORAL at 16:49

## 2025-05-16 RX ADMIN — AMPICILLIN SODIUM 2000 MG: 2 INJECTION, POWDER, FOR SOLUTION INTRAMUSCULAR; INTRAVENOUS at 06:38

## 2025-05-16 ASSESSMENT — PAIN DESCRIPTION - LOCATION
LOCATION: BACK
LOCATION: BACK
LOCATION: BACK;LEG
LOCATION: RIB CAGE

## 2025-05-16 ASSESSMENT — PAIN DESCRIPTION - ORIENTATION
ORIENTATION: LOWER
ORIENTATION: LOWER
ORIENTATION: MID;LOWER;RIGHT;LEFT

## 2025-05-16 ASSESSMENT — PAIN SCALES - GENERAL
PAINLEVEL_OUTOF10: 7
PAINLEVEL_OUTOF10: 4
PAINLEVEL_OUTOF10: 3
PAINLEVEL_OUTOF10: 7
PAINLEVEL_OUTOF10: 7

## 2025-05-16 ASSESSMENT — PAIN DESCRIPTION - DESCRIPTORS
DESCRIPTORS: DULL
DESCRIPTORS: ACHING;DISCOMFORT
DESCRIPTORS: ACHING;CRAMPING

## 2025-05-16 ASSESSMENT — PAIN DESCRIPTION - FREQUENCY: FREQUENCY: CONTINUOUS

## 2025-05-16 ASSESSMENT — PAIN DESCRIPTION - ONSET: ONSET: ON-GOING

## 2025-05-16 ASSESSMENT — PAIN DESCRIPTION - PAIN TYPE: TYPE: ACUTE PAIN;SURGICAL PAIN

## 2025-05-16 NOTE — PROGRESS NOTES
Physical Therapy     05/16/25 1206   Restrictions/Precautions   Restrictions/Precautions Isolation;Fall Risk   Activity Level Up with Assist   Required Braces or Orthoses? Yes   Required Braces or Orthoses   Spinal Lumbar Corset   Spinal Other LSO   Position Activity Restriction   Other Position/Activity Restrictions droplet precautions-rhinovirus   General   Diagnosis sepsis, AMS, generlized weakness, L hip pain, rhinovirus, syncope, L3-L5 spinal epidural abscess, L4-5 septic facet joints, L4-5 spondylolisthesis.   Subjective   Subjective pt agreeable to tx   Vitals   Temp 97.9 °F (36.6 °C)   Pulse 97   Heart Rate Source Monitor   Respirations 18   SpO2 95 %   O2 Device Nasal cannula   BP (!) 140/70   MAP (Calculated) 93   BP Location Left upper arm   Bed mobility   Supine to Sit Minimal assistance;Partial/Moderate assistance   Bed Mobility Comments with HOB elevated and use of L rail. sat EOB 15+ for clean up/change of gown mostly CGA due to pt slight fwd lean from pain/fatigue.   Transfers   Sit to Stand Minimal Assistance   Stand to Sit Contact guard assistance   Comment multiple STS from EOB and SS paddles CGA/Nafisa with pt able to tolerate static standing for almost one minute before fatiguing and pain. unable to weight shift or take small steps inside SS.   Short Term Goals   Time Frame for Short Term Goals 2 wks   Short Term Goal 1 supine to sit indep   Short Term Goal 2 sit to stand indep   Short Term Goal 3 amb. 100' with RW SBA   Short Term Goal 4 bed to chair SBA   Activity Tolerance   Activity Tolerance Patient limited by pain   Assessment   Assessment pt continues to be limited by pain. able to improve STS from EOB to mostly CGA. not ready for switch to RW at this time as pt can only tolerate very short periods of static standing. left in chair with alarm on and all needs in reach.   PT Plan of Care   Friday X   Safety Devices   Type of Devices Call light within reach;Chair alarm in place;Gait belt;Left

## 2025-05-16 NOTE — PROGRESS NOTES
of stroke.      Objective:    /62   Pulse 99   Temp 98.6 °F (37 °C)   Resp 18   Ht 1.803 m (5' 10.98\")   Wt 94.9 kg (209 lb 3.5 oz)   SpO2 98%   BMI 29.19 kg/m²       Intake/Output Summary (Last 24 hours) at 5/16/2025 0732  Last data filed at 5/16/2025 0300  Gross per 24 hour   Intake 505.42 ml   Output 1850 ml   Net -1344.58 ml     HMV:  100 mL x24h        Physical Exam:    General: alert, cooperative, no distress  Cardiorespiratory: unlabored breathing  Abdomen: soft and nondistended  Wound:  Covered by clean Mepilex dressing      Neurologic Exam:    Mental Status: Alert, oriented, thought content appropriate  Cranial Nerves: PERRL, EOMI, symmetric facies, tongue midline  Motor: Motor exam is symmetrical 5 out of 5 all extremities bilaterally  Somatosensory: normal light touch sensation          Pertinent Labs:  Lab Results   Component Value Date    WBC 20.5 (H) 05/16/2025    HGB 8.5 (L) 05/16/2025    HCT 26.3 (L) 05/16/2025    MCV 93.3 05/16/2025     (H) 05/16/2025     Lab Results   Component Value Date/Time     05/16/2025 04:05 AM    K 3.3 05/16/2025 04:05 AM    CL 98 05/16/2025 04:05 AM    CO2 26 05/16/2025 04:05 AM    BUN 7 05/16/2025 04:05 AM    CREATININE 0.4 05/16/2025 04:05 AM    GLUCOSE 152 05/16/2025 04:05 AM    CALCIUM 7.9 05/16/2025 04:05 AM          Imaging:    No new imaging        Assessment and Plan:    64 y.o. M s/p L3-5 MIDLIF with evacuation of epidural abscess on 5/13/2025.  He was admitted with complaints of back pain, left hip pain, generalized weakness and multiple falls.  He was found to be bacteremic at admission with blood cultures growing strep.  Spinal imaging revealed an extensive lumbar paraspinal infection with associated epidural abscess, L4/5 spondylolisthesis and L4/5 septic facet joints.      - Patient doing well after surgery  - Mobilize with PT/OT in LSO brace  - Pain control  - Continue antibiotics per ID and monitor surgical cultures, one now growing  staph epidermidis  - Monitor HMV drain output          Electronically signed by Ayan Rivera MD on 5/16/2025 at 7:32 AM

## 2025-05-16 NOTE — PROGRESS NOTES
[facility] Neurology Progress Note      Patient:   Rodney Garcia  MR#:    [id number]   Room:    [facility]420-   YOB: 1960  Date of Progress Note: 5/16/2025  Time of Note                           10:56 AM  Consulting Physician:  Leif Reyna DO  Attending Physician:  Nando Albert MD      INTERVAL HISTORY:  No acute events, no new focal complaints, continues to do well post-op, no worsening LE weakness, remains largely unchanged overnight.     REVIEW OF SYSTEMS:  Constitutional: No fevers No chills  Neck:No stiffness  Respiratory: No shortness of breath  Cardiovascular: No chest pain No palpitations  Gastrointestinal: No abdominal pain    Genitourinary: No Dysuria  Neurological: No headache, no confusion    PHYSICAL EXAM:    Constitutional -   BP (!) 141/61   Pulse 87   Temp 99.1 °F (37.3 °C) (Temporal)   Resp 16   Ht 1.803 m (5' 10.98\")   Wt 94.9 kg (209 lb 3.5 oz)   SpO2 97%   BMI 29.19 kg/m²   General appearance: No acute distress   EYES -   Conjunctiva normal  Pupillary exam as below, see CN exam in the neurologic exam  ENT-    No scars, masses, or lesions over external nose or ears  Oropharynx without erythema, palate midline  Cardiovascular -   No clubbing, cyanosis, or edema   Pulmonary-   Good expansion, normal effort without use of accessory muscles  Musculoskeletal -   No significant wasting of muscles noted  Gait as below, see gait exam in the neurologic exam  Muscle strength, tone, stability as below see the motor exam in the neurologic exam.   No bony deformities  Skin -   Warm, dry, and intact to inspection and palpation.    No rash, erythema, or pallor  Psychiatric -   Mood, affect, and behavior appear normal    Memory as below see mental status examination in the neurologic exam        NEUROLOGICAL EXAM     Mental status    [x] Awake, alert, oriented   [x] Affect attention and concentration appear appropriate  [x] Recent and remote memory appears unremarkable  [x] Speech      1. Multiple enhancing epidural abscesses at L3-L4 to the S2 sacral canal, causing severe spinal canal stenosis at these levels. 2. Multiple intramuscular abscesses in bilateral paraspinous muscles and inferior left psoas. 3. Moderate bilateral facet joint effusions at L4-L5.  Septic joint cannot be excluded. 4. Mild marrow edema in the L5 vertebral body, bilateral L4-L5 pedicles and facets as well as L4-L5 spinous processes.  Osteomyelitis cannot be excluded. 5. Degenerative changes as above.     ______________________________________ Electronically signed by: KUSUM AGUILLON M.D. Date:     05/09/2025 Time:    07:32     US LIVER  Result Date: 5/9/2025  EXAM: ULTRASOUND OF THE RIGHT UPPER QUADRANT (LIMITED ABDOMEN)  HISTORY: Low-density liver lesion seen in a recent CTA chest.  TECHNIQUE: Sonography of the right upper quadrant of the abdomen was performed.  Color Doppler imaging and spectral Doppler imaging of the portal vein was also performed.  Images were obtained and stored in a permanent archive.  COMPARISON: Correlation is made with the CTA chest from 5/6/2025.  FINDINGS:  Pancreas:  Completely obscured by bowel gas. Liver:  Diffuse increased echogenicity.  No visible liver mass. Main portal vein: Patent with normal antegrade flow. Biliary: Common bile duct measures 3.0 mm. Cholecystectomy, again noted. Right Kidney: Measures 12.3 cm.  No hydronephrosis.  No lesions.  No calculus. IVC: Unremarkable. Other:  No ascites.        1.  Non visualization of the low density lesion seen on the recent CT. MRI liver mass protocol is recommended for better characterization. 2. Cholecystectomy, again noted. 3. No biliary tree dilatation. 4. Fatty liver. 5. Complete obscuration of the pancreas by bowel gas.          ______________________________________ Electronically signed by: LAKE OLMOS M.D. Date:     05/09/2025 Time:    06:28     VILMA (with contrast/ 3D PRN)  Result Date: 5/8/2025    Left Ventricle: Normal left

## 2025-05-16 NOTE — PROGRESS NOTES
[facility]    Progress Note    Patient:  Rodney Garcia  YOB: 1960  Date of Service: 5/16/2025  MRN: [id number]   Acct: [id number]   Primary Care Physician: Tu Puente MD  Advance Directive: Full Code  Admit Date: 5/5/2025       Hospital Day: 10    Portions of this note have been copied forward, however, updated to reflect the most current clinical status of this patient.     CHIEF COMPLAINT: generalized weakness    SUBJECTIVE: Pain under control, ongoing constipation, encouraged ambulation to assist with constipation    CUMULATIVE HOSPITAL COURSE:     This patient is a 64-year-old male with hypertension, CVA, PFO and hyperlipidemia who presented to [facility] ED on 5/5 for evaluation of generalized weakness and difficulty with ambulation.  Of note, patient was recently discharged from [facility] after being treated for strep intermedius bacteremia with Zyvox and acute CVA with MRI showing acute infarcts in left MCA distribution and subacute infarcts in bilateral cerebral hemispheres, appearing somewhat embolic, bubble study nondiagnostic at that time.  He was discharged on plavix and switched to aspirin alone by neurology on 5/2. He presented to his PCP on 5/5 for evaluation of left hip pain, had an u/s the same day which showed edematous tissue without drainable fluid collection. Later that day, patient experienced a fall due to loss of balance thus presenting here for evaluation. Per family, patient has had multiple falls without LOC [place] [facility]      In ED, CT of left hip showed soft tissue swelling and multifocal osteoarthritis. Xray hips showed severe bilateral hip osteoarthritic change, possible femoral acetabular impingement, no acute fractures. CTA showed no PE, bibasilar atelectasis, and low density liver lesion measuring 1.3 cm. Lab work indicated procalcitonin 0.58, , WBC 14.9, and lactic 2.0.  Respiratory panel was positive for human rhinovirus.  hours.    Invalid input(s): \"KETONESU\"      A1C: No results for input(s): \"LABA1C\" in the last 72 hours.    ABG:No results for input(s): \"PHART\", \"XMA1XUR\", \"PO2ART\", \"VBA4SZW\", \"BEART\", \"HGBAE\", \"D7ZYGYCY\", \"CARBOXHGBART\" in the last 72 hours.    Radiology:   MRI BRAIN W WO CONTRAST  Result Date: 5/9/2025  EXAM:  BRAIN MRI WITHOUT AND WITH CONTRAST  HISTORY:  Stroke.  Epidural abscess.  TECHNIQUE:  Multiplanar and multisequence MRI of the brain before and after the administration of 20 cc of MultiHance contrast intravenously.  COMPARISON:  Brain MRI dated 04/08/2025.  Brain CT dated 05/06/2025.  FINDINGS:  Motion artifacts are noted.  There has been interval development of two 4 mm T2 hyperintense lesions in subcortical white matter of bilateral frontal lobes, with susceptibility artifact, which could represent tiny intraparenchymal hematomas. There are numerous punctate susceptibility artifacts scattered in bilateral cerebral hemispheres and bilateral cerebellum, not visualized on prior GRE sequence (SWI was not performed).  There is no evidence associated T2/FLAIR hyperintensity.  Findings could represent new tiny intraparenchymal hematomas versus remote microhemorrhage or cerebral amyloid angiopathy.  There has been interval resolution of previously seen tiny subacute infarcts in bilateral corona radiata and centrum semiovale and left occipital lobe.  There is no acute ischemic infarct.  The ventricles and sulci demonstrate a normal pattern.  Scattered foci of T2/FLAIR hyperintense signal are present in the subcortical and periventricular white matter, most commonly seen in chronic white matter microvascular ischemic changes.  The basilar cisterns are patent.  The paranasal sinuses and mastoid air cells are well aerated.  The orbits are within normal limits.  No calvarial abnormality is identified.      1. Motion degraded study. 2. Interval development of two 4 mm T2 hyperintense lesions in subcortical white

## 2025-05-16 NOTE — PROGRESS NOTES
Infectious Diseases Progress Note    Patient:  Rodney Garcia  YOB: 1960  MRN: [id number]   Admit date: 5/5/2025   Admitting Physician: Nando Albert MD  Primary Care Physician: Tu Puente MD    Chief Complaint: Seeing in follow-up today for Streptococcus intermedius bloodstream infection along with lumbar paraspinal infection likely related to the same organism.    Interval History: He is up to chair currently.  He is wearing his back brace.  He indicates he has back pain at present.  He indicates he is requested but not yet received any additional pain medication treatment.  Notified floor staff regarding his request for as needed medicine.  He is tolerating his antibiotic without diarrhea or rash.  He has had 1 of multiple cultures grow coagulase-negative staph (broth only).  Communicated with Dr. Rivera.  Feel this is most likely a contaminant.  He indicates he got up with Nathalie fong.  He is trying to work with therapy and maneuver chair for meals etc.    In/Out    Intake/Output Summary (Last 24 hours) at 5/16/2025 1048  Last data filed at 5/16/2025 0700  Gross per 24 hour   Intake 505.42 ml   Output 1945 ml   Net -1439.58 ml     Allergies: No Known Allergies  Current Meds: HYDROmorphone HCl PF (DILAUDID) injection 0.25 mg, Q3H PRN   Or  HYDROmorphone HCl PF (DILAUDID) injection 0.5 mg, Q3H PRN  oxyCODONE-acetaminophen (PERCOCET)  MG per tablet 1 tablet, Q4H PRN  oxyCODONE-acetaminophen (PERCOCET)  MG per tablet 2 tablet, Q4H PRN  bisacodyl (DULCOLAX) suppository 10 mg, Daily  0.9 % sodium chloride infusion, PRN  naloxegol (MOVANTIK) tablet 25 mg, QAM AC  0.9 % sodium chloride infusion, Continuous  insulin lispro (HUMALOG,ADMELOG) injection vial 0-16 Units, 4x Daily AC & HS  polyethylene glycol (GLYCOLAX) packet 17 g, Daily  sennosides-docusate sodium (SENOKOT-S) 8.6-50 MG tablet 2 tablet, Daily  ampicillin (OMNIPEN) 2,000 mg in sodium chloride 0.9 % 100 mL IVPB ([id number]),

## 2025-05-16 NOTE — CONSULTS
[facility] Vascular Access Team:  PICC Line Insertion Procedure Note      Patient: Rodney Garcia  YOB: 1960   MRN: [id number]  Room: [address] [place] [address]     Attending Physician - Nando Albert MD  Ordering Physician - Nando Albert MD    Diagnosis:   Generalized weakness [R53.1]  Left hip pain [M25.552]  Rhinovirus infection [B34.8]  Altered mental status, unspecified altered mental status type [R41.82]  Syncope, unspecified syncope type [R55]  Sepsis, due to unspecified organism, unspecified whether acute organ dysfunction present (HCC) [A41.9]  Acute hypoxic respiratory failure (HCC) [J96.01]       Procedure(s):   Insertion of a 4.5 Setswana Single Lumen PICC    Indication(s):   Long Term IV Antibiotic Therapy    Date of Procedure: 5/16/2025   Start Time: 1350  End Time: 1430    Performed by: Rafael Burns RN    Anesthesia: Local Injection 3 mL 1% Lidocaine without Epinephrine    Estimated Blood Loss (mL): Minimal    Complications: None    PICC 05/16/25 Left Basilic (Active)   Central Line Being Utilized Yes 05/16/25 1445   Criteria for Appropriate Use Long term IV/antibiotic administration 05/16/25 1445   Site Assessment Clean, dry & intact 05/16/25 1445   Phlebitis Assessment No symptoms 05/16/25 1445   Infiltration Assessment 0 05/16/25 1445   Extremity Circumference (cm) 32 cm 05/16/25 1445   External Catheter Length (cm) 0 cm 05/16/25 1445   Lumen #1 Color/Status Pink;Flushed;Brisk blood return;Normal saline locked;Alcohol cap applied 05/16/25 1445   Alcohol Cap Used Yes 05/16/25 1445   Date of Last Dressing Change 05/16/25 05/16/25 1445   Dressing Type Securing device;Transparent w/CHG gel 05/16/25 1445   Dressing Status Clean, dry & intact;New dressing applied 05/16/25 1445   Dressing Intervention New 05/16/25 1445         Findings:   1. Successful insertion of PICC line.  2. PICC Brochure given to patient for care instructions  3. PICC Line is ready to be used.   4. Please change tubing prior to

## 2025-05-16 NOTE — PROGRESS NOTES
Occupational Therapy     05/16/25 1100   Subjective   Subjective Pt in bed upon arrival for therapy. Pt agreeable to participate.   Pain Assessment   Pain Assessment 0-10   Pain Level 7   Pain Location Back;Leg   Pain Orientation Mid;Lower;Right;Left   Pain Descriptors Aching;Discomfort   Functional Pain Assessment Prevents or interferes some active activities and ADLs   Pain Type Acute pain;Surgical pain   Pain Frequency Continuous   Pain Onset On-going   Non-Pharmaceutical Pain Intervention(s) Ambulation/Increased Activity;Repositioned;Rest;Nurse notified (comment)  (Called out to nursing 2x during tx.)   Response to Pain Intervention None (pain unchanged or no improvement)   Vitals   O2 Device Nasal cannula   Cognition   Overall Cognitive Status WFL   Orientation   Overall Orientation Status WNL   Bed Mobility Training   Bed Mobility Training Yes   Overall Level of Assistance Partial/Moderate assistance   Interventions Verbal cues;Tactile cues;Manual cues   Supine to Sit Partial/Moderate assistance   Scooting Minimal assistance;Partial/Moderate assistance   Transfer Training   Transfer Training Yes   Overall Level of Assistance Minimal assistance;Partial/Moderate assistance  (Nathalie Lee)   Interventions Verbal cues;Tactile cues;Manual cues   Sit to Stand Minimal assistance;Partial/Moderate assistance   Stand to Sit Minimal assistance;Partial/Moderate assistance   Balance   Sitting Intact   Standing Impaired   Standing - Static Poor;Occasional   Standing - Dynamic Poor   ADL   Feeding Setup;Supervision   Grooming Setup;Stand by assistance   UE Bathing Minimal assistance   LE Bathing Maximum assistance   UE Dressing Minimal assistance   LE Dressing Maximum assistance   Putting On/Taking Off Footwear Dependent/Total   Toileting Maximum assistance   Toileting Skilled Clinical Factors for clean up and clothing management,   Functional Mobility Moderate assistance   Functional Mobility Skilled Clinical Factors Nathalie

## 2025-05-17 ENCOUNTER — OUTSIDE FACILITY SERVICE (OUTPATIENT)
Age: 65
End: 2025-05-17

## 2025-05-17 LAB
ALBUMIN SERPL-MCNC: 2.6 G/DL (ref 3.5–5.2)
ALP SERPL-CCNC: 88 U/L (ref 40–129)
ALT SERPL-CCNC: 20 U/L (ref 10–50)
ANION GAP SERPL CALCULATED.3IONS-SCNC: 10 MMOL/L (ref 8–16)
AST SERPL-CCNC: 21 U/L (ref 10–50)
BASOPHILS # BLD: 0.1 K/UL (ref 0–0.2)
BASOPHILS NFR BLD: 0.5 % (ref 0–1)
BILIRUB SERPL-MCNC: 0.5 MG/DL (ref 0.2–1.2)
BUN SERPL-MCNC: 5 MG/DL (ref 8–23)
CALCIUM SERPL-MCNC: 8.3 MG/DL (ref 8.8–10.2)
CHLORIDE SERPL-SCNC: 97 MMOL/L (ref 98–107)
CO2 SERPL-SCNC: 26 MMOL/L (ref 22–29)
CREAT SERPL-MCNC: 0.4 MG/DL (ref 0.7–1.2)
EKG P AXIS: 16 DEGREES
EKG P-R INTERVAL: 148 MS
EKG Q-T INTERVAL: 316 MS
EKG QRS DURATION: 72 MS
EKG QTC CALCULATION (BAZETT): 393 MS
EKG T AXIS: 42 DEGREES
EOSINOPHIL # BLD: 0.3 K/UL (ref 0–0.6)
EOSINOPHIL NFR BLD: 2.2 % (ref 0–5)
ERYTHROCYTE [DISTWIDTH] IN BLOOD BY AUTOMATED COUNT: 14.2 % (ref 11.5–14.5)
GLUCOSE BLD-MCNC: 169 MG/DL (ref 70–99)
GLUCOSE BLD-MCNC: 237 MG/DL (ref 70–99)
GLUCOSE BLD-MCNC: 254 MG/DL (ref 70–99)
GLUCOSE SERPL-MCNC: 244 MG/DL (ref 70–99)
HCT VFR BLD AUTO: 25.6 % (ref 42–52)
HCT VFR BLD AUTO: 28 % (ref 42–52)
HGB BLD-MCNC: 8.2 G/DL (ref 14–18)
HGB BLD-MCNC: 8.9 G/DL (ref 14–18)
IMM GRANULOCYTES # BLD: 1.1 K/UL
LYMPHOCYTES # BLD: 1.4 K/UL (ref 1.1–4.5)
LYMPHOCYTES NFR BLD: 9.2 % (ref 20–40)
MCH RBC QN AUTO: 30.4 PG (ref 27–31)
MCHC RBC AUTO-ENTMCNC: 32 G/DL (ref 33–37)
MCV RBC AUTO: 94.8 FL (ref 80–94)
MONOCYTES # BLD: 0.8 K/UL (ref 0–0.9)
MONOCYTES NFR BLD: 5.4 % (ref 0–10)
NEUTROPHILS # BLD: 11.7 K/UL (ref 1.5–7.5)
NEUTS SEG NFR BLD: 75.4 % (ref 50–65)
PERFORMED ON: ABNORMAL
PLATELET # BLD AUTO: 486 K/UL (ref 130–400)
PMV BLD AUTO: 8.8 FL (ref 9.4–12.4)
POTASSIUM SERPL-SCNC: 3.8 MMOL/L (ref 3.5–5)
PROT SERPL-MCNC: 5.4 G/DL (ref 6.4–8.3)
RBC # BLD AUTO: 2.7 M/UL (ref 4.7–6.1)
REASON FOR REJECTION: NORMAL
REJECTED TEST: NORMAL
SODIUM SERPL-SCNC: 133 MMOL/L (ref 136–145)
WBC # BLD AUTO: 15.5 K/UL (ref 4.8–10.8)

## 2025-05-17 PROCEDURE — 6370000000 HC RX 637 (ALT 250 FOR IP)

## 2025-05-17 PROCEDURE — 85014 HEMATOCRIT: CPT

## 2025-05-17 PROCEDURE — 2700000000 HC OXYGEN THERAPY PER DAY

## 2025-05-17 PROCEDURE — 85025 COMPLETE CBC W/AUTO DIFF WBC: CPT

## 2025-05-17 PROCEDURE — OUTSIDEPOS PR OUTSIDE POS PLACEHOLDER: Performed by: INTERNAL MEDICINE

## 2025-05-17 PROCEDURE — 85018 HEMOGLOBIN: CPT

## 2025-05-17 PROCEDURE — 82962 GLUCOSE BLOOD TEST: CPT

## 2025-05-17 PROCEDURE — 36415 COLL VENOUS BLD VENIPUNCTURE: CPT

## 2025-05-17 PROCEDURE — 99024 POSTOP FOLLOW-UP VISIT: CPT | Performed by: NEUROLOGICAL SURGERY

## 2025-05-17 PROCEDURE — 6370000000 HC RX 637 (ALT 250 FOR IP): Performed by: HOSPITALIST

## 2025-05-17 PROCEDURE — 80053 COMPREHEN METABOLIC PANEL: CPT

## 2025-05-17 PROCEDURE — 1200000000 HC SEMI PRIVATE

## 2025-05-17 PROCEDURE — 94761 N-INVAS EAR/PLS OXIMETRY MLT: CPT

## 2025-05-17 PROCEDURE — 99233 SBSQ HOSP IP/OBS HIGH 50: CPT | Performed by: PSYCHIATRY & NEUROLOGY

## 2025-05-17 PROCEDURE — 2500000003 HC RX 250 WO HCPCS: Performed by: NEUROLOGICAL SURGERY

## 2025-05-17 PROCEDURE — 6370000000 HC RX 637 (ALT 250 FOR IP): Performed by: NEUROLOGICAL SURGERY

## 2025-05-17 PROCEDURE — 6360000002 HC RX W HCPCS: Performed by: NEUROLOGICAL SURGERY

## 2025-05-17 PROCEDURE — 2580000003 HC RX 258: Performed by: NEUROLOGICAL SURGERY

## 2025-05-17 RX ORDER — LACTULOSE 10 G/15ML
20 SOLUTION ORAL 3 TIMES DAILY
Status: DISPENSED | OUTPATIENT
Start: 2025-05-17 | End: 2025-05-18

## 2025-05-17 RX ADMIN — LACTULOSE 20 G: 20 SOLUTION ORAL at 21:47

## 2025-05-17 RX ADMIN — SODIUM CHLORIDE, PRESERVATIVE FREE 10 ML: 5 INJECTION INTRAVENOUS at 14:53

## 2025-05-17 RX ADMIN — OXYCODONE AND ACETAMINOPHEN 1 TABLET: 10; 325 TABLET ORAL at 09:16

## 2025-05-17 RX ADMIN — SENNOSIDES AND DOCUSATE SODIUM 2 TABLET: 50; 8.6 TABLET ORAL at 14:52

## 2025-05-17 RX ADMIN — OXYCODONE AND ACETAMINOPHEN 1 TABLET: 10; 325 TABLET ORAL at 01:57

## 2025-05-17 RX ADMIN — AMPICILLIN SODIUM 2000 MG: 2 INJECTION, POWDER, FOR SOLUTION INTRAMUSCULAR; INTRAVENOUS at 21:53

## 2025-05-17 RX ADMIN — INSULIN LISPRO 8 UNITS: 100 INJECTION, SOLUTION INTRAVENOUS; SUBCUTANEOUS at 21:48

## 2025-05-17 RX ADMIN — AMPICILLIN SODIUM 2000 MG: 2 INJECTION, POWDER, FOR SOLUTION INTRAMUSCULAR; INTRAVENOUS at 05:40

## 2025-05-17 RX ADMIN — OXYCODONE AND ACETAMINOPHEN 1 TABLET: 10; 325 TABLET ORAL at 21:57

## 2025-05-17 RX ADMIN — INSULIN GLARGINE 17 UNITS: 100 INJECTION, SOLUTION SUBCUTANEOUS at 21:49

## 2025-05-17 RX ADMIN — TIZANIDINE 4 MG: 4 TABLET ORAL at 21:57

## 2025-05-17 RX ADMIN — AMPICILLIN SODIUM 2000 MG: 2 INJECTION, POWDER, FOR SOLUTION INTRAMUSCULAR; INTRAVENOUS at 15:13

## 2025-05-17 RX ADMIN — PRAVASTATIN SODIUM 10 MG: 20 TABLET ORAL at 14:52

## 2025-05-17 RX ADMIN — AMPICILLIN SODIUM 2000 MG: 2 INJECTION, POWDER, FOR SOLUTION INTRAMUSCULAR; INTRAVENOUS at 02:28

## 2025-05-17 RX ADMIN — INSULIN LISPRO 4 UNITS: 100 INJECTION, SOLUTION INTRAVENOUS; SUBCUTANEOUS at 18:13

## 2025-05-17 RX ADMIN — Medication 25 MG: at 05:36

## 2025-05-17 RX ADMIN — AMPICILLIN SODIUM 2000 MG: 2 INJECTION, POWDER, FOR SOLUTION INTRAMUSCULAR; INTRAVENOUS at 18:16

## 2025-05-17 RX ADMIN — SODIUM CHLORIDE, PRESERVATIVE FREE 10 ML: 5 INJECTION INTRAVENOUS at 21:48

## 2025-05-17 ASSESSMENT — PAIN DESCRIPTION - LOCATION: LOCATION: BACK

## 2025-05-17 ASSESSMENT — PAIN SCALES - GENERAL
PAINLEVEL_OUTOF10: 6
PAINLEVEL_OUTOF10: 7
PAINLEVEL_OUTOF10: 5

## 2025-05-17 NOTE — PROGRESS NOTES
Infectious Diseases Progress Note    Patient:  Rodney Garcia  YOB: 1960  MRN: [id number]   Admit date: 5/5/2025   Admitting Physician: Nando Albert MD  Primary Care Physician: Tu Puente MD    Chief Complaint: Seeing in follow-up today of Streptococcus intermedius bloodstream infection and paraspinal infection.    Interval History: Clinically he is about the same.  He reports some back discomfort.  He is voiding without difficulty.  He does not report any new lower extremity neurological symptoms.  Encouraging him to continue to work with therapy.  Discussed with Dr. Rivera of spine surgery this morning.  Explained I felt the coagulase-negative staph was most likely contaminant since he was not on treatment for coagulase-negative staph preoperatively, extensive infection was identified operatively, only 2 of the multiple operative cultures showed coagulase-negative staph, and different species were grown on those cultures.  If coag negative staph was pathogen it should have grown on all of the cultures.  In addition strep is easier to render culture negative in the setting of preoperative antibiotic treatment.    In/Out    Intake/Output Summary (Last 24 hours) at 5/17/2025 0959  Last data filed at 5/17/2025 0815  Gross per 24 hour   Intake --   Output 2905 ml   Net -2905 ml     Allergies: No Known Allergies  Current Meds: lactulose (CHRONULAC) 10 GM/15ML solution 20 g, TID  HYDROmorphone HCl PF (DILAUDID) injection 0.25 mg, Q3H PRN   Or  HYDROmorphone HCl PF (DILAUDID) injection 0.5 mg, Q3H PRN  oxyCODONE-acetaminophen (PERCOCET)  MG per tablet 1 tablet, Q4H PRN  oxyCODONE-acetaminophen (PERCOCET)  MG per tablet 2 tablet, Q4H PRN  bisacodyl (DULCOLAX) suppository 10 mg, Daily  naloxegol (MOVANTIK) tablet 25 mg, QAM AC  0.9 % sodium chloride infusion, Continuous  insulin lispro (HUMALOG,ADMELOG) injection vial 0-16 Units, 4x Daily AC & HS  polyethylene glycol (GLYCOLAX) packet 17 g,

## 2025-05-17 NOTE — PROGRESS NOTES
[facility] Neurology Progress Note      Patient:   Rodney Garcia  MR#:    [id number]   Room:    [facility]420-   YOB: 1960  Date of Progress Note: 5/17/2025  Time of Note                           8:19 AM  Consulting Physician:  Leif Reyna DO  Attending Physician:  Nando Albert MD      INTERVAL HISTORY: No new complaints.  More alert as per nursing.    REVIEW OF SYSTEMS:  Constitutional: No fevers No chills  Neck:No stiffness  Respiratory: No shortness of breath  Cardiovascular: No chest pain No palpitations  Gastrointestinal: No abdominal pain    Genitourinary: No Dysuria  Neurological: No headache, no confusion    PHYSICAL EXAM:    Constitutional -   /68   Pulse 82   Temp 99 °F (37.2 °C) (Temporal)   Resp 20   Ht 1.803 m (5' 10.98\")   Wt 94.9 kg (209 lb 3.5 oz)   SpO2 95%   BMI 29.19 kg/m²     Constitutional - well developed, well nourished.   Eyes - conjunctiva normal.   Ear, nose, throat - No scars, masses, or lesions over external nose or ears, no atrophy of tongue  Neck-symmetric, no masses noted, no jugular vein distension  Respiration- chest wall appears symmetric, good expansion,   normal effort without use of accessory muscles  Musculoskeletal - no significant wasting of muscles noted, no bony deformities  Extremities-no clubbing, cyanosis or edema  Skin - warm, dry, and intact. No rash, erythema, or pallor.  Psychiatric - mood, affect, and behavior appear normal.      Neurological exam  Awake, alert, fluent oriented appropriate affect  Attention and concentration appear appropriate  Recent and remote memory appears unremarkable  Speech normal without dysarthria  No clear issues with language of fund of knowledge     Cranial Nerve Exam     CN III, IV,VI-EOMI, No nystagmus, conjugate eye movements, no ptosis    CN VII-no facial assymetry       Motor Exam  antigravity throughout upper and lower extremities bilaterally      Tremors- no tremors in hands or head noted    cerebellum, not visualized on prior GRE sequence (SWI was not performed).  Findings could represent new tiny intraparenchymal hematomas versus remote microhemorrhage or cerebral amyloid angiopathy. 4. Interval resolution of previously seen tiny subacute infarcts. 5. Chronic white matter microvascular ischemic changes.   ______________________________________ Electronically signed by: KUSUM AGUILLON M.D. Date:     05/09/2025 Time:    12:47     MRI LUMBAR SPINE W WO CONTRAST  Result Date: 5/9/2025  EXAM:  LUMBAR SPINE MRI WITHOUT AND WITH CONTRAST  HISTORY:  Rule out spinal abscess  TECHNIQUE:  Multi-sequential multiplanar MRI lumbar spine before and after the administration of 20 ml of MultiHance contrast intravenously.  COMPARISON:  Lumbar spine CT dated 05/06/2025.  FINDINGS:  Multiple enhancing epidural fluid collections are noted at L3-L4 to the S2 sacral canal, which could represent epidural abscesses, causing severe spinal canal stenosis at these levels. Multiple enhancing fluid collections are noted in bilateral paraspinous muscles at L2 to sacral level, consistent with abscesses.  There is probable myositis and small abscesses in the inferior left psoas.  There are moderate bilateral facet joint effusions at L4-L5.  Septic joint cannot be excluded.  There is mild marrow edema in the L5 vertebral body, bilateral L4-L5 pedicles and facets as well as L4-L5 spinous processes.  Osteomyelitis cannot be excluded.  There is grade 1 anterolisthesis at L4-L5, and L5-S1.  The vertebral body heights are maintained.  The lumbar lordosis is maintained.  The conus medullaris terminates at L1-L2.  The prevertebral and paraspinal soft tissues are unremarkable.  The visualized portions of the abdominopelvic viscera are grossly unremarkable.  T12-L1: There is no significant disc herniation, spinal canal stenosis, or neuroforaminal stenosis.  L1-L2: There is mild bilateral facet hypertrophy.  There is no significant disc

## 2025-05-17 NOTE — PROGRESS NOTES
[facility]    Progress Note    Patient:  Rodney Garcia  YOB: 1960  Date of Service: 5/17/2025  MRN: [id number]   Acct: [id number]   Primary Care Physician: Tu Puente MD  Advance Directive: Full Code  Admit Date: 5/5/2025       Hospital Day: 11    Portions of this note have been copied forward, however, updated to reflect the most current clinical status of this patient.     CHIEF COMPLAINT: Generalized weakness    SUBJECTIVE: Pain under control this morning, ongoing constipation, feels motivated to ambulate today if tolerated    CUMULATIVE HOSPITAL COURSE:     This patient is a 64-year-old male with hypertension, CVA, PFO and hyperlipidemia who presented to [facility] ED on 5/5 for evaluation of generalized weakness and difficulty with ambulation.  Of note, patient was recently discharged from [facility] after being treated for strep intermedius bacteremia with Zyvox and acute CVA with MRI showing acute infarcts in left MCA distribution and subacute infarcts in bilateral cerebral hemispheres, appearing somewhat embolic, bubble study nondiagnostic at that time.  He was discharged on plavix and switched to aspirin alone by neurology on 5/2. He presented to his PCP on 5/5 for evaluation of left hip pain, had an US the same day which showed edematous tissue without drainable fluid collection. Later that day, patient experienced a fall due to loss of balance thus presenting here for evaluation. Per family, patient has had multiple falls without LOC since being [place] [facility] [place]       In ED, CT of left hip showed soft tissue swelling and multifocal osteoarthritis. Xray hips showed severe bilateral hip osteoarthritic change, possible femoral acetabular impingement, no acute fractures. CTA showed no PE, bibasilar atelectasis, and low density liver lesion measuring 1.3 cm. Lab work indicated procalcitonin 0.58, , WBC 14.9, and lactic 2.0.  Respiratory panel was positive for human  Valve: Valve structure is normal. Trace regurgitation. No stenosis noted.   Tricuspid Valve: Trace regurgitation.   Left Atrium: Left atrium size is normal. Normal sized appendage. Normal appendage flow velocity. No left atrial appendage thrombus noted. No left atrial appendage mass noted.   Interatrial Septum: Grade II Positive (10 to 20 bubbles). Agitated saline study was positive without provocation. Right to left shunt was noted. PFO present. Hypermobile interatrial septum. The septum is bowing into the LA.   Right Atrium: Right atrium size is normal.   Pericardium: Trivial pericardial effusion present. No indication of cardiac tamponade.   Image quality is adequate. No evidence to suggest endocarditis by transesophageal echocardiography. Evidence of hypermobile/borderline atrial septal aneurysm with PFO and right-to-left shunting.       Culture Results:    Recent Labs     05/13/25  1425   CXSURG Isolated from Broth       Blood Culture Recent:   Recent Labs     05/10/25  1518 05/07/25  1013 05/05/25  2355   BC No growth after 5 days of incubation.  Bottle volume = 9 ml   Bottle volume = >10 ml  *  Isolated from Aerobic and Anaerobic bottle  No further workup  Refer to (blood culture collected 5/5/25@2355) for sensitivity results    Bottle volume = 8 ml   Bottle volume = 7 ml  *  Isolated from Aerobic and Anaerobic bottle       No results for input(s): \"BC\", \"BLOODCULT2\", \"ORG\" in the last 72 hours.    Assessment/Plan:   Bacteremia  Abscess in epidural space of lumbar spine  Spinal epidural abscess  -ID following  -Blood cultures on admission grew strep intermedius, repeat show no growth after 5 days  -Rocephin transitioned to Ampicillin on 5/12, long-term antibiotic therapy recommendations placed per ID  -PICC line placed 5/16  -VILMA showed no vegetations  -Neurosurgery following  -S/p L3/4 and L4/5 MIDLIF on 5/13  -Follow surgical cultures-- one culture showing staph epidermidis  -Pain control  -PT/OT

## 2025-05-17 NOTE — PROGRESS NOTES
NEUROSURGERY PROGRESS NOTE      Chief Complaint:   Chief Complaint   Patient presents with    Fall     Left hip pain         Date of Surgery: 5/13/2025    Procedure Performed:    1. L3-4 and L4-5 laminectomy and bilateral facetectomy for drainage of epidural abscess and debridement of septic facets.  2. L3-4 and L4-5 midline lumbar interbody fusion using Medtronic Solera cortical screws and Catalyft interbody cages filled with Shwetha and i-FACTOR DBM.      Interval Update:    Patient seen and examined.  He is now POD#4.  He is having difficulty mobilizing and requiring significant assistance.  He has no focal lower extremity weakness or sensory loss.  He is requesting an enema today to assist with a BM.       HPI:     The patient is a 64 y.o. male who presented to the [facility] ED with complaints of weakness, hip pain and multiple recent falls.  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  He has also been treated for a stroke within the past month.  He has continued with complaints of dizziness, weakness and multiple falls.  In the ED he was found to be tachycardic, tachypneic, with an elevated WBC count, lactic acid and procaclitonin.  He was admitted with possible sepsis and blood cultures have grown strep.  He has complained of back pain and hip pain since admission.  He does have a history of chronic back pain and was recently evaluated for surgery in [place] and he was told he was not a candidate for surgery because his Hgb A1c was 9.4%.  He does see Dr. Lloyd in pain management and receives injections.       He underwent an MRI as part of his infectious workup and this revealed evidence of widespread infection in the lumbar spine with a likely epidural abscess.     He was recently started on Eliquis due to his history of stroke.      Objective:    /68  output, will remove when <30mL in 24h          Electronically signed by Ayan Rivera MD on 5/17/2025 at 10:16 AM

## 2025-05-18 PROBLEM — B95.5 STREPTOCOCCAL BACTEREMIA: Status: ACTIVE | Noted: 2025-05-08

## 2025-05-18 LAB
ALBUMIN SERPL-MCNC: 2.5 G/DL (ref 3.5–5.2)
ALP SERPL-CCNC: 83 U/L (ref 40–129)
ALT SERPL-CCNC: 21 U/L (ref 10–50)
ANION GAP SERPL CALCULATED.3IONS-SCNC: 10 MMOL/L (ref 8–16)
AST SERPL-CCNC: 20 U/L (ref 10–50)
BACTERIA SPEC AEROBE CULT: ABNORMAL
BACTERIA SPEC AEROBE CULT: NORMAL
BACTERIA SPEC ANAEROBE CULT: ABNORMAL
BACTERIA SPEC ANAEROBE CULT: NORMAL
BASOPHILS # BLD: 0.1 K/UL (ref 0–0.2)
BASOPHILS NFR BLD: 0.6 % (ref 0–1)
BILIRUB SERPL-MCNC: 0.4 MG/DL (ref 0.2–1.2)
BUN SERPL-MCNC: 6 MG/DL (ref 8–23)
CALCIUM SERPL-MCNC: 8.3 MG/DL (ref 8.8–10.2)
CHLORIDE SERPL-SCNC: 99 MMOL/L (ref 98–107)
CO2 SERPL-SCNC: 27 MMOL/L (ref 22–29)
CREAT SERPL-MCNC: 0.5 MG/DL (ref 0.7–1.2)
EOSINOPHIL # BLD: 0.4 K/UL (ref 0–0.6)
EOSINOPHIL NFR BLD: 2.2 % (ref 0–5)
ERYTHROCYTE [DISTWIDTH] IN BLOOD BY AUTOMATED COUNT: 14.3 % (ref 11.5–14.5)
GLUCOSE BLD-MCNC: 125 MG/DL (ref 70–99)
GLUCOSE BLD-MCNC: 185 MG/DL (ref 70–99)
GLUCOSE BLD-MCNC: 195 MG/DL (ref 70–99)
GLUCOSE BLD-MCNC: 222 MG/DL (ref 70–99)
GLUCOSE SERPL-MCNC: 131 MG/DL (ref 70–99)
GRAM STN SPEC: ABNORMAL
GRAM STN SPEC: NORMAL
HCT VFR BLD AUTO: 27.8 % (ref 42–52)
HGB BLD-MCNC: 8.8 G/DL (ref 14–18)
IMM GRANULOCYTES # BLD: 1 K/UL
IRON SATN MFR SERPL: 15 % (ref 20–50)
IRON SERPL-MCNC: 21 UG/DL (ref 59–158)
LYMPHOCYTES # BLD: 1.7 K/UL (ref 1.1–4.5)
LYMPHOCYTES NFR BLD: 10.4 % (ref 20–40)
MCH RBC QN AUTO: 29.7 PG (ref 27–31)
MCHC RBC AUTO-ENTMCNC: 31.7 G/DL (ref 33–37)
MCV RBC AUTO: 93.9 FL (ref 80–94)
MONOCYTES # BLD: 0.9 K/UL (ref 0–0.9)
MONOCYTES NFR BLD: 5.3 % (ref 0–10)
NEUTROPHILS # BLD: 12.3 K/UL (ref 1.5–7.5)
NEUTS SEG NFR BLD: 75.7 % (ref 50–65)
ORGANISM: ABNORMAL
PERFORMED ON: ABNORMAL
PLATELET # BLD AUTO: 580 K/UL (ref 130–400)
PMV BLD AUTO: 8.6 FL (ref 9.4–12.4)
POTASSIUM SERPL-SCNC: 3.7 MMOL/L (ref 3.5–5)
PROT SERPL-MCNC: 5.3 G/DL (ref 6.4–8.3)
RBC # BLD AUTO: 2.96 M/UL (ref 4.7–6.1)
SODIUM SERPL-SCNC: 136 MMOL/L (ref 136–145)
TIBC SERPL-MCNC: 144 UG/DL (ref 250–400)
WBC # BLD AUTO: 16.2 K/UL (ref 4.8–10.8)

## 2025-05-18 PROCEDURE — 99232 SBSQ HOSP IP/OBS MODERATE 35: CPT | Performed by: PSYCHIATRY & NEUROLOGY

## 2025-05-18 PROCEDURE — 94760 N-INVAS EAR/PLS OXIMETRY 1: CPT

## 2025-05-18 PROCEDURE — 6370000000 HC RX 637 (ALT 250 FOR IP)

## 2025-05-18 PROCEDURE — 83540 ASSAY OF IRON: CPT

## 2025-05-18 PROCEDURE — 6370000000 HC RX 637 (ALT 250 FOR IP): Performed by: NEUROLOGICAL SURGERY

## 2025-05-18 PROCEDURE — 2500000003 HC RX 250 WO HCPCS: Performed by: NEUROLOGICAL SURGERY

## 2025-05-18 PROCEDURE — 36415 COLL VENOUS BLD VENIPUNCTURE: CPT

## 2025-05-18 PROCEDURE — 6370000000 HC RX 637 (ALT 250 FOR IP): Performed by: HOSPITALIST

## 2025-05-18 PROCEDURE — 83550 IRON BINDING TEST: CPT

## 2025-05-18 PROCEDURE — 80053 COMPREHEN METABOLIC PANEL: CPT

## 2025-05-18 PROCEDURE — 85025 COMPLETE CBC W/AUTO DIFF WBC: CPT

## 2025-05-18 PROCEDURE — 82962 GLUCOSE BLOOD TEST: CPT

## 2025-05-18 PROCEDURE — 94761 N-INVAS EAR/PLS OXIMETRY MLT: CPT

## 2025-05-18 PROCEDURE — 6360000002 HC RX W HCPCS: Performed by: NEUROLOGICAL SURGERY

## 2025-05-18 PROCEDURE — 2580000003 HC RX 258: Performed by: NEUROLOGICAL SURGERY

## 2025-05-18 PROCEDURE — 1200000000 HC SEMI PRIVATE

## 2025-05-18 RX ADMIN — AMPICILLIN SODIUM 2000 MG: 2 INJECTION, POWDER, FOR SOLUTION INTRAMUSCULAR; INTRAVENOUS at 10:15

## 2025-05-18 RX ADMIN — OXYCODONE AND ACETAMINOPHEN 1 TABLET: 10; 325 TABLET ORAL at 14:14

## 2025-05-18 RX ADMIN — AMPICILLIN SODIUM 2000 MG: 2 INJECTION, POWDER, FOR SOLUTION INTRAMUSCULAR; INTRAVENOUS at 13:48

## 2025-05-18 RX ADMIN — Medication 25 MG: at 05:51

## 2025-05-18 RX ADMIN — INSULIN LISPRO 4 UNITS: 100 INJECTION, SOLUTION INTRAVENOUS; SUBCUTANEOUS at 13:45

## 2025-05-18 RX ADMIN — SENNOSIDES AND DOCUSATE SODIUM 2 TABLET: 50; 8.6 TABLET ORAL at 10:12

## 2025-05-18 RX ADMIN — POLYETHYLENE GLYCOL 3350 17 G: 17 POWDER, FOR SOLUTION ORAL at 10:10

## 2025-05-18 RX ADMIN — TIZANIDINE 4 MG: 4 TABLET ORAL at 04:42

## 2025-05-18 RX ADMIN — TIZANIDINE 4 MG: 4 TABLET ORAL at 18:27

## 2025-05-18 RX ADMIN — INSULIN LISPRO 4 UNITS: 100 INJECTION, SOLUTION INTRAVENOUS; SUBCUTANEOUS at 21:53

## 2025-05-18 RX ADMIN — OXYCODONE AND ACETAMINOPHEN 1 TABLET: 10; 325 TABLET ORAL at 18:27

## 2025-05-18 RX ADMIN — AMPICILLIN SODIUM 2000 MG: 2 INJECTION, POWDER, FOR SOLUTION INTRAMUSCULAR; INTRAVENOUS at 05:51

## 2025-05-18 RX ADMIN — INSULIN LISPRO 4 UNITS: 100 INJECTION, SOLUTION INTRAVENOUS; SUBCUTANEOUS at 18:28

## 2025-05-18 RX ADMIN — ACETAMINOPHEN 650 MG: 325 TABLET ORAL at 03:09

## 2025-05-18 RX ADMIN — AMPICILLIN SODIUM 2000 MG: 2 INJECTION, POWDER, FOR SOLUTION INTRAMUSCULAR; INTRAVENOUS at 03:11

## 2025-05-18 RX ADMIN — SODIUM CHLORIDE, PRESERVATIVE FREE 10 ML: 5 INJECTION INTRAVENOUS at 10:15

## 2025-05-18 RX ADMIN — BISACODYL 10 MG: 10 SUPPOSITORY RECTAL at 15:43

## 2025-05-18 RX ADMIN — INSULIN GLARGINE 17 UNITS: 100 INJECTION, SOLUTION SUBCUTANEOUS at 21:53

## 2025-05-18 RX ADMIN — AMPICILLIN SODIUM 2000 MG: 2 INJECTION, POWDER, FOR SOLUTION INTRAMUSCULAR; INTRAVENOUS at 21:58

## 2025-05-18 RX ADMIN — OXYCODONE AND ACETAMINOPHEN 1 TABLET: 10; 325 TABLET ORAL at 04:42

## 2025-05-18 RX ADMIN — INSULIN GLARGINE 17 UNITS: 100 INJECTION, SOLUTION SUBCUTANEOUS at 10:12

## 2025-05-18 RX ADMIN — AMPICILLIN SODIUM 2000 MG: 2 INJECTION, POWDER, FOR SOLUTION INTRAMUSCULAR; INTRAVENOUS at 18:30

## 2025-05-18 RX ADMIN — PRAVASTATIN SODIUM 10 MG: 20 TABLET ORAL at 10:11

## 2025-05-18 ASSESSMENT — PAIN - FUNCTIONAL ASSESSMENT: PAIN_FUNCTIONAL_ASSESSMENT: PREVENTS OR INTERFERES SOME ACTIVE ACTIVITIES AND ADLS

## 2025-05-18 ASSESSMENT — PAIN SCALES - GENERAL
PAINLEVEL_OUTOF10: 6
PAINLEVEL_OUTOF10: 4
PAINLEVEL_OUTOF10: 6
PAINLEVEL_OUTOF10: 7

## 2025-05-18 ASSESSMENT — PAIN DESCRIPTION - ORIENTATION: ORIENTATION: MID

## 2025-05-18 ASSESSMENT — PAIN DESCRIPTION - LOCATION
LOCATION: BACK
LOCATION: BACK

## 2025-05-18 ASSESSMENT — PAIN DESCRIPTION - DESCRIPTORS: DESCRIPTORS: DULL

## 2025-05-18 NOTE — PROGRESS NOTES
heights are maintained.  The lumbar lordosis is maintained.  The conus medullaris terminates at L1-L2.  The prevertebral and paraspinal soft tissues are unremarkable.  The visualized portions of the abdominopelvic viscera are grossly unremarkable.  T12-L1: There is no significant disc herniation, spinal canal stenosis, or neuroforaminal stenosis.  L1-L2: There is mild bilateral facet hypertrophy.  There is no significant disc herniation, spinal canal stenosis, or neuroforaminal stenosis.  L2-L3: There is mild bilateral facet hypertrophy.  There is no significant disc herniation, spinal canal stenosis, or neuroforaminal stenosis.  L3-L4: Epidural abscess is noted, causing severe spinal canal stenosis.  There is mild bilateral facet hypertrophy, without definite neural foraminal stenosis.  L4-L5: There is grade 1 anterolisthesis.  There is broad-based posterior disc bulge extending to bilateral neural foramen.  There is bilateral ligamentum flavum and facet hypertrophy.  Epidural abscess is noted.  There is overall severe spinal canal stenosis.  L5-S1: There is grade 1 anterolisthesis.  Epidural abscess is noted.  There is severe spinal canal stenosis.  There is bilateral facet hypertrophy, without significant neural foraminal stenosis.      1. Multiple enhancing epidural abscesses at L3-L4 to the S2 sacral canal, causing severe spinal canal stenosis at these levels. 2. Multiple intramuscular abscesses in bilateral paraspinous muscles and inferior left psoas. 3. Moderate bilateral facet joint effusions at L4-L5.  Septic joint cannot be excluded. 4. Mild marrow edema in the L5 vertebral body, bilateral L4-L5 pedicles and facets as well as L4-L5 spinous processes.  Osteomyelitis cannot be excluded. 5. Degenerative changes as above.     ______________________________________ Electronically signed by: KUSUM AGUILLON M.D. Date:     05/09/2025 Time:    07:32     US LIVER  Result Date: 5/9/2025  EXAM: ULTRASOUND OF THE RIGHT  UPPER QUADRANT (LIMITED ABDOMEN)  HISTORY: Low-density liver lesion seen in a recent CTA chest.  TECHNIQUE: Sonography of the right upper quadrant of the abdomen was performed.  Color Doppler imaging and spectral Doppler imaging of the portal vein was also performed.  Images were obtained and stored in a permanent archive.  COMPARISON: Correlation is made with the CTA chest from 5/6/2025.  FINDINGS:  Pancreas:  Completely obscured by bowel gas. Liver:  Diffuse increased echogenicity.  No visible liver mass. Main portal vein: Patent with normal antegrade flow. Biliary: Common bile duct measures 3.0 mm. Cholecystectomy, again noted. Right Kidney: Measures 12.3 cm.  No hydronephrosis.  No lesions.  No calculus. IVC: Unremarkable. Other:  No ascites.        1.  Non visualization of the low density lesion seen on the recent CT. MRI liver mass protocol is recommended for better characterization. 2. Cholecystectomy, again noted. 3. No biliary tree dilatation. 4. Fatty liver. 5. Complete obscuration of the pancreas by bowel gas.          ______________________________________ Electronically signed by: LAKE OLMOS M.D. Date:     05/09/2025 Time:    06:28     VILMA (with contrast/ 3D PRN)  Result Date: 5/8/2025    Left Ventricle: Normal left ventricular systolic function with a visually estimated EF of 55 - 60%. Left ventricle size is normal. Normal wall thickness. Normal wall motion. Normal diastolic function.   Right Ventricle: Right ventricle size is normal. Normal systolic function.   Aortic Valve: Trileaflet valve. No regurgitation. No stenosis.   Mitral Valve: Valve structure is normal. Trace regurgitation. No stenosis noted.   Tricuspid Valve: Trace regurgitation.   Left Atrium: Left atrium size is normal. Normal sized appendage. Normal appendage flow velocity. No left atrial appendage thrombus noted. No left atrial appendage mass noted.   Interatrial Septum: Grade II Positive (10 to 20 bubbles). Agitated saline study

## 2025-05-18 NOTE — PROGRESS NOTES
[facility] Neurology Progress Note      Patient:   Rodney Garcia  MR#:    [id number]   Room:    [facility]420-   YOB: 1960  Date of Progress Note: 5/18/2025  Time of Note                           8:28 AM  Consulting Physician:  Leif Reyna DO  Attending Physician:  Nando Albert MD      INTERVAL HISTORY: Feeling well this morning.  No new issues overnight as per nursing.    REVIEW OF SYSTEMS:  Constitutional: No fevers No chills  Neck:No stiffness  Respiratory: No shortness of breath  Cardiovascular: No chest pain No palpitations  Gastrointestinal: No abdominal pain    Genitourinary: No Dysuria  Neurological: No headache, no confusion    PHYSICAL EXAM:    Constitutional -   BP (!) 108/59   Pulse 66   Temp 96.8 °F (36 °C) (Temporal)   Resp 16   Ht 1.803 m (5' 10.98\")   Wt 97.7 kg (215 lb 6.2 oz)   SpO2 94%   BMI 30.05 kg/m²     Constitutional - well developed, well nourished.   Eyes - conjunctiva normal.   Ear, nose, throat - No scars, masses, or lesions over external nose or ears, no atrophy of tongue  Neck-symmetric, no masses noted, no jugular vein distension  Respiration- chest wall appears symmetric, good expansion,   normal effort without use of accessory muscles  Musculoskeletal - no significant wasting of muscles noted, no bony deformities  Extremities-no clubbing, cyanosis or edema  Skin - warm, dry, and intact. No rash, erythema, or pallor.  Psychiatric - mood, affect, and behavior appear normal.      Neurological exam  Awake, alert, fluent oriented appropriate affect  Attention and concentration appear appropriate  Recent and remote memory appears unremarkable  Speech normal without dysarthria  No clear issues with language of fund of knowledge     Cranial Nerve Exam     CN III, IV,VI-EOMI, No nystagmus, conjugate eye movements, no ptosis    CN VII-no facial assymetry       Motor Exam  antigravity throughout upper and lower extremities bilaterally      Tremors- no tremors in   Completely obscured by bowel gas. Liver:  Diffuse increased echogenicity.  No visible liver mass. Main portal vein: Patent with normal antegrade flow. Biliary: Common bile duct measures 3.0 mm. Cholecystectomy, again noted. Right Kidney: Measures 12.3 cm.  No hydronephrosis.  No lesions.  No calculus. IVC: Unremarkable. Other:  No ascites.        1.  Non visualization of the low density lesion seen on the recent CT. MRI liver mass protocol is recommended for better characterization. 2. Cholecystectomy, again noted. 3. No biliary tree dilatation. 4. Fatty liver. 5. Complete obscuration of the pancreas by bowel gas.          ______________________________________ Electronically signed by: LAKE OLMOS M.D. Date:     05/09/2025 Time:    06:28     VILMA (with contrast/ 3D PRN)  Result Date: 5/8/2025    Left Ventricle: Normal left ventricular systolic function with a visually estimated EF of 55 - 60%. Left ventricle size is normal. Normal wall thickness. Normal wall motion. Normal diastolic function.   Right Ventricle: Right ventricle size is normal. Normal systolic function.   Aortic Valve: Trileaflet valve. No regurgitation. No stenosis.   Mitral Valve: Valve structure is normal. Trace regurgitation. No stenosis noted.   Tricuspid Valve: Trace regurgitation.   Left Atrium: Left atrium size is normal. Normal sized appendage. Normal appendage flow velocity. No left atrial appendage thrombus noted. No left atrial appendage mass noted.   Interatrial Septum: Grade II Positive (10 to 20 bubbles). Agitated saline study was positive without provocation. Right to left shunt was noted. PFO present. Hypermobile interatrial septum. The septum is bowing into the LA.   Right Atrium: Right atrium size is normal.   Pericardium: Trivial pericardial effusion present. No indication of cardiac tamponade.   Image quality is adequate. No evidence to suggest endocarditis by transesophageal echocardiography. Evidence of hypermobile/borderline

## 2025-05-19 ENCOUNTER — HOSPITAL ENCOUNTER (OUTPATIENT)
Facility: HOSPITAL | Age: 65
Discharge: HOME HEALTH | End: 2025-06-09
Attending: INTERNAL MEDICINE | Admitting: INTERNAL MEDICINE
Payer: COMMERCIAL

## 2025-05-19 ENCOUNTER — OUTSIDE FACILITY SERVICE (OUTPATIENT)
Age: 65
End: 2025-05-19

## 2025-05-19 ENCOUNTER — APPOINTMENT (OUTPATIENT)
Age: 65
DRG: 853 | End: 2025-05-19
Payer: COMMERCIAL

## 2025-05-19 VITALS
HEIGHT: 71 IN | RESPIRATION RATE: 18 BRPM | HEART RATE: 80 BPM | WEIGHT: 212.38 LBS | DIASTOLIC BLOOD PRESSURE: 64 MMHG | OXYGEN SATURATION: 93 % | TEMPERATURE: 99 F | BODY MASS INDEX: 29.73 KG/M2 | SYSTOLIC BLOOD PRESSURE: 132 MMHG

## 2025-05-19 LAB
ALBUMIN SERPL-MCNC: 2.5 G/DL (ref 3.5–5.2)
ALP SERPL-CCNC: 83 U/L (ref 40–129)
ALT SERPL-CCNC: 17 U/L (ref 10–50)
ANION GAP SERPL CALCULATED.3IONS-SCNC: 9 MMOL/L (ref 8–16)
AST SERPL-CCNC: 15 U/L (ref 10–50)
BACTERIA SPEC AEROBE CULT: ABNORMAL
BACTERIA SPEC ANAEROBE CULT: ABNORMAL
BASOPHILS # BLD: 0.1 K/UL (ref 0–0.2)
BASOPHILS NFR BLD: 0.8 % (ref 0–1)
BILIRUB SERPL-MCNC: 0.4 MG/DL (ref 0.2–1.2)
BUN SERPL-MCNC: 9 MG/DL (ref 8–23)
CALCIUM SERPL-MCNC: 8.3 MG/DL (ref 8.8–10.2)
CHLORIDE SERPL-SCNC: 100 MMOL/L (ref 98–107)
CO2 SERPL-SCNC: 26 MMOL/L (ref 22–29)
CREAT SERPL-MCNC: 0.5 MG/DL (ref 0.7–1.2)
ECHO BSA: 2.18 M2
EOSINOPHIL # BLD: 0.5 K/UL (ref 0–0.6)
EOSINOPHIL NFR BLD: 3.7 % (ref 0–5)
ERYTHROCYTE [DISTWIDTH] IN BLOOD BY AUTOMATED COUNT: 14.2 % (ref 11.5–14.5)
GLUCOSE BLD-MCNC: 108 MG/DL (ref 70–99)
GLUCOSE BLD-MCNC: 228 MG/DL (ref 70–99)
GLUCOSE BLD-MCNC: 294 MG/DL (ref 70–99)
GLUCOSE BLDC GLUCOMTR-MCNC: 178 MG/DL (ref 70–130)
GLUCOSE BLDC GLUCOMTR-MCNC: 255 MG/DL (ref 70–130)
GLUCOSE SERPL-MCNC: 115 MG/DL (ref 70–99)
GRAM STAIN RESULT: ABNORMAL
HCT VFR BLD AUTO: 28.3 % (ref 42–52)
HGB BLD-MCNC: 8.7 G/DL (ref 14–18)
IMM GRANULOCYTES # BLD: 0.7 K/UL
LYMPHOCYTES # BLD: 1.8 K/UL (ref 1.1–4.5)
LYMPHOCYTES NFR BLD: 13.4 % (ref 20–40)
MAGNESIUM SERPL-MCNC: 2 MG/DL (ref 1.6–2.4)
MCH RBC QN AUTO: 29.2 PG (ref 27–31)
MCHC RBC AUTO-ENTMCNC: 30.7 G/DL (ref 33–37)
MCV RBC AUTO: 95 FL (ref 80–94)
MONOCYTES # BLD: 0.9 K/UL (ref 0–0.9)
MONOCYTES NFR BLD: 6.5 % (ref 0–10)
NEUTROPHILS # BLD: 9.4 K/UL (ref 1.5–7.5)
NEUTS SEG NFR BLD: 70.7 % (ref 50–65)
ORGANISM: ABNORMAL
PERFORMED ON: ABNORMAL
PLATELET # BLD AUTO: 650 K/UL (ref 130–400)
PMV BLD AUTO: 8.8 FL (ref 9.4–12.4)
POTASSIUM SERPL-SCNC: 3.4 MMOL/L (ref 3.5–5)
PROT SERPL-MCNC: 5.3 G/DL (ref 6.4–8.3)
RBC # BLD AUTO: 2.98 M/UL (ref 4.7–6.1)
SODIUM SERPL-SCNC: 135 MMOL/L (ref 136–145)
WBC # BLD AUTO: 13.3 K/UL (ref 4.8–10.8)

## 2025-05-19 PROCEDURE — 99232 SBSQ HOSP IP/OBS MODERATE 35: CPT | Performed by: INTERNAL MEDICINE

## 2025-05-19 PROCEDURE — 82948 REAGENT STRIP/BLOOD GLUCOSE: CPT

## 2025-05-19 PROCEDURE — 2500000003 HC RX 250 WO HCPCS: Performed by: NEUROLOGICAL SURGERY

## 2025-05-19 PROCEDURE — 6360000002 HC RX W HCPCS: Performed by: NEUROLOGICAL SURGERY

## 2025-05-19 PROCEDURE — 6370000000 HC RX 637 (ALT 250 FOR IP): Performed by: NEUROLOGICAL SURGERY

## 2025-05-19 PROCEDURE — 99232 SBSQ HOSP IP/OBS MODERATE 35: CPT | Performed by: PSYCHIATRY & NEUROLOGY

## 2025-05-19 PROCEDURE — 63710000001 INSULIN LISPRO (HUMAN) PER 5 UNITS: Performed by: INTERNAL MEDICINE

## 2025-05-19 PROCEDURE — 94761 N-INVAS EAR/PLS OXIMETRY MLT: CPT

## 2025-05-19 PROCEDURE — 25010000002 AMPICILLIN PER 500 MG: Performed by: INTERNAL MEDICINE

## 2025-05-19 PROCEDURE — 2580000003 HC RX 258: Performed by: NEUROLOGICAL SURGERY

## 2025-05-19 PROCEDURE — 36415 COLL VENOUS BLD VENIPUNCTURE: CPT

## 2025-05-19 PROCEDURE — 6370000000 HC RX 637 (ALT 250 FOR IP)

## 2025-05-19 PROCEDURE — 83735 ASSAY OF MAGNESIUM: CPT

## 2025-05-19 PROCEDURE — OUTSIDEPOS PR OUTSIDE POS PLACEHOLDER: Performed by: INTERNAL MEDICINE

## 2025-05-19 PROCEDURE — 93246 EXT ECG>7D<15D RECORDING: CPT

## 2025-05-19 PROCEDURE — 63710000001 INSULIN GLARGINE PER 5 UNITS: Performed by: INTERNAL MEDICINE

## 2025-05-19 PROCEDURE — 85025 COMPLETE CBC W/AUTO DIFF WBC: CPT

## 2025-05-19 PROCEDURE — 80053 COMPREHEN METABOLIC PANEL: CPT

## 2025-05-19 PROCEDURE — 82962 GLUCOSE BLOOD TEST: CPT

## 2025-05-19 RX ORDER — SODIUM CHLORIDE 0.9 % (FLUSH) 0.9 %
10 SYRINGE (ML) INJECTION EVERY 12 HOURS SCHEDULED
Status: DISCONTINUED | OUTPATIENT
Start: 2025-05-19 | End: 2025-06-10 | Stop reason: HOSPADM

## 2025-05-19 RX ORDER — OXYCODONE AND ACETAMINOPHEN 10; 325 MG/1; MG/1
1 TABLET ORAL EVERY 4 HOURS PRN
Status: DISPENSED | OUTPATIENT
Start: 2025-05-19 | End: 2025-05-24

## 2025-05-19 RX ORDER — SODIUM CHLORIDE 9 MG/ML
40 INJECTION, SOLUTION INTRAVENOUS AS NEEDED
Status: DISCONTINUED | OUTPATIENT
Start: 2025-05-19 | End: 2025-06-10 | Stop reason: HOSPADM

## 2025-05-19 RX ORDER — POLYETHYLENE GLYCOL 3350 17 G/17G
17 POWDER, FOR SOLUTION ORAL DAILY
Qty: 30 PACKET | Refills: 0 | Status: SHIPPED | OUTPATIENT
Start: 2025-05-20 | End: 2025-06-19

## 2025-05-19 RX ORDER — POLYETHYLENE GLYCOL 3350 17 G/17G
17 POWDER, FOR SOLUTION ORAL DAILY
Status: DISCONTINUED | OUTPATIENT
Start: 2025-05-20 | End: 2025-06-10 | Stop reason: HOSPADM

## 2025-05-19 RX ORDER — DEXTROSE MONOHYDRATE 25 G/50ML
25 INJECTION, SOLUTION INTRAVENOUS
Status: DISCONTINUED | OUTPATIENT
Start: 2025-05-19 | End: 2025-06-10 | Stop reason: HOSPADM

## 2025-05-19 RX ORDER — INSULIN GLARGINE 100 [IU]/ML
17 INJECTION, SOLUTION SUBCUTANEOUS 2 TIMES DAILY
Qty: 10 ML | Refills: 3 | Status: SHIPPED | OUTPATIENT
Start: 2025-05-19

## 2025-05-19 RX ORDER — IBUPROFEN 600 MG/1
1 TABLET ORAL
Status: DISCONTINUED | OUTPATIENT
Start: 2025-05-19 | End: 2025-06-10 | Stop reason: HOSPADM

## 2025-05-19 RX ORDER — NICOTINE POLACRILEX 4 MG
15 LOZENGE BUCCAL
Status: DISCONTINUED | OUTPATIENT
Start: 2025-05-19 | End: 2025-06-10 | Stop reason: HOSPADM

## 2025-05-19 RX ORDER — ACETAMINOPHEN 325 MG/1
650 TABLET ORAL EVERY 4 HOURS PRN
Status: DISCONTINUED | OUTPATIENT
Start: 2025-05-19 | End: 2025-06-10 | Stop reason: HOSPADM

## 2025-05-19 RX ORDER — SODIUM CHLORIDE 0.9 % (FLUSH) 0.9 %
10 SYRINGE (ML) INJECTION AS NEEDED
Status: DISCONTINUED | OUTPATIENT
Start: 2025-05-19 | End: 2025-06-10 | Stop reason: HOSPADM

## 2025-05-19 RX ORDER — SENNA AND DOCUSATE SODIUM 50; 8.6 MG/1; MG/1
2 TABLET, FILM COATED ORAL DAILY
Qty: 60 TABLET | Refills: 1 | Status: SHIPPED | OUTPATIENT
Start: 2025-05-20

## 2025-05-19 RX ORDER — SODIUM CHLORIDE 0.9 % (FLUSH) 0.9 %
20 SYRINGE (ML) INJECTION AS NEEDED
Status: DISCONTINUED | OUTPATIENT
Start: 2025-05-19 | End: 2025-06-10 | Stop reason: HOSPADM

## 2025-05-19 RX ORDER — AMOXICILLIN 250 MG
1 CAPSULE ORAL DAILY
Status: DISCONTINUED | OUTPATIENT
Start: 2025-05-19 | End: 2025-06-06

## 2025-05-19 RX ORDER — OXYCODONE AND ACETAMINOPHEN 10; 325 MG/1; MG/1
1 TABLET ORAL EVERY 4 HOURS PRN
Qty: 18 TABLET | Refills: 0 | Status: SHIPPED | OUTPATIENT
Start: 2025-05-19 | End: 2025-05-22

## 2025-05-19 RX ORDER — INSULIN LISPRO 100 [IU]/ML
5 INJECTION, SOLUTION INTRAVENOUS; SUBCUTANEOUS
Qty: 1 EACH | Refills: 0 | Status: SHIPPED | OUTPATIENT
Start: 2025-05-19 | End: 2025-06-18

## 2025-05-19 RX ORDER — ACETAMINOPHEN 650 MG/1
650 SUPPOSITORY RECTAL EVERY 6 HOURS PRN
Status: DISCONTINUED | OUTPATIENT
Start: 2025-05-19 | End: 2025-06-10 | Stop reason: HOSPADM

## 2025-05-19 RX ORDER — BISACODYL 10 MG
10 SUPPOSITORY, RECTAL RECTAL DAILY
Qty: 30 SUPPOSITORY | Refills: 0 | Status: SHIPPED | OUTPATIENT
Start: 2025-05-20 | End: 2025-06-19

## 2025-05-19 RX ORDER — PRAVASTATIN SODIUM 10 MG
10 TABLET ORAL DAILY
Status: DISCONTINUED | OUTPATIENT
Start: 2025-05-20 | End: 2025-06-10 | Stop reason: HOSPADM

## 2025-05-19 RX ORDER — INSULIN LISPRO 100 [IU]/ML
3-14 INJECTION, SOLUTION INTRAVENOUS; SUBCUTANEOUS
Status: DISCONTINUED | OUTPATIENT
Start: 2025-05-19 | End: 2025-06-06

## 2025-05-19 RX ORDER — OXYCODONE AND ACETAMINOPHEN 5; 325 MG/1; MG/1
1 TABLET ORAL EVERY 4 HOURS PRN
Status: DISPENSED | OUTPATIENT
Start: 2025-05-19 | End: 2025-05-24

## 2025-05-19 RX ADMIN — Medication 25 MG: at 06:10

## 2025-05-19 RX ADMIN — SENNOSIDES AND DOCUSATE SODIUM 2 TABLET: 50; 8.6 TABLET ORAL at 09:40

## 2025-05-19 RX ADMIN — PRAVASTATIN SODIUM 10 MG: 20 TABLET ORAL at 09:40

## 2025-05-19 RX ADMIN — POTASSIUM CHLORIDE 40 MEQ: 1500 TABLET, EXTENDED RELEASE ORAL at 04:34

## 2025-05-19 RX ADMIN — AMPICILLIN SODIUM 2000 MG: 2 INJECTION, POWDER, FOR SOLUTION INTRAMUSCULAR; INTRAVENOUS at 06:14

## 2025-05-19 RX ADMIN — INSULIN GLARGINE 17 UNITS: 100 INJECTION, SOLUTION SUBCUTANEOUS at 09:41

## 2025-05-19 RX ADMIN — OXYCODONE AND ACETAMINOPHEN 1 TABLET: 10; 325 TABLET ORAL at 04:34

## 2025-05-19 RX ADMIN — SODIUM CHLORIDE, PRESERVATIVE FREE 10 ML: 5 INJECTION INTRAVENOUS at 09:41

## 2025-05-19 RX ADMIN — OXYCODONE AND ACETAMINOPHEN 1 TABLET: 10; 325 TABLET ORAL at 00:07

## 2025-05-19 RX ADMIN — OXYCODONE AND ACETAMINOPHEN 1 TABLET: 10; 325 TABLET ORAL at 09:40

## 2025-05-19 RX ADMIN — POLYETHYLENE GLYCOL 3350 17 G: 17 POWDER, FOR SOLUTION ORAL at 09:43

## 2025-05-19 RX ADMIN — TIZANIDINE 4 MG: 4 TABLET ORAL at 04:34

## 2025-05-19 RX ADMIN — AMPICILLIN SODIUM 2000 MG: 2 INJECTION, POWDER, FOR SOLUTION INTRAMUSCULAR; INTRAVENOUS at 02:17

## 2025-05-19 RX ADMIN — AMPICILLIN SODIUM 2000 MG: 2 INJECTION, POWDER, FOR SOLUTION INTRAMUSCULAR; INTRAVENOUS at 14:20

## 2025-05-19 RX ADMIN — AMPICILLIN SODIUM 2000 MG: 2 INJECTION, POWDER, FOR SOLUTION INTRAMUSCULAR; INTRAVENOUS at 09:49

## 2025-05-19 ASSESSMENT — PAIN DESCRIPTION - ORIENTATION: ORIENTATION: MID

## 2025-05-19 ASSESSMENT — PAIN SCALES - GENERAL
PAINLEVEL_OUTOF10: 6
PAINLEVEL_OUTOF10: 7
PAINLEVEL_OUTOF10: 5

## 2025-05-19 ASSESSMENT — PAIN - FUNCTIONAL ASSESSMENT: PAIN_FUNCTIONAL_ASSESSMENT: PREVENTS OR INTERFERES SOME ACTIVE ACTIVITIES AND ADLS

## 2025-05-19 ASSESSMENT — PAIN DESCRIPTION - DESCRIPTORS: DESCRIPTORS: ACHING

## 2025-05-19 ASSESSMENT — PAIN DESCRIPTION - LOCATION
LOCATION: BACK

## 2025-05-19 NOTE — PROGRESS NOTES
NEUROSURGERY PROGRESS NOTE      Chief Complaint:   Chief Complaint   Patient presents with    Fall     Left hip pain         Date of Surgery: 5/13/2025    Procedure Performed:    1. L3-4 and L4-5 laminectomy and bilateral facetectomy for drainage of epidural abscess and debridement of septic facets.  2. L3-4 and L4-5 midline lumbar interbody fusion using Medtronic Solera cortical screws and Catalyft interbody cages filled with [place] and i-FACTOR DBM.      Interval Update:    Patient seen and examined.  He is now POD#6.  He states his back pain is improving and he is no longer taking IV pain medication.  He continues to deny lower extremity weakness or sensory loss.  Still having difficulty with mobilizing.  Significant drain output continues.       HPI:     The patient is a 64 y.o. male who presented to the [facility] ED with complaints of weakness, hip pain and multiple recent falls.  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  He has also been treated for a stroke within the past month.  He has continued with complaints of dizziness, weakness and multiple falls.  In the ED he was found to be tachycardic, tachypneic, with an elevated WBC count, lactic acid and procaclitonin.  He was admitted with possible sepsis and blood cultures have grown strep.  He has complained of back pain and hip pain since admission.  He does have a history of chronic back pain and was recently evaluated for surgery in [place] and he was told he was not a candidate for surgery because his Hgb A1c was 9.4%.  He does see Dr. Lloyd in pain management and receives injections.       He underwent an MRI as part of his infectious workup and this revealed evidence of widespread infection in the lumbar spine with a likely epidural abscess.     He was recently started on Eliquis due to his  history of stroke.      Objective:    /70   Pulse 78   Temp 98.4 °F (36.9 °C) (Temporal)   Resp 18   Ht 1.803 m (5' 10.98\")   Wt 96.3 kg (212 lb 6 oz)   SpO2 91%   BMI 29.63 kg/m²       Intake/Output Summary (Last 24 hours) at 5/19/2025 0817  Last data filed at 5/19/2025 0508  Gross per 24 hour   Intake --   Output 1160 ml   Net -1160 ml     HMV:  160 mL x24h        Physical Exam:    General: alert, cooperative, no distress  Cardiorespiratory: unlabored breathing  Abdomen: soft and nondistended  Wound:  Covered by clean Mepilex dressing      Neurologic Exam:    Mental Status: Alert, oriented, thought content appropriate  Cranial Nerves: PERRL, EOMI, symmetric facies, tongue midline  Motor: Motor exam is symmetrical 5 out of 5 all extremities bilaterally  Somatosensory: normal light touch sensation          Pertinent Labs:  Lab Results   Component Value Date    WBC 13.3 (H) 05/19/2025    HGB 8.7 (L) 05/19/2025    HCT 28.3 (L) 05/19/2025    MCV 95.0 (H) 05/19/2025     (H) 05/19/2025     Lab Results   Component Value Date/Time     05/19/2025 02:43 AM    K 3.4 05/19/2025 02:43 AM     05/19/2025 02:43 AM    CO2 26 05/19/2025 02:43 AM    BUN 9 05/19/2025 02:43 AM    CREATININE 0.5 05/19/2025 02:43 AM    GLUCOSE 115 05/19/2025 02:43 AM    CALCIUM 8.3 05/19/2025 02:43 AM          Imaging:    No new imaging        Assessment and Plan:    64 y.o. M s/p L3-5 MIDLIF with evacuation of epidural abscess on 5/13/2025.  He was admitted with complaints of back pain, left hip pain, generalized weakness and multiple falls.  He was found to be bacteremic at admission with blood cultures growing strep.  Spinal imaging revealed an extensive lumbar paraspinal infection with associated epidural abscess, L4/5 spondylolisthesis and L4/5 septic facet joints.      - Mobilize with PT/OT in LSO brace  - Continue antibiotics per ID and monitor surgical cultures, one now growing staph epidermidis  - Monitor HMV

## 2025-05-19 NOTE — PROGRESS NOTES
Gait  Not tested     LABS/IMAGING:    MRI BRAIN W WO CONTRAST  Result Date: 5/9/2025  EXAM:  BRAIN MRI WITHOUT AND WITH CONTRAST  HISTORY:  Stroke.  Epidural abscess.  TECHNIQUE:  Multiplanar and multisequence MRI of the brain before and after the administration of 20 cc of MultiHance contrast intravenously.  COMPARISON:  Brain MRI dated 04/08/2025.  Brain CT dated 05/06/2025.  FINDINGS:  Motion artifacts are noted.  There has been interval development of two 4 mm T2 hyperintense lesions in subcortical white matter of bilateral frontal lobes, with susceptibility artifact, which could represent tiny intraparenchymal hematomas. There are numerous punctate susceptibility artifacts scattered in bilateral cerebral hemispheres and bilateral cerebellum, not visualized on prior GRE sequence (SWI was not performed).  There is no evidence associated T2/FLAIR hyperintensity.  Findings could represent new tiny intraparenchymal hematomas versus remote microhemorrhage or cerebral amyloid angiopathy.  There has been interval resolution of previously seen tiny subacute infarcts in bilateral corona radiata and centrum semiovale and left occipital lobe.  There is no acute ischemic infarct.  The ventricles and sulci demonstrate a normal pattern.  Scattered foci of T2/FLAIR hyperintense signal are present in the subcortical and periventricular white matter, most commonly seen in chronic white matter microvascular ischemic changes.  The basilar cisterns are patent.  The paranasal sinuses and mastoid air cells are well aerated.  The orbits are within normal limits.  No calvarial abnormality is identified.      1. Motion degraded study. 2. Interval development of two 4 mm T2 hyperintense lesions in subcortical white matter of bilateral frontal lobes, with susceptibility artifact, which could represent tiny intraparenchymal hematomas. 3. Numerous punctate susceptibility artifacts scattered in bilateral cerebral hemispheres and bilateral  obscured by bowel gas. Liver:  Diffuse increased echogenicity.  No visible liver mass. Main portal vein: Patent with normal antegrade flow. Biliary: Common bile duct measures 3.0 mm. Cholecystectomy, again noted. Right Kidney: Measures 12.3 cm.  No hydronephrosis.  No lesions.  No calculus. IVC: Unremarkable. Other:  No ascites.        1.  Non visualization of the low density lesion seen on the recent CT. MRI liver mass protocol is recommended for better characterization. 2. Cholecystectomy, again noted. 3. No biliary tree dilatation. 4. Fatty liver. 5. Complete obscuration of the pancreas by bowel gas.          ______________________________________ Electronically signed by: LAKE OLMOS M.D. Date:     05/09/2025 Time:    06:28     VILMA (with contrast/ 3D PRN)  Result Date: 5/8/2025    Left Ventricle: Normal left ventricular systolic function with a visually estimated EF of 55 - 60%. Left ventricle size is normal. Normal wall thickness. Normal wall motion. Normal diastolic function.   Right Ventricle: Right ventricle size is normal. Normal systolic function.   Aortic Valve: Trileaflet valve. No regurgitation. No stenosis.   Mitral Valve: Valve structure is normal. Trace regurgitation. No stenosis noted.   Tricuspid Valve: Trace regurgitation.   Left Atrium: Left atrium size is normal. Normal sized appendage. Normal appendage flow velocity. No left atrial appendage thrombus noted. No left atrial appendage mass noted.   Interatrial Septum: Grade II Positive (10 to 20 bubbles). Agitated saline study was positive without provocation. Right to left shunt was noted. PFO present. Hypermobile interatrial septum. The septum is bowing into the LA.   Right Atrium: Right atrium size is normal.   Pericardium: Trivial pericardial effusion present. No indication of cardiac tamponade.   Image quality is adequate. No evidence to suggest endocarditis by transesophageal echocardiography. Evidence of hypermobile/borderline atrial septal

## 2025-05-19 NOTE — DISCHARGE SUMMARY
Rodney Garcia  :  1960  MRN:  [id number]    Admit date:  2025  Discharge date:  25    Discharging Physician:  DR Albert    Advance Directive: Full Code    Consults: IP CONSULT TO INFECTIOUS DISEASES  IP CONSULT TO ORTHOPEDIC SURGERY  IP CONSULT TO CARDIOLOGY  IP CONSULT TO NEUROLOGY  IP CONSULT TO NEUROSURGERY  IP CONSULT TO REHAB/TCU ADMISSION COORDINATOR  IP CONSULT TO VASCULAR ACCESS TEAM  IP CONSULT TO SOCIAL WORK     Primary Care Physician:  Tu Puente MD    Discharge Diagnoses:  Principal Problem:    Streptococcal bacteremia  Active Problems:    Ambulatory dysfunction    Diabetes (HCC)    CVA (cerebral vascular accident) (HCC)    Abscess in epidural space of lumbar spine    Spinal epidural abscess    Spondylolisthesis of lumbar region  Resolved Problems:    Cellulitis of left hip      Portions of this note have been copied forward, however, changed to reflect the most current clinical status of this patient.  Hospital Course:   64-year-old male with CVA, PFO, HTN, hyperlipidemia, with complaints of generalized weakness of difficulty with ambulation.  Patient recently discharged from [facility] after being treated for strep intermedius bacteremia completed Zyvox therapy.  In addition had acute CVA showing acute infarct in left MCA distribution and subacute infarct in bilateral cerebral hemispheres appearing somewhat embolic bubble study nondiagnostic.  He was discharged on Plavix and switch to aspirin only by neurology on .  He then presented to his PCP on  due to left hip pain.  Ultrasound showed edematous tissue without drainable fluid collection.  Later that same day patient experienced a fall due to loss of balance and presented to [facility] ER for further evaluation.  Patient stated he had had multiple falls without loss of consciousness since discharge from [facility].  Workup in ER CT left hip soft tissue swelling, osteoarthritis, XR hip bilateral hip OA change    oxyCODONE-acetaminophen  MG per tablet  Commonly known as: PERCOCET  Take 1 tablet by mouth every 4 hours as needed for Pain for up to 3 days. Max Daily Amount: 6 tablets     polyethylene glycol 17 g packet  Commonly known as: GLYCOLAX  Take 1 packet by mouth daily  Start taking on: May 20, 2025     sennosides-docusate sodium 8.6-50 MG tablet  Commonly known as: SENOKOT-S  Take 2 tablets by mouth daily  Start taking on: May 20, 2025            CONTINUE taking these medications      lisinopril 5 MG tablet  Commonly known as: PRINIVIL;ZESTRIL     pravastatin 10 MG tablet  Commonly known as: PRAVACHOL     tiZANidine 4 MG tablet  Commonly known as: ZANAFLEX            STOP taking these medications      celecoxib 200 MG capsule  Commonly known as: CELEBREX     traMADol 50 MG tablet  Commonly known as: ULTRAM               Where to Get Your Medications        These medications were sent to [id number] - [address] [facility] -  [phone] - F [phone]  [address] [place] [address] [address] [place] [id number]      Phone: [phone]   bisacodyl 10 MG suppository  insulin glargine 100 UNIT/ML injection vial  insulin lispro 100 UNIT/ML Soln injection vial  naloxegol 25 MG Tabs tablet  oxyCODONE-acetaminophen  MG per tablet  polyethylene glycol 17 g packet  sennosides-docusate sodium 8.6-50 MG tablet       You can get these medications from any pharmacy    Bring a paper prescription for each of these medications  ampicillin infusion         Discharge Instructions:   Follow up with Tu Puente MD in 7 days.  Take medications as directed.  Resume activity as tolerated.    Diet: ADULT ORAL NUTRITION SUPPLEMENT; Breakfast; Diabetic Oral Supplement  ADULT DIET; Regular; 4 carb choices (60 gm/meal); No sausage or oatmeal     Disposition: Patient is Stableand will be discharged to LTAC.    Time spent on discharge 40 minutes spent in assessing patient, reviewing

## 2025-05-19 NOTE — CARE COORDINATION
Message sent to Layne, with LTAC, to learn status precert.  Awaiting response.  [facility]  P [phone]  F [phone]  Electronically signed by Felicia Ma RN on 5/19/2025 at 8:36 AM    Precert approved for LTAC.      Per Layne, patient can admit to their facility with Hemovac as long as it is documented when drain can be removed.  Yusef OMALLEYN informed and she plans to include an order.    Also, per Layne, patient can admit to their facility with IV ATB same as it is now.  She will have ID discuss continuous IV ATB with their pharmacy once he is at their facility.  Electronically signed by Felicia Ma RN on 5/19/2025 at 11:51 AM

## 2025-05-19 NOTE — PROGRESS NOTES
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    Interval History: He indicates he thinks he is doing a little bit better.  His voice sounds stronger today.  He overall looks a little bit better.  He seems to indicate pain in the low back area is under better control.  He is not having any problems with bowel or bladder function.  He does not report any new lower extremity sensory problems.  He is moving all extremities equally.  He feels he is gaining some overall general strength although he has not been working with therapy much as of yet.  He indicates he did not get out of bed yesterday.  Indicates he does have some neuropathy involving his feet but that is been chronic and without change.  He indicates he thinks he will likely go to [facility] for additional physical therapy and antibiotic treatment.    In/Out    Intake/Output Summary (Last 24 hours) at 5/19/2025 0745  Last data filed at 5/19/2025 0508  Gross per 24 hour   Intake --   Output 1360 ml   Net -1360 ml     Allergies: No Known Allergies  Current Meds: HYDROmorphone HCl PF (DILAUDID) injection 0.25 mg, Q3H PRN   Or  HYDROmorphone HCl PF (DILAUDID) injection 0.5 mg, Q3H PRN  oxyCODONE-acetaminophen (PERCOCET)  MG per tablet 1 tablet, Q4H PRN  oxyCODONE-acetaminophen (PERCOCET)  MG per tablet 2 tablet, Q4H PRN  bisacodyl (DULCOLAX) suppository 10 mg, Daily  naloxegol (MOVANTIK) tablet 25 mg, QAM AC  0.9 % sodium chloride infusion, Continuous  insulin lispro (HUMALOG,ADMELOG) injection vial 0-16 Units, 4x Daily AC & HS  polyethylene glycol (GLYCOLAX) packet 17 g, Daily  sennosides-docusate  15.5*  --  16.2* 13.3*   HGB 8.2* 8.9* 8.8* 8.7*   *  --  580* 650*     BMP:  Recent Labs     05/17/25  0938 05/18/25  0149 05/19/25  0243   * 136 135*   K 3.8 3.7 3.4*   CL 97* 99 100   CO2 26 27 26   BUN 5* 6* 9   CREATININE 0.4* 0.5* 0.5*   GLUCOSE 244* 131* 115*     Culture Results:  Blood and surgical cultures reviewed.  Updates added in bold below.  Surgical cultures May 13, 2025:  Culture #1 paraspinal swab-few white blood cells, no organisms, no growth on primary media but growth in broth only.  Staphylococcus warneri (susceptibility below)     Susceptibility  Staphylococcus warneri (1)  Antibiotic Interpretation HENRI   Method Status     doxycycline Sensitive <=0.5 mcg/mL BACTERIAL SUSCEPTIBILITY PANEL BY HENRI       erythromycin Resistant >=8 mcg/mL BACTERIAL SUSCEPTIBILITY PANEL BY HENRI       inducible clindamycin resistance   Neg mcg/mL BACTERIAL SUSCEPTIBILITY PANEL BY HENRI       levofloxacin Sensitive <=0.12 mcg/mL BACTERIAL SUSCEPTIBILITY PANEL BY HENRI       oxacillin Sensitive <=0.25 mcg/mL BACTERIAL SUSCEPTIBILITY PANEL BY HENRI       tetracycline Sensitive <=1 mcg/mL BACTERIAL SUSCEPTIBILITY PANEL BY HENRI       vancomycin Sensitive <=0.5 mcg/mL BACTERIAL SUSCEPTIBILITY PANEL BY HENRI             Culture #2 paraspinal tissue-rare white blood cells, no organisms, culture no growth to date  Culture #3 epidural purulence (swab)-rare white blood cells, no organisms, culture growing Streptococcus intermedius (checked for susceptibility this a.m.-remains pending)  Culture #4 labeled L4-5 facet-result canceled by ancillary.  Staphylococcus epidermidis (susceptibility below)   Susceptibility  Staphylococcus epidermidis (1)     Antibiotic Interpretation HENRI   Method Status     clindamycin Sensitive <=0.12 mcg/mL BACTERIAL SUSCEPTIBILITY PANEL BY HENRI       doxycycline Sensitive <=0.5 mcg/mL BACTERIAL SUSCEPTIBILITY PANEL BY HENRI       erythromycin Sensitive <=0.25 mcg/mL BACTERIAL SUSCEPTIBILITY PANEL BY HENRI

## 2025-05-19 NOTE — PROGRESS NOTES
Nutrition Assessment     Type and Reason for Visit: Reassess    Nutrition Recommendations/Plan:   Continue POC     Malnutrition Assessment:  Malnutrition Status: At risk for malnutrition    Nutrition Assessment:  Pt states intake has been decreased, states eating less than 50%. Reports taking ONS this morning. Breakfast preferences noted.    Estimated Daily Nutrient Needs:  Energy (kcal):  6557-8318 (20-25/kg) Weight Used for Energy Requirements: Current     Protein (g):  101-156 (1.3-2/kg) Weight Used for Protein Requirements: Ideal        Fluid (ml/day):  0316-5777 Method Used for Fluid Requirements: 1 ml/kcal    Nutrition Related Findings:   Na 134, Glu 244-355, A1c 9.3, +1 LLE edema Wound Type: Surgical Incision    Current Nutrition Therapies:    ADULT DIET; Regular; 4 carb choices (60 gm/meal)  ADULT ORAL NUTRITION SUPPLEMENT; Breakfast; Diabetic Oral Supplement    Anthropometric Measures:  Height: 180.3 cm (5' 10.98\")  Current Body Wt: 96.2 kg (212 lb)   BMI: 29.6        Nutrition Diagnosis:   Inadequate oral intake related to decreased appetite as evidenced by intake 26-50%    Nutrition Interventions:   Food and/or Nutrient Delivery: Continue Current Diet, Continue Oral Nutrition Supplement  Nutrition Education/Counseling: No recommendation at this time  Coordination of Nutrition Care: Continue to monitor while inpatient  Plan of Care discussed with: Pt    Goals:  Goals: PO intake 50% or greater  Type of Goal: Continue current goal  Previous Goal Met: New Goal    Nutrition Monitoring and Evaluation:   Behavioral-Environmental Outcomes: None Identified  Food/Nutrient Intake Outcomes: Food and Nutrient Intake, Supplement Intake  Physical Signs/Symptoms Outcomes: Biochemical Data, Fluid Status or Edema, Skin, Weight, Nutrition Focused Physical Findings    Discharge Planning:    Too soon to determine     Eileen E Summerlin, MS, RD, LD  Contact: [phone]

## 2025-05-19 NOTE — PLAN OF CARE
Problem: Pain  Goal: Verbalizes/displays adequate comfort level or baseline comfort level  Outcome: Progressing     Problem: Safety - Adult  Goal: Free from fall injury  Outcome: Progressing     Problem: ABCDS Injury Assessment  Goal: Absence of physical injury  Outcome: Progressing     Problem: Chronic Conditions and Co-morbidities  Goal: Patient's chronic conditions and co-morbidity symptoms are monitored and maintained or improved  Outcome: Progressing     Problem: Skin/Tissue Integrity  Goal: Skin integrity remains intact  Description: 1.  Monitor for areas of redness and/or skin breakdown2.  Assess vascular access sites hourly3.  Every 4-6 hours minimum:  Change oxygen saturation probe site4.  Every 4-6 hours:  If on nasal continuous positive airway pressure, respiratory therapy assess nares and determine need for appliance change or resting period  Outcome: Progressing     Problem: Discharge Planning  Goal: Discharge to home or other facility with appropriate resources  Outcome: Progressing     Problem: Nutrition Deficit:  Goal: Optimize nutritional status  Outcome: Progressing

## 2025-05-20 ENCOUNTER — APPOINTMENT (OUTPATIENT)
Dept: MRI IMAGING | Facility: HOSPITAL | Age: 65
End: 2025-05-20
Payer: COMMERCIAL

## 2025-05-20 ENCOUNTER — TELEPHONE (OUTPATIENT)
Age: 65
End: 2025-05-20
Payer: COMMERCIAL

## 2025-05-20 LAB
ALBUMIN SERPL-MCNC: 2.5 G/DL (ref 3.5–5.2)
ALBUMIN/GLOB SERPL: 0.8 G/DL
ALP SERPL-CCNC: 85 U/L (ref 39–117)
ALT SERPL W P-5'-P-CCNC: 13 U/L (ref 1–41)
ANION GAP SERPL CALCULATED.3IONS-SCNC: 11 MMOL/L (ref 5–15)
AST SERPL-CCNC: 11 U/L (ref 1–40)
BACTERIA SPEC AEROBE CULT: ABNORMAL
BACTERIA SPEC ANAEROBE CULT: ABNORMAL
BASOPHILS # BLD AUTO: 0.14 10*3/MM3 (ref 0–0.2)
BASOPHILS NFR BLD AUTO: 0.9 % (ref 0–1.5)
BILIRUB SERPL-MCNC: 0.5 MG/DL (ref 0–1.2)
BUN SERPL-MCNC: 9 MG/DL (ref 8–23)
BUN/CREAT SERPL: 22.5 (ref 7–25)
CALCIUM SPEC-SCNC: 8.2 MG/DL (ref 8.6–10.5)
CHLORIDE SERPL-SCNC: 98 MMOL/L (ref 98–107)
CO2 SERPL-SCNC: 27 MMOL/L (ref 22–29)
CREAT SERPL-MCNC: 0.4 MG/DL (ref 0.76–1.27)
DEPRECATED RDW RBC AUTO: 49.1 FL (ref 37–54)
EGFRCR SERPLBLD CKD-EPI 2021: 122.6 ML/MIN/1.73
EOSINOPHIL # BLD AUTO: 0.47 10*3/MM3 (ref 0–0.4)
EOSINOPHIL NFR BLD AUTO: 3.1 % (ref 0.3–6.2)
ERYTHROCYTE [DISTWIDTH] IN BLOOD BY AUTOMATED COUNT: 14.6 % (ref 12.3–15.4)
GLOBULIN UR ELPH-MCNC: 3.1 GM/DL
GLUCOSE BLDC GLUCOMTR-MCNC: 139 MG/DL (ref 70–130)
GLUCOSE BLDC GLUCOMTR-MCNC: 214 MG/DL (ref 70–130)
GLUCOSE BLDC GLUCOMTR-MCNC: 233 MG/DL (ref 70–130)
GLUCOSE BLDC GLUCOMTR-MCNC: 259 MG/DL (ref 70–130)
GLUCOSE SERPL-MCNC: 121 MG/DL (ref 65–99)
GRAM STN SPEC: ABNORMAL
HCT VFR BLD AUTO: 29.9 % (ref 37.5–51)
HGB BLD-MCNC: 9.4 G/DL (ref 13–17.7)
IMM GRANULOCYTES # BLD AUTO: 0.65 10*3/MM3 (ref 0–0.05)
IMM GRANULOCYTES NFR BLD AUTO: 4.2 % (ref 0–0.5)
LYMPHOCYTES # BLD AUTO: 1.61 10*3/MM3 (ref 0.7–3.1)
LYMPHOCYTES NFR BLD AUTO: 10.5 % (ref 19.6–45.3)
MCH RBC QN AUTO: 29.2 PG (ref 26.6–33)
MCHC RBC AUTO-ENTMCNC: 31.4 G/DL (ref 31.5–35.7)
MCV RBC AUTO: 92.9 FL (ref 79–97)
MONOCYTES # BLD AUTO: 0.99 10*3/MM3 (ref 0.1–0.9)
MONOCYTES NFR BLD AUTO: 6.5 % (ref 5–12)
NEUTROPHILS NFR BLD AUTO: 11.48 10*3/MM3 (ref 1.7–7)
NEUTROPHILS NFR BLD AUTO: 74.8 % (ref 42.7–76)
NRBC BLD AUTO-RTO: 0 /100 WBC (ref 0–0.2)
ORGANISM: ABNORMAL
PLATELET # BLD AUTO: 655 10*3/MM3 (ref 140–450)
PMV BLD AUTO: 8.6 FL (ref 6–12)
POTASSIUM SERPL-SCNC: 3.9 MMOL/L (ref 3.5–5.2)
PREALB SERPL-MCNC: 7.7 MG/DL (ref 20–40)
PROT SERPL-MCNC: 5.6 G/DL (ref 6–8.5)
RBC # BLD AUTO: 3.22 10*6/MM3 (ref 4.14–5.8)
SODIUM SERPL-SCNC: 136 MMOL/L (ref 136–145)
WBC NRBC COR # BLD AUTO: 15.34 10*3/MM3 (ref 3.4–10.8)

## 2025-05-20 PROCEDURE — 80053 COMPREHEN METABOLIC PANEL: CPT | Performed by: INTERNAL MEDICINE

## 2025-05-20 PROCEDURE — 97162 PT EVAL MOD COMPLEX 30 MIN: CPT

## 2025-05-20 PROCEDURE — 85025 COMPLETE CBC W/AUTO DIFF WBC: CPT | Performed by: INTERNAL MEDICINE

## 2025-05-20 PROCEDURE — 63710000001 INSULIN GLARGINE PER 5 UNITS: Performed by: INTERNAL MEDICINE

## 2025-05-20 PROCEDURE — 25010000002 AMPICILLIN PER 500 MG: Performed by: INTERNAL MEDICINE

## 2025-05-20 PROCEDURE — 82948 REAGENT STRIP/BLOOD GLUCOSE: CPT

## 2025-05-20 PROCEDURE — 84134 ASSAY OF PREALBUMIN: CPT | Performed by: INTERNAL MEDICINE

## 2025-05-20 PROCEDURE — 97166 OT EVAL MOD COMPLEX 45 MIN: CPT | Performed by: OCCUPATIONAL THERAPIST

## 2025-05-20 PROCEDURE — 63710000001 INSULIN LISPRO (HUMAN) PER 5 UNITS: Performed by: INTERNAL MEDICINE

## 2025-05-20 NOTE — H&P
Marco Lara M.D.  [facility]          Internal Medicine History and Physical      Name: Fredis Wilson  MRN: [id number]     Acct: [id number]  Room: 6/    Admit Date: 5/19/2025  PCP: Milo Verma MD    Chief Complaint:   Need for continued IV antibiotics      History Obtained From:     chart review and the patient    History of Present Illness:      Fredis Wilson is a  64 y.o.  male who presents with need for continued IV antibiotic therapy and rehabilitation efforts following a recent acute care stay. The patient had been in his usual state of health when he developed increased weakness and difficulty with ambulation leading to a fall. He had recently been evaluated out an outside facility and diagnosed with CVA as well as strep intermedius bacteremia. He had been seen in follow up after that hospitalization with a new complaint of left hip pain.  He presented to ER following the fall and was noted to have hyponatremia, leukocytosis and hyperglycemia. CT of the chest, lumbar spine, left hip and head were negative. He was found positive for rhinovirus. Blood cultures returned positive for strep intermedius and ID was consulted for antibiotic management. Imaging of the left hip revealed evidence of cellulitis without abscess. Neurology was consulted and MRI of the brain revealed interval hematomas and anticoagulation was discontinued. MRI lumbar spine revealed multiple epidural abscesses with intramuscular abscesses with a left psoas abscess.Surveillance cultures returned negative. Patient underwent L5 interbody fusion per neurosurgery on 5/13 and 1 culture returned positive for staph epidermidis. He required transfusion of blood products due to worsening anemia with hemoglobin down to 6.8. Due to his need for continued IV antibiotic therapy through July 8 and ongoing rehabilitation efforts, he transferred to our facility.     Past Medical History:     Past Medical  History:   Diagnosis Date    Congenital heart defect     Diabetes mellitus     Hyperlipidemia     Stroke         Past Surgical History:     Past Surgical History:   Procedure Laterality Date    ANKLE SURGERY Right     APPENDECTOMY      CHOLECYSTECTOMY      FRACTURE SURGERY Left     arm        Medications Prior to Admission:       Prior to Admission medications    Medication Sig Start Date End Date Taking? Authorizing Provider   glipizide (GLUCOTROL) 10 MG tablet Take 1 tablet by mouth 2 (Two) Times a Day Before Meals.    Pat Monroe MD   lisinopril (PRINIVIL,ZESTRIL) 5 MG tablet Take 1 tablet by mouth Daily.    Pat Monroe MD   meloxicam (MOBIC) 15 MG tablet Take 1 tablet by mouth Daily. Not currently using    Pat Monroe MD   metFORMIN (GLUCOPHAGE) 1000 MG tablet Take 1 tablet by mouth 2 (Two) Times a Day With Meals. 9/6/23   Pat Monroe MD   methylPREDNISolone (MEDROL) 4 MG dose pack Take as directed on package instructions. 10/3/23   Nara Ley APRN   montelukast (SINGULAIR) 10 MG tablet Take 1 tablet by mouth Daily. 9/6/23   Pat Monroe MD   pravastatin (PRAVACHOL) 10 MG tablet Take 1 tablet by mouth Daily.    Pat Monroe MD        Allergies:       Patient has no known allergies.    Social History:     Tobacco:    reports that he has never smoked. He has never used smokeless tobacco.  Alcohol:      reports no history of alcohol use.  Drug Use:  reports no history of drug use.    Family History:     No family history on file.    Review of Systems:     Review of Systems   Constitutional: Positive for malaise/fatigue. Negative for chills, decreased appetite, weight gain and weight loss.   HENT:  Negative for congestion, ear discharge, hoarse voice and tinnitus.    Eyes:  Negative for blurred vision, discharge, visual disturbance and visual halos.   Cardiovascular:  Negative for chest pain, claudication, dyspnea on exertion, irregular heartbeat, leg  swelling, orthopnea and paroxysmal nocturnal dyspnea.   Respiratory:  Negative for cough, shortness of breath, sputum production and wheezing.    Endocrine: Negative for cold intolerance, heat intolerance and polyuria.   Hematologic/Lymphatic: Negative for adenopathy. Does not bruise/bleed easily.   Skin:  Negative for dry skin, itching and suspicious lesions.   Musculoskeletal:  Positive for back pain, joint pain, muscle weakness and myalgias. Negative for arthritis and falls.   Gastrointestinal:  Negative for abdominal pain, constipation, diarrhea, dysphagia and hematemesis.   Genitourinary:  Negative for bladder incontinence, dysuria and frequency.   Neurological:  Positive for weakness. Negative for aphonia, disturbances in coordination and dizziness.   Psychiatric/Behavioral:  Negative for altered mental status, depression, memory loss and substance abuse. The patient does not have insomnia and is not nervous/anxious.        Code Status:    There are no questions and answers to display.       Physical Exam:     Vitals:    T 98.3 P 85 R 21 /51 Sp02 95% (room air)  Physical Exam  Vitals and nursing note reviewed.   Constitutional:       Appearance: He is well-developed.   HENT:      Head: Normocephalic and atraumatic.      Right Ear: External ear normal.      Left Ear: External ear normal.      Nose: Nose normal.   Eyes:      Pupils: Pupils are equal, round, and reactive to light.   Cardiovascular:      Rate and Rhythm: Normal rate and regular rhythm.      Heart sounds: Normal heart sounds.   Pulmonary:      Effort: Pulmonary effort is normal.      Breath sounds: Normal breath sounds.   Abdominal:      General: Bowel sounds are normal.      Palpations: Abdomen is soft.   Musculoskeletal:         General: Normal range of motion.      Cervical back: Normal range of motion and neck supple.      Comments: Generalized weakness   Skin:     General: Skin is warm and dry.   Neurological:      Mental Status: He is  alert and oriented to person, place, and time.      Deep Tendon Reflexes: Reflexes are normal and symmetric.   Psychiatric:         Behavior: Behavior normal.               Data:     Lab Results (last 7 days)       Procedure Component Value Units Date/Time    Prealbumin [[id number]]  (Abnormal) Collected: 05/20/25 0617    Specimen: Blood Updated: 05/20/25 1150     Prealbumin 7.7 mg/dL     POC Glucose Once [[id number]]  (Abnormal) Collected: 05/20/25 1010    Specimen: Blood Updated: 05/20/25 1021     Glucose 259 mg/dL      Comment: : [email or web address] Lashon DuarteieMeter ID: [id number]       POC Glucose Once [[id number]]  (Abnormal) Collected: 05/20/25 0722    Specimen: Blood Updated: 05/20/25 0734     Glucose 139 mg/dL      Comment: : irlanda CarterinMeter ID: [id number]       Comprehensive Metabolic Panel [[id number]]  (Abnormal) Collected: 05/20/25 0617    Specimen: Blood Updated: 05/20/25 0729     Glucose 121 mg/dL      BUN 9 mg/dL      Creatinine 0.40 mg/dL      Sodium 136 mmol/L      Potassium 3.9 mmol/L      Chloride 98 mmol/L      CO2 27.0 mmol/L      Calcium 8.2 mg/dL      Total Protein 5.6 g/dL      Albumin 2.5 g/dL      ALT (SGPT) 13 U/L      AST (SGOT) 11 U/L      Alkaline Phosphatase 85 U/L      Total Bilirubin 0.5 mg/dL      Globulin 3.1 gm/dL      A/G Ratio 0.8 g/dL      BUN/Creatinine Ratio 22.5     Anion Gap 11.0 mmol/L      eGFR 122.6 mL/min/1.73     Narrative:      GFR Categories in Chronic Kidney Disease (CKD)              GFR Category          GFR (mL/min/1.73)    Interpretation  G1                    90 or greater        Normal or high (1)  G2                    60-89                Mild decrease (1)  G3a                   45-59                Mild to moderate decrease  G3b                   30-44                Moderate to severe decrease  G4                    15-29                Severe decrease  G5                    14 or less           Kidney failure    (1)In the absence of  evidence of kidney disease, neither GFR category G1 or G2 fulfill the criteria for CKD.    eGFR calculation 2021 CKD-EPI creatinine equation, which does not include race as a factor    CBC & Differential [[id number]]  (Abnormal) Collected: 05/20/25 0617    Specimen: Blood Updated: 05/20/25 0706    Narrative:      The following orders were created for panel order CBC & Differential.  Procedure                               Abnormality         Status                     ---------                               -----------         ------                     CBC Auto Differential[[id number]]        Abnormal            Final result                 Please view results for these tests on the individual orders.    CBC Auto Differential [[id number]]  (Abnormal) Collected: 05/20/25 0617    Specimen: Blood Updated: 05/20/25 0706     WBC 15.34 10*3/mm3      RBC 3.22 10*6/mm3      Hemoglobin 9.4 g/dL      Hematocrit 29.9 %      MCV 92.9 fL      MCH 29.2 pg      MCHC 31.4 g/dL      RDW 14.6 %      RDW-SD 49.1 fl      MPV 8.6 fL      Platelets 655 10*3/mm3      Neutrophil % 74.8 %      Lymphocyte % 10.5 %      Monocyte % 6.5 %      Eosinophil % 3.1 %      Basophil % 0.9 %      Immature Grans % 4.2 %      Neutrophils, Absolute 11.48 10*3/mm3      Lymphocytes, Absolute 1.61 10*3/mm3      Monocytes, Absolute 0.99 10*3/mm3      Eosinophils, Absolute 0.47 10*3/mm3      Basophils, Absolute 0.14 10*3/mm3      Immature Grans, Absolute 0.65 10*3/mm3      nRBC 0.0 /100 WBC     POC Glucose Once [[id number]]  (Abnormal) Collected: 05/19/25 2000    Specimen: Blood Updated: 05/19/25 2012     Glucose 255 mg/dL      Comment: : [email or web address] manuel CarlaMeter ID: [id number]       POC Glucose Once [[id number]]  (Abnormal) Collected: 05/19/25 1733    Specimen: Blood Updated: 05/19/25 1744     Glucose 178 mg/dL      Comment: : christine Chopra ID: [id number]             XR Chest 1 View  Result Date: 5/16/2025  Narrative: EXAM: CHEST  RADIOGRAPH TECHNIQUE: Single frontal chest radiograph. HISTORY: PICC line placement COMPARISON: Chest single view 05/13/2025 FINDINGS: Left sided PICC line distal tip projects at the innominate caval junction. Moderately expanded lungs are clear. The heart size is normal. There is no pleural effusion. There is no pneumothorax. Moderate size hiatal hernia.    Impression: Left side PICC line distal tip projects at the innominate-caval junction. No acute cardiopulmonary process. Hiatal hernia. ______________________________________ Electronically signed by: LIDIA LOVING M.D. Date:     05/16/2025 Time:    14:44     XR Abdomen 1 View  Result Date: 5/15/2025  Narrative: EXAM:  ABDOMEN ONE-VIEW HISTORY:  Constipation FINDINGS:  Normal bowel gas pattern.  No pathologic calcifications.  No large free intraperitoneal gas. Generally moderate stool retention, right, transverse, left.  Mild distal colonic stool.  Skeleton appears normal.    Impression: Mild - moderate colonic stool retention. Nonobstructive bowel pattern ______________________________________ Electronically signed by: ANTHONY POLANCO M.D. Date:     05/15/2025 Time:    17:23     XR Chest 1 View  Result Date: 5/13/2025  Narrative: EXAM: CHEST RADIOGRAPH TECHNIQUE: Single frontal chest radiograph. HISTORY: Preoperative.  Hypoxia. COMPARISON: None. FINDINGS: Mild left basilar atelectasis.  Lungs are otherwise clear. The heart size is normal. There is no pleural effusion. There is no pneumothorax.    Impression: 1.  Mild left basilar atelectasis. 2.  Otherwise unremarkable chest radiograph. ______________________________________ Electronically signed by: IONA ARTEAGA M.D. Date:     05/13/2025 Time:    11:46     XR Spine Lumbar Complete 4+VW  Result Date: 5/11/2025  Narrative: EXAM: LUMBAR SPINE (AP, NEUTRAL LATERAL, FLEXION LATERAL AND EXTENSION LATERAL VIEWS) HISTORY:  Evaluate instability. COMPARISON: Lumbar spine radiographs 4/7/2025. MRI lumbar spine 5/8/2025.  CT lumbar spine 5/6/2025. FINDINGS: There are 6 non rib bearing lumbar type vertebral bodies. The lowest fully formed intervertebral disc spaces referred to as L5/S1 for purposes of this dictation, utilizing the nomenclature employed on recent MRI 5/8/2025. No acute compression fracture. 3.2 mm anterolisthesis at L4/L5 without significant excursion between flexion and extension. Trace anterolisthesis at L5/S1 without significant excursion between flexion and extension. Trace retrolisthesis at L3/L4 without significant excursion between flexion and extension. Disc space narrowing most severe at L4/L5 with bilateral facet hypertrophy. Vascular calcifications. No lance cortical destruction/erosive change at the current study. Please see recent MRI report for additional findings. Nonspecific bowel gas pattern with mild gaseous distension of small bowel and colon.    Impression: No acute osseous abnormality. Grade 1 listhesis at several levels without evidence of segmental instability. Chronic/degenerative changes as described. ______________________________________ Electronically signed by: VICTOR M HIGGINS M.D. Date:     05/11/2025 Time:    11:40     MRI Cyberknife BRAIN W WO Contrast  Result Date: 5/9/2025  Narrative: EXAM:  BRAIN MRI WITHOUT AND WITH CONTRAST HISTORY:  Stroke.  Epidural abscess. TECHNIQUE:  Multiplanar and multisequence MRI of the brain before and after the administration of 20 cc of MultiHance contrast intravenously. COMPARISON:  Brain MRI dated 04/08/2025.  Brain CT dated 05/06/2025. FINDINGS: Motion artifacts are noted. There has been interval development of two 4 mm T2 hyperintense lesions in subcortical white matter of bilateral frontal lobes, with susceptibility artifact, which could represent tiny intraparenchymal hematomas. There are numerous punctate susceptibility artifacts scattered in bilateral cerebral hemispheres and bilateral cerebellum, not visualized on prior GRE sequence (SWI was not  performed).  There is no evidence associated T2/FLAIR hyperintensity.  Findings could represent new tiny intraparenchymal hematomas versus remote microhemorrhage or cerebral amyloid angiopathy. There has been interval resolution of previously seen tiny subacute infarcts in bilateral corona radiata and centrum semiovale and left occipital lobe. There is no acute ischemic infarct. The ventricles and sulci demonstrate a normal pattern. Scattered foci of T2/FLAIR hyperintense signal are present in the subcortical and periventricular white matter, most commonly seen in chronic white matter microvascular ischemic changes. The basilar cisterns are patent. The paranasal sinuses and mastoid air cells are well aerated. The orbits are within normal limits. No calvarial abnormality is identified.    Impression: 1. Motion degraded study. 2. Interval development of two 4 mm T2 hyperintense lesions in subcortical white matter of bilateral frontal lobes, with susceptibility artifact, which could represent tiny intraparenchymal hematomas. 3. Numerous punctate susceptibility artifacts scattered in bilateral cerebral hemispheres and bilateral cerebellum, not visualized on prior GRE sequence (SWI was not performed).  Findings could represent new tiny intraparenchymal hematomas versus remote microhemorrhage or cerebral amyloid angiopathy. 4. Interval resolution of previously seen tiny subacute infarcts. 5. Chronic white matter microvascular ischemic changes. ______________________________________ Electronically signed by: CAROLYN EMMANUEL M.D. Date:     05/09/2025 Time:    12:47     MRI Lumbar Spine With & Without Contrast  Result Date: 5/9/2025  Narrative: EXAM:  LUMBAR SPINE MRI WITHOUT AND WITH CONTRAST HISTORY:  Rule out spinal abscess TECHNIQUE:  Multi-sequential multiplanar MRI lumbar spine before and after the administration of 20 ml of MultiHance contrast intravenously. COMPARISON:  Lumbar spine CT dated 05/06/2025. FINDINGS:  Multiple enhancing epidural fluid collections are noted at L3-L4 to the S2 sacral canal, which could represent epidural abscesses, causing severe spinal canal stenosis at these levels. Multiple enhancing fluid collections are noted in bilateral paraspinous muscles at L2 to sacral level, consistent with abscesses.  There is probable myositis and small abscesses in the inferior left psoas. There are moderate bilateral facet joint effusions at L4-L5.  Septic joint cannot be excluded.  There is mild marrow edema in the L5 vertebral body, bilateral L4-L5 pedicles and facets as well as L4-L5 spinous processes.  Osteomyelitis cannot be excluded. There is grade 1 anterolisthesis at L4-L5, and L5-S1. The vertebral body heights are maintained. The lumbar lordosis is maintained. The conus medullaris terminates at L1-L2. The prevertebral and paraspinal soft tissues are unremarkable. The visualized portions of the abdominopelvic viscera are grossly unremarkable. T12-L1: There is no significant disc herniation, spinal canal stenosis, or neuroforaminal stenosis. L1-L2: There is mild bilateral facet hypertrophy.  There is no significant disc herniation, spinal canal stenosis, or neuroforaminal stenosis. L2-L3: There is mild bilateral facet hypertrophy.  There is no significant disc herniation, spinal canal stenosis, or neuroforaminal stenosis. L3-L4: Epidural abscess is noted, causing severe spinal canal stenosis.  There is mild bilateral facet hypertrophy, without definite neural foraminal stenosis. L4-L5: There is grade 1 anterolisthesis.  There is broad-based posterior disc bulge extending to bilateral neural foramen.  There is bilateral ligamentum flavum and facet hypertrophy.  Epidural abscess is noted.  There is overall severe spinal canal stenosis. L5-S1: There is grade 1 anterolisthesis.  Epidural abscess is noted.  There is severe spinal canal stenosis.  There is bilateral facet hypertrophy, without significant neural  foraminal stenosis.    Impression: 1. Multiple enhancing epidural abscesses at L3-L4 to the S2 sacral canal, causing severe spinal canal stenosis at these levels. 2. Multiple intramuscular abscesses in bilateral paraspinous muscles and inferior left psoas. 3. Moderate bilateral facet joint effusions at L4-L5.  Septic joint cannot be excluded. 4. Mild marrow edema in the L5 vertebral body, bilateral L4-L5 pedicles and facets as well as L4-L5 spinous processes.  Osteomyelitis cannot be excluded. 5. Degenerative changes as above.   ______________________________________ Electronically signed by: CAROLYN EMMANUEL M.D. Date:     05/09/2025 Time:    07:32     US Liver  Result Date: 5/9/2025  Narrative: EXAM: ULTRASOUND OF THE RIGHT UPPER QUADRANT (LIMITED ABDOMEN) HISTORY: Low-density liver lesion seen in a recent CTA chest. TECHNIQUE: Sonography of the right upper quadrant of the abdomen was performed.  Color Doppler imaging and spectral Doppler imaging of the portal vein was also performed.  Images were obtained and stored in a permanent archive. COMPARISON: Correlation is made with the CTA chest from 5/6/2025. FINDINGS: Pancreas:  Completely obscured by bowel gas. Liver:  Diffuse increased echogenicity.  No visible liver mass. Main portal vein: Patent with normal antegrade flow. Biliary: Common bile duct measures 3.0 mm. Cholecystectomy, again noted. Right Kidney: Measures 12.3 cm.  No hydronephrosis.  No lesions.  No calculus.  IVC: Unremarkable. Other:  No ascites.    Impression: 1.  Non visualization of the low density lesion seen on the recent CT. MRI liver mass protocol is recommended for better characterization. 2. Cholecystectomy, again noted. 3. No biliary tree dilatation. 4. Fatty liver. 5. Complete obscuration of the pancreas by bowel gas. ______________________________________ Electronically signed by: SHIRAZ JOHNSTON M.D. Date:     05/09/2025 Time:    06:28     Adult Transesophageal Echo (MEDARDO) W/ Cont if Necessary  Per Protocol  Result Date: 5/8/2025  Narrative:   Left Ventricle: Normal left ventricular systolic function with a visually estimated EF of 55 - 60%. Left ventricle size is normal. Normal wall thickness. Normal wall motion. Normal diastolic function.   Right Ventricle: Right ventricle size is normal. Normal systolic function.   Aortic Valve: Trileaflet valve. No regurgitation. No stenosis.   Mitral Valve: Valve structure is normal. Trace regurgitation. No stenosis noted.   Tricuspid Valve: Trace regurgitation.   Left Atrium: Left atrium size is normal. Normal sized appendage. Normal appendage flow velocity. No left atrial appendage thrombus noted. No left atrial appendage mass noted.   Interatrial Septum: Grade II Positive (10 to 20 bubbles). Agitated saline study was positive without provocation. Right to left shunt was noted. PFO present. Hypermobile interatrial septum. The septum is bowing into the LA.   Right Atrium: Right atrium size is normal.   Pericardium: Trivial pericardial effusion present. No indication of cardiac tamponade.   Image quality is adequate. No evidence to suggest endocarditis by transesophageal echocardiography. Evidence of hypermobile/borderline atrial septal aneurysm with PFO and right-to-left shunting. Left Ventricle Normal left ventricular systolic function with a visually estimated EF of 55 - 60%. Left ventricle size is normal. Normal wall thickness. Normal wall motion. Normal diastolic function. Right Ventricle Right ventricle size is normal. Normal systolic function. Left Atrium Left atrium size is normal. Normal sized appendage. Normal appendage flow velocity. No left atrial appendage thrombus noted. No left atrial appendage mass noted. Right Atrium Right atrium size is normal. Mitral Valve Valve structure is normal. Trace regurgitation. No stenosis noted. Tricuspid Valve Valve structure is normal. Trace regurgitation. No stenosis noted. Aortic Valve Trileaflet valve. No restricted  motion. No regurgitation. No stenosis. Pulmonic Valve Trace regurgitation. No stenosis noted. Ascending Aorta Normal sized sinus of Valsalva, aortic root and ascending aorta. Atherosclerosis of the descending aorta with normal thickness. Pericardium Trivial pericardial effusion present. No indication of cardiac tamponade. Septum Grade II Positive (10 to 20 bubbles). Agitated saline study was positive without provocation. Right to left shunt was noted. PFO present. Hypermobile interatrial septum. The septum is bowing into the LA. Study Details Image quality: adequate. MEDARDO probe number: 3. MEDARDO probe was inserted by the cardiologist with no difficulty. No complications. Color flow Doppler was performed and pulse wave and/or continuous wave Doppler was performed. Saline contrast was given to evaluate for intracardiac shunt. Clinical Background Streptococcus bacteremia    US EXTREMITY LEFT NON VASC LIMITED  Result Date: 5/7/2025  Narrative: EXAM:  ULTRASOUND OF THE LEFT HIP SOFT TISSUES. HISTORY: Left hip redness and swelling. TECHNIQUE: Limited sonography of the left hip soft tissues was performed.  Images were obtained and stored in a permanent archive. COMPARISON: None. FINDINGS: Diffuse skin thickening and subcutaneous edema. No visible fluid collection, mass, or adenopathy.    Impression: 1.  Cellulitis, without visible abscess in the region of interest. ______________________________________ Electronically signed by: SHIRAZ JOHNSTON M.D. Date:     05/07/2025 Time:    11:57     Intracardiac echocardiogram  Result Date: 5/6/2025  Narrative:   Left Ventricle: EF by visual approximation is 55%. Increased wall thickness. Mild septal thickening. Normal wall motion. Normal diastolic function.   Right Ventricle: Not well visualized.   Image quality is technically difficult. Left Ventricle EF by visual approximation is 55%. Increased wall thickness. Mild septal thickening.Normal wall motion. Normal diastolic function. Right  Ventricle Not well visualized. TAPSE is normal. Left Atrium Left atrium size is normal. Right Atrium Right atrium size is normal. Mitral Valve Valve structure is normal. Physiologically normal regurgitation. Tricuspid Valve Valve structure is normal. Physiologically normal regurgitation. Aortic Valve Not well visualized. No regurgitation. No stenosis. Pulmonic Valve The pulmonic valve was not well visualized. Pericardium No pericardial effusion. Study Details Image quality: technically difficult. No contrast was given. Patient refused lumason and wanted to end test he was in pain from his left side where he fell    XR Chest 1 View  Result Date: 5/6/2025  Narrative: EXAM: CHEST RADIOGRAPH TECHNIQUE: Single frontal chest radiograph. HISTORY: Shortness of breath. COMPARISON: 4/7/2025 FINDINGS: The patient is rotated to the right. Hypoventilation. EKG leads project over the chest. No pulmonary infiltrate is identified. No pleural effusion or pneumothorax is seen. Stable mild cardiomegaly. No acute displaced rib fractures are identified. Old fractures of the left clavicle, left scapula, and several left ribs, again noted.    Impression: 1.  Limited hypoventilated portable study of the chest. No obvious acute findings. 2. Stable cardiomegaly. ______________________________________ Electronically signed by: SHIRAZ JOHNSTON M.D. Date:     05/06/2025 Time:    06:36     XR Hip With or Without Pelvis 2 - 3 View Left  Result Date: 5/6/2025  Narrative: EXAM: LEFT HIP AND PELVIS RADIOGRAPH TECHNIQUE: 3 views.  Frontal pelvis.  Frontal and lateral left hip. HISTORY: left hip pain, fall COMPARISON: None.    Impression: Moderate to severe bilateral hip osteoarthritic change.  Prominent femoral head neck offset may suggest cam type femoral acetabular impingement. No acute fracture or dislocation detected. Soft tissues intact.  No osseous lesions.   Degenerative changes inferior left SI joint. ______________________________________  Electronically signed by: AGUSTINA MERIDA M.D. Date:     05/06/2025 Time:    06:29     CT Angiogram Chest Pulmonary Embolism  Result Date: 5/6/2025  Narrative: EXAM:  CTA OF THE PULMONARY ARTERIES WITH CONTRAST TECHNIQUE: CTA of the pulmonary arteries was performed with contrast. 2-D and 3-D reconstructions were performed. HISTORY:  Difficulty breathing.  Decreased oxygen saturation. COMPARISON:  None  FINDINGS: Pulmonary arteries: No pulmonary embolus evident. Lungs/pleura: Bibasilar atelectasis. No suspicious nodules.  Calcified granuloma on the right.  No consolidation.  There is some atelectasis along the lingula. Mediastinum: No adenopathy. Cardiovascular: No pericardial effusion. Coronary atherosclerosis.  Aorta is normal in caliber. No dissection evident. Chest wall/axillae/thoracic inlet: No mass or adenopathy. Imaged upper abdomen: Mild distal esophageal wall thickening.  Low density finding in the liver measuring 1.3 cm.  Indeterminate. Bones: Old rib fractures are present.    Impression: 1. No pulmonary embolus evident 2. Bibasilar atelectasis 3. Coronary atherosclerotic disease. 4. Low-density liver lesion in the left lobe measuring 1.3 cm.  Indeterminate on this exam. 5. Mild thickening of the distal esophageal wall.  Correlate with any evidence of reflux on clinical history. All CT scans are performed using dose optimization techniques as appropriate to the performed exam and includes at least one of the following:  Automated exposure control, adjustment of the mA and/or kV according to size, and the use of iterative reconstruction technique. All CT scans are performed using dose optimization techniques as appropriate to the performed exam and include at least one of the following: Automated exposure control, adjustment of the mA and/or kV according to size, and the use of iterative reconstruction technique. ______________________________________ Electronically signed by: ANDREA PASTRANA M.D. Date:      05/06/2025 Time:    01:05     CT Lumbar Spine Without Contrast  Result Date: 5/6/2025  Narrative: EXAM:  CT LUMBAR SPINE WITHOUT CONTRAST HISTORY:  Lower back pain TECHNIQUE:  CT of the lumbar spine with multiplanar reformations. FINDINGS:  Vertebral body height is maintained.  Degenerative anterior listhesis L4 measuring 3.5 mm.  Normal alignment otherwise.  No suspicious bony lesions or acute bony abnormalities.  No immediate paravertebral soft tissue abnormality. T12-L1:  No central canal or foraminal stenosis L1-L2:  No central canal or foraminal stenosis L2-L3:  No central canal or foraminal stenosis L3-L4: No central canal or foraminal stenosis L4-L5:  Moderate disc space narrowing. No anterior sac indentation. Ligamentous thickening mildly indenting the posterior sac. Mild facet arthropathy.  Mild bilateral foraminal narrowing. L5-L6:  Severe disc space narrowing.  Degenerative anterior listhesis L5 measuring 3.5 mm.  Generalized disc bulge and ligamentous thickening with moderate triangulation of the central canal.  Severe bilateral foraminal stenosis. L6-S1:  No central canal stenosis.  Facet enlargement bilaterally with moderate bilateral foraminal narrowing.    Impression: Impression: No acute lumbar abnormality Degenerative changes as described.  No severe central canal stenosis. Severe bilateral foraminal stenosis at L5-L6. Six non-rib bearing lumbar elements All CT scans are performed using dose optimization techniques as appropriate to the performed exam and include at least one of the following: Automated exposure control, adjustment of the mA and/or kV according to size, and the use of iterative reconstruction technique. ______________________________________ Electronically signed by: ANTHONY POLANCO M.D. Date:     05/06/2025 Time:    01:01     CT HIP LEFT W CONTRAST  Result Date: 5/6/2025  Narrative: EXAM:  CT OF THE LEFT HIP WITH CONTRAST TECHNIQUE:  CT of the left hip was performed with IV  contrast. Multiplanar reformats were made. HISTORY:  Pain and swelling.  Recent fall. COMPARISON:  Plain films same date. FINDINGS: Soft tissue swelling is present in the subcutaneous tissues lateral to the hip.  Mild skin thickening.  Correlate with any evidence of induration or inflammatory change.  No significant capsular distension from the fluid collection.  Osteoarthritis is present in the hip.  Degenerative changes are present in the sacroiliac joints, right hip and symphysis pubis..  Diverticulosis of the colon incidentally noted.  There is some variable density in the gluteus tony centrally which could reflect muscle strain edema.  There is calcification indeterminate significance within the biceps.  Could reflect prior muscle injury.  Somewhat distant from the origin of the hamstring avulsion is not excluded.  Some enthesophyte formation is present along the ischial  tuberosity as well. No findings to indicate active contrast extravasation.    Impression: 1. Multifocal osteoarthritis 2. Some variable density in the gluteus tony which may reflect muscle contusion or strain 3. Superficial soft tissue swelling lateral to the hip. 4. Findings of the hamstring as noted with distal calcification.  Hamstring injury not excluded with additional fragmented enthesophyte close to the ischial tuberosity. All CT scans are performed using dose optimization techniques as appropriate to the performed exam and include at least one of the following: Automated exposure control, adjustment of the mA and/or kV according to size, and the use of iterative reconstruction technique. ______________________________________ Electronically signed by: ANDREA PASTRANA M.D. Date:     05/06/2025 Time:    01:01     CT Head Without Contrast  Result Date: 5/6/2025  Narrative: EXAM: CT BRAIN WITHOUT CONTRAST HISTORY:  Altered mental status, weakness TECHNIQUE:  CT of the brain without intravenous contrast. FINDINGS:  There is no acute  hemorrhage midline shift or mass effect.  No hydrocephalus or abnormal extra-axial fluid collection.  Generalized involutional atrophy, mild.  Chronic microvascular change of the periventricular white matter, mild.  No acute large vessel territorial infarct is seen.  The bony cranium appears normal. The visualized paranasal sinuses are clear. Soft tissues without significant abnormality.    Impression: 1.  No acute intracranial abnormality.  Chronic changes as described. All CT scans are performed using dose optimization techniques as appropriate to the performed exam and include at least one of the following: Automated exposure control, adjustment of the mA and/or kV according to size, and the use of iterative reconstruction technique. ______________________________________ Electronically signed by: ANTHONY POLANCO M.D. Date:     05/06/2025 Time:    01:00     Duplex Venous Lower Extremity - Left CAR  Result Date: 5/5/2025  Narrative: EXAM:  ULTRASOUND OF THE LEFT LOWER EXTREMITY (NONVASCULAR) COMPARISON:  None available. HISTORY:  Pain and mass along the left posterior buttock/hip for 1 week. Recent stroke. TECHNIQUE:  Transverse and longitudinal gray-scale and color-flow sonographic images were obtained through the site of clinical concern in the left posterior hip/buttock. Cine images were also provided. FINDINGS:  Edematous appearance of the subcutaneous tissues at the site of clinical concern with some ill-defined subcutaneous fluid without a discrete drainable fluid collection. No hypervascular soft tissue mass identified. IMPRESSION:   Edematous appearance of the subcutaneous tissues at the site of clinical concern with ill-defined subcutaneous fluid without a discrete drainable fluid collection. No hypervascular soft tissue mass identified. ______________________________________ Electronically signed by: VICTOR M HIGGINS M.D. Date:     05/05/2025 Time:    14:43         Assessment:         * No active  hospital problems. *    Past Medical History:   Diagnosis Date    Congenital heart defect     Diabetes mellitus     Hyperlipidemia     Stroke        Plan:     Strep bacteremia  Multiple lumbar epidural abscesses  Subacute CVA  PFO  Multifactorial anemia  DM2 with hyperglycemia not on long term insulin  PAF  Thrombocytosis  Severe protein calorie malnutrition  Continue current treatment. Monitor counts. Increase activity. Labs Thursday. Aggressive therapies as tolerated. Continue IV antibiotic therapy under direction of ID. Glycemic control. Maintain patient safety.       Electronically signed by DAVID Hill on 5/20/2025 at 12:33 CDT     Copy sent to Milo Aleman MD  I have discussed the care of Fredis Wilson, including pertinent history and exam findings, with the nurse practitioner.    I have seen and examined the patient and the key elements of all parts of the encounter have been performed by me.  I agree with the assessment, plan and orders as documented by DAVID Funez, after I modified the exam findings and the plan of treatments and the final version is my approved version of the assessment.        Electronically signed by Elver Lara MD on 5/21/2025 at 11:08 CDT

## 2025-05-20 NOTE — PROGRESS NOTES
Adult Nutrition  Assessment/PES    Patient Name:  Fredis Wilson  YOB: 1960  MRN: [id number]  Admit Date:  5/19/2025    Assessment Date:  5/20/2025       Reason for Assessment       Row Name 05/20/25 1716          Reason for Assessment    Reason For Assessment physician consult;other (see comments)  New admission     Diagnosis diabetes diagnosis/complications;infection/sepsis;cardiac disease     Identified At Risk by Screening Criteria no indicators present              Nutrition/Diet History       Row Name 05/20/25 1717          Nutrition/Diet History    Typical Intake (Food/Fluid/EN/PN) New admission pt admitted for continued therapy and IV antibiotics.Pt with infection in lumbar spine;pt s/p surgery for L3-4and L4-5 laminectmy,bilateral facetectomy for drainage of abscess and debridement.S/P L3-4,L4-L5 midline lumbar fusion on 5/13.  Pt reports good appetite today. Pt currently receiving CCHO diet,thin liquids. Will order Boost Glucose Control oral supplement with breakfast in vanilla. Advised of alternate choices with meals. Encouraged oral intake.     Food Intolerance(s) NKFA     Supplemental Drinks/Foods/Additives has drank oral supplement at previous facility     Functional Status ambulatory     Factors Affecting Nutritional Intake fatigue                 Labs/Tests/Procedures/Meds       Row Name 05/20/25 1726          Labs/Procedures/Meds    Lab Results Reviewed reviewed, pertinent     Lab Results Comments glucose,Cr,PAB 7.7        Diagnostic Tests/Procedures    Diagnostic Test/Procedure Reviewed reviewed        Medications    Pertinent Medications Reviewed reviewed     Pertinent Medications Comments SC Lantus,SC humalog,miralax,ampicillin                 Physical Findings       Row Name 05/20/25 1730          Physical Findings    Overall Physical Appearance Last BM 5/20,Medial lower back incision,back accordian drain Flavio score 16               Estimated/Assessed Needs - Anthropometrics   "     Row Name 05/20/25 1732 05/20/25 1718       Anthropometrics    Height -- 180.3 cm (71\")    Weight -- 104 kg (229 lb 14.4 oz)    Calculation Weight 104 kg (229 lb 14.4 oz) --       Estimated/Assessed Needs    Additional Documentation Protein Requirements (Group);Estimated Calorie Needs (Group);KCAL/KG (Group);Fluid Requirements (Group) --       Estimated Calorie Needs    Estimated Calorie Require (kcal/day) 1985-2185 --    Estimated Calorie Need Method kcal/kg --       KCAL/KG    KCAL/KG 20 Kcal/Kg (kcal) --    20 Kcal/Kg (kcal) 2085.64 --       Protein Requirements    Weight Used For Protein Calculations 78 kg (172 lb)  IBW --    Est Protein Requirement Amount (gms/kg) 1.4 gm protein --    Estimated Protein Requirements (gms/day) 109.23 --       Fluid Requirements    Fluid Requirements (mL/day) 2085 1985-2185 ml/day --    Estimated Fluid Requirement Method other (see comments)  1ml/kcal --                Nutrition Prescription Ordered       Row Name 05/20/25 1736          Nutrition Prescription PO    Current PO Diet Regular     Fluid Consistency Thin     Common Modifiers Consistent Carbohydrate                 Evaluation of Received Nutrient/Fluid Intake       Row Name 05/20/25 1736          Nutrient/Fluid Evaluation    Number of Days Evaluated 1 day     Additional Documentation Intake Assessment (Group)        Fluid Intake Evaluation    Oral Fluid (mL) 1020     Other Fluid (mL) 328  IVF        PO Evaluation    Number of Days PO Intake Evaluated 1 day     Number of Meals 3     % PO Intake 92              Problem/Interventions:   Problem 1       Row Name 05/20/25 1738          Nutrition Diagnoses Problem 1    Problem 1 Increased Nutrient Needs     Macronutrient Protein     Etiology (related to) Medical Diagnosis     Infectious Disease Other (comment)  streptococcus intermedius bacteremia,paraspinal infection/abscess     Skin Surgical wound     Signs/Symptoms (evidenced by) PO Intake;Biochemical     Percent (%) " intake recorded 92 %     Over number of meals 3     Labs Reviewed Done     Specific Labs Noted Other (comment)  PAB 7.7 mg/dl                 Intervention Goal       Row Name 05/20/25 1739          Intervention Goal    General Reduce/improve symptoms;Meet nutritional needs for age/condition;Disease management/therapy     PO Meet estimated needs;Establish PO;Continue positive trend;Tolerate PO     Weight No significant weight loss                 Nutrition Intervention       Row Name 05/20/25 1739          Nutrition Intervention    RD/Tech Action Follow Tx progress;Care plan reviewd;Encourage intake;Advise alternate selection;Recommend/ordered     Recommended/Ordered Supplement                 Nutrition Prescription       Row Name 05/20/25 1739          Nutrition Prescription PO    PO Prescription Begin/change supplement     Supplement Boost Glucose Control     Supplement Frequency Daily                    Education/Evaluation       Row Name 05/20/25 1739          Education    Education No discharge needs identified at this time        Monitor/Evaluation    Monitor Per protocol                Electronically signed by:  Kathy Friend RDN, VIRGIL  05/20/25 17:44 CDT

## 2025-05-20 NOTE — TELEPHONE ENCOUNTER
Message sent to Dr. Hale:    [9:06 AM] Tayla Jiang ([facility])     New Consult:   Jennifer with [facility]   [phone]   Fredis Wilson, 1960  [facility] room # 576  Consulting: Elver Lara MD   For: streptococcus epidual abscess     You were seeing him at [facility] and wrote recommendations for Ampicillin 12 grams IV daily given via continuous infusion OR Ceftriaxone 2 grams IV daily for 6-8 weeks (6/24/25 or 7/8/25)      [9:07 AM] Anthony Hale ([facility])     Got it

## 2025-05-21 ENCOUNTER — APPOINTMENT (OUTPATIENT)
Dept: MRI IMAGING | Facility: HOSPITAL | Age: 65
End: 2025-05-21
Payer: COMMERCIAL

## 2025-05-21 LAB
GLUCOSE BLDC GLUCOMTR-MCNC: 168 MG/DL (ref 70–130)
GLUCOSE BLDC GLUCOMTR-MCNC: 223 MG/DL (ref 70–130)
GLUCOSE BLDC GLUCOMTR-MCNC: 239 MG/DL (ref 70–130)
GLUCOSE BLDC GLUCOMTR-MCNC: 242 MG/DL (ref 70–130)

## 2025-05-21 PROCEDURE — 25010000002 AMPICILLIN PER 500 MG: Performed by: INTERNAL MEDICINE

## 2025-05-21 PROCEDURE — 97530 THERAPEUTIC ACTIVITIES: CPT

## 2025-05-21 PROCEDURE — 63710000001 INSULIN LISPRO (HUMAN) PER 5 UNITS: Performed by: INTERNAL MEDICINE

## 2025-05-21 PROCEDURE — 70553 MRI BRAIN STEM W/O & W/DYE: CPT

## 2025-05-21 PROCEDURE — 97116 GAIT TRAINING THERAPY: CPT

## 2025-05-21 PROCEDURE — 99222 1ST HOSP IP/OBS MODERATE 55: CPT | Performed by: INTERNAL MEDICINE

## 2025-05-21 PROCEDURE — 82948 REAGENT STRIP/BLOOD GLUCOSE: CPT

## 2025-05-21 PROCEDURE — 63710000001 INSULIN GLARGINE PER 5 UNITS: Performed by: INTERNAL MEDICINE

## 2025-05-21 NOTE — PROGRESS NOTES
[facility] Fall Risk Assessment Note    Name: Fredis Wilson  MRN: [id number]  CSN: [id number]  Admit Date/Time: 5/19/2025  5:15 PM.    Currently ordered medications associated with an increased risk for fall include:    Scheduled Meds:  ampicillin, 2 g, Intravenous, Q4H  insulin glargine, 15 Units, Subcutaneous, Q12H  insulin lispro, 3-14 Units, Subcutaneous, 4x Daily AC & at Bedtime  Naloxegol Oxalate, 25 mg, Oral, QAM  polyethylene glycol, 17 g, Oral, Daily  pravastatin, 10 mg, Oral, Daily  senna-docusate sodium, 1 tablet, Oral, Daily  Oxycodone-acetaminophen 5-325 PRN      Continuous Infusions:[unfilled]        Comments/Recommendations:  Patient on opioids (opioid tolerant) and extensive bowel regimen, often required large insulin doses as well, continue to monitor.    Jac Diaz [facility]  05/21/25 08:37 CDT

## 2025-05-21 NOTE — PROGRESS NOTES
Marco Lara M.D.  DAVID Funez        Internal Medicine Progress Note    5/21/2025   11:16 CDT    Name:  Fredis Wilson  MRN:    [id number]     Acct:     [id number]   Room:  [address] [place] [address] Day: 0     Admit Date: 5/19/2025  5:15 PM  PCP: Milo Verma MD    Subjective:     C/C: back pain, weakness    Interval History: Status:  stayed the same. Resting in bed. No family at bedside. Afebrile. Maintaining adequate 02 sats on room air. Increased back pain after transporting to radiology for MRI. Limited progress/participation with therapies.     Review of Systems   Constitutional: Positive for malaise/fatigue. Negative for chills, decreased appetite, weight gain and weight loss.   HENT:  Negative for congestion, ear discharge, hoarse voice and tinnitus.    Eyes:  Negative for blurred vision, discharge, visual disturbance and visual halos.   Cardiovascular:  Negative for chest pain, claudication, dyspnea on exertion, irregular heartbeat, leg swelling, orthopnea and paroxysmal nocturnal dyspnea.   Respiratory:  Negative for cough, shortness of breath, sputum production and wheezing.    Endocrine: Negative for cold intolerance, heat intolerance and polyuria.   Hematologic/Lymphatic: Negative for adenopathy. Does not bruise/bleed easily.   Skin:  Negative for dry skin, itching and suspicious lesions.   Musculoskeletal:  Positive for back pain and myalgias. Negative for arthritis, falls, joint pain and muscle weakness.   Gastrointestinal:  Negative for abdominal pain, constipation, diarrhea, dysphagia and hematemesis.   Genitourinary:  Negative for bladder incontinence, dysuria and frequency.   Neurological:  Positive for weakness. Negative for aphonia, disturbances in coordination and dizziness.   Psychiatric/Behavioral:  Negative for altered mental status, depression, memory loss and substance abuse. The patient does not have insomnia and is not nervous/anxious.          Medications:      Allergies: No Known Allergies    Current Meds:   Current Facility-Administered Medications:     acetaminophen (TYLENOL) tablet 650 mg, 650 mg, Oral, Q4H PRN **OR** acetaminophen (TYLENOL) suppository 650 mg, 650 mg, Rectal, Q6H PRN, Elver Lara MD    ampicillin 2000 mg IVPB in 100 mL NS (MBP), 2 g, Intravenous, Q4H, Elver Lara MD    dextrose (D50W) (25 g/50 mL) IV injection 25 g, 25 g, Intravenous, Q15 Min PRN, Elver Lara MD    dextrose (GLUTOSE) oral gel 15 g, 15 g, Oral, Q15 Min PRN, Elver Lara MD    glucagon (GLUCAGEN) injection 1 mg, 1 mg, Intramuscular, Q15 Min PRN, Elver Lara MD    insulin glargine (LANTUS, SEMGLEE) injection 15 Units, 15 Units, Subcutaneous, Q12H, Elver Lara MD    Insulin Lispro (humaLOG) injection 3-14 Units, 3-14 Units, Subcutaneous, 4x Daily AC & at Bedtime, Elver Lara MD    Naloxegol Oxalate (MOVANTIK) tablet 25 mg, 25 mg, Oral, QAM, Elver Lara MD    oxyCODONE-acetaminophen (PERCOCET)  MG per tablet 1 tablet, 1 tablet, Oral, Q4H PRN **OR** oxyCODONE-acetaminophen (PERCOCET) 5-325 MG per tablet 1 tablet, 1 tablet, Oral, Q4H PRN, Elver Lara MD    polyethylene glycol (MIRALAX) packet 17 g, 17 g, Oral, Daily, Elver Lara MD    pravastatin (PRAVACHOL) tablet 10 mg, 10 mg, Oral, Daily, Elver Lara MD    sennosides-docusate (PERICOLACE) 8.6-50 MG per tablet 1 tablet, 1 tablet, Oral, Daily, Elver Lara MD    sodium chloride 0.9 % flush 10 mL, 10 mL, Intravenous, Q12H, Elver Lara MD    sodium chloride 0.9 % flush 10 mL, 10 mL, Intravenous, PRN, Elver Lara MD    sodium chloride 0.9 % flush 10 mL, 10 mL, Intravenous, Q12H, Elver Lara MD    sodium chloride 0.9 % flush 10 mL, 10 mL, Intravenous, PRN, Elver Lara MD    sodium chloride 0.9 % flush 20 mL, 20 mL, Intravenous, PRN, Elver Lara MD     "sodium chloride 0.9 % infusion 40 mL, 40 mL, Intravenous, PRN, Elver Lara MD    sodium chloride 0.9 % infusion 40 mL, 40 mL, Intravenous, PRN, Elver Lara MD    tiZANidine (ZANAFLEX) tablet 4 mg, 4 mg, Oral, Q6H PRN, Elver Lara MD    Data:     Code Status:    There are no questions and answers to display.       No family history on file.    Social History     Socioeconomic History    Marital status:    Tobacco Use    Smoking status: Never    Smokeless tobacco: Never   Vaping Use    Vaping status: Never Used   Substance and Sexual Activity    Alcohol use: Never    Drug use: Never    Sexual activity: Defer       Vitals:  Ht 180.3 cm (71\")   Wt 104 kg (229 lb 14.4 oz)   BMI 32.06 kg/m²   T 98.5 P 80 R 23 /56 Sp02 95% (room air)          I/O (24Hr):  No intake or output data in the 24 hours ending 05/21/25 1116    Labs and imaging:      Recent Results (from the past 12 hours)   POC Glucose Once    Collection Time: 05/21/25  7:46 AM    Specimen: Blood   Result Value Ref Range    Glucose 168 (H) 70 - 130 mg/dL   POC Glucose Once    Collection Time: 05/21/25 11:02 AM    Specimen: Blood   Result Value Ref Range    Glucose 239 (H) 70 - 130 mg/dL                               Physical Examination:        Physical Exam  Vitals and nursing note reviewed.   Constitutional:       Appearance: He is well-developed.   HENT:      Head: Normocephalic and atraumatic.      Right Ear: External ear normal.      Left Ear: External ear normal.      Nose: Nose normal.      Mouth/Throat:      Mouth: Mucous membranes are dry.      Pharynx: Oropharynx is clear.   Eyes:      Pupils: Pupils are equal, round, and reactive to light.   Cardiovascular:      Rate and Rhythm: Normal rate and regular rhythm.      Heart sounds: Normal heart sounds.   Pulmonary:      Effort: Pulmonary effort is normal.      Breath sounds: Normal breath sounds.   Abdominal:      General: Bowel sounds are normal.      " Palpations: Abdomen is soft.   Musculoskeletal:         General: Normal range of motion.      Cervical back: Normal range of motion and neck supple.      Comments: Generalized weakness   Skin:     General: Skin is warm and dry.      Comments: Incision c/d/I  Hemovac intact   Neurological:      Mental Status: He is alert and oriented to person, place, and time.      Deep Tendon Reflexes: Reflexes are normal and symmetric.   Psychiatric:         Behavior: Behavior normal.           Assessment:               * No active hospital problems. *    Past Medical History:   Diagnosis Date    Congenital heart defect     Diabetes mellitus     Hyperlipidemia     Stroke         Plan:        Strep bacteremia  Multiple lumbar epidural abscesses  Subacute CVA  PFO  Multifactorial anemia  DM2 with hyperglycemia not on long term insulin  PAF  Thrombocytosis  Severe protein calorie malnutrition    Continue current treatment. Monitor counts. Increase activity. Labs in am. Aggressive therapies as tolerated. Continue IV antibiotics under direction of ID. Maintain patient safety. Continue pain control efforts. Glycemic control.       Electronically signed by DAVID Hill on 5/21/2025 at 11:16 CDT     I have discussed the care of Fredis Wilson, including pertinent history and exam findings, with the nurse practitioner.    I have seen and examined the patient and the key elements of all parts of the encounter have been performed by me.  I agree with the assessment, plan and orders as documented by DAVID Funez, after I modified the exam findings and the plan of treatments and the final version is my approved version of the assessment.        Electronically signed by Elver Lara MD on 5/21/2025 at 17:23 CDT

## 2025-05-21 NOTE — CONSULTS
"INFECTIOUS DISEASES CONSULT NOTE    Patient:  Fredis Wilson 64 y.o. male  ROOM # 576/1  YOB: 1960  MRN: [id number]  [facility]:  [id number]  Admit date: 5/19/2025   Admitting Physician: Elver Lara MD  Primary Care Physician: Milo Verma MD  REFERRING PROVIDER: Milo Verma MD    Reason for Consultation: \"Streptococcus epidural abscess\"    Chief Complaint: Had followed patient at [facility].  Was seeing him because of bloodstream infection due to Streptococcus intermedius along with lumbar paraspinal infection/epidural abscess secondary to Streptococcus intermedius.    History of Present Illness: Pleasant 64-year-old man.  I was asked to assist in the ongoing management of his infection.  He had had Streptococcus intermedius bloodstream infection while hospitalized in [place] early April.  He has had problems with back pain.  He had been admitted to [facility].  Blood cultures they were positive for Streptococcus intermedius.  MRI evidence demonstrated lumbar paraspinal infection/epidural abscess.  He underwent surgery for irrigation, debridement, abscess drainage, and stabilization with hardware placement.  He has been receiving antibiotic treatment with ceftriaxone initially and subsequently treatment with ampicillin.  He has had a PICC line placed.  He has been receiving physical therapy.  He was transferred to [facility] for additional physical therapy/Occupational Therapy/antibiotic treatment.  He is tolerating his antibiotic treatment without diarrhea or rash.  He is not having any cardiopulmonary symptoms.  He indicates he walked from bedside commode to chair today and additional short distance in room with walker.  He is gaining strength.  He is gaining mobility.  He is improving with therapy, antibiotic treatment, and time.  Infectious diseases asked to evaluate and assist with his ongoing infection management.    Current Scheduled Medications:   ampicillin, 2 g, " "Intravenous, Q4H  insulin glargine, 15 Units, Subcutaneous, Q12H  insulin lispro, 3-14 Units, Subcutaneous, 4x Daily AC & at Bedtime  Naloxegol Oxalate, 25 mg, Oral, QAM  polyethylene glycol, 17 g, Oral, Daily  pravastatin, 10 mg, Oral, Daily  senna-docusate sodium, 1 tablet, Oral, Daily  sodium chloride, 10 mL, Intravenous, Q12H  sodium chloride, 10 mL, Intravenous, Q12H      Current PRN Medications:    acetaminophen **OR** acetaminophen    dextrose    dextrose    glucagon (human recombinant)    oxyCODONE-acetaminophen **OR** oxyCODONE-acetaminophen    sodium chloride    sodium chloride    sodium chloride    sodium chloride    sodium chloride    tiZANidine    Allergies:  No Known Allergies    Past Medical History: Streptococcus intermedius bloodstream infection.  Streptococcus intermedius lumbar paraspinal infection/epidural abscess.  Diabetes mellitus.  Patent foramen ovale.  Hypertension.  Hyperlipidemia.  Stroke.    Past Surgical History: Lumbar spine surgery.  Ankle surgery.  Shoulder surgery.    Social History: No tobacco use.  No alcohol use.  No illicit drug use.  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  Lives in [facility].  .    Family History: Noncontributory    Exposure History: No close contacts have been ill    Review of Systems see HPI.  No cardiopulmonary symptoms.  No pain at PICC line site.    Vital Signs:  Ht 180.3 cm (71\")   Wt 104 kg (229 lb 14.4 oz)   BMI 32.06 kg/m²  No data recorded.    Physical Exam  Vital signs - reviewed.  Line/IV (PICC) site - No erythema or tenderness.  Lungs clear to auscultation without crackles  Sclera nonicteric  No conjunctival hemorrhages  Abdomen is soft and nontender  Heart regular rhythm without murmur  He has intact strength and sensation of both lower extremities  Skin without rash    Lab Results:  CBC:   Results from last 7 days   Lab Units 05/20/25  0617 05/19/25  0243 05/18/25  0149 05/17/25  0938 05/17/25  0415 " 05/16/25  0932 05/16/25  0405 05/15/25  0806 05/15/25  0143   WBC 10*3/mm3 15.34* 13.3* 16.2*  --  15.5*  --  20.5*  --  20.9*   HEMOGLOBIN g/dL 9.4* 8.7* 8.8* 8.9* 8.2* 8.8* 8.5*   < > 7.2*   HEMATOCRIT % 29.9* 28.3* 27.8* 28* 25.6* 26.8* 26.3*   < > 23.4*   PLATELETS 10*3/mm3 655* 650* 580*  --  486*  --  524*  --  498*    < > = values in this interval not displayed.     CMP:   Results from last 7 days   Lab Units 05/20/25  0617   SODIUM mmol/L 136   POTASSIUM mmol/L 3.9   CHLORIDE mmol/L 98   CO2 mmol/L 27.0   BUN mg/dL 9   CREATININE mg/dL 0.40*   CALCIUM mg/dL 8.2*   BILIRUBIN mg/dL 0.5   ALK PHOS U/L 85   ALT (SGPT) U/L 13   AST (SGOT) U/L 11   GLUCOSE mg/dL 121*     Culture results:  Blood and surgical culture results from his hospitalization at [facility].  Blood and surgical cultures reviewed.    Surgical cultures May 13, 2025:  Culture #1 paraspinal swab-few white blood cells, no organisms, no growth on primary media but growth in broth only.  Staphylococcus warneri (susceptibility below)     Susceptibility  Staphylococcus warneri (1)  Antibiotic Interpretation VANI   Method Status     doxycycline Sensitive <=0.5 mcg/mL BACTERIAL SUSCEPTIBILITY PANEL BY VANI       erythromycin Resistant >=8 mcg/mL BACTERIAL SUSCEPTIBILITY PANEL BY VANI       inducible clindamycin resistance   Neg mcg/mL BACTERIAL SUSCEPTIBILITY PANEL BY VANI       levofloxacin Sensitive <=0.12 mcg/mL BACTERIAL SUSCEPTIBILITY PANEL BY VANI       oxacillin Sensitive <=0.25 mcg/mL BACTERIAL SUSCEPTIBILITY PANEL BY VANI       tetracycline Sensitive <=1 mcg/mL BACTERIAL SUSCEPTIBILITY PANEL BY VANI       vancomycin Sensitive <=0.5 mcg/mL BACTERIAL SUSCEPTIBILITY PANEL BY VANI             Culture #2 paraspinal tissue-rare white blood cells, no organisms, culture no growth to date  Culture #3 epidural purulence (swab)-rare white blood cells, no organisms, culture growing Streptococcus intermedius  Anaerobic Culture No anaerobes isolated  5 days  "  Organism Streptococcus intermedius Abnormal    Culture Surgical Isolated from Broth   Resulting Agency [facility]   Susceptibility    Organism Antibiotic Method Susceptibility   Streptococcus intermedius ampicillin BACTERIAL SUSCEPTIBILITY PANEL BY VANI <=0.25 mcg/mL: Sensitive   Streptococcus intermedius benzylpenicillin BACTERIAL SUSCEPTIBILITY PANEL BY VANI <=0.06 mcg/mL: Sensitive   Streptococcus intermedius cefotaxime BACTERIAL SUSCEPTIBILITY PANEL BY VANI <=0.12 mcg/mL: Sensitive   Streptococcus intermedius cefTRIAXone BACTERIAL SUSCEPTIBILITY PANEL BY VANI <=0.12 mcg/mL: Sensitive   Streptococcus intermedius erythromycin BACTERIAL SUSCEPTIBILITY PANEL BY VANI <=0.12 mcg/mL: Sensitive   Streptococcus intermedius levofloxacin BACTERIAL SUSCEPTIBILITY PANEL BY VANI <=0.25 mcg/mL: Sensitive   Streptococcus intermedius vancomycin BACTERIAL SUSCEPTIBILITY PANEL BY VANI 0.25 mcg/mL: Sensitive     Culture #4 labeled L4-5 facet-result canceled by ancillary.  Staphylococcus epidermidis (susceptibility below)   Susceptibility  Staphylococcus epidermidis (1)     Antibiotic Interpretation VANI   Method Status     clindamycin Sensitive <=0.12 mcg/mL BACTERIAL SUSCEPTIBILITY PANEL BY VANI       doxycycline Sensitive <=0.5 mcg/mL BACTERIAL SUSCEPTIBILITY PANEL BY VANI       erythromycin Sensitive <=0.25 mcg/mL BACTERIAL SUSCEPTIBILITY PANEL BY VANI       inducible clindamycin resistance   Neg mcg/mL BACTERIAL SUSCEPTIBILITY PANEL BY VANI       levofloxacin Sensitive <=0.12 mcg/mL BACTERIAL SUSCEPTIBILITY PANEL BY VANI       linezolid Sensitive 1 mcg/mL BACTERIAL SUSCEPTIBILITY PANEL BY VANI       oxacillin Sensitive <=0.25 mcg/mL BACTERIAL SUSCEPTIBILITY PANEL BY VANI       tetracycline Sensitive <=1 mcg/mL BACTERIAL SUSCEPTIBILITY PANEL BY VANI       vancomycin Sensitive 2 mcg/mL BACTERIAL SUSCEPTIBILITY PANEL BY VANI          Blood cultures May 5, 2025-\"susceptibility now available, reviewed, and outlined below " "\"  Blood cultures May 6, 2025-Streptococcus intermedius  Blood cultures May 7, 2025-Streptococcus intermedius  Blood cultures May 10, 2025-no growth  Blood cultures May 11, 2025-no growth     Susceptibility     Streptococcus intermedius (1)     Antibiotic Interpretation VANI   Method Status     ampicillin Sensitive <=0.25 mcg/mL BACTERIAL SUSCEPTIBILITY PANEL BY VANI       benzylpenicillin Sensitive <=0.06 mcg/mL BACTERIAL SUSCEPTIBILITY PANEL BY VANI       cefotaxime Sensitive <=0.12 mcg/mL BACTERIAL SUSCEPTIBILITY PANEL BY VANI       cefTRIAXone Sensitive <=0.12 mcg/mL BACTERIAL SUSCEPTIBILITY PANEL BY VANI       erythromycin Sensitive <=0.12 mcg/mL BACTERIAL SUSCEPTIBILITY PANEL BY VANI       levofloxacin Sensitive 0.5 mcg/mL BACTERIAL SUSCEPTIBILITY PANEL BY VANI       vancomycin Sensitive 0.5 mcg/mL BACTERIAL SUSCEPTIBILITY PANEL BY VANI       Radiology:   MRI brain with and without contrast done at [facility] May 9, 2025:  Impression    1. Motion degraded study.  2. Interval development of two 4 mm T2 hyperintense lesions in subcortical white matter of bilateral frontal lobes, with susceptibility artifact, which could represent tiny intraparenchymal hematomas.  3. Numerous punctate susceptibility artifacts scattered in bilateral cerebral hemispheres and bilateral cerebellum, not visualized on prior GRE sequence (SWI was not performed).  Findings could represent new tiny intraparenchymal hematomas versus remote  microhemorrhage or cerebral amyloid angiopathy.  4. Interval resolution of previously seen tiny subacute infarcts.  5. Chronic white matter microvascular ischemic changes.      ______________________________________  Electronically signed by: CAROLYN EMMANUEL M.D.  Date:     05/09/2025  Time:    12:47    MRI brain with and without contrast done today:  HISTORY: Epidural abscess. History of intracranial hemorrhage and  stroke.     Images are stored in PACS per institutional protocol.     MR imaging of the brain " without and with IV gadolinium contrast.  Axial, sagittal, and coronal sequences.     5 mm acute lacunar infarct within the white matter of the LEFT frontal  lobe.  No hemorrhage at this site.     Multiple small microhemorrhages are seen throughout the brain and  cerebellum compatible with amyloid angiopathy.     Mild to moderate cortical atrophy.  Mild small vessel disease.     Clear paranasal sinuses.  Mildly prominent ventricle size compatible with central atrophy.     Postcontrast images show no abnormal enhancement.  No mass lesion.  The dural venous sinuses are patent.     IMPRESSION:  1. 5 mm acute lacunar infarct in the LEFT frontal lobe. No hemorrhage or  mass effect at this site.  2. Multiple small foci of hemosiderin representing tiny chronic  microhemorrhages compatible with amyloid angiopathy.     This report was signed and finalized on 5/21/2025 10:47 AM by Dr. Jasper Cunningham MD.        Additional Studies Reviewed:     Impression:   1.  Bloodstream infection-Streptococcus intermedius-tolerating ampicillin without side effect  2.  Paraspinal infection/abscess-Streptococcus intermedius.  He had had 2 cultures that grew 2 different coagulase-negative staph.  Feel this likely represented skin contaminant.  3.  Diabetes mellitus-continue attempts to optimize control to help facilitate treatment of his infection  4.  Weakness/deconditioning-showing improvement with time, antibiotic treatment, and physical therapy  5.  Abnormal MRI of the brain done May 9, 2025-neurology been following at [facility]-they had recommended follow-up MRI in 2 weeks.  Repeat MRI obtained today with results outlined above.  Hopefully previous images from [facility] can be retrieved and current MRI read in comparison.  6.  Patent foramen ovale-eventually he will need closure    Recommendations:    Continue ampicillin 2 g IV every 4 hours at present  Susceptibility from his lumbar culture reviewed-he is ceftriaxone  susceptible-will transition to ceftriaxone treatment tomorrow to make it easier for him to continue work with physical therapy as he will only need a once daily dosing  Encouraging physical therapy  Planning 6 to 8-week course of antibiotic treatment  See antibiotic treatment plan/recommendations outlined below    Antibiotic recommendations:  1.  Diagnosis-Streptococcus intermedius bacteremia.  Lumbar discitis/paraspinal infection-most likely secondary to Streptococcus intermedius.  Underwent surgical exploration/irrigation/hardware placement on May 13, 2025.  2.  IV access-PICC line placement planned prior to discharge  3.  Spine surgeon-Angel Washington MD  4.  Antibiotic recommendation:  Ampicillin 12 g IV daily given via continuous infusion  OR  Ceftriaxone 2 g IV daily  Last dose of ampicillin or ceftriaxone on June 24, 2025 (6 weeks following surgical drainage) or July 8, 2025 (8 weeks following surgical drainage)  5.  Laboratory monitoring:  CMP, CBC, CRP every Monday while on intravenous antibiotic treatment  6.  Follow-up appointment 2 to 3 weeks after hospital discharge    Anthony Hale MD  05/21/25  07:47 CDT

## 2025-05-22 LAB
ANION GAP SERPL CALCULATED.3IONS-SCNC: 11 MMOL/L (ref 5–15)
BASOPHILS # BLD AUTO: 0.12 10*3/MM3 (ref 0–0.2)
BASOPHILS NFR BLD AUTO: 1 % (ref 0–1.5)
BUN SERPL-MCNC: 11 MG/DL (ref 8–23)
BUN/CREAT SERPL: 22.9 (ref 7–25)
CALCIUM SPEC-SCNC: 8 MG/DL (ref 8.6–10.5)
CHLORIDE SERPL-SCNC: 99 MMOL/L (ref 98–107)
CO2 SERPL-SCNC: 27 MMOL/L (ref 22–29)
CREAT SERPL-MCNC: 0.48 MG/DL (ref 0.76–1.27)
CRP SERPL-MCNC: 10.69 MG/DL (ref 0–0.5)
DEPRECATED RDW RBC AUTO: 49.1 FL (ref 37–54)
EGFRCR SERPLBLD CKD-EPI 2021: 116 ML/MIN/1.73
EOSINOPHIL # BLD AUTO: 0.48 10*3/MM3 (ref 0–0.4)
EOSINOPHIL NFR BLD AUTO: 4.1 % (ref 0.3–6.2)
ERYTHROCYTE [DISTWIDTH] IN BLOOD BY AUTOMATED COUNT: 14.5 % (ref 12.3–15.4)
ERYTHROCYTE [SEDIMENTATION RATE] IN BLOOD: 72 MM/HR (ref 0–20)
GLUCOSE BLDC GLUCOMTR-MCNC: 110 MG/DL (ref 70–130)
GLUCOSE BLDC GLUCOMTR-MCNC: 218 MG/DL (ref 70–130)
GLUCOSE BLDC GLUCOMTR-MCNC: 256 MG/DL (ref 70–130)
GLUCOSE BLDC GLUCOMTR-MCNC: 307 MG/DL (ref 70–130)
GLUCOSE SERPL-MCNC: 91 MG/DL (ref 65–99)
HCT VFR BLD AUTO: 28.8 % (ref 37.5–51)
HGB BLD-MCNC: 8.8 G/DL (ref 13–17.7)
IMM GRANULOCYTES # BLD AUTO: 0.43 10*3/MM3 (ref 0–0.05)
IMM GRANULOCYTES NFR BLD AUTO: 3.7 % (ref 0–0.5)
LYMPHOCYTES # BLD AUTO: 2.11 10*3/MM3 (ref 0.7–3.1)
LYMPHOCYTES NFR BLD AUTO: 18.2 % (ref 19.6–45.3)
MCH RBC QN AUTO: 28.4 PG (ref 26.6–33)
MCHC RBC AUTO-ENTMCNC: 30.6 G/DL (ref 31.5–35.7)
MCV RBC AUTO: 92.9 FL (ref 79–97)
MONOCYTES # BLD AUTO: 0.86 10*3/MM3 (ref 0.1–0.9)
MONOCYTES NFR BLD AUTO: 7.4 % (ref 5–12)
NEUTROPHILS NFR BLD AUTO: 65.6 % (ref 42.7–76)
NEUTROPHILS NFR BLD AUTO: 7.6 10*3/MM3 (ref 1.7–7)
NRBC BLD AUTO-RTO: 0 /100 WBC (ref 0–0.2)
PLATELET # BLD AUTO: 635 10*3/MM3 (ref 140–450)
PMV BLD AUTO: 8.7 FL (ref 6–12)
POTASSIUM SERPL-SCNC: 3.5 MMOL/L (ref 3.5–5.2)
RBC # BLD AUTO: 3.1 10*6/MM3 (ref 4.14–5.8)
SODIUM SERPL-SCNC: 137 MMOL/L (ref 136–145)
WBC NRBC COR # BLD AUTO: 11.6 10*3/MM3 (ref 3.4–10.8)

## 2025-05-22 PROCEDURE — 82948 REAGENT STRIP/BLOOD GLUCOSE: CPT

## 2025-05-22 PROCEDURE — 63710000001 INSULIN LISPRO (HUMAN) PER 5 UNITS: Performed by: INTERNAL MEDICINE

## 2025-05-22 PROCEDURE — 80048 BASIC METABOLIC PNL TOTAL CA: CPT | Performed by: INTERNAL MEDICINE

## 2025-05-22 PROCEDURE — 97530 THERAPEUTIC ACTIVITIES: CPT

## 2025-05-22 PROCEDURE — 86140 C-REACTIVE PROTEIN: CPT | Performed by: INTERNAL MEDICINE

## 2025-05-22 PROCEDURE — 63710000001 INSULIN GLARGINE PER 5 UNITS: Performed by: NURSE PRACTITIONER

## 2025-05-22 PROCEDURE — 97116 GAIT TRAINING THERAPY: CPT

## 2025-05-22 PROCEDURE — 25010000002 CEFTRIAXONE PER 250 MG: Performed by: INTERNAL MEDICINE

## 2025-05-22 PROCEDURE — 85652 RBC SED RATE AUTOMATED: CPT | Performed by: INTERNAL MEDICINE

## 2025-05-22 PROCEDURE — 99204 OFFICE O/P NEW MOD 45 MIN: CPT | Performed by: PSYCHIATRY & NEUROLOGY

## 2025-05-22 PROCEDURE — 97535 SELF CARE MNGMENT TRAINING: CPT

## 2025-05-22 PROCEDURE — 63710000001 INSULIN GLARGINE PER 5 UNITS: Performed by: INTERNAL MEDICINE

## 2025-05-22 PROCEDURE — 85025 COMPLETE CBC W/AUTO DIFF WBC: CPT | Performed by: INTERNAL MEDICINE

## 2025-05-22 NOTE — PROGRESS NOTES
Marco Lara M.D.  DAVID Funez        Internal Medicine Progress Note    5/22/2025   11:41 CDT    Name:  Fredis Wilson  MRN:    [id number]     Acct:     [id number]   Room:  [address] [place] [address] Day: 0     Admit Date: 5/19/2025  5:15 PM  PCP: Milo Verma MD    Subjective:     C/C: back pain, weakness    Interval History: Status:  stayed the same. Up to chair. No family at bedside. Afebrile. Maintaining adequate 02 sats on room air. Still with increased back pain at times. Progressing with therapies - ambulated in the hallway with therapies and sat up to chair most of the morning. WBC trending toward normal. BLood sugars elevated at times. Counts otherwise stable. Overall, he feels that he is improving.      Review of Systems   Constitutional: Positive for malaise/fatigue. Negative for chills, decreased appetite, weight gain and weight loss.   HENT:  Negative for congestion, ear discharge, hoarse voice and tinnitus.    Eyes:  Negative for blurred vision, discharge, visual disturbance and visual halos.   Cardiovascular:  Negative for chest pain, claudication, dyspnea on exertion, irregular heartbeat, leg swelling, orthopnea and paroxysmal nocturnal dyspnea.   Respiratory:  Negative for cough, shortness of breath, sputum production and wheezing.    Endocrine: Negative for cold intolerance, heat intolerance and polyuria.   Hematologic/Lymphatic: Negative for adenopathy. Does not bruise/bleed easily.   Skin:  Negative for dry skin, itching and suspicious lesions.   Musculoskeletal:  Positive for back pain and myalgias. Negative for arthritis, falls, joint pain and muscle weakness.   Gastrointestinal:  Negative for abdominal pain, constipation, diarrhea, dysphagia and hematemesis.   Genitourinary:  Negative for bladder incontinence, dysuria and frequency.   Neurological:  Positive for weakness. Negative for aphonia, disturbances in coordination and dizziness.   Psychiatric/Behavioral:  Negative for  altered mental status, depression, memory loss and substance abuse. The patient does not have insomnia and is not nervous/anxious.          Medications:     Allergies: No Known Allergies    Current Meds:   Current Facility-Administered Medications:     acetaminophen (TYLENOL) tablet 650 mg, 650 mg, Oral, Q4H PRN **OR** acetaminophen (TYLENOL) suppository 650 mg, 650 mg, Rectal, Q6H PRN, Elver Lara MD    cefTRIAXone (ROCEPHIN) 2,000 mg in sodium chloride 0.9 % 100 mL MBP, 2,000 mg, Intravenous, Q24H, Anthony Harvey MD    dextrose (D50W) (25 g/50 mL) IV injection 25 g, 25 g, Intravenous, Q15 Min PRN, Elver Lara MD    dextrose (GLUTOSE) oral gel 15 g, 15 g, Oral, Q15 Min PRN, Elver Lara MD    glucagon (GLUCAGEN) injection 1 mg, 1 mg, Intramuscular, Q15 Min PRN, Elver Lara MD    insulin glargine (LANTUS, SEMGLEE) injection 15 Units, 15 Units, Subcutaneous, Q12H, Elver Lara MD    Insulin Lispro (humaLOG) injection 3-14 Units, 3-14 Units, Subcutaneous, 4x Daily AC & at Bedtime, Elver Lara MD    Naloxegol Oxalate (MOVANTIK) tablet 25 mg, 25 mg, Oral, QAM, Elver Lara MD    oxyCODONE-acetaminophen (PERCOCET)  MG per tablet 1 tablet, 1 tablet, Oral, Q4H PRN **OR** oxyCODONE-acetaminophen (PERCOCET) 5-325 MG per tablet 1 tablet, 1 tablet, Oral, Q4H PRN, Elver Lara MD    polyethylene glycol (MIRALAX) packet 17 g, 17 g, Oral, Daily, Elver Lara MD    pravastatin (PRAVACHOL) tablet 10 mg, 10 mg, Oral, Daily, Elver Lara MD    sennosides-docusate (PERICOLACE) 8.6-50 MG per tablet 1 tablet, 1 tablet, Oral, Daily, Elver Lara MD    sodium chloride 0.9 % flush 10 mL, 10 mL, Intravenous, Q12H, Elver Lara MD    sodium chloride 0.9 % flush 10 mL, 10 mL, Intravenous, PRN, Elver Lara MD    sodium chloride 0.9 % flush 10 mL, 10 mL, Intravenous, Q12H, Elver Lara MD     "sodium chloride 0.9 % flush 10 mL, 10 mL, Intravenous, PRN, Elver Lara MD    sodium chloride 0.9 % flush 20 mL, 20 mL, Intravenous, PRN, Elver Lara MD    sodium chloride 0.9 % infusion 40 mL, 40 mL, Intravenous, PRN, Elver Lara MD    sodium chloride 0.9 % infusion 40 mL, 40 mL, Intravenous, PRN, Elver Lara MD    tiZANidine (ZANAFLEX) tablet 4 mg, 4 mg, Oral, Q6H PRN, Elver Lara MD    Data:     Code Status:    There are no questions and answers to display.       No family history on file.    Social History     Socioeconomic History    Marital status:    Tobacco Use    Smoking status: Never    Smokeless tobacco: Never   Vaping Use    Vaping status: Never Used   Substance and Sexual Activity    Alcohol use: Never    Drug use: Never    Sexual activity: Defer       Vitals:  Ht 180.3 cm (71\")   Wt 104 kg (229 lb 14.4 oz)   BMI 32.06 kg/m²   T 97.9 P 70 R 14 BP 89/44 Sp02 95% (room air)          I/O (24Hr):  No intake or output data in the 24 hours ending 05/22/25 1141    Labs and imaging:      Recent Results (from the past 12 hours)   Sedimentation Rate    Collection Time: 05/22/25  3:28 AM    Specimen: Blood   Result Value Ref Range    Sed Rate 72 (H) 0 - 20 mm/hr   CBC Auto Differential    Collection Time: 05/22/25  3:28 AM    Specimen: Blood   Result Value Ref Range    WBC 11.60 (H) 3.40 - 10.80 10*3/mm3    RBC 3.10 (L) 4.14 - 5.80 10*6/mm3    Hemoglobin 8.8 (L) 13.0 - 17.7 g/dL    Hematocrit 28.8 (L) 37.5 - 51.0 %    MCV 92.9 79.0 - 97.0 fL    MCH 28.4 26.6 - 33.0 pg    MCHC 30.6 (L) 31.5 - 35.7 g/dL    RDW 14.5 12.3 - 15.4 %    RDW-SD 49.1 37.0 - 54.0 fl    MPV 8.7 6.0 - 12.0 fL    Platelets 635 (H) 140 - 450 10*3/mm3    Neutrophil % 65.6 42.7 - 76.0 %    Lymphocyte % 18.2 (L) 19.6 - 45.3 %    Monocyte % 7.4 5.0 - 12.0 %    Eosinophil % 4.1 0.3 - 6.2 %    Basophil % 1.0 0.0 - 1.5 %    Immature Grans % 3.7 (H) 0.0 - 0.5 %    Neutrophils, Absolute " 7.60 (H) 1.70 - 7.00 10*3/mm3    Lymphocytes, Absolute 2.11 0.70 - 3.10 10*3/mm3    Monocytes, Absolute 0.86 0.10 - 0.90 10*3/mm3    Eosinophils, Absolute 0.48 (H) 0.00 - 0.40 10*3/mm3    Basophils, Absolute 0.12 0.00 - 0.20 10*3/mm3    Immature Grans, Absolute 0.43 (H) 0.00 - 0.05 10*3/mm3    nRBC 0.0 0.0 - 0.2 /100 WBC   Basic Metabolic Panel    Collection Time: 05/22/25  3:29 AM    Specimen: Blood   Result Value Ref Range    Glucose 91 65 - 99 mg/dL    BUN 11 8 - 23 mg/dL    Creatinine 0.48 (L) 0.76 - 1.27 mg/dL    Sodium 137 136 - 145 mmol/L    Potassium 3.5 3.5 - 5.2 mmol/L    Chloride 99 98 - 107 mmol/L    CO2 27.0 22.0 - 29.0 mmol/L    Calcium 8.0 (L) 8.6 - 10.5 mg/dL    BUN/Creatinine Ratio 22.9 7.0 - 25.0    Anion Gap 11.0 5.0 - 15.0 mmol/L    eGFR 116.0 >60.0 mL/min/1.73   C-reactive Protein    Collection Time: 05/22/25  3:29 AM    Specimen: Blood   Result Value Ref Range    C-Reactive Protein 10.69 (H) 0.00 - 0.50 mg/dL   POC Glucose Once    Collection Time: 05/22/25  7:09 AM    Specimen: Blood   Result Value Ref Range    Glucose 110 70 - 130 mg/dL   POC Glucose Once    Collection Time: 05/22/25 11:10 AM    Specimen: Blood   Result Value Ref Range    Glucose 307 (H) 70 - 130 mg/dL                               Physical Examination:        Physical Exam  Vitals and nursing note reviewed.   Constitutional:       Appearance: He is well-developed.   HENT:      Head: Normocephalic and atraumatic.      Right Ear: External ear normal.      Left Ear: External ear normal.      Nose: Nose normal.      Mouth/Throat:      Mouth: Mucous membranes are dry.      Pharynx: Oropharynx is clear.   Eyes:      Pupils: Pupils are equal, round, and reactive to light.   Cardiovascular:      Rate and Rhythm: Normal rate and regular rhythm.      Heart sounds: Normal heart sounds.   Pulmonary:      Effort: Pulmonary effort is normal.      Breath sounds: Normal breath sounds.   Abdominal:      General: Bowel sounds are normal.       Palpations: Abdomen is soft.   Musculoskeletal:         General: Normal range of motion.      Cervical back: Normal range of motion and neck supple.      Comments: Generalized weakness   Skin:     General: Skin is warm and dry.      Comments: Incision c/d/I  Hemovac intact   Neurological:      Mental Status: He is alert and oriented to person, place, and time.      Deep Tendon Reflexes: Reflexes are normal and symmetric.   Psychiatric:         Behavior: Behavior normal.           Assessment:               * No active hospital problems. *    Past Medical History:   Diagnosis Date    Congenital heart defect     Diabetes mellitus     Hyperlipidemia     Stroke         Plan:        Strep bacteremia  Multiple lumbar epidural abscesses  Subacute CVA  PFO  Multifactorial anemia  DM2 with hyperglycemia not on long term insulin  PAF  Thrombocytosis  Severe protein calorie malnutrition    Continue current treatment. Monitor counts. Increase activity. Labs Monday. Aggressive therapies as tolerated. Continue IV antibiotics under direction of ID. Maintain patient safety. Continue pain control efforts. Glycemic control. Appreciate neurology evaluation and recommendations.       Electronically signed by DAVID Hill on 5/22/2025 at 11:41 CDT

## 2025-05-22 NOTE — CONSULTS
Neurology Consult Note    Consult Date: 2025  Referring MD: Milo Verma MD  Reason for Consult: stroke    Patient: Fredis Wilson (64 y.o. male)  MRN: [id number]  : 1960    History of Present Illness:   Fredis Wilson is a 64 y.o. male who initially presented to [facility] for difficulties with ambulation.  He was diagnosed with strokes and possible cerebral amyloid angiopathy.  He was discharged home but was noted to have bacteremia as well.  He then had 2 episodes where he fell onto the floor and could not get up.  He was seen at [facility] where an MRI of the lumbar spine showed evidence of multiple epidural abscess and intramuscular abscesses with a left psoas.  Neurosurgery performed an L5 interbody fusion on  with 1 culture showing positive for staph epidermis.  He had some issues with blood loss and required a transfusion.  He was transferred to the long-term acute care facility and admitted on  for long-term antibiotics.  He is currently outfitted with a TLSO.  Neurology is consulted as a routine MRI was repeated based on recommendations from the [facility] neurology team.  It shows evidence of a new infarct and neurology is asked to comment on this.    Reviewing old records I see that he was seen by a neurologist on .  He was described as a gentleman with a history of diabetes with a hemoglobin A1c of 9.4%, hypertension, and hyperlipidemia.  He was admitted after he was pinned down working [place].  Workup included a CT angiography of his head and neck which was unremarkable.  An MRI showed evidence of multifocal acute embolic strokes in the posterior and anterior circulations.  He has no history of A-fib.  He does have a history of a stroke approximately 10 years ago.  He describes that event as him breaking his arm and a clot traveled from the broken limb through a PFO and into his brain.  His only symptom from that was disconjugate gaze.  This was corrected with  prisms in his glasses.  He has yet to get the PFO closed.  He did receive TNK for that shot.    Workup at the outside facility for stroke included a cardiac echo showing an EF of 45 to 50%.  A PFO was found later by [facility] with a MEDARDO.   He was discharged with ASA and Plavix.  [id number] as well.      His stay at [facility] involved continued neurologic care.  They planned for a repeat MRIn  in 1-2 weeks to ensure new small hemorrhagic changes are not abscess/seeding related.      Their plan was as follows:   Will need short term follow up MRI in 1-2 weeks to ensure new small hemorrhagic changes are not abscess / seeding related.  No new stroke noted.     Tele, zio at discharge    Cardiology following, RoPE score is 5, should consider PFO closure, planned follow up with structural cardiology as an outpatient, will not be able to anticoagulate at present due to spinal epidural abscess and brain MRI findings, DVT proph should be ok.    Stroke Risk Factors/ Pertinent Data      Stroke risk factors:  Anticoagulants prior to arrival:   Antiplatelets prior to arrival:   Statins prior to arrival:    Medical History:   Past Medical/Surgical Hx:  Past Medical History:   Diagnosis Date    Congenital heart defect     Diabetes mellitus     Hyperlipidemia     Stroke      Past Surgical History:   Procedure Laterality Date    ANKLE SURGERY Right     APPENDECTOMY      CHOLECYSTECTOMY      FRACTURE SURGERY Left     arm       Medications On Admission:  Medications Prior to Admission   Medication Sig Dispense Refill Last Dose/Taking    glipizide (GLUCOTROL) 10 MG tablet Take 1 tablet by mouth 2 (Two) Times a Day Before Meals.       lisinopril (PRINIVIL,ZESTRIL) 5 MG tablet Take 1 tablet by mouth Daily.       meloxicam (MOBIC) 15 MG tablet Take 1 tablet by mouth Daily. Not currently using       metFORMIN (GLUCOPHAGE) 1000 MG tablet Take 1 tablet by mouth 2 (Two) Times a Day With Meals.       methylPREDNISolone (MEDROL) 4 MG  dose pack Take as directed on package instructions. 21 tablet 0     montelukast (SINGULAIR) 10 MG tablet Take 1 tablet by mouth Daily.       pravastatin (PRAVACHOL) 10 MG tablet Take 1 tablet by mouth Daily.          Current Medications:    Current Facility-Administered Medications:     acetaminophen (TYLENOL) tablet 650 mg, 650 mg, Oral, Q4H PRN **OR** acetaminophen (TYLENOL) suppository 650 mg, 650 mg, Rectal, Q6H PRN, Elver Lara MD    cefTRIAXone (ROCEPHIN) 2,000 mg in sodium chloride 0.9 % 100 mL MBP, 2,000 mg, Intravenous, Q24H, Anthony Harvey MD    dextrose (D50W) (25 g/50 mL) IV injection 25 g, 25 g, Intravenous, Q15 Min PRN, Elver Lara MD    dextrose (GLUTOSE) oral gel 15 g, 15 g, Oral, Q15 Min PRN, Elver Lara MD    glucagon (GLUCAGEN) injection 1 mg, 1 mg, Intramuscular, Q15 Min PRN, Elver Lara MD    insulin glargine (LANTUS, SEMGLEE) injection 15 Units, 15 Units, Subcutaneous, Q12H, Elver Lara MD    Insulin Lispro (humaLOG) injection 3-14 Units, 3-14 Units, Subcutaneous, 4x Daily AC & at Bedtime, Elver Lara MD    Naloxegol Oxalate (MOVANTIK) tablet 25 mg, 25 mg, Oral, QAM, Elver Lara MD    oxyCODONE-acetaminophen (PERCOCET)  MG per tablet 1 tablet, 1 tablet, Oral, Q4H PRN **OR** oxyCODONE-acetaminophen (PERCOCET) 5-325 MG per tablet 1 tablet, 1 tablet, Oral, Q4H PRN, Elver Lara MD    polyethylene glycol (MIRALAX) packet 17 g, 17 g, Oral, Daily, Elver Lara MD    pravastatin (PRAVACHOL) tablet 10 mg, 10 mg, Oral, Daily, Elver Lara MD    sennosides-docusate (PERICOLACE) 8.6-50 MG per tablet 1 tablet, 1 tablet, Oral, Daily, Elver Lara MD    sodium chloride 0.9 % flush 10 mL, 10 mL, Intravenous, Q12H, Elver Lara MD    sodium chloride 0.9 % flush 10 mL, 10 mL, Intravenous, PRN, Elver Lara MD    sodium chloride 0.9 % flush 10 mL, 10 mL, Intravenous,  "Q12H, Elver Lara MD    sodium chloride 0.9 % flush 10 mL, 10 mL, Intravenous, PRN, Elver Lara MD    sodium chloride 0.9 % flush 20 mL, 20 mL, Intravenous, PRN, Elver Lara MD    sodium chloride 0.9 % infusion 40 mL, 40 mL, Intravenous, PRN, Elver Lara MD    sodium chloride 0.9 % infusion 40 mL, 40 mL, Intravenous, PRN, Elver Lara MD    tiZANidine (ZANAFLEX) tablet 4 mg, 4 mg, Oral, Q6H PRN, Elver Lara MD     Allergies:  No Known Allergies    Social Hx:  Social History     Socioeconomic History    Marital status:    Tobacco Use    Smoking status: Never    Smokeless tobacco: Never   Vaping Use    Vaping status: Never Used   Substance and Sexual Activity    Alcohol use: Never    Drug use: Never    Sexual activity: Defer       Family Hx:  No family history on file.  Physical Examination:   Vital Signs:  Vitals:    05/20/25 1718   Weight: 104 kg (229 lb 14.4 oz)   Height: 180.3 cm (71\")         General Exam:  Head:  Normocephalic, atraumatic  HEENT:  Neck supple  Fundoscopic Exam:  No signs of disc edema  CVS:  Regular rate and rhythm.  No murmurs  Carotid Examination:  No bruits  Lungs:  Clear to auscultation  Abdomen:  Nontender, Nondistended  Extremities:  No signs of peripheral edema  Skin:  No rashes    Neurologic Exam:    Mental Status:    -Awake, Alert, Oriented X 3  -No word finding difficulties  -No aphasia  -No dysarthria  -Follows simple and complex commands    CN II:  Visual fields full.  Pupils equally reactive to light  CN III, IV, VI:  Extraocular Muscles full with no signs of nystagmus  CN V:  Facial sensory is symmetric with no asymmetries.  CN VII:  Facial motor symmetric  CN VIII:  Gross hearing intact bilaterally  CN IX:  Palate elevates symmetrically  CN X:  Palate elevates symmetrically  CN XI:  Shoulder shrug symmetric  CN XII:  Tongue is midline on protrusion    Motor: (strength out of 5:  1= minimal movement, 2 = " "movement in plane of gravity, 3 = movement against gravity, 4 = movement against some resistance, 5 = full strength)    -Right Upper Ext: Proximal: 5 Distal: 5  -Left Upper Ext: Proximal: 5 Distal: 5    -Right Lower Ext: Proximal: 4  -Left Lower Ext: Proximal: 4    DTR:  -Right   Biceps: 2+ Triceps: 2+ Brachioradialis: 2+   Patella: 2+ Ankle: 2+ Neg Babinski  -Left   Biceps: 2+ Triceps: 2+ Brachioradialis: 2+   Patella: 2+ Ankle: 2+ Neg Babinski    Sensory:  -Intact to light touch, pinprick, temperature, pain, and proprioception    Coordination:  -Finger to nose intact  -Heel to shin intact  -No ataxia    Gait  -No signs of ataxia  -ambulates unassisted    Recent Diagnostics:   Laboratory Results:   - Reviewed in EMR  Lab Results   Component Value Date    GLUCOSE 91 05/22/2025    CALCIUM 8.0 (L) 05/22/2025     05/22/2025    K 3.5 05/22/2025    CO2 27.0 05/22/2025    CL 99 05/22/2025    BUN 11 05/22/2025    CREATININE 0.48 (L) 05/22/2025    BCR 22.9 05/22/2025    ANIONGAP 11.0 05/22/2025     Lab Results   Component Value Date    WBC 11.60 (H) 05/22/2025    HGB 8.8 (L) 05/22/2025    HCT 28.8 (L) 05/22/2025    MCV 92.9 05/22/2025     (H) 05/22/2025     No results found for: \"PTT\", \"INR\"  Lab Results   Component Value Date    TRIG 73 11/19/2018    HDL 40 11/19/2018    LDL 80 11/19/2018     Lab Results   Component Value Date    HGBA1C 9.3 (H) 05/07/2025       Imaging Results:  Imaging Results (Last 24 Hours)       ** No results found for the last 24 hours. **             Other RAD:  MRI Brain with and without on 5/20:          Assessment & Plan:       Impression:  Strep bacteremia  Multiple lumbar epidural abscesses  Subacute CVA  New DWI lesion on MRI consistent with likely diagnosis of Cerebral Amyloid Aniopathy  PFO  Multifactorial anemia  DM2 with hyperglycemia not on long term insulin  PAF  Thrombocytosis  Severe protein calorie malnutrition      Plan:   From a neurologic perspective his MRI is " concerning for cerebral amyloid angiopathy.  He does have a new DWI abnormality but I believe this is consistent with his CAA.  There is no clinical deficits associated with this and therefore it is an incidental finding.  He does have a PFO as well.  They have made a referral to the structural heart clinic.  It seems as if their main concern was to repeat the MRI to see if there is any evidence that the strokes they had found were actually caused by septic emboli.  The newest MRI I think speaks against that.  I believe that his current abnormalities in his MRI are more consistent with previous strokes and then also evidence of cerebral amyloid angiopathy.  My recommendation moving forward would be a low-dose aspirin.  He is likely not a candidate for anticoagulation at any point.  I agree with the structural heart clinic.  Increasing his glycemic control as well will help mitigate any further stroke risk.  Neurology is signing off and he should follow-up with outpatient neurology at either [facility] or [facility]y who was previously treating him.  Please call if there is any neurologic changes of concern.    Bismark Drake MD  05/22/25  11:55 CDT    Medical Decision Making    Number/Complexity of Problems  Moderate  1 undiagnosed new problem with uncertain prognosis -   1 acute illness with systemic symptoms -   High  1 acute or chronic illness that poses a threat to life/body function -   High     MDM Data  Moderate - 1/3 categories  Extensive - 2/3 categories    Category 1: 3 of the following  Review of external notes  Review of results  Ordering of each unique test  Independent historian  Category 2:  Independent interpretation of test (ex: imaging)  Category 3:  Discussion of management with another provider    Extensive     Treatment Plan  Moderate - Prescription Drug management  High  Drug therapy requiring intensive monitoring for toxicity  Decision regarding hospitalization or escalation of  care  De-escalate care/DNR decisions

## 2025-05-23 LAB
GLUCOSE BLDC GLUCOMTR-MCNC: 130 MG/DL (ref 70–130)
GLUCOSE BLDC GLUCOMTR-MCNC: 218 MG/DL (ref 70–130)
GLUCOSE BLDC GLUCOMTR-MCNC: 272 MG/DL (ref 70–130)
GLUCOSE BLDC GLUCOMTR-MCNC: 274 MG/DL (ref 70–130)

## 2025-05-23 PROCEDURE — 63710000001 INSULIN GLARGINE PER 5 UNITS: Performed by: NURSE PRACTITIONER

## 2025-05-23 PROCEDURE — 63710000001 INSULIN LISPRO (HUMAN) PER 5 UNITS: Performed by: INTERNAL MEDICINE

## 2025-05-23 PROCEDURE — 99231 SBSQ HOSP IP/OBS SF/LOW 25: CPT | Performed by: INTERNAL MEDICINE

## 2025-05-23 PROCEDURE — 97116 GAIT TRAINING THERAPY: CPT

## 2025-05-23 PROCEDURE — 97535 SELF CARE MNGMENT TRAINING: CPT

## 2025-05-23 PROCEDURE — 82948 REAGENT STRIP/BLOOD GLUCOSE: CPT

## 2025-05-23 PROCEDURE — 97110 THERAPEUTIC EXERCISES: CPT

## 2025-05-23 PROCEDURE — 97530 THERAPEUTIC ACTIVITIES: CPT

## 2025-05-23 PROCEDURE — 25010000002 CEFTRIAXONE PER 250 MG: Performed by: INTERNAL MEDICINE

## 2025-05-23 NOTE — PROGRESS NOTES
"Infectious Diseases Progress Note    Patient:  Fredis Wilson  YOB: 1960  MRN: [id number]   Admit date: 5/19/2025   Admitting Physician: Elver Lara MD  Primary Care Physician: Milo Verma MD    Chief Complaint: Seen for ongoing follow-up of Streptococcus intermedius bloodstream infection and lumbar paraspinal infection/epidural abscess.    Interval History: He indicates he is doing better.  He is gaining some ground with physical therapy.  He indicates he is walking longer distances with a walker.  He feels he is gaining some leg strength.  No new neurological complaints.    No intake or output data in the 24 hours ending 05/23/25 0909  Allergies: No Known Allergies  Current Scheduled Medications:   cefTRIAXone, 2,000 mg, Intravenous, Q24H  insulin glargine, 18 Units, Subcutaneous, Q12H  insulin lispro, 3-14 Units, Subcutaneous, 4x Daily AC & at Bedtime  Naloxegol Oxalate, 25 mg, Oral, QAM  polyethylene glycol, 17 g, Oral, Daily  pravastatin, 10 mg, Oral, Daily  senna-docusate sodium, 1 tablet, Oral, Daily  sodium chloride, 10 mL, Intravenous, Q12H  sodium chloride, 10 mL, Intravenous, Q12H        Current PRN Medications:    acetaminophen **OR** acetaminophen    dextrose    dextrose    glucagon (human recombinant)    oxyCODONE-acetaminophen **OR** oxyCODONE-acetaminophen    sodium chloride    sodium chloride    sodium chloride    sodium chloride    sodium chloride    tiZANidine    Review of Systems no nausea, diarrhea, rash, or skin itching.    Vital Signs:  No data recorded.    Ht 180.3 cm (71\")   Wt 104 kg (229 lb 14.4 oz)   BMI 32.06 kg/m²     Physical Exam  Vital signs - reviewed.  Line/IV (PICC) site - No erythema, warmth, induration, or tenderness.  Abdomen is soft and nontender  Skin without rash  He is alert, pleasant, comfortable appearing at this time    Lab Results:  CBC:   Results from last 7 days   Lab Units 05/22/25  0328 05/20/25  0617 05/19/25  0243 05/18/25  0149 " 05/17/25  0938 05/17/25  0415 05/16/25  0932   WBC 10*3/mm3 11.60* 15.34* 13.3* 16.2*  --  15.5*  --    HEMOGLOBIN g/dL 8.8* 9.4* 8.7* 8.8* 8.9* 8.2* 8.8*   HEMATOCRIT % 28.8* 29.9* 28.3* 27.8* 28* 25.6* 26.8*   PLATELETS 10*3/mm3 635* 655* 650* 580*  --  486*  --      BMP:  Results from last 7 days   Lab Units 05/22/25  0329 05/20/25  0617   SODIUM mmol/L 137 136   POTASSIUM mmol/L 3.5 3.9   CHLORIDE mmol/L 99 98   CO2 mmol/L 27.0 27.0   BUN mg/dL 11 9   CREATININE mg/dL 0.48* 0.40*   GLUCOSE mg/dL 91 121*   CALCIUM mg/dL 8.0* 8.2*   ALT (SGPT) U/L  --  13     Culture Results:     Radiology: None    Additional Studies Reviewed: None    Impression:   1.  Streptococcus intermedius bloodstream infection-improving with treatment  2.  Lumbar paraspinal infection/epidural abscess-improving with treatment and tolerating ceftriaxone without side effect  3.  Weakness/deconditioning-improving with therapy    Recommendations:   Continue ceftriaxone 2 g IV daily  Continue to encourage physical therapy  Will see every 2 to 4 days during his treatment course to reassess and make sure improving with treatment and no infection related problems  Please call in interim if any new issues arise for infectious diseases    Anthony Hale MD

## 2025-05-23 NOTE — PROGRESS NOTES
Marco Lara M.D.  DAVID Funez        Internal Medicine Progress Note    5/23/2025   09:56 CDT    Name:  Fredis Wilson  MRN:    [id number]     Acct:     [id number]   Room:  [address] [place] [address] Day: 0     Admit Date: 5/19/2025  5:15 PM  PCP: Milo Verma MD    Subjective:     C/C: back pain, weakness    Interval History: Status:  stayed the same. Ambulating in hallway.  No family at bedside. Afebrile. Maintaining adequate 02 sats on room air. Still with increased back pain at times. Progressing with therapies. Tolerating treatment thus far.  Blood sugars remain elevated at times. Counts otherwise stable. Overall, he feels that he is improving.      Review of Systems   Constitutional: Positive for malaise/fatigue. Negative for chills, decreased appetite, weight gain and weight loss.   HENT:  Negative for congestion, ear discharge, hoarse voice and tinnitus.    Eyes:  Negative for blurred vision, discharge, visual disturbance and visual halos.   Cardiovascular:  Negative for chest pain, claudication, dyspnea on exertion, irregular heartbeat, leg swelling, orthopnea and paroxysmal nocturnal dyspnea.   Respiratory:  Negative for cough, shortness of breath, sputum production and wheezing.    Endocrine: Negative for cold intolerance, heat intolerance and polyuria.   Hematologic/Lymphatic: Negative for adenopathy. Does not bruise/bleed easily.   Skin:  Negative for dry skin, itching and suspicious lesions.   Musculoskeletal:  Positive for back pain and myalgias. Negative for arthritis, falls, joint pain and muscle weakness.   Gastrointestinal:  Negative for abdominal pain, constipation, diarrhea, dysphagia and hematemesis.   Genitourinary:  Negative for bladder incontinence, dysuria and frequency.   Neurological:  Positive for weakness. Negative for aphonia, disturbances in coordination and dizziness.   Psychiatric/Behavioral:  Negative for altered mental status, depression, memory loss and substance  abuse. The patient does not have insomnia and is not nervous/anxious.          Medications:     Allergies: No Known Allergies    Current Meds:   Current Facility-Administered Medications:     acetaminophen (TYLENOL) tablet 650 mg, 650 mg, Oral, Q4H PRN **OR** acetaminophen (TYLENOL) suppository 650 mg, 650 mg, Rectal, Q6H PRN, Elver Lara MD    cefTRIAXone (ROCEPHIN) 2,000 mg in sodium chloride 0.9 % 100 mL MBP, 2,000 mg, Intravenous, Q24H, Anthony Harvey MD    dextrose (D50W) (25 g/50 mL) IV injection 25 g, 25 g, Intravenous, Q15 Min PRN, Elver Lara MD    dextrose (GLUTOSE) oral gel 15 g, 15 g, Oral, Q15 Min PRN, Elver Lara MD    glucagon (GLUCAGEN) injection 1 mg, 1 mg, Intramuscular, Q15 Min PRN, Elver Lara MD    insulin glargine (LANTUS, SEMGLEE) injection 18 Units, 18 Units, Subcutaneous, Q12H, Jordyn Tidwell APRN    Insulin Lispro (humaLOG) injection 3-14 Units, 3-14 Units, Subcutaneous, 4x Daily AC & at Bedtime, Elver Lara MD    Naloxegol Oxalate (MOVANTIK) tablet 25 mg, 25 mg, Oral, QAM, Elver Lara MD    oxyCODONE-acetaminophen (PERCOCET)  MG per tablet 1 tablet, 1 tablet, Oral, Q4H PRN **OR** oxyCODONE-acetaminophen (PERCOCET) 5-325 MG per tablet 1 tablet, 1 tablet, Oral, Q4H PRN, Elver Lara MD    polyethylene glycol (MIRALAX) packet 17 g, 17 g, Oral, Daily, Elver Lara MD    pravastatin (PRAVACHOL) tablet 10 mg, 10 mg, Oral, Daily, Elver Lara MD    sennosides-docusate (PERICOLACE) 8.6-50 MG per tablet 1 tablet, 1 tablet, Oral, Daily, Elver Lara MD    sodium chloride 0.9 % flush 10 mL, 10 mL, Intravenous, Q12H, Elver Lara MD    sodium chloride 0.9 % flush 10 mL, 10 mL, Intravenous, PRN, Elver Lara MD    sodium chloride 0.9 % flush 10 mL, 10 mL, Intravenous, Q12H, Elver Lara MD    sodium chloride 0.9 % flush 10 mL, 10 mL, Intravenous, PRN,  "Elver Lara MD    sodium chloride 0.9 % flush 20 mL, 20 mL, Intravenous, PRN, Elver Lara MD    sodium chloride 0.9 % infusion 40 mL, 40 mL, Intravenous, PRN, Elver Lara MD    sodium chloride 0.9 % infusion 40 mL, 40 mL, Intravenous, PRN, Elver Lara MD    tiZANidine (ZANAFLEX) tablet 4 mg, 4 mg, Oral, Q6H PRN, Elver Lara MD    Data:     Code Status:    There are no questions and answers to display.       No family history on file.    Social History     Socioeconomic History    Marital status:    Tobacco Use    Smoking status: Never    Smokeless tobacco: Never   Vaping Use    Vaping status: Never Used   Substance and Sexual Activity    Alcohol use: Never    Drug use: Never    Sexual activity: Defer       Vitals:  Ht 180.3 cm (71\")   Wt 104 kg (229 lb 14.4 oz)   BMI 32.06 kg/m²   T 98.1 P 79 R 19 /61 Sp02 98% (room air)          I/O (24Hr):  No intake or output data in the 24 hours ending 05/23/25 0956    Labs and imaging:      Recent Results (from the past 12 hours)   POC Glucose Once    Collection Time: 05/23/25  7:36 AM    Specimen: Blood   Result Value Ref Range    Glucose 130 70 - 130 mg/dL                               Physical Examination:        Physical Exam  Vitals and nursing note reviewed.   Constitutional:       Appearance: He is well-developed.   HENT:      Head: Normocephalic and atraumatic.      Right Ear: External ear normal.      Left Ear: External ear normal.      Nose: Nose normal.      Mouth/Throat:      Mouth: Mucous membranes are dry.      Pharynx: Oropharynx is clear.   Eyes:      Pupils: Pupils are equal, round, and reactive to light.   Cardiovascular:      Rate and Rhythm: Normal rate and regular rhythm.      Heart sounds: Normal heart sounds.   Pulmonary:      Effort: Pulmonary effort is normal.      Breath sounds: Normal breath sounds.   Abdominal:      General: Bowel sounds are normal.      Palpations: Abdomen is " soft.   Musculoskeletal:         General: Normal range of motion.      Cervical back: Normal range of motion and neck supple.      Comments: Generalized weakness   Skin:     General: Skin is warm and dry.      Comments: Incision c/d/I  Hemovac intact   Neurological:      Mental Status: He is alert and oriented to person, place, and time.      Deep Tendon Reflexes: Reflexes are normal and symmetric.   Psychiatric:         Behavior: Behavior normal.           Assessment:               * No active hospital problems. *    Past Medical History:   Diagnosis Date    Congenital heart defect     Diabetes mellitus     Hyperlipidemia     Stroke         Plan:        Strep bacteremia  Multiple lumbar epidural abscesses  Subacute CVA  PFO  Multifactorial anemia  DM2 with hyperglycemia not on long term insulin  PAF  Thrombocytosis  Severe protein calorie malnutrition    Continue current treatment. Monitor counts. Increase activity. Labs Monday. Aggressive therapies as tolerated. Continue IV antibiotics under direction of ID. Maintain patient safety. Continue pain control efforts. Glycemic control. Appreciate neurology evaluation and recommendations.       Electronically signed by DAVID Hill on 5/23/2025 at 09:56 CDT

## 2025-05-23 NOTE — ADT AUTH CERT
Utilization Reviews       5/18  by Beth Cool RN   Last updated by Beth Cool RN on 5/23/2025 0711     Review Status Review Type Created By   In Primary -- Beth Cool RN      Criteria Review   DATE: 5/18/2025     TYPE OF BED: Medical unit           PERTINENT UPDATES:  CHIEF COMPLAINT: Generalized weakness     SUBJECTIVE: Feels better today, oxygen has been weaned off, successful bowel movement              VITALS:  Bp: 108/59, pulse 80, resp 18, temp 98.4, o2 sat 93% on ra              ABNL/PERTINENT LABS/RADIOLOGY/DIAGNOSTIC STUDIES:  Iron 21, glucose 131, creat 0.5, gfr >90, ca 8.3, total protein 5.3, albumin 2.5, wbc 16.2, hgb 8.8, hct 27.8              PHYSICAL EXAM:  Constitutional:       Appearance: Normal appearance. He is normal weight.   Cardiovascular:      Rate and Rhythm: Normal rate and regular rhythm.      Pulses: Normal pulses.      Heart sounds: Normal heart sounds.   Pulmonary:      Effort: Pulmonary effort is normal. No respiratory distress.      Breath sounds: Normal breath sounds. No wheezing or rhonchi.   Abdominal:      General: Abdomen is flat. There is no distension.      Palpations: Abdomen is soft.      Tenderness: There is no abdominal tenderness. There is no guarding.   Musculoskeletal:        Back:   Right lower leg: No edema.      Left lower leg: No edema.      Comments: HMV drain from surgery with bloody drainage noted   Skin:     General: Skin is warm and dry.      Capillary Refill: Capillary refill takes less than 2 seconds.   Neurological:      Mental Status: He is alert. Mental status is at baseline.   Psychiatric:         Behavior: Behavior is cooperative.                 MD CONSULTS/ASSESSMENT AND PLAN:  Assessment/Plan:   Bacteremia  Abscess in epidural space of lumbar spine  Spinal epidural abscess  -ID following  -Blood cultures on admission grew strep intermedius, repeat show no growth after 5 days  -Rocephin transitioned to Ampicillin on 5/12, long-term  antibiotic therapy recommendations placed per ID  -PICC line placed 5/16  -VILMA showed no vegetations  -Neurosurgery following  -S/p L3/4 and L4/5 MIDLIF on 5/13  -Follow surgical cultures-- one culture showing staph epidermidis  -Pain control  -PT/OT following  -Social work following, LTAC precert initiated 5/15     Postoperative anemia  -Strict output of HMV drain  -Hgb 6.8 on 5/15, received 1 unit PRBCs with improvement  -Transfuse if Hgb <7  -Hgb has remained stable     Sinus tachycardia  -Given bolus on 5/14 and maintenance IVF initiated  -Likely secondary to post-op blood loss   -Resolved     Ambulatory dysfunction  -PT/OT following     Diabetes ([facility])  -A1c 9.3  -Medium dose SSI, adjust as needed for hyperglycemia  -Accuchecks AC/HS  -Hypoglycemia protocol     CVA (cerebral vascular accident) ([facility])  PFO  -Neurology following  -MRI concerning for microhemorrhages  -Holding anticoagulation/antiplatelet therapy currently  -Repeat MRI in 1-2 weeks  -Neuro checks  -OP cardiology f/u for consideration of PFO closure  -Zio at discharge  -Continue statin     Constipation  -Daily MiraLAX, Senokot, Movantik, Dulcolax suppository  -KUB showing mild to moderate fecal retention  -Soapsuds enema given on 5/15 with no bowel movement  -Milk of molasses enema given 5/17, successful bowel movement after  -No abdominal pain, distention, nausea or vomiting currently  -Encourage ambulation     Liver lesion  -CTA 5/6 revealed low density liver lesion in the left lobe measuring 1.3 cm  -US liver 5/8 revealed questionable liver lesion  -Defer to outpatient MRI workup                          Infectious Diseases Progress Note      Chief Complaint: Seeing in follow-up today of Streptococcus intermedius bloodstream infection and paraspinal infection.     Interval History: Clinically he is about the same.  He reports some back discomfort.  He is voiding without difficulty.  He does not report any new lower extremity neurological symptoms.

## 2025-05-24 LAB
GLUCOSE BLDC GLUCOMTR-MCNC: 127 MG/DL (ref 70–130)
GLUCOSE BLDC GLUCOMTR-MCNC: 244 MG/DL (ref 70–130)
GLUCOSE BLDC GLUCOMTR-MCNC: 249 MG/DL (ref 70–130)
GLUCOSE BLDC GLUCOMTR-MCNC: 270 MG/DL (ref 70–130)

## 2025-05-24 PROCEDURE — 63710000001 INSULIN LISPRO (HUMAN) PER 5 UNITS: Performed by: INTERNAL MEDICINE

## 2025-05-24 PROCEDURE — 82948 REAGENT STRIP/BLOOD GLUCOSE: CPT

## 2025-05-24 PROCEDURE — 63710000001 INSULIN GLARGINE PER 5 UNITS: Performed by: NURSE PRACTITIONER

## 2025-05-24 PROCEDURE — 25010000002 CEFTRIAXONE PER 250 MG: Performed by: INTERNAL MEDICINE

## 2025-05-24 PROCEDURE — 97116 GAIT TRAINING THERAPY: CPT

## 2025-05-24 RX ORDER — OXYCODONE AND ACETAMINOPHEN 5; 325 MG/1; MG/1
1 TABLET ORAL EVERY 4 HOURS PRN
Refills: 0 | Status: DISPENSED | OUTPATIENT
Start: 2025-05-24 | End: 2025-05-29

## 2025-05-24 RX ORDER — OXYCODONE AND ACETAMINOPHEN 10; 325 MG/1; MG/1
1 TABLET ORAL EVERY 4 HOURS PRN
Refills: 0 | Status: DISPENSED | OUTPATIENT
Start: 2025-05-24 | End: 2025-05-29

## 2025-05-24 NOTE — PROGRESS NOTES
Marco Lara M.D.  DAVID Funez        Internal Medicine Progress Note    5/24/2025   05:11 CDT    Name:  Fredis Wilson  MRN:    [id number]     Acct:     [id number]   Room:  [address] [place] [address] Day: 0     Admit Date: 5/19/2025  5:15 PM  PCP: Milo Verma MD    Subjective:     C/C: back pain, weakness    Interval History: Status:  stayed the same. Pt resting in bed, appears to be sleeping comfortably. Staff report increased back pain and requesting additional pain medication at times.  BS elevated but stable. Afebrile.      Review of Systems   Constitutional: Positive for malaise/fatigue. Negative for chills, decreased appetite, weight gain and weight loss.   HENT:  Negative for congestion, ear discharge, hoarse voice and tinnitus.    Eyes:  Negative for blurred vision, discharge, visual disturbance and visual halos.   Cardiovascular:  Negative for chest pain, claudication, dyspnea on exertion, irregular heartbeat, leg swelling, orthopnea and paroxysmal nocturnal dyspnea.   Respiratory:  Negative for cough, shortness of breath, sputum production and wheezing.    Endocrine: Negative for cold intolerance, heat intolerance and polyuria.   Hematologic/Lymphatic: Negative for adenopathy. Does not bruise/bleed easily.   Skin:  Negative for dry skin, itching and suspicious lesions.   Musculoskeletal:  Positive for back pain and myalgias. Negative for arthritis, falls, joint pain and muscle weakness.   Gastrointestinal:  Negative for abdominal pain, constipation, diarrhea, dysphagia and hematemesis.   Genitourinary:  Negative for bladder incontinence, dysuria and frequency.   Neurological:  Positive for weakness. Negative for aphonia, disturbances in coordination and dizziness.   Psychiatric/Behavioral:  Negative for altered mental status, depression, memory loss and substance abuse. The patient does not have insomnia and is not nervous/anxious.          Medications:     Allergies: No Known  Allergies    Current Meds:   Current Facility-Administered Medications:     acetaminophen (TYLENOL) tablet 650 mg, 650 mg, Oral, Q4H PRN **OR** acetaminophen (TYLENOL) suppository 650 mg, 650 mg, Rectal, Q6H PRN, Elver Lara MD    cefTRIAXone (ROCEPHIN) 2,000 mg in sodium chloride 0.9 % 100 mL MBP, 2,000 mg, Intravenous, Q24H, Anthony Harvey MD    dextrose (D50W) (25 g/50 mL) IV injection 25 g, 25 g, Intravenous, Q15 Min PRN, Elver Lara MD    dextrose (GLUTOSE) oral gel 15 g, 15 g, Oral, Q15 Min PRN, Elver Lara MD    glucagon (GLUCAGEN) injection 1 mg, 1 mg, Intramuscular, Q15 Min PRN, Elver Lara MD    insulin glargine (LANTUS, SEMGLEE) injection 20 Units, 20 Units, Subcutaneous, Q12H, Jordyn Tidwell APRN    Insulin Lispro (humaLOG) injection 3-14 Units, 3-14 Units, Subcutaneous, 4x Daily AC & at Bedtime, Elver Lara MD    Naloxegol Oxalate (MOVANTIK) tablet 25 mg, 25 mg, Oral, QAM, Elver Lara MD    oxyCODONE-acetaminophen (PERCOCET)  MG per tablet 1 tablet, 1 tablet, Oral, Q4H PRN **OR** oxyCODONE-acetaminophen (PERCOCET) 5-325 MG per tablet 1 tablet, 1 tablet, Oral, Q4H PRN, Elver Lara MD    oxyCODONE-acetaminophen (PERCOCET)  MG per tablet 1 tablet, 1 tablet, Oral, Q4H PRN **OR** oxyCODONE-acetaminophen (PERCOCET) 5-325 MG per tablet 1 tablet, 1 tablet, Oral, Q4H PRN, Elver Lara MD    polyethylene glycol (MIRALAX) packet 17 g, 17 g, Oral, Daily, Elver Lara MD    pravastatin (PRAVACHOL) tablet 10 mg, 10 mg, Oral, Daily, Elver Lara MD    sennosides-docusate (PERICOLACE) 8.6-50 MG per tablet 1 tablet, 1 tablet, Oral, Daily, Elver Lara MD    sodium chloride 0.9 % flush 10 mL, 10 mL, Intravenous, Q12H, Elver Lara MD    sodium chloride 0.9 % flush 10 mL, 10 mL, Intravenous, PRN, Elver Lara MD    sodium chloride 0.9 % flush 10 mL, 10 mL,  "Intravenous, Q12H, Elver Lara MD    sodium chloride 0.9 % flush 10 mL, 10 mL, Intravenous, PRN, Elver Lara MD    sodium chloride 0.9 % flush 20 mL, 20 mL, Intravenous, PRN, Elver Lara MD    sodium chloride 0.9 % infusion 40 mL, 40 mL, Intravenous, PRN, Elver Lara MD    sodium chloride 0.9 % infusion 40 mL, 40 mL, Intravenous, PRN, Elver Lara MD    tiZANidine (ZANAFLEX) tablet 4 mg, 4 mg, Oral, Q6H PRN, Elver Lara MD    Data:     Code Status:    There are no questions and answers to display.       No family history on file.    Social History     Socioeconomic History    Marital status:    Tobacco Use    Smoking status: Never    Smokeless tobacco: Never   Vaping Use    Vaping status: Never Used   Substance and Sexual Activity    Alcohol use: Never    Drug use: Never    Sexual activity: Defer       Vitals:  Ht 180.3 cm (71\")   Wt 104 kg (229 lb 14.4 oz)   BMI 32.06 kg/m²   T 98.1 P 79 R 19 /61 Sp02 98% (room air)          I/O (24Hr):  No intake or output data in the 24 hours ending 05/24/25 0511    Labs and imaging:      Recent Results (from the past 12 hours)   POC Glucose Once    Collection Time: 05/23/25  8:04 PM    Specimen: Blood   Result Value Ref Range    Glucose 274 (H) 70 - 130 mg/dL                               Physical Examination:        Physical Exam  Vitals and nursing note reviewed.   Constitutional:       Appearance: He is well-developed.   HENT:      Head: Normocephalic and atraumatic.      Right Ear: External ear normal.      Left Ear: External ear normal.      Nose: Nose normal.      Mouth/Throat:      Mouth: Mucous membranes are dry.      Pharynx: Oropharynx is clear.   Eyes:      Pupils: Pupils are equal, round, and reactive to light.   Cardiovascular:      Rate and Rhythm: Normal rate and regular rhythm.      Heart sounds: Normal heart sounds.   Pulmonary:      Effort: Pulmonary effort is normal.      Breath " sounds: Normal breath sounds.   Abdominal:      General: Bowel sounds are normal.      Palpations: Abdomen is soft.   Musculoskeletal:         General: Normal range of motion.      Cervical back: Normal range of motion and neck supple.      Comments: Generalized weakness   Skin:     General: Skin is warm and dry.      Comments: Incision c/d/I  Hemovac intact   Neurological:      Mental Status: He is alert and oriented to person, place, and time.      Deep Tendon Reflexes: Reflexes are normal and symmetric.   Psychiatric:         Behavior: Behavior normal.           Assessment:               * No active hospital problems. *    Past Medical History:   Diagnosis Date    Congenital heart defect     Diabetes mellitus     Hyperlipidemia     Stroke         Plan:        Strep bacteremia  Multiple lumbar epidural abscesses  Subacute CVA  PFO  Multifactorial anemia  DM2 with hyperglycemia not on long term insulin  PAF  Thrombocytosis  Severe protein calorie malnutrition    Continue current treatment. Monitor counts. Increase activity. Labs Monday. Aggressive therapies as tolerated. Continue IV antibiotics under direction of ID. Maintain patient safety. Continue pain control efforts. Glycemic control. Appreciate neurology evaluation and recommendations.       Electronically signed by DAVID Mojica on 5/24/2025 at 05:11 CDT     I have discussed the care of Fredis Wilson, including pertinent history and exam findings, with the nurse practitioner.    I have seen and examined the patient and the key elements of all parts of the encounter have been performed by me.  I agree with the assessment, plan and orders as documented by DAVID Fuentes, after I modified the exam findings and the plan of treatments and the final version is my approved version of the assessment.        Electronically signed by Elver Lara MD on 5/24/2025 at 07:48 CDT

## 2025-05-24 NOTE — PROGRESS NOTES
Physician Progress Note      PATIENT:               NIESHA TORRES  CSN #:                  [id number]  :                       1960  ADMIT DATE:       2025 10:31 PM  DISCH DATE:        2025 5:40 PM  RESPONDING  PROVIDER #:        Azael Lazaro MD          QUERY TEXT:    Acute respiratory failure is documented in the medical record by Dr. Lazaro.   Please provide additional clinical indicators supportive of your   documentation. Or please document if the diagnosis of acute respiratory   failure has been ruled out after study.    The clinical indicators include:  -absence of work of breathing  -absence of respiratory distress  -SPO2 89-96 on room air  -[id number]  ABG 7.400, PCO2 30.0, PO2 60, HCO3 18.6, SPO2 92.2  -[employer] hypoventilated portable study of the chest. No obvious acute   findings.  2. Stable cardiomegaly.  Options provided:  -- Acute Respiratory Failure ruled out after study  -- Acute Respiratory Failure as evidenced by, Please document evidence.  -- Other - I will add my own diagnosis  -- Disagree - Not applicable / Not valid  -- Disagree - Clinically unable to determine / Unknown  -- Refer to Clinical Documentation Reviewer    PROVIDER RESPONSE TEXT:    This patient is in acute respiratory failure as evidenced by ABG : PO2 60,   SPO2 89 % on room air    Query created by: Sarah Noe on 2025 12:56 PM      Electronically signed by:  Azael Lazaro MD 2025 8:32 PM

## 2025-05-25 LAB
GLUCOSE BLDC GLUCOMTR-MCNC: 132 MG/DL (ref 70–130)
GLUCOSE BLDC GLUCOMTR-MCNC: 231 MG/DL (ref 70–130)
GLUCOSE BLDC GLUCOMTR-MCNC: 240 MG/DL (ref 70–130)
GLUCOSE BLDC GLUCOMTR-MCNC: 296 MG/DL (ref 70–130)

## 2025-05-25 PROCEDURE — 25010000002 CEFTRIAXONE PER 250 MG: Performed by: INTERNAL MEDICINE

## 2025-05-25 PROCEDURE — 63710000001 INSULIN LISPRO (HUMAN) PER 5 UNITS: Performed by: INTERNAL MEDICINE

## 2025-05-25 PROCEDURE — 82948 REAGENT STRIP/BLOOD GLUCOSE: CPT

## 2025-05-25 PROCEDURE — 63710000001 INSULIN GLARGINE PER 5 UNITS: Performed by: NURSE PRACTITIONER

## 2025-05-25 NOTE — PROGRESS NOTES
Marco Lara M.D.  DAVID Funez        Internal Medicine Progress Note    5/25/2025   15:49 CDT    Name:  Fredis Wilson  MRN:    [id number]     Acct:     [id number]   Room:  [address] [place] [address] Day: 0     Admit Date: 5/19/2025  5:15 PM  PCP: Milo Verma MD    Subjective:     C/C: back pain, weakness    Interval History: Status:  stayed the same. Resting in bed. No family at bedside. Afebrile. BS elevated but stable. Appears to be comfortable.      Review of Systems   Constitutional: Positive for malaise/fatigue. Negative for chills, decreased appetite, weight gain and weight loss.   HENT:  Negative for congestion, ear discharge, hoarse voice and tinnitus.    Eyes:  Negative for blurred vision, discharge, visual disturbance and visual halos.   Cardiovascular:  Negative for chest pain, claudication, dyspnea on exertion, irregular heartbeat, leg swelling, orthopnea and paroxysmal nocturnal dyspnea.   Respiratory:  Negative for cough, shortness of breath, sputum production and wheezing.    Endocrine: Negative for cold intolerance, heat intolerance and polyuria.   Hematologic/Lymphatic: Negative for adenopathy. Does not bruise/bleed easily.   Skin:  Negative for dry skin, itching and suspicious lesions.   Musculoskeletal:  Positive for back pain and myalgias. Negative for arthritis, falls, joint pain and muscle weakness.   Gastrointestinal:  Negative for abdominal pain, constipation, diarrhea, dysphagia and hematemesis.   Genitourinary:  Negative for bladder incontinence, dysuria and frequency.   Neurological:  Positive for weakness. Negative for aphonia, disturbances in coordination and dizziness.   Psychiatric/Behavioral:  Negative for altered mental status, depression, memory loss and substance abuse. The patient does not have insomnia and is not nervous/anxious.          Medications:     Allergies: No Known Allergies    Current Meds:   Current Facility-Administered Medications:      acetaminophen (TYLENOL) tablet 650 mg, 650 mg, Oral, Q4H PRN **OR** acetaminophen (TYLENOL) suppository 650 mg, 650 mg, Rectal, Q6H PRN, Elver Lara MD    cefTRIAXone (ROCEPHIN) 2,000 mg in sodium chloride 0.9 % 100 mL MBP, 2,000 mg, Intravenous, Q24H, Anthony Harvey MD    dextrose (D50W) (25 g/50 mL) IV injection 25 g, 25 g, Intravenous, Q15 Min PRN, Elver Lara MD    dextrose (GLUTOSE) oral gel 15 g, 15 g, Oral, Q15 Min PRN, Elver Lara MD    glucagon (GLUCAGEN) injection 1 mg, 1 mg, Intramuscular, Q15 Min PRN, Elver Lara MD    insulin glargine (LANTUS, SEMGLEE) injection 20 Units, 20 Units, Subcutaneous, Q12H, Jordyn Tidwell APRN    Insulin Lispro (humaLOG) injection 3-14 Units, 3-14 Units, Subcutaneous, 4x Daily AC & at Bedtime, Elver Lara MD    Naloxegol Oxalate (MOVANTIK) tablet 25 mg, 25 mg, Oral, QAM, Elver Lara MD    oxyCODONE-acetaminophen (PERCOCET)  MG per tablet 1 tablet, 1 tablet, Oral, Q4H PRN **OR** oxyCODONE-acetaminophen (PERCOCET) 5-325 MG per tablet 1 tablet, 1 tablet, Oral, Q4H PRN, Elver Lara MD    polyethylene glycol (MIRALAX) packet 17 g, 17 g, Oral, Daily, Elver Lara MD    pravastatin (PRAVACHOL) tablet 10 mg, 10 mg, Oral, Daily, Elver Lara MD    sennosides-docusate (PERICOLACE) 8.6-50 MG per tablet 1 tablet, 1 tablet, Oral, Daily, Elver Lara MD    sodium chloride 0.9 % flush 10 mL, 10 mL, Intravenous, Q12H, Elver Lara MD    sodium chloride 0.9 % flush 10 mL, 10 mL, Intravenous, PRN, Elver Lara MD    sodium chloride 0.9 % flush 10 mL, 10 mL, Intravenous, Q12H, Elver Lara MD    sodium chloride 0.9 % flush 10 mL, 10 mL, Intravenous, PRN, Elver Lara MD    sodium chloride 0.9 % flush 20 mL, 20 mL, Intravenous, PRN, Elver Lara MD    sodium chloride 0.9 % infusion 40 mL, 40 mL, Intravenous, PRN, Marco,  "Elver Spencer MD    sodium chloride 0.9 % infusion 40 mL, 40 mL, Intravenous, PRN, Elver Lara MD    tiZANidine (ZANAFLEX) tablet 4 mg, 4 mg, Oral, Q6H PRN, Elver Lara MD    Data:     Code Status:    There are no questions and answers to display.       No family history on file.    Social History     Socioeconomic History    Marital status:    Tobacco Use    Smoking status: Never    Smokeless tobacco: Never   Vaping Use    Vaping status: Never Used   Substance and Sexual Activity    Alcohol use: Never    Drug use: Never    Sexual activity: Defer       Vitals:  Ht 180.3 cm (71\")   Wt 104 kg (229 lb 14.4 oz)   BMI 32.06 kg/m²   T 98.5 P 84 R 22 /68 Sp02 98% (room air)          I/O (24Hr):  No intake or output data in the 24 hours ending 05/25/25 1549    Labs and imaging:      Recent Results (from the past 12 hours)   POC Glucose Once    Collection Time: 05/25/25  7:20 AM    Specimen: Blood   Result Value Ref Range    Glucose 132 (H) 70 - 130 mg/dL   POC Glucose Once    Collection Time: 05/25/25 11:25 AM    Specimen: Blood   Result Value Ref Range    Glucose 231 (H) 70 - 130 mg/dL                               Physical Examination:        Physical Exam  Vitals and nursing note reviewed.   Constitutional:       Appearance: He is well-developed.   HENT:      Head: Normocephalic and atraumatic.      Right Ear: External ear normal.      Left Ear: External ear normal.      Nose: Nose normal.      Mouth/Throat:      Mouth: Mucous membranes are dry.      Pharynx: Oropharynx is clear.   Eyes:      Pupils: Pupils are equal, round, and reactive to light.   Cardiovascular:      Rate and Rhythm: Normal rate and regular rhythm.      Heart sounds: Normal heart sounds.   Pulmonary:      Effort: Pulmonary effort is normal.      Breath sounds: Normal breath sounds.   Abdominal:      General: Bowel sounds are normal.      Palpations: Abdomen is soft.   Musculoskeletal:         General: Normal range " of motion.      Cervical back: Normal range of motion and neck supple.      Comments: Generalized weakness   Skin:     General: Skin is warm and dry.      Comments: Incision c/d/I  Hemovac intact   Neurological:      Mental Status: He is alert and oriented to person, place, and time.      Deep Tendon Reflexes: Reflexes are normal and symmetric.   Psychiatric:         Behavior: Behavior normal.           Assessment:               * No active hospital problems. *    Past Medical History:   Diagnosis Date    Congenital heart defect     Diabetes mellitus     Hyperlipidemia     Stroke         Plan:        Strep bacteremia  Multiple lumbar epidural abscesses  Subacute CVA  PFO  Multifactorial anemia  DM2 with hyperglycemia not on long term insulin  PAF  Thrombocytosis  Severe protein calorie malnutrition    Continue current treatment. Monitor counts. Increase activity. Labs Monday. Aggressive therapies as tolerated. Continue IV antibiotics under direction of ID. Maintain patient safety. Continue pain control efforts. Glycemic control. Appreciate neurology evaluation and recommendations.       Electronically signed by DAVID Mojica on 5/25/2025 at 15:49 CDT

## 2025-05-26 LAB
ALBUMIN SERPL-MCNC: 2.7 G/DL (ref 3.5–5.2)
ALBUMIN/GLOB SERPL: 0.9 G/DL
ALP SERPL-CCNC: 90 U/L (ref 39–117)
ALT SERPL W P-5'-P-CCNC: 10 U/L (ref 1–41)
ANION GAP SERPL CALCULATED.3IONS-SCNC: 9 MMOL/L (ref 5–15)
ANISOCYTOSIS BLD QL: ABNORMAL
AST SERPL-CCNC: 12 U/L (ref 1–40)
BASOPHILS # BLD MANUAL: 0 10*3/MM3 (ref 0–0.2)
BASOPHILS NFR BLD MANUAL: 0 % (ref 0–1.5)
BILIRUB SERPL-MCNC: 0.3 MG/DL (ref 0–1.2)
BUN SERPL-MCNC: 10 MG/DL (ref 8–23)
BUN/CREAT SERPL: 22.2 (ref 7–25)
CALCIUM SPEC-SCNC: 8.3 MG/DL (ref 8.6–10.5)
CHLORIDE SERPL-SCNC: 98 MMOL/L (ref 98–107)
CO2 SERPL-SCNC: 27 MMOL/L (ref 22–29)
CREAT SERPL-MCNC: 0.45 MG/DL (ref 0.76–1.27)
CRP SERPL-MCNC: 3.83 MG/DL (ref 0–0.5)
DEPRECATED RDW RBC AUTO: 48.6 FL (ref 37–54)
EGFRCR SERPLBLD CKD-EPI 2021: 118.3 ML/MIN/1.73
EOSINOPHIL # BLD MANUAL: 0.76 10*3/MM3 (ref 0–0.4)
EOSINOPHIL NFR BLD MANUAL: 8.1 % (ref 0.3–6.2)
ERYTHROCYTE [DISTWIDTH] IN BLOOD BY AUTOMATED COUNT: 14.3 % (ref 12.3–15.4)
ERYTHROCYTE [SEDIMENTATION RATE] IN BLOOD: 81 MM/HR (ref 0–20)
GLOBULIN UR ELPH-MCNC: 3.1 GM/DL
GLUCOSE BLDC GLUCOMTR-MCNC: 148 MG/DL (ref 70–130)
GLUCOSE BLDC GLUCOMTR-MCNC: 239 MG/DL (ref 70–130)
GLUCOSE BLDC GLUCOMTR-MCNC: 256 MG/DL (ref 70–130)
GLUCOSE BLDC GLUCOMTR-MCNC: 282 MG/DL (ref 70–130)
GLUCOSE SERPL-MCNC: 143 MG/DL (ref 65–99)
HCT VFR BLD AUTO: 30.4 % (ref 37.5–51)
HGB BLD-MCNC: 9.3 G/DL (ref 13–17.7)
LYMPHOCYTES # BLD MANUAL: 1.9 10*3/MM3 (ref 0.7–3.1)
LYMPHOCYTES NFR BLD MANUAL: 8.1 % (ref 5–12)
MCH RBC QN AUTO: 28.4 PG (ref 26.6–33)
MCHC RBC AUTO-ENTMCNC: 30.6 G/DL (ref 31.5–35.7)
MCV RBC AUTO: 92.7 FL (ref 79–97)
MONOCYTES # BLD: 0.76 10*3/MM3 (ref 0.1–0.9)
NEUTROPHILS # BLD AUTO: 5.98 10*3/MM3 (ref 1.7–7)
NEUTROPHILS NFR BLD MANUAL: 63.6 % (ref 42.7–76)
PLATELET # BLD AUTO: 511 10*3/MM3 (ref 140–450)
PMV BLD AUTO: 8.7 FL (ref 6–12)
POLYCHROMASIA BLD QL SMEAR: ABNORMAL
POTASSIUM SERPL-SCNC: 3.7 MMOL/L (ref 3.5–5.2)
PROT SERPL-MCNC: 5.8 G/DL (ref 6–8.5)
RBC # BLD AUTO: 3.28 10*6/MM3 (ref 4.14–5.8)
SMALL PLATELETS BLD QL SMEAR: ABNORMAL
SODIUM SERPL-SCNC: 134 MMOL/L (ref 136–145)
VARIANT LYMPHS NFR BLD MANUAL: 18.2 % (ref 19.6–45.3)
VARIANT LYMPHS NFR BLD MANUAL: 2 % (ref 0–5)
WBC MORPH BLD: NORMAL
WBC NRBC COR # BLD AUTO: 9.41 10*3/MM3 (ref 3.4–10.8)

## 2025-05-26 PROCEDURE — 80053 COMPREHEN METABOLIC PANEL: CPT | Performed by: INTERNAL MEDICINE

## 2025-05-26 PROCEDURE — 82948 REAGENT STRIP/BLOOD GLUCOSE: CPT

## 2025-05-26 PROCEDURE — 85652 RBC SED RATE AUTOMATED: CPT | Performed by: INTERNAL MEDICINE

## 2025-05-26 PROCEDURE — 25010000002 CEFTRIAXONE PER 250 MG: Performed by: INTERNAL MEDICINE

## 2025-05-26 PROCEDURE — 63710000001 INSULIN GLARGINE PER 5 UNITS: Performed by: NURSE PRACTITIONER

## 2025-05-26 PROCEDURE — 85025 COMPLETE CBC W/AUTO DIFF WBC: CPT | Performed by: INTERNAL MEDICINE

## 2025-05-26 PROCEDURE — 97530 THERAPEUTIC ACTIVITIES: CPT

## 2025-05-26 PROCEDURE — 85007 BL SMEAR W/DIFF WBC COUNT: CPT | Performed by: INTERNAL MEDICINE

## 2025-05-26 PROCEDURE — 86140 C-REACTIVE PROTEIN: CPT | Performed by: INTERNAL MEDICINE

## 2025-05-26 PROCEDURE — 63710000001 INSULIN LISPRO (HUMAN) PER 5 UNITS: Performed by: INTERNAL MEDICINE

## 2025-05-26 NOTE — PROGRESS NOTES
Marco Lara M.D.  DAVID Funez        Internal Medicine Progress Note    5/26/2025   14:18 CDT    Name:  Fredis Wilson  MRN:    [id number]     Acct:     [id number]   Room:  [address] [place] [address] Day: 0     Admit Date: 5/19/2025  5:15 PM  PCP: Milo Verma MD    Subjective:     C/C: back pain, weakness    Interval History: Status:  stayed the same. Up to chair. Family at bedside. Afebrile. Maintaining adequate 02 sats on room air. Denies pain. Increasing ambulating and exercise. Reports that he's largely independent for ADLs at this point. Tolerating treatment thus far. Counts stable.     Review of Systems   Constitutional: Positive for malaise/fatigue. Negative for chills, decreased appetite, weight gain and weight loss.   HENT:  Negative for congestion, ear discharge, hoarse voice and tinnitus.    Eyes:  Negative for blurred vision, discharge, visual disturbance and visual halos.   Cardiovascular:  Negative for chest pain, claudication, dyspnea on exertion, irregular heartbeat, leg swelling, orthopnea and paroxysmal nocturnal dyspnea.   Respiratory:  Negative for cough, shortness of breath, sputum production and wheezing.    Endocrine: Negative for cold intolerance, heat intolerance and polyuria.   Hematologic/Lymphatic: Negative for adenopathy. Does not bruise/bleed easily.   Skin:  Negative for dry skin, itching and suspicious lesions.   Musculoskeletal:  Positive for back pain and myalgias. Negative for arthritis, falls, joint pain and muscle weakness.   Gastrointestinal:  Negative for abdominal pain, constipation, diarrhea, dysphagia and hematemesis.   Genitourinary:  Negative for bladder incontinence, dysuria and frequency.   Neurological:  Positive for weakness. Negative for aphonia, disturbances in coordination and dizziness.   Psychiatric/Behavioral:  Negative for altered mental status, depression, memory loss and substance abuse. The patient does not have insomnia and is not  nervous/anxious.          Medications:     Allergies: No Known Allergies    Current Meds:   Current Facility-Administered Medications:     acetaminophen (TYLENOL) tablet 650 mg, 650 mg, Oral, Q4H PRN **OR** acetaminophen (TYLENOL) suppository 650 mg, 650 mg, Rectal, Q6H PRN, Elver Lara MD    cefTRIAXone (ROCEPHIN) 2,000 mg in sodium chloride 0.9 % 100 mL MBP, 2,000 mg, Intravenous, Q24H, Anthony Harvey MD    dextrose (D50W) (25 g/50 mL) IV injection 25 g, 25 g, Intravenous, Q15 Min PRN, Elver Lara MD    dextrose (GLUTOSE) oral gel 15 g, 15 g, Oral, Q15 Min PRN, Elver Lara MD    glucagon (GLUCAGEN) injection 1 mg, 1 mg, Intramuscular, Q15 Min PRN, Elver Lara MD    insulin glargine (LANTUS, SEMGLEE) injection 20 Units, 20 Units, Subcutaneous, Q12H, Jordyn Tidwell APRN    Insulin Lispro (humaLOG) injection 3-14 Units, 3-14 Units, Subcutaneous, 4x Daily AC & at Bedtime, Elver Lara MD    Naloxegol Oxalate (MOVANTIK) tablet 25 mg, 25 mg, Oral, QAM, Elver Lara MD    oxyCODONE-acetaminophen (PERCOCET)  MG per tablet 1 tablet, 1 tablet, Oral, Q4H PRN **OR** oxyCODONE-acetaminophen (PERCOCET) 5-325 MG per tablet 1 tablet, 1 tablet, Oral, Q4H PRN, Elver Lara MD    polyethylene glycol (MIRALAX) packet 17 g, 17 g, Oral, Daily, Elver Lara MD    pravastatin (PRAVACHOL) tablet 10 mg, 10 mg, Oral, Daily, Elver Lara MD    sennosides-docusate (PERICOLACE) 8.6-50 MG per tablet 1 tablet, 1 tablet, Oral, Daily, Elver Lara MD    sodium chloride 0.9 % flush 10 mL, 10 mL, Intravenous, Q12H, Elver Lara MD    sodium chloride 0.9 % flush 10 mL, 10 mL, Intravenous, PRN, Elver Lara MD    sodium chloride 0.9 % flush 10 mL, 10 mL, Intravenous, Q12H, Elver Lara MD    sodium chloride 0.9 % flush 10 mL, 10 mL, Intravenous, PRN, Elver Lara MD    sodium chloride 0.9 % flush  "20 mL, 20 mL, Intravenous, PRN, Elver Lara MD    sodium chloride 0.9 % infusion 40 mL, 40 mL, Intravenous, PRN, Elver Lara MD    sodium chloride 0.9 % infusion 40 mL, 40 mL, Intravenous, PRN, Elver Lara MD    tiZANidine (ZANAFLEX) tablet 4 mg, 4 mg, Oral, Q6H PRN, lEver Lara MD    Data:     Code Status:    There are no questions and answers to display.       No family history on file.    Social History     Socioeconomic History    Marital status:    Tobacco Use    Smoking status: Never    Smokeless tobacco: Never   Vaping Use    Vaping status: Never Used   Substance and Sexual Activity    Alcohol use: Never    Drug use: Never    Sexual activity: Defer       Vitals:  Ht 180.3 cm (71\")   Wt 98.1 kg (216 lb 3.2 oz)   BMI 30.15 kg/m²   T 98.3 P 74 R 17 /62 Sp02 98% (room air)          I/O (24Hr):  No intake or output data in the 24 hours ending 05/26/25 1418    Labs and imaging:      Recent Results (from the past 12 hours)   Comprehensive Metabolic Panel    Collection Time: 05/26/25  6:44 AM    Specimen: Blood   Result Value Ref Range    Glucose 143 (H) 65 - 99 mg/dL    BUN 10 8 - 23 mg/dL    Creatinine 0.45 (L) 0.76 - 1.27 mg/dL    Sodium 134 (L) 136 - 145 mmol/L    Potassium 3.7 3.5 - 5.2 mmol/L    Chloride 98 98 - 107 mmol/L    CO2 27.0 22.0 - 29.0 mmol/L    Calcium 8.3 (L) 8.6 - 10.5 mg/dL    Total Protein 5.8 (L) 6.0 - 8.5 g/dL    Albumin 2.7 (L) 3.5 - 5.2 g/dL    ALT (SGPT) 10 1 - 41 U/L    AST (SGOT) 12 1 - 40 U/L    Alkaline Phosphatase 90 39 - 117 U/L    Total Bilirubin 0.3 0.0 - 1.2 mg/dL    Globulin 3.1 gm/dL    A/G Ratio 0.9 g/dL    BUN/Creatinine Ratio 22.2 7.0 - 25.0    Anion Gap 9.0 5.0 - 15.0 mmol/L    eGFR 118.3 >60.0 mL/min/1.73   C-reactive Protein    Collection Time: 05/26/25  6:44 AM    Specimen: Blood   Result Value Ref Range    C-Reactive Protein 3.83 (H) 0.00 - 0.50 mg/dL   CBC Auto Differential    Collection Time: 05/26/25  6:44 AM "    Specimen: Blood   Result Value Ref Range    WBC 9.41 3.40 - 10.80 10*3/mm3    RBC 3.28 (L) 4.14 - 5.80 10*6/mm3    Hemoglobin 9.3 (L) 13.0 - 17.7 g/dL    Hematocrit 30.4 (L) 37.5 - 51.0 %    MCV 92.7 79.0 - 97.0 fL    MCH 28.4 26.6 - 33.0 pg    MCHC 30.6 (L) 31.5 - 35.7 g/dL    RDW 14.3 12.3 - 15.4 %    RDW-SD 48.6 37.0 - 54.0 fl    MPV 8.7 6.0 - 12.0 fL    Platelets 511 (H) 140 - 450 10*3/mm3   Sedimentation Rate    Collection Time: 05/26/25  6:44 AM    Specimen: Blood   Result Value Ref Range    Sed Rate 81 (H) 0 - 20 mm/hr   Manual Differential    Collection Time: 05/26/25  6:44 AM    Specimen: Blood   Result Value Ref Range    Neutrophil % 63.6 42.7 - 76.0 %    Lymphocyte % 18.2 (L) 19.6 - 45.3 %    Monocyte % 8.1 5.0 - 12.0 %    Eosinophil % 8.1 (H) 0.3 - 6.2 %    Basophil % 0.0 0.0 - 1.5 %    Atypical Lymphocyte % 2.0 0.0 - 5.0 %    Neutrophils Absolute 5.98 1.70 - 7.00 10*3/mm3    Lymphocytes Absolute 1.90 0.70 - 3.10 10*3/mm3    Monocytes Absolute 0.76 0.10 - 0.90 10*3/mm3    Eosinophils Absolute 0.76 (H) 0.00 - 0.40 10*3/mm3    Basophils Absolute 0.00 0.00 - 0.20 10*3/mm3    Anisocytosis Slight/1+ None Seen    Polychromasia Mod/2+ None Seen    WBC Morphology Normal Normal    Platelet Estimate Increased Normal   POC Glucose Once    Collection Time: 05/26/25  7:15 AM    Specimen: Blood   Result Value Ref Range    Glucose 148 (H) 70 - 130 mg/dL   POC Glucose Once    Collection Time: 05/26/25 11:22 AM    Specimen: Blood   Result Value Ref Range    Glucose 256 (H) 70 - 130 mg/dL                               Physical Examination:        Physical Exam  Vitals and nursing note reviewed.   Constitutional:       Appearance: He is well-developed.   HENT:      Head: Normocephalic and atraumatic.      Right Ear: External ear normal.      Left Ear: External ear normal.      Nose: Nose normal.      Mouth/Throat:      Mouth: Mucous membranes are dry.      Pharynx: Oropharynx is clear.   Eyes:      Pupils: Pupils are  equal, round, and reactive to light.   Cardiovascular:      Rate and Rhythm: Normal rate and regular rhythm.      Heart sounds: Normal heart sounds.   Pulmonary:      Effort: Pulmonary effort is normal.      Breath sounds: Normal breath sounds.   Abdominal:      General: Bowel sounds are normal.      Palpations: Abdomen is soft.   Musculoskeletal:         General: Normal range of motion.      Cervical back: Normal range of motion and neck supple.      Comments: Generalized weakness   Skin:     General: Skin is warm and dry.      Comments: Incision c/d/I  Hemovac intact   Neurological:      Mental Status: He is alert and oriented to person, place, and time.      Deep Tendon Reflexes: Reflexes are normal and symmetric.   Psychiatric:         Behavior: Behavior normal.           Assessment:               * No active hospital problems. *    Past Medical History:   Diagnosis Date    Congenital heart defect     Diabetes mellitus     Hyperlipidemia     Stroke         Plan:        Strep bacteremia  Multiple lumbar epidural abscesses  Subacute CVA  PFO  Multifactorial anemia  DM2 with hyperglycemia not on long term insulin  PAF  Thrombocytosis  Severe protein calorie malnutrition    Continue current treatment. Monitor counts. Increase activity. Labs Thursday. Aggressive therapies as tolerated. Continue IV antibiotics under direction of ID. Maintain patient safety. Continue pain control efforts. Glycemic control. Appreciate neurology evaluation and recommendations.       Electronically signed by DAVID Hill on 5/26/2025 at 14:18 CDT

## 2025-05-27 ENCOUNTER — TELEPHONE (OUTPATIENT)
Dept: CARDIOLOGY CLINIC | Age: 65
End: 2025-05-27

## 2025-05-27 LAB
FUNGUS SPEC CULT: NORMAL
GLUCOSE BLDC GLUCOMTR-MCNC: 107 MG/DL (ref 70–130)
GLUCOSE BLDC GLUCOMTR-MCNC: 284 MG/DL (ref 70–130)
GLUCOSE BLDC GLUCOMTR-MCNC: 290 MG/DL (ref 70–130)
GLUCOSE BLDC GLUCOMTR-MCNC: 331 MG/DL (ref 70–130)
KOH PREP SPEC: NORMAL

## 2025-05-27 PROCEDURE — 82948 REAGENT STRIP/BLOOD GLUCOSE: CPT

## 2025-05-27 PROCEDURE — 63710000001 INSULIN LISPRO (HUMAN) PER 5 UNITS: Performed by: INTERNAL MEDICINE

## 2025-05-27 PROCEDURE — 25010000002 CEFTRIAXONE PER 250 MG: Performed by: INTERNAL MEDICINE

## 2025-05-27 PROCEDURE — 63710000001 INSULIN GLARGINE PER 5 UNITS: Performed by: NURSE PRACTITIONER

## 2025-05-27 NOTE — PROGRESS NOTES
Marco Lara M.D.  DAVID Funez        Internal Medicine Progress Note    5/27/2025   12:30 CDT    Name:  Fredis Wilson  MRN:    [id number]     Acct:     [id number]   Room:  [address] [place] [address] Day: 0     Admit Date: 5/19/2025  5:15 PM  PCP: Milo Verma MD    Subjective:     C/C: back pain, weakness    Interval History: Status:  stayed the same. Up to chair. No family at bedside. Afebrile. Maintaining adequate 02 sats on room air. Denies pain. Increasing ambulation and exercise. Remains largely independent for ADLs at this point. Tolerating treatment thus far. Blood sugars stable, but elevated at times. Asking about short term disability forms - informed that he would need to obtain those from his employer and fill out the section for employees and bring to the hospital for the provider to fill out.     Review of Systems   Constitutional: Positive for malaise/fatigue. Negative for chills, decreased appetite, weight gain and weight loss.   HENT:  Negative for congestion, ear discharge, hoarse voice and tinnitus.    Eyes:  Negative for blurred vision, discharge, visual disturbance and visual halos.   Cardiovascular:  Negative for chest pain, claudication, dyspnea on exertion, irregular heartbeat, leg swelling, orthopnea and paroxysmal nocturnal dyspnea.   Respiratory:  Negative for cough, shortness of breath, sputum production and wheezing.    Endocrine: Negative for cold intolerance, heat intolerance and polyuria.   Hematologic/Lymphatic: Negative for adenopathy. Does not bruise/bleed easily.   Skin:  Negative for dry skin, itching and suspicious lesions.   Musculoskeletal:  Positive for back pain and myalgias. Negative for arthritis, falls, joint pain and muscle weakness.   Gastrointestinal:  Negative for abdominal pain, constipation, diarrhea, dysphagia and hematemesis.   Genitourinary:  Negative for bladder incontinence, dysuria and frequency.   Neurological:  Positive for weakness.  Negative for aphonia, disturbances in coordination and dizziness.   Psychiatric/Behavioral:  Negative for altered mental status, depression, memory loss and substance abuse. The patient does not have insomnia and is not nervous/anxious.          Medications:     Allergies: No Known Allergies    Current Meds:   Current Facility-Administered Medications:     acetaminophen (TYLENOL) tablet 650 mg, 650 mg, Oral, Q4H PRN **OR** acetaminophen (TYLENOL) suppository 650 mg, 650 mg, Rectal, Q6H PRN, Elver Lara MD    cefTRIAXone (ROCEPHIN) 2,000 mg in sodium chloride 0.9 % 100 mL MBP, 2,000 mg, Intravenous, Q24H, Anthony Harvey MD    dextrose (D50W) (25 g/50 mL) IV injection 25 g, 25 g, Intravenous, Q15 Min PRN, Elver Lara MD    dextrose (GLUTOSE) oral gel 15 g, 15 g, Oral, Q15 Min PRN, Elver Lara MD    glucagon (GLUCAGEN) injection 1 mg, 1 mg, Intramuscular, Q15 Min PRN, Elver Lara MD    insulin glargine (LANTUS, SEMGLEE) injection 20 Units, 20 Units, Subcutaneous, Q12H, Jordyn Tidwell APRN    Insulin Lispro (humaLOG) injection 3-14 Units, 3-14 Units, Subcutaneous, 4x Daily AC & at Bedtime, Elver Lara MD    Naloxegol Oxalate (MOVANTIK) tablet 25 mg, 25 mg, Oral, QAM, Elver Lara MD    oxyCODONE-acetaminophen (PERCOCET)  MG per tablet 1 tablet, 1 tablet, Oral, Q4H PRN **OR** oxyCODONE-acetaminophen (PERCOCET) 5-325 MG per tablet 1 tablet, 1 tablet, Oral, Q4H PRN, Elver Lara MD    polyethylene glycol (MIRALAX) packet 17 g, 17 g, Oral, Daily, Elver Lara MD    pravastatin (PRAVACHOL) tablet 10 mg, 10 mg, Oral, Daily, Elver Lara MD    sennosides-docusate (PERICOLACE) 8.6-50 MG per tablet 1 tablet, 1 tablet, Oral, Daily, Elver Lara MD    sodium chloride 0.9 % flush 10 mL, 10 mL, Intravenous, Q12H, Elver Lara MD    sodium chloride 0.9 % flush 10 mL, 10 mL, Intravenous, PRN, Elver Lara  "MD Tj    sodium chloride 0.9 % flush 10 mL, 10 mL, Intravenous, Q12H, Elver Lara MD    sodium chloride 0.9 % flush 10 mL, 10 mL, Intravenous, PRN, Elver Lara MD    sodium chloride 0.9 % flush 20 mL, 20 mL, Intravenous, PRN, Elver Lara MD    sodium chloride 0.9 % infusion 40 mL, 40 mL, Intravenous, PRN, Elver Lara MD    sodium chloride 0.9 % infusion 40 mL, 40 mL, Intravenous, PRN, Elver Lara MD    tiZANidine (ZANAFLEX) tablet 4 mg, 4 mg, Oral, Q6H PRN, Elver Lara MD    Data:     Code Status:    There are no questions and answers to display.       No family history on file.    Social History     Socioeconomic History    Marital status:    Tobacco Use    Smoking status: Never    Smokeless tobacco: Never   Vaping Use    Vaping status: Never Used   Substance and Sexual Activity    Alcohol use: Never    Drug use: Never    Sexual activity: Defer       Vitals:  Ht 180.3 cm (71\")   Wt 103 kg (226 lb)   BMI 31.52 kg/m²   T 98.3 P 71 R 18 /64 Sp02 97% (room air)          I/O (24Hr):  No intake or output data in the 24 hours ending 05/27/25 1230    Labs and imaging:      Recent Results (from the past 12 hours)   POC Glucose Once    Collection Time: 05/27/25  6:48 AM    Specimen: Blood   Result Value Ref Range    Glucose 107 70 - 130 mg/dL   POC Glucose Once    Collection Time: 05/27/25 11:03 AM    Specimen: Blood   Result Value Ref Range    Glucose 284 (H) 70 - 130 mg/dL                               Physical Examination:        Physical Exam  Vitals and nursing note reviewed.   Constitutional:       Appearance: He is well-developed.   HENT:      Head: Normocephalic and atraumatic.      Right Ear: External ear normal.      Left Ear: External ear normal.      Nose: Nose normal.      Mouth/Throat:      Mouth: Mucous membranes are dry.      Pharynx: Oropharynx is clear.   Eyes:      Pupils: Pupils are equal, round, and reactive to light. "   Cardiovascular:      Rate and Rhythm: Normal rate and regular rhythm.      Heart sounds: Normal heart sounds.   Pulmonary:      Effort: Pulmonary effort is normal.      Breath sounds: Normal breath sounds.   Abdominal:      General: Bowel sounds are normal.      Palpations: Abdomen is soft.   Musculoskeletal:         General: Normal range of motion.      Cervical back: Normal range of motion and neck supple.      Comments: Generalized weakness   Skin:     General: Skin is warm and dry.      Comments: Incision c/d/I  Hemovac intact   Neurological:      Mental Status: He is alert and oriented to person, place, and time.      Deep Tendon Reflexes: Reflexes are normal and symmetric.   Psychiatric:         Behavior: Behavior normal.           Assessment:               * No active hospital problems. *    Past Medical History:   Diagnosis Date    Congenital heart defect     Diabetes mellitus     Hyperlipidemia     Stroke         Plan:        Strep bacteremia  Multiple lumbar epidural abscesses  Subacute CVA  PFO  Multifactorial anemia  DM2 with hyperglycemia not on long term insulin  PAF  Thrombocytosis  Severe protein calorie malnutrition    Continue current treatment. Monitor counts. Increase activity. Labs Thursday. Aggressive therapies as tolerated. Continue IV antibiotics under direction of ID. Maintain patient safety. Continue pain control efforts. Glycemic control - adjust insulin regimen. Appreciate neurology evaluation and recommendations.       Electronically signed by DAVID Hill on 5/27/2025 at 12:30 CDT     I have discussed the care of Fredis Wilson, including pertinent history and exam findings, with the nurse practitioner.    I have seen and examined the patient and the key elements of all parts of the encounter have been performed by me.  I agree with the assessment, plan and orders as documented by DAVID Funez, after I modified the exam findings and the plan of treatments and the  final version is my approved version of the assessment.        Electronically signed by Elver Lara MD on 5/27/2025 at 21:59 CDT

## 2025-05-27 NOTE — PROGRESS NOTES
Adult Nutrition  Assessment/PES    Patient Name:  Fredis Wilson  YOB: 1960  MRN: [id number]  Admit Date:  5/19/2025    Assessment Date:  5/27/2025     Nutrition/Diet History       Row Name 05/27/25 1152          Nutrition/Diet History    Typical Intake (Food/Fluid/EN/PN) Nutrition follow up. Pt reports his appetite is good. Dining associate obtaining menu preferences. Pt receiving CCHO diet,thin liquids. Boost Glucose Control daily with breakfast. PO intake avg. 97% of the past nine meals,po fluid 1130 ml over the past three day review; fluids available at bedside Pt consuming oral supplement at breakfast. Per weight report admit wt 229lb,current wt 226 lbs;wt loss of 3 lbs over the past six days. Pertinent labs blood glucose range 132-296 mg/dl (over the past three days),Na+134 mmol/L,Cr 0.45 mg/dl,Alb 2.7g/dl,C-RP improving from 10.69 mg/dl to currently at 3.83 mg/dl. Pertinent medications SC Lantus,SC humalog and rocephin. Flavio score 19,room air,generalized weakness,last BM 5/26. Cont to follow for plan of care.     Food Preferences likes the bowtie pasta with zucchini     Supplemental Drinks/Foods/Additives Boost Glucose Control daily     Functional Status ambulatory     Factors Affecting Nutritional Intake fatigue           Electronically signed by:  Kathy Friend RDN, VIRGIL  05/27/25 12:16 CDT

## 2025-05-27 NOTE — TELEPHONE ENCOUNTER
Rodney's wife requests a call back please from the nurse. She advised that the [facility] are needing information as to why the patient was in the hospital for short term disability claim.     Thank you.

## 2025-05-27 NOTE — TELEPHONE ENCOUNTER
Spoke with the patients wife and informed her that disability forms were not received. Patient recommended to reach out to primary care doctor or Dr. Jaren Bee. Informed to call back with any issues.

## 2025-05-28 LAB
GLUCOSE BLDC GLUCOMTR-MCNC: 156 MG/DL (ref 70–130)
GLUCOSE BLDC GLUCOMTR-MCNC: 239 MG/DL (ref 70–130)
GLUCOSE BLDC GLUCOMTR-MCNC: 263 MG/DL (ref 70–130)
GLUCOSE BLDC GLUCOMTR-MCNC: 277 MG/DL (ref 70–130)

## 2025-05-28 PROCEDURE — 63710000001 INSULIN GLARGINE PER 5 UNITS: Performed by: NURSE PRACTITIONER

## 2025-05-28 PROCEDURE — 25010000002 CEFTRIAXONE PER 250 MG: Performed by: INTERNAL MEDICINE

## 2025-05-28 PROCEDURE — 63710000001 INSULIN LISPRO (HUMAN) PER 5 UNITS: Performed by: INTERNAL MEDICINE

## 2025-05-28 PROCEDURE — 82948 REAGENT STRIP/BLOOD GLUCOSE: CPT

## 2025-05-28 PROCEDURE — 99232 SBSQ HOSP IP/OBS MODERATE 35: CPT | Performed by: INTERNAL MEDICINE

## 2025-05-28 NOTE — PROGRESS NOTES
Infectious Diseases Progress Note    Patient:  Fredis Wilson  YOB: 1960  MRN: [id number]   Admit date: 5/19/2025   Admitting Physician: Elver Lara MD  Primary Care Physician: Milo Verma MD    Chief Complaint: Seeing in follow-up of Streptococcus intermedius bloodstream infection and lumbar paraspinal infection/epidural abscess secondary to Streptococcus intermedius.    Interval History: He indicates he is showing improvement.  He feels he is continuing to gain strength steadily.  He indicates he has been walking the halls with a walker.  His drain does remain in place.  He is not having pain or discomfort at his PICC line site.  He has no diarrhea or rash.  He indicates physical therapy is ready to dismiss him given his improvement.  He was wondering about discharge home.  We again talked about IV antibiotic treatment at home.  He wondered if his treatment could be pill form.  Explained with his severe discitis/epidural abscess/paraspinal infection that recommendations would be for 6 to 8 weeks of IV therapy.  He is currently on a once a day treatment which is perfect for home administration.  He was wondering about anticoagulation.  He indicates neurology did not want him to have anything more other than aspirin.  He wondered about closure of his PFO.  Explained he will need ongoing cardiology follow-up.    No intake or output data in the 24 hours ending 05/28/25 1248  Allergies: No Known Allergies  Current Scheduled Medications:   cefTRIAXone, 2,000 mg, Intravenous, Q24H  insulin glargine, 22 Units, Subcutaneous, Q12H  insulin lispro, 3-14 Units, Subcutaneous, 4x Daily AC & at Bedtime  Naloxegol Oxalate, 25 mg, Oral, QAM  polyethylene glycol, 17 g, Oral, Daily  pravastatin, 10 mg, Oral, Daily  senna-docusate sodium, 1 tablet, Oral, Daily  sodium chloride, 10 mL, Intravenous, Q12H  sodium chloride, 10 mL, Intravenous, Q12H          Current PRN Medications:    acetaminophen **OR**  "acetaminophen    dextrose    dextrose    glucagon (human recombinant)    oxyCODONE-acetaminophen **OR** oxyCODONE-acetaminophen    sodium chloride    sodium chloride    sodium chloride    sodium chloride    sodium chloride    tiZANidine    Review of Systems see HPI    Vital Signs:  No data recorded.    Ht 180.3 cm (71\")   Wt 103 kg (226 lb)   BMI 31.52 kg/m²     Physical Exam  Vital signs - reviewed.  Line/IV (PICC) site - No erythema, warmth, induration, or tenderness.  Bilateral lower extremity strength and sensation intact  Skin without rash    Lab Results:  CBC:   Results from last 7 days   Lab Units 05/26/25  0644 05/22/25  0328   WBC 10*3/mm3 9.41 11.60*   HEMOGLOBIN g/dL 9.3* 8.8*   HEMATOCRIT % 30.4* 28.8*   PLATELETS 10*3/mm3 511* 635*     BMP:  Results from last 7 days   Lab Units 05/26/25  0644 05/22/25  0329   SODIUM mmol/L 134* 137   POTASSIUM mmol/L 3.7 3.5   CHLORIDE mmol/L 98 99   CO2 mmol/L 27.0 27.0   BUN mg/dL 10 11   CREATININE mg/dL 0.45* 0.48*   GLUCOSE mg/dL 143* 91   CALCIUM mg/dL 8.3* 8.0*   ALT (SGPT) U/L 10  --      CRP trend reviewed:  Component  Ref Range & Units (hover) 2 d ago  [place] 6 d ago  [place] 2 wk ago  [place] 3 wk ago  [place]   C-Reactive Protein 3.83 High  10.69 High  228.0 High  R, .0 High  R, C     Culture Results: No new results  Previous culture results at [facility] reviewed again today:  Blood and surgical culture results from his hospitalization at [facility].  Blood and surgical cultures reviewed.    Surgical cultures May 13, 2025:  Culture #1 paraspinal swab-few white blood cells, no organisms, no growth on primary media but growth in broth only.  Staphylococcus warneri (susceptibility below)     Susceptibility  Staphylococcus warneri (1)  Antibiotic Interpretation VANI   Method Status     doxycycline Sensitive <=0.5 mcg/mL BACTERIAL SUSCEPTIBILITY PANEL BY VANI       erythromycin Resistant >=8 mcg/mL BACTERIAL " SUSCEPTIBILITY PANEL BY VANI       inducible clindamycin resistance   Neg mcg/mL BACTERIAL SUSCEPTIBILITY PANEL BY VANI       levofloxacin Sensitive <=0.12 mcg/mL BACTERIAL SUSCEPTIBILITY PANEL BY VANI       oxacillin Sensitive <=0.25 mcg/mL BACTERIAL SUSCEPTIBILITY PANEL BY VANI       tetracycline Sensitive <=1 mcg/mL BACTERIAL SUSCEPTIBILITY PANEL BY VANI       vancomycin Sensitive <=0.5 mcg/mL BACTERIAL SUSCEPTIBILITY PANEL BY VANI             Culture #2 paraspinal tissue-rare white blood cells, no organisms, culture no growth to date  Culture #3 epidural purulence (swab)-rare white blood cells, no organisms, culture growing Streptococcus intermedius  Anaerobic Culture No anaerobes isolated  5 days   Organism Streptococcus intermedius Abnormal    Culture Surgical Isolated from Broth   Resulting Agency [facility]   Susceptibility     Organism Antibiotic Method Susceptibility   Streptococcus intermedius ampicillin BACTERIAL SUSCEPTIBILITY PANEL BY VANI <=0.25 mcg/mL: Sensitive   Streptococcus intermedius benzylpenicillin BACTERIAL SUSCEPTIBILITY PANEL BY VANI <=0.06 mcg/mL: Sensitive   Streptococcus intermedius cefotaxime BACTERIAL SUSCEPTIBILITY PANEL BY VANI <=0.12 mcg/mL: Sensitive   Streptococcus intermedius cefTRIAXone BACTERIAL SUSCEPTIBILITY PANEL BY VANI <=0.12 mcg/mL: Sensitive   Streptococcus intermedius erythromycin BACTERIAL SUSCEPTIBILITY PANEL BY VANI <=0.12 mcg/mL: Sensitive   Streptococcus intermedius levofloxacin BACTERIAL SUSCEPTIBILITY PANEL BY VANI <=0.25 mcg/mL: Sensitive   Streptococcus intermedius vancomycin BACTERIAL SUSCEPTIBILITY PANEL BY VANI 0.25 mcg/mL: Sensitive      Culture #4 labeled L4-5 facet-result canceled by ancillary.  Staphylococcus epidermidis (susceptibility below)   Susceptibility  Staphylococcus epidermidis (1)     Antibiotic Interpretation VANI   Method Status     clindamycin Sensitive <=0.12 mcg/mL BACTERIAL SUSCEPTIBILITY PANEL BY VANI       doxycycline  "Sensitive <=0.5 mcg/mL BACTERIAL SUSCEPTIBILITY PANEL BY VANI       erythromycin Sensitive <=0.25 mcg/mL BACTERIAL SUSCEPTIBILITY PANEL BY VANI       inducible clindamycin resistance   Neg mcg/mL BACTERIAL SUSCEPTIBILITY PANEL BY VANI       levofloxacin Sensitive <=0.12 mcg/mL BACTERIAL SUSCEPTIBILITY PANEL BY VANI       linezolid Sensitive 1 mcg/mL BACTERIAL SUSCEPTIBILITY PANEL BY VANI       oxacillin Sensitive <=0.25 mcg/mL BACTERIAL SUSCEPTIBILITY PANEL BY VANI       tetracycline Sensitive <=1 mcg/mL BACTERIAL SUSCEPTIBILITY PANEL BY VANI       vancomycin Sensitive 2 mcg/mL BACTERIAL SUSCEPTIBILITY PANEL BY VANI          Blood cultures May 5, 2025-\"susceptibility now available, reviewed, and outlined below \"  Blood cultures May 6, 2025-Streptococcus intermedius  Blood cultures May 7, 2025-Streptococcus intermedius  Blood cultures May 10, 2025-no growth  Blood cultures May 11, 2025-no growth     Susceptibility     Streptococcus intermedius (1)     Antibiotic Interpretation VANI   Method Status     ampicillin Sensitive <=0.25 mcg/mL BACTERIAL SUSCEPTIBILITY PANEL BY VANI       benzylpenicillin Sensitive <=0.06 mcg/mL BACTERIAL SUSCEPTIBILITY PANEL BY VANI       cefotaxime Sensitive <=0.12 mcg/mL BACTERIAL SUSCEPTIBILITY PANEL BY VANI       cefTRIAXone Sensitive <=0.12 mcg/mL BACTERIAL SUSCEPTIBILITY PANEL BY VANI       erythromycin Sensitive <=0.12 mcg/mL BACTERIAL SUSCEPTIBILITY PANEL BY VANI       levofloxacin Sensitive 0.5 mcg/mL BACTERIAL SUSCEPTIBILITY PANEL BY VANI       vancomycin Sensitive 0.5 mcg/mL BACTERIAL SUSCEPTIBILITY PANEL BY VANI          Radiology:   MRI brain with and without contrast done at [facility] May 9, 2025:  Impression     1. Motion degraded study.  2. Interval development of two 4 mm T2 hyperintense lesions in subcortical white matter of bilateral frontal lobes, with susceptibility artifact, which could represent tiny intraparenchymal hematomas.  3. Numerous punctate susceptibility artifacts " scattered in bilateral cerebral hemispheres and bilateral cerebellum, not visualized on prior GRE sequence (SWI was not performed).  Findings could represent new tiny intraparenchymal hematomas versus remote  microhemorrhage or cerebral amyloid angiopathy.  4. Interval resolution of previously seen tiny subacute infarcts.  5. Chronic white matter microvascular ischemic changes.      ______________________________________  Electronically signed by: CAROLYN EMMANUEL M.D.  Date:     05/09/2025  Time:    12:47     MRI brain with and without contrast done today:  HISTORY: Epidural abscess. History of intracranial hemorrhage and  stroke.     Images are stored in PACS per institutional protocol.     MR imaging of the brain without and with IV gadolinium contrast.  Axial, sagittal, and coronal sequences.     5 mm acute lacunar infarct within the white matter of the LEFT frontal  lobe.  No hemorrhage at this site.     Multiple small microhemorrhages are seen throughout the brain and  cerebellum compatible with amyloid angiopathy.     Mild to moderate cortical atrophy.  Mild small vessel disease.     Clear paranasal sinuses.  Mildly prominent ventricle size compatible with central atrophy.     Postcontrast images show no abnormal enhancement.  No mass lesion.  The dural venous sinuses are patent.     IMPRESSION:  1. 5 mm acute lacunar infarct in the LEFT frontal lobe. No hemorrhage or  mass effect at this site.  2. Multiple small foci of hemosiderin representing tiny chronic  microhemorrhages compatible with amyloid angiopathy.     This report was signed and finalized on 5/21/2025 10:47 AM by Dr. Jasper Cunningham MD.    MRI brain and brainstem with and without contrast done May 21, 2025:  HISTORY: Epidural abscess. History of intracranial hemorrhage and  stroke.     Images are stored in PACS per institutional protocol.     MR imaging of the brain without and with IV gadolinium contrast.  Axial, sagittal, and coronal sequences.      5 mm acute lacunar infarct within the white matter of the LEFT frontal  lobe.  No hemorrhage at this site.     Multiple small microhemorrhages are seen throughout the brain and  cerebellum compatible with amyloid angiopathy.     Mild to moderate cortical atrophy.  Mild small vessel disease.     Clear paranasal sinuses.  Mildly prominent ventricle size compatible with central atrophy.     Postcontrast images show no abnormal enhancement.  No mass lesion.  The dural venous sinuses are patent.     IMPRESSION:  1. 5 mm acute lacunar infarct in the LEFT frontal lobe. No hemorrhage or  mass effect at this site.  2. Multiple small foci of hemosiderin representing tiny chronic  microhemorrhages compatible with amyloid angiopathy.    This report was signed and finalized on 5/21/2025 10:47 AM by Dr. Jasper Cunningham MD.    Impression:   1.  Streptococcus intermedius bloodstream infection  2.  Lumbar paraspinal infection/epidural abscess-Streptococcus intermedius-tolerating ceftriaxone without antibiotic  or line related problem  3.  Weakness/deconditioning-improving  4.  PFO-needs follow-up with structural cardiology at discharge regarding closure    Recommendations:   Continue plan for 6 to 8 weeks of intravenous antibiotic therapy  He appears to be tolerating ceftriaxone without difficulty  If he is improved to the point where he can manage at home, process could be started to begin arrangements for home intravenous antibiotic therapy  See home IV antibiotic recommendations outlined below    Home IV antibiotic recommendations:  Antibiotic recommendations:  1.  Diagnosis-Streptococcus intermedius bacteremia.  Lumbar discitis/paraspinal infection-most likely secondary to Streptococcus intermedius.  Underwent surgical exploration/irrigation/hardware placement on May 13, 2025.  2.  IV access-PICC line placement planned prior to discharge  3.  Spine surgeon-Angel Washington MD  4.  Antibiotic recommendation:  Ceftriaxone 2 g IV  daily  Last dose  ceftriaxone on June 24, 2025 (6 weeks following surgical drainage) or July 8, 2025 (8 weeks following surgical drainage)  5.  Laboratory monitoring:  CMP, CBC, CRP every Monday while on intravenous antibiotic treatment  6.  Follow-up appointment 2 to 3 weeks after hospital discharge    Anthony Hale MD

## 2025-05-28 NOTE — PROGRESS NOTES
Marco Lara M.D.  [facility]        Internal Medicine Progress Note    5/28/2025   09:39 CDT    Name:  Fredis Wilson  MRN:    [id number]     Acct:     [id number]   Room:  [address] [place] [address] Day: 0     Admit Date: 5/19/2025  5:15 PM  PCP: Milo Verma MD    Subjective:     C/C: back pain, weakness    Interval History: Status:  stayed the same. Resting in bed.  No family at bedside. Afebrile. Maintaining adequate 02 sats on room air. Denies pain. Increasing ambulation and exercise. Remains largely independent for ADLs at this point. Tolerating treatment thus far. Blood sugars stable, but elevated at times.     Review of Systems   Constitutional: Positive for malaise/fatigue. Negative for chills, decreased appetite, weight gain and weight loss.   HENT:  Negative for congestion, ear discharge, hoarse voice and tinnitus.    Eyes:  Negative for blurred vision, discharge, visual disturbance and visual halos.   Cardiovascular:  Negative for chest pain, claudication, dyspnea on exertion, irregular heartbeat, leg swelling, orthopnea and paroxysmal nocturnal dyspnea.   Respiratory:  Negative for cough, shortness of breath, sputum production and wheezing.    Endocrine: Negative for cold intolerance, heat intolerance and polyuria.   Hematologic/Lymphatic: Negative for adenopathy. Does not bruise/bleed easily.   Skin:  Negative for dry skin, itching and suspicious lesions.   Musculoskeletal:  Positive for back pain and myalgias. Negative for arthritis, falls, joint pain and muscle weakness.   Gastrointestinal:  Negative for abdominal pain, constipation, diarrhea, dysphagia and hematemesis.   Genitourinary:  Negative for bladder incontinence, dysuria and frequency.   Neurological:  Positive for weakness. Negative for aphonia, disturbances in coordination and dizziness.   Psychiatric/Behavioral:  Negative for altered mental status, depression, memory loss and substance abuse. The patient does not have  insomnia and is not nervous/anxious.          Medications:     Allergies: No Known Allergies    Current Meds:   Current Facility-Administered Medications:     acetaminophen (TYLENOL) tablet 650 mg, 650 mg, Oral, Q4H PRN **OR** acetaminophen (TYLENOL) suppository 650 mg, 650 mg, Rectal, Q6H PRN, Elver Lara MD    cefTRIAXone (ROCEPHIN) 2,000 mg in sodium chloride 0.9 % 100 mL MBP, 2,000 mg, Intravenous, Q24H, Anthony Harvey MD    dextrose (D50W) (25 g/50 mL) IV injection 25 g, 25 g, Intravenous, Q15 Min PRN, Elver Lara MD    dextrose (GLUTOSE) oral gel 15 g, 15 g, Oral, Q15 Min PRN, Elver Lara MD    glucagon (GLUCAGEN) injection 1 mg, 1 mg, Intramuscular, Q15 Min PRN, Elver Lara MD    insulin glargine (LANTUS, SEMGLEE) injection 22 Units, 22 Units, Subcutaneous, Q12H, Jordyn Tidwell APRN    Insulin Lispro (humaLOG) injection 3-14 Units, 3-14 Units, Subcutaneous, 4x Daily AC & at Bedtime, Elver Lara MD    Naloxegol Oxalate (MOVANTIK) tablet 25 mg, 25 mg, Oral, QAM, Elver Lara MD    oxyCODONE-acetaminophen (PERCOCET)  MG per tablet 1 tablet, 1 tablet, Oral, Q4H PRN **OR** oxyCODONE-acetaminophen (PERCOCET) 5-325 MG per tablet 1 tablet, 1 tablet, Oral, Q4H PRN, Elver Lara MD    polyethylene glycol (MIRALAX) packet 17 g, 17 g, Oral, Daily, Elver Lara MD    pravastatin (PRAVACHOL) tablet 10 mg, 10 mg, Oral, Daily, Elver Lara MD    sennosides-docusate (PERICOLACE) 8.6-50 MG per tablet 1 tablet, 1 tablet, Oral, Daily, Elver Lara MD    sodium chloride 0.9 % flush 10 mL, 10 mL, Intravenous, Q12H, Elver Lara MD    sodium chloride 0.9 % flush 10 mL, 10 mL, Intravenous, PRN, Elver Lara MD    sodium chloride 0.9 % flush 10 mL, 10 mL, Intravenous, Q12H, Elver Lara MD    sodium chloride 0.9 % flush 10 mL, 10 mL, Intravenous, PRN, Elver Lara MD    sodium  "chloride 0.9 % flush 20 mL, 20 mL, Intravenous, PRN, Elver Lara MD    sodium chloride 0.9 % infusion 40 mL, 40 mL, Intravenous, PRN, Elver Lara MD    sodium chloride 0.9 % infusion 40 mL, 40 mL, Intravenous, PRN, Elver Lara MD    tiZANidine (ZANAFLEX) tablet 4 mg, 4 mg, Oral, Q6H PRN, Elver Lara MD    Data:     Code Status:    There are no questions and answers to display.       No family history on file.    Social History     Socioeconomic History    Marital status:    Tobacco Use    Smoking status: Never    Smokeless tobacco: Never   Vaping Use    Vaping status: Never Used   Substance and Sexual Activity    Alcohol use: Never    Drug use: Never    Sexual activity: Defer       Vitals:  Ht 180.3 cm (71\")   Wt 103 kg (226 lb)   BMI 31.52 kg/m²   T 98.3 P 75 R 20 /63 Sp02 90% (room air)          I/O (24Hr):  No intake or output data in the 24 hours ending 05/28/25 0939    Labs and imaging:      Recent Results (from the past 12 hours)   POC Glucose Once    Collection Time: 05/28/25  6:39 AM    Specimen: Blood   Result Value Ref Range    Glucose 156 (H) 70 - 130 mg/dL                               Physical Examination:        Physical Exam  Vitals and nursing note reviewed.   Constitutional:       Appearance: He is well-developed.   HENT:      Head: Normocephalic and atraumatic.      Right Ear: External ear normal.      Left Ear: External ear normal.      Nose: Nose normal.      Mouth/Throat:      Mouth: Mucous membranes are dry.      Pharynx: Oropharynx is clear.   Eyes:      Pupils: Pupils are equal, round, and reactive to light.   Cardiovascular:      Rate and Rhythm: Normal rate and regular rhythm.      Heart sounds: Normal heart sounds.   Pulmonary:      Effort: Pulmonary effort is normal.      Breath sounds: Normal breath sounds.   Abdominal:      General: Bowel sounds are normal.      Palpations: Abdomen is soft.   Musculoskeletal:         General: " Normal range of motion.      Cervical back: Normal range of motion and neck supple.      Comments: Generalized weakness   Skin:     General: Skin is warm and dry.      Comments: Incision c/d/I  Hemovac intact   Neurological:      Mental Status: He is alert and oriented to person, place, and time.      Deep Tendon Reflexes: Reflexes are normal and symmetric.   Psychiatric:         Behavior: Behavior normal.           Assessment:               * No active hospital problems. *    Past Medical History:   Diagnosis Date    Congenital heart defect     Diabetes mellitus     Hyperlipidemia     Stroke         Plan:        Strep bacteremia  Multiple lumbar epidural abscesses  Subacute CVA  PFO  Multifactorial anemia  DM2 with hyperglycemia not on long term insulin  PAF  Thrombocytosis  Severe protein calorie malnutrition    Continue current treatment. Monitor counts. Increase activity. Labs in am. Aggressive therapies as tolerated. Continue IV antibiotics under direction of ID. Maintain patient safety. Continue pain control efforts. Glycemic control - adjust insulin regimen as needed. Appreciate neurology evaluation and recommendations.       Electronically signed by DAVID Hill on 5/28/2025 at 09:39 CDT

## 2025-05-29 LAB
ANION GAP SERPL CALCULATED.3IONS-SCNC: 11 MMOL/L (ref 5–15)
BUN SERPL-MCNC: 14.3 MG/DL (ref 8–23)
BUN/CREAT SERPL: 32.5 (ref 7–25)
CALCIUM SPEC-SCNC: 8.4 MG/DL (ref 8.6–10.5)
CHLORIDE SERPL-SCNC: 99 MMOL/L (ref 98–107)
CO2 SERPL-SCNC: 25 MMOL/L (ref 22–29)
CREAT SERPL-MCNC: 0.44 MG/DL (ref 0.76–1.27)
CRP SERPL-MCNC: 3.41 MG/DL (ref 0–0.5)
DEPRECATED RDW RBC AUTO: 47.8 FL (ref 37–54)
EGFRCR SERPLBLD CKD-EPI 2021: 119.1 ML/MIN/1.73
ERYTHROCYTE [DISTWIDTH] IN BLOOD BY AUTOMATED COUNT: 14.4 % (ref 12.3–15.4)
ERYTHROCYTE [SEDIMENTATION RATE] IN BLOOD: 80 MM/HR (ref 0–20)
GLUCOSE BLDC GLUCOMTR-MCNC: 205 MG/DL (ref 70–130)
GLUCOSE BLDC GLUCOMTR-MCNC: 248 MG/DL (ref 70–130)
GLUCOSE BLDC GLUCOMTR-MCNC: 250 MG/DL (ref 70–130)
GLUCOSE BLDC GLUCOMTR-MCNC: 270 MG/DL (ref 70–130)
GLUCOSE SERPL-MCNC: 186 MG/DL (ref 65–99)
HCT VFR BLD AUTO: 30.9 % (ref 37.5–51)
HGB BLD-MCNC: 9.7 G/DL (ref 13–17.7)
MCH RBC QN AUTO: 28.6 PG (ref 26.6–33)
MCHC RBC AUTO-ENTMCNC: 31.4 G/DL (ref 31.5–35.7)
MCV RBC AUTO: 91.2 FL (ref 79–97)
PLATELET # BLD AUTO: 483 10*3/MM3 (ref 140–450)
PMV BLD AUTO: 8.9 FL (ref 6–12)
POTASSIUM SERPL-SCNC: 3.8 MMOL/L (ref 3.5–5.2)
RBC # BLD AUTO: 3.39 10*6/MM3 (ref 4.14–5.8)
SODIUM SERPL-SCNC: 135 MMOL/L (ref 136–145)
WBC NRBC COR # BLD AUTO: 8.58 10*3/MM3 (ref 3.4–10.8)

## 2025-05-29 PROCEDURE — 25010000002 CEFTRIAXONE PER 250 MG: Performed by: INTERNAL MEDICINE

## 2025-05-29 PROCEDURE — 85652 RBC SED RATE AUTOMATED: CPT | Performed by: INTERNAL MEDICINE

## 2025-05-29 PROCEDURE — 82948 REAGENT STRIP/BLOOD GLUCOSE: CPT

## 2025-05-29 PROCEDURE — 63710000001 INSULIN LISPRO (HUMAN) PER 5 UNITS: Performed by: INTERNAL MEDICINE

## 2025-05-29 PROCEDURE — 63710000001 INSULIN GLARGINE PER 5 UNITS: Performed by: NURSE PRACTITIONER

## 2025-05-29 PROCEDURE — 80048 BASIC METABOLIC PNL TOTAL CA: CPT | Performed by: INTERNAL MEDICINE

## 2025-05-29 PROCEDURE — 86140 C-REACTIVE PROTEIN: CPT | Performed by: INTERNAL MEDICINE

## 2025-05-29 PROCEDURE — 85027 COMPLETE CBC AUTOMATED: CPT | Performed by: INTERNAL MEDICINE

## 2025-05-29 RX ORDER — OXYCODONE AND ACETAMINOPHEN 10; 325 MG/1; MG/1
1 TABLET ORAL EVERY 4 HOURS PRN
Refills: 0 | Status: DISCONTINUED | OUTPATIENT
Start: 2025-05-29 | End: 2025-05-30 | Stop reason: DRUGHIGH

## 2025-05-29 RX ORDER — OXYCODONE AND ACETAMINOPHEN 5; 325 MG/1; MG/1
1 TABLET ORAL EVERY 4 HOURS PRN
Refills: 0 | Status: DISCONTINUED | OUTPATIENT
Start: 2025-05-29 | End: 2025-05-30

## 2025-05-29 NOTE — PROGRESS NOTES
Marco Lara M.D.  DAVID Funez        Internal Medicine Progress Note    5/29/2025   11:34 CDT    Name:  Fredis Wilson  MRN:    [id number]     Acct:     [id number]   Room:  [address] [place] [address] Day: 0     Admit Date: 5/19/2025  5:15 PM  PCP: Milo Verma MD    Subjective:     C/C: back pain, weakness    Interval History: Status:  stayed the same. Up to chair.  No family at bedside. Afebrile. Maintaining adequate 02 sats on room air. Denies pain. Increasing ambulation and exercise. Remains largely independent for ADLs at this point. Tolerating treatment thus far. Blood sugars stable, but elevated at times. Counts otherwise stable. 90 ml out from hemovac yesterday.  Anxious for discharge.     Review of Systems   Constitutional: Positive for malaise/fatigue. Negative for chills, decreased appetite, weight gain and weight loss.   HENT:  Negative for congestion, ear discharge, hoarse voice and tinnitus.    Eyes:  Negative for blurred vision, discharge, visual disturbance and visual halos.   Cardiovascular:  Negative for chest pain, claudication, dyspnea on exertion, irregular heartbeat, leg swelling, orthopnea and paroxysmal nocturnal dyspnea.   Respiratory:  Negative for cough, shortness of breath, sputum production and wheezing.    Endocrine: Negative for cold intolerance, heat intolerance and polyuria.   Hematologic/Lymphatic: Negative for adenopathy. Does not bruise/bleed easily.   Skin:  Negative for dry skin, itching and suspicious lesions.   Musculoskeletal:  Positive for back pain and myalgias. Negative for arthritis, falls, joint pain and muscle weakness.   Gastrointestinal:  Negative for abdominal pain, constipation, diarrhea, dysphagia and hematemesis.   Genitourinary:  Negative for bladder incontinence, dysuria and frequency.   Neurological:  Positive for weakness. Negative for aphonia, disturbances in coordination and dizziness.   Psychiatric/Behavioral:  Negative for altered mental  status, depression, memory loss and substance abuse. The patient does not have insomnia and is not nervous/anxious.          Medications:     Allergies: No Known Allergies    Current Meds:   Current Facility-Administered Medications:     acetaminophen (TYLENOL) tablet 650 mg, 650 mg, Oral, Q4H PRN **OR** acetaminophen (TYLENOL) suppository 650 mg, 650 mg, Rectal, Q6H PRN, Elver Lara MD    cefTRIAXone (ROCEPHIN) 2,000 mg in sodium chloride 0.9 % 100 mL MBP, 2,000 mg, Intravenous, Q24H, Anthony Harvey MD    dextrose (D50W) (25 g/50 mL) IV injection 25 g, 25 g, Intravenous, Q15 Min PRN, Elver Lara MD    dextrose (GLUTOSE) oral gel 15 g, 15 g, Oral, Q15 Min PRN, Elver Lara MD    glucagon (GLUCAGEN) injection 1 mg, 1 mg, Intramuscular, Q15 Min PRN, Elver Lara MD    insulin glargine (LANTUS, SEMGLEE) injection 22 Units, 22 Units, Subcutaneous, Q12H, Jordyn Tidwell APRN    Insulin Lispro (humaLOG) injection 3-14 Units, 3-14 Units, Subcutaneous, 4x Daily AC & at Bedtime, Elver Lara MD    Naloxegol Oxalate (MOVANTIK) tablet 25 mg, 25 mg, Oral, QAM, Elver Lara MD    oxyCODONE-acetaminophen (PERCOCET)  MG per tablet 1 tablet, 1 tablet, Oral, Q4H PRN **OR** oxyCODONE-acetaminophen (PERCOCET) 5-325 MG per tablet 1 tablet, 1 tablet, Oral, Q4H PRN, Elver Lara MD    polyethylene glycol (MIRALAX) packet 17 g, 17 g, Oral, Daily, Elver Lara MD    pravastatin (PRAVACHOL) tablet 10 mg, 10 mg, Oral, Daily, Elver Lara MD    sennosides-docusate (PERICOLACE) 8.6-50 MG per tablet 1 tablet, 1 tablet, Oral, Daily, Elver Lara MD    sodium chloride 0.9 % flush 10 mL, 10 mL, Intravenous, Q12H, Elver Lara MD    sodium chloride 0.9 % flush 10 mL, 10 mL, Intravenous, PRN, Elver Lara MD    sodium chloride 0.9 % flush 10 mL, 10 mL, Intravenous, Q12H, Elver Lara MD    sodium chloride  "0.9 % flush 10 mL, 10 mL, Intravenous, PRN, Elver Lara MD    sodium chloride 0.9 % flush 20 mL, 20 mL, Intravenous, PRMarco JON Richard Scott, MD    sodium chloride 0.9 % infusion 40 mL, 40 mL, Intravenous, PRN, Elver Lara MD    sodium chloride 0.9 % infusion 40 mL, 40 mL, Intravenous, PRMarco JON Richard Scott, MD    tiZANidine (ZANAFLEX) tablet 4 mg, 4 mg, Oral, Q6H PRN, Elver Lara MD    Data:     Code Status:    There are no questions and answers to display.       No family history on file.    Social History     Socioeconomic History    Marital status:    Tobacco Use    Smoking status: Never    Smokeless tobacco: Never   Vaping Use    Vaping status: Never Used   Substance and Sexual Activity    Alcohol use: Never    Drug use: Never    Sexual activity: Defer       Vitals:  Ht 180.3 cm (71\")   Wt 103 kg (226 lb)   BMI 31.52 kg/m²   T 98.2 P 69 R 15 /59 Sp02 95% (room air)          I/O (24Hr):  No intake or output data in the 24 hours ending 05/29/25 1134    Labs and imaging:      Recent Results (from the past 12 hours)   CBC (No Diff)    Collection Time: 05/29/25  4:09 AM    Specimen: Blood   Result Value Ref Range    WBC 8.58 3.40 - 10.80 10*3/mm3    RBC 3.39 (L) 4.14 - 5.80 10*6/mm3    Hemoglobin 9.7 (L) 13.0 - 17.7 g/dL    Hematocrit 30.9 (L) 37.5 - 51.0 %    MCV 91.2 79.0 - 97.0 fL    MCH 28.6 26.6 - 33.0 pg    MCHC 31.4 (L) 31.5 - 35.7 g/dL    RDW 14.4 12.3 - 15.4 %    RDW-SD 47.8 37.0 - 54.0 fl    MPV 8.9 6.0 - 12.0 fL    Platelets 483 (H) 140 - 450 10*3/mm3   Basic Metabolic Panel    Collection Time: 05/29/25  4:09 AM    Specimen: Blood   Result Value Ref Range    Glucose 186 (H) 65 - 99 mg/dL    BUN 14.3 8.0 - 23.0 mg/dL    Creatinine 0.44 (L) 0.76 - 1.27 mg/dL    Sodium 135 (L) 136 - 145 mmol/L    Potassium 3.8 3.5 - 5.2 mmol/L    Chloride 99 98 - 107 mmol/L    CO2 25.0 22.0 - 29.0 mmol/L    Calcium 8.4 (L) 8.6 - 10.5 mg/dL    BUN/Creatinine Ratio 32.5 " (H) 7.0 - 25.0    Anion Gap 11.0 5.0 - 15.0 mmol/L    eGFR 119.1 >60.0 mL/min/1.73   Sedimentation Rate    Collection Time: 05/29/25  4:09 AM    Specimen: Blood   Result Value Ref Range    Sed Rate 80 (H) 0 - 20 mm/hr   C-reactive Protein    Collection Time: 05/29/25  4:09 AM    Specimen: Blood   Result Value Ref Range    C-Reactive Protein 3.41 (H) 0.00 - 0.50 mg/dL   POC Glucose Once    Collection Time: 05/29/25  6:47 AM    Specimen: Blood   Result Value Ref Range    Glucose 205 (H) 70 - 130 mg/dL                               Physical Examination:        Physical Exam  Vitals and nursing note reviewed.   Constitutional:       Appearance: He is well-developed.   HENT:      Head: Normocephalic and atraumatic.      Right Ear: External ear normal.      Left Ear: External ear normal.      Nose: Nose normal.      Mouth/Throat:      Mouth: Mucous membranes are dry.      Pharynx: Oropharynx is clear.   Eyes:      Pupils: Pupils are equal, round, and reactive to light.   Cardiovascular:      Rate and Rhythm: Normal rate and regular rhythm.      Heart sounds: Normal heart sounds.   Pulmonary:      Effort: Pulmonary effort is normal.      Breath sounds: Normal breath sounds.   Abdominal:      General: Bowel sounds are normal.      Palpations: Abdomen is soft.   Musculoskeletal:         General: Normal range of motion.      Cervical back: Normal range of motion and neck supple.      Comments: Generalized weakness   Skin:     General: Skin is warm and dry.      Comments: Incision c/d/I  Hemovac intact   Neurological:      Mental Status: He is alert and oriented to person, place, and time.      Deep Tendon Reflexes: Reflexes are normal and symmetric.   Psychiatric:         Behavior: Behavior normal.           Assessment:               * No active hospital problems. *    Past Medical History:   Diagnosis Date    Congenital heart defect     Diabetes mellitus     Hyperlipidemia     Stroke         Plan:        Strep  bacteremia  Multiple lumbar epidural abscesses  Subacute CVA  PFO  Multifactorial anemia  DM2 with hyperglycemia not on long term insulin  PAF  Thrombocytosis  Severe protein calorie malnutrition    Continue current treatment. Monitor counts. Increase activity. Labs Monday. Aggressive therapies as tolerated. Continue IV antibiotics under direction of ID. Maintain patient safety. Continue pain control efforts. Glycemic control - adjust insulin regimen as needed. Appreciate neurology evaluation and recommendations.       Electronically signed by DAVID Hill on 5/29/2025 at 11:34 CDT     I have discussed the care of Fredis Wilson, including pertinent history and exam findings, with the nurse practitioner.    I have seen and examined the patient and the key elements of all parts of the encounter have been performed by me.  I agree with the assessment, plan and orders as documented by DAVID Funez, after I modified the exam findings and the plan of treatments and the final version is my approved version of the assessment.        Electronically signed by Elver Lara MD on 5/29/2025 at 22:44 CDT

## 2025-05-30 LAB
GLUCOSE BLDC GLUCOMTR-MCNC: 182 MG/DL (ref 70–130)
GLUCOSE BLDC GLUCOMTR-MCNC: 212 MG/DL (ref 70–130)
GLUCOSE BLDC GLUCOMTR-MCNC: 235 MG/DL (ref 70–130)
GLUCOSE BLDC GLUCOMTR-MCNC: 247 MG/DL (ref 70–130)

## 2025-05-30 PROCEDURE — 63710000001 INSULIN LISPRO (HUMAN) PER 5 UNITS: Performed by: INTERNAL MEDICINE

## 2025-05-30 PROCEDURE — 63710000001 INSULIN LISPRO (HUMAN) PER 5 UNITS: Performed by: NURSE PRACTITIONER

## 2025-05-30 PROCEDURE — 63710000001 INSULIN GLARGINE PER 5 UNITS: Performed by: NURSE PRACTITIONER

## 2025-05-30 PROCEDURE — 82948 REAGENT STRIP/BLOOD GLUCOSE: CPT

## 2025-05-30 PROCEDURE — 25010000002 CEFTRIAXONE PER 250 MG: Performed by: INTERNAL MEDICINE

## 2025-05-30 PROCEDURE — 99232 SBSQ HOSP IP/OBS MODERATE 35: CPT | Performed by: INTERNAL MEDICINE

## 2025-05-30 RX ORDER — OXYCODONE AND ACETAMINOPHEN 7.5; 325 MG/1; MG/1
1 TABLET ORAL EVERY 6 HOURS PRN
Refills: 0 | Status: DISCONTINUED | OUTPATIENT
Start: 2025-05-30 | End: 2025-06-04

## 2025-05-30 RX ORDER — OXYCODONE AND ACETAMINOPHEN 5; 325 MG/1; MG/1
1 TABLET ORAL EVERY 4 HOURS PRN
Refills: 0 | Status: DISCONTINUED | OUTPATIENT
Start: 2025-05-30 | End: 2025-05-30

## 2025-05-30 RX ORDER — INSULIN LISPRO 100 [IU]/ML
5 INJECTION, SOLUTION INTRAVENOUS; SUBCUTANEOUS
Status: DISCONTINUED | OUTPATIENT
Start: 2025-05-30 | End: 2025-06-10 | Stop reason: HOSPADM

## 2025-05-30 NOTE — PROGRESS NOTES
Infectious Diseases Progress Note    Patient:  Fredis Wilson  YOB: 1960  MRN: [id number]   Admit date: 5/19/2025   Admitting Physician: Elver Lara MD  Primary Care Physician: Milo Verma MD    Chief Complaint: Follow-up Streptococcus intermedius bloodstream infection and lumbar paraspinal infection/epidural abscess secondary to Streptococcus intermedius.    Interval History: He continues to show improvement.  He is ambulating with a walker.  He is not having any lower extremity weakness or numbness.  He has some chronic numbness in both feet consistent with his diabetes, but no new numbness or neurological findings involving the lower extremities.  His bowel and bladder function remains normal.  He is not having any skin rash or skin itching.  He has no pain or discomfort at his left arm PICC site.  He wondered about removal of his operative drain.  He indicates he is ready for discharge home once IV antibiotic arrangements are in place.  We talked about the process and administration of home IV antibiotics/line flushes etc.  He wondered about return to work.  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  He did indicate the majority of his work can be done at home.  He indicates the only lifting and activity that he requires work wise is sitting and lifting/carrying his laptop computer.  Discussed with discharge planning and his floor nurse.  Updated them that it was okay with me for him to discharge home once home IV antibiotic arrangements are in place.  Have placed home IV antibiotic orders below and also placed in his written orders.    No intake or output data in the 24 hours ending 05/30/25 0859  Allergies: No Known Allergies  Current Scheduled Medications:   cefTRIAXone, 2,000 mg, Intravenous, Q24H  insulin glargine, 22 Units, Subcutaneous, Q12H  insulin lispro, 3-14 Units, Subcutaneous, 4x Daily AC & at Bedtime  Naloxegol Oxalate, 25 mg, Oral,  "QAM  polyethylene glycol, 17 g, Oral, Daily  pravastatin, 10 mg, Oral, Daily  senna-docusate sodium, 1 tablet, Oral, Daily  sodium chloride, 10 mL, Intravenous, Q12H  sodium chloride, 10 mL, Intravenous, Q12H          Current PRN Medications:    acetaminophen **OR** acetaminophen    dextrose    dextrose    glucagon (human recombinant)    oxyCODONE-acetaminophen **OR** oxyCODONE-acetaminophen    sodium chloride    sodium chloride    sodium chloride    sodium chloride    sodium chloride    tiZANidine    Review of Systems see HPI.  No new symptoms.    Vital Signs:  No data recorded.    Ht 180.3 cm (71\")   Wt 103 kg (226 lb)   BMI 31.52 kg/m²     Physical Exam  Vital signs - reviewed.  Line/IV (left arm PICC) site - No erythema, warmth, induration, or tenderness.  He seems to have intact strength and no focal findings on motor exam in the lower extremities.    Lab Results:  CBC:   Results from last 7 days   Lab Units 05/29/25  0409 05/26/25  0644   WBC 10*3/mm3 8.58 9.41   HEMOGLOBIN g/dL 9.7* 9.3*   HEMATOCRIT % 30.9* 30.4*   PLATELETS 10*3/mm3 483* 511*     BMP:  Results from last 7 days   Lab Units 05/29/25  0409 05/26/25  0644   SODIUM mmol/L 135* 134*   POTASSIUM mmol/L 3.8 3.7   CHLORIDE mmol/L 99 98   CO2 mmol/L 25.0 27.0   BUN mg/dL 14.3 10   CREATININE mg/dL 0.44* 0.45*   GLUCOSE mg/dL 186* 143*   CALCIUM mg/dL 8.4* 8.3*   ALT (SGPT) U/L  --  10     Culture Results:   Prior culture results and MRI results reviewed prior to confirming/forwarding his home intravenous IV antibiotic orders.    Blood and surgical culture results from his hospitalization at [facility].  Blood and surgical cultures reviewed.    Surgical cultures May 13, 2025:  Culture #1 paraspinal swab-few white blood cells, no organisms, no growth on primary media but growth in broth only.  Staphylococcus warneri (susceptibility below)     Susceptibility  Staphylococcus warneri (1)  Antibiotic Interpretation VANI   Method Status   "   doxycycline Sensitive <=0.5 mcg/mL BACTERIAL SUSCEPTIBILITY PANEL BY VANI       erythromycin Resistant >=8 mcg/mL BACTERIAL SUSCEPTIBILITY PANEL BY VANI       inducible clindamycin resistance   Neg mcg/mL BACTERIAL SUSCEPTIBILITY PANEL BY VANI       levofloxacin Sensitive <=0.12 mcg/mL BACTERIAL SUSCEPTIBILITY PANEL BY VANI       oxacillin Sensitive <=0.25 mcg/mL BACTERIAL SUSCEPTIBILITY PANEL BY VANI       tetracycline Sensitive <=1 mcg/mL BACTERIAL SUSCEPTIBILITY PANEL BY VANI       vancomycin Sensitive <=0.5 mcg/mL BACTERIAL SUSCEPTIBILITY PANEL BY VANI             Culture #2 paraspinal tissue-rare white blood cells, no organisms, culture no growth to date  Culture #3 epidural purulence (swab)-rare white blood cells, no organisms, culture growing Streptococcus intermedius  Anaerobic Culture No anaerobes isolated  5 days   Organism Streptococcus intermedius Abnormal    Culture Surgical Isolated from Broth   Resulting Agency [facility]   Susceptibility     Organism Antibiotic Method Susceptibility   Streptococcus intermedius ampicillin BACTERIAL SUSCEPTIBILITY PANEL BY VANI <=0.25 mcg/mL: Sensitive   Streptococcus intermedius benzylpenicillin BACTERIAL SUSCEPTIBILITY PANEL BY VANI <=0.06 mcg/mL: Sensitive   Streptococcus intermedius cefotaxime BACTERIAL SUSCEPTIBILITY PANEL BY VANI <=0.12 mcg/mL: Sensitive   Streptococcus intermedius cefTRIAXone BACTERIAL SUSCEPTIBILITY PANEL BY VANI <=0.12 mcg/mL: Sensitive   Streptococcus intermedius erythromycin BACTERIAL SUSCEPTIBILITY PANEL BY VANI <=0.12 mcg/mL: Sensitive   Streptococcus intermedius levofloxacin BACTERIAL SUSCEPTIBILITY PANEL BY VANI <=0.25 mcg/mL: Sensitive   Streptococcus intermedius vancomycin BACTERIAL SUSCEPTIBILITY PANEL BY VANI 0.25 mcg/mL: Sensitive      Culture #4 labeled L4-5 facet-result canceled by ancillary.  Staphylococcus epidermidis (susceptibility below)   Susceptibility  Staphylococcus epidermidis (1)     Antibiotic Interpretation  "VANI   Method Status     clindamycin Sensitive <=0.12 mcg/mL BACTERIAL SUSCEPTIBILITY PANEL BY VANI       doxycycline Sensitive <=0.5 mcg/mL BACTERIAL SUSCEPTIBILITY PANEL BY VANI       erythromycin Sensitive <=0.25 mcg/mL BACTERIAL SUSCEPTIBILITY PANEL BY VANI       inducible clindamycin resistance   Neg mcg/mL BACTERIAL SUSCEPTIBILITY PANEL BY VANI       levofloxacin Sensitive <=0.12 mcg/mL BACTERIAL SUSCEPTIBILITY PANEL BY VANI       linezolid Sensitive 1 mcg/mL BACTERIAL SUSCEPTIBILITY PANEL BY VANI       oxacillin Sensitive <=0.25 mcg/mL BACTERIAL SUSCEPTIBILITY PANEL BY VANI       tetracycline Sensitive <=1 mcg/mL BACTERIAL SUSCEPTIBILITY PANEL BY VANI       vancomycin Sensitive 2 mcg/mL BACTERIAL SUSCEPTIBILITY PANEL BY VANI          Blood cultures May 5, 2025-\"susceptibility now available, reviewed, and outlined below \"  Blood cultures May 6, 2025-Streptococcus intermedius  Blood cultures May 7, 2025-Streptococcus intermedius  Blood cultures May 10, 2025-no growth  Blood cultures May 11, 2025-no growth     Susceptibility     Streptococcus intermedius (1)     Antibiotic Interpretation VANI   Method Status     ampicillin Sensitive <=0.25 mcg/mL BACTERIAL SUSCEPTIBILITY PANEL BY VANI       benzylpenicillin Sensitive <=0.06 mcg/mL BACTERIAL SUSCEPTIBILITY PANEL BY VANI       cefotaxime Sensitive <=0.12 mcg/mL BACTERIAL SUSCEPTIBILITY PANEL BY VANI       cefTRIAXone Sensitive <=0.12 mcg/mL BACTERIAL SUSCEPTIBILITY PANEL BY VANI       erythromycin Sensitive <=0.12 mcg/mL BACTERIAL SUSCEPTIBILITY PANEL BY VANI       levofloxacin Sensitive 0.5 mcg/mL BACTERIAL SUSCEPTIBILITY PANEL BY VANI       vancomycin Sensitive 0.5 mcg/mL BACTERIAL SUSCEPTIBILITY PANEL BY VANI          Radiology:   MRI brain with and without contrast done at [facility] May 9, 2025:  Impression     1. Motion degraded study.  2. Interval development of two 4 mm T2 hyperintense lesions in subcortical white matter of bilateral frontal lobes, with susceptibility " artifact, which could represent tiny intraparenchymal hematomas.  3. Numerous punctate susceptibility artifacts scattered in bilateral cerebral hemispheres and bilateral cerebellum, not visualized on prior GRE sequence (SWI was not performed).  Findings could represent new tiny intraparenchymal hematomas versus remote  microhemorrhage or cerebral amyloid angiopathy.  4. Interval resolution of previously seen tiny subacute infarcts.  5. Chronic white matter microvascular ischemic changes.      ______________________________________  Electronically signed by: CAROLYN EMMANUEL M.D.  Date:     05/09/2025  Time:    12:47     MRI brain with and without contrast done today:  HISTORY: Epidural abscess. History of intracranial hemorrhage and  stroke.     Images are stored in PACS per institutional protocol.     MR imaging of the brain without and with IV gadolinium contrast.  Axial, sagittal, and coronal sequences.     5 mm acute lacunar infarct within the white matter of the LEFT frontal  lobe.  No hemorrhage at this site.     Multiple small microhemorrhages are seen throughout the brain and  cerebellum compatible with amyloid angiopathy.     Mild to moderate cortical atrophy.  Mild small vessel disease.     Clear paranasal sinuses.  Mildly prominent ventricle size compatible with central atrophy.     Postcontrast images show no abnormal enhancement.  No mass lesion.  The dural venous sinuses are patent.     IMPRESSION:  1. 5 mm acute lacunar infarct in the LEFT frontal lobe. No hemorrhage or  mass effect at this site.  2. Multiple small foci of hemosiderin representing tiny chronic  microhemorrhages compatible with amyloid angiopathy.     This report was signed and finalized on 5/21/2025 10:47 AM by Dr. Jasper Cunningham MD.    Impression:   1.  Streptococcus intermedius bloodstream infection  2.  Lumbar paraspinal infection/epidural abscess-Streptococcus intermedius.  No signs of antibiotic side effect.  No signs of line  related problem.  3.  Weakness/deconditioning-showing ongoing improvement  4.  PFO-needs outpatient follow-up with structural cardiology    Recommendations:   See home IV antibiotic orders copied below  Orders also written in his paper chart so that  will begin process of home IV antibiotic arrangements  Recommend contacting Dr. 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  Diagnosis-Streptococcus intermedius bacteremia.  Lumbar discitis/paraspinal infection-most likely secondary to Streptococcus intermedius.  Underwent surgical exploration/irrigation/hardware placement on May 13, 2025.  2.  IV access-PICC line left arm  3.  Spine surgeon-Angel Washington MD  4.  Antibiotic recommendation:  Ceftriaxone 2 g IV daily  Last dose  ceftriaxone on June 24, 2025 (6 weeks following surgical drainage) or July 8, 2025 (8 weeks following surgical drainage)  5.  Laboratory monitoring:  CMP, CBC, CRP every Monday while on intravenous antibiotic treatment  6.  Follow-up appointment 2 to 3 weeks after hospital discharge    Anthony Hale MD

## 2025-05-30 NOTE — PROGRESS NOTES
Marco Lara M.D.  DAVID Funez        Internal Medicine Progress Note    5/30/2025   09:46 CDT    Name:  Fredis Wilson  MRN:    [id number]     Acct:     [id number]   Room:  [address] [place] [address] Day: 0     Admit Date: 5/19/2025  5:15 PM  PCP: Milo Verma MD    Subjective:     C/C: back pain, weakness    Interval History: Status:  stayed the same. Ambulating in hallway.  No family at bedside. Afebrile. Maintaining adequate 02 sats on room air. Denies pain. Increasing ambulation and exercise. Remains largely independent for ADLs at this point. Tolerating treatment thus far. Blood sugars stable, but elevated at times.  70 ml out from hemovac in the past 24 hours.  Anxious for discharge.     Review of Systems   Constitutional: Positive for malaise/fatigue. Negative for chills, decreased appetite, weight gain and weight loss.   HENT:  Negative for congestion, ear discharge, hoarse voice and tinnitus.    Eyes:  Negative for blurred vision, discharge, visual disturbance and visual halos.   Cardiovascular:  Negative for chest pain, claudication, dyspnea on exertion, irregular heartbeat, leg swelling, orthopnea and paroxysmal nocturnal dyspnea.   Respiratory:  Negative for cough, shortness of breath, sputum production and wheezing.    Endocrine: Negative for cold intolerance, heat intolerance and polyuria.   Hematologic/Lymphatic: Negative for adenopathy. Does not bruise/bleed easily.   Skin:  Negative for dry skin, itching and suspicious lesions.   Musculoskeletal:  Positive for back pain and myalgias. Negative for arthritis, falls, joint pain and muscle weakness.   Gastrointestinal:  Negative for abdominal pain, constipation, diarrhea, dysphagia and hematemesis.   Genitourinary:  Negative for bladder incontinence, dysuria and frequency.   Neurological:  Positive for weakness. Negative for aphonia, disturbances in coordination and dizziness.   Psychiatric/Behavioral:  Negative for altered mental  status, depression, memory loss and substance abuse. The patient does not have insomnia and is not nervous/anxious.          Medications:     Allergies: No Known Allergies    Current Meds:   Current Facility-Administered Medications:     acetaminophen (TYLENOL) tablet 650 mg, 650 mg, Oral, Q4H PRN **OR** acetaminophen (TYLENOL) suppository 650 mg, 650 mg, Rectal, Q6H PRN, Elver Lara MD    cefTRIAXone (ROCEPHIN) 2,000 mg in sodium chloride 0.9 % 100 mL MBP, 2,000 mg, Intravenous, Q24H, Anthony Harvey MD    dextrose (D50W) (25 g/50 mL) IV injection 25 g, 25 g, Intravenous, Q15 Min PRN, Elver Lara MD    dextrose (GLUTOSE) oral gel 15 g, 15 g, Oral, Q15 Min PRN, Elver Lara MD    glucagon (GLUCAGEN) injection 1 mg, 1 mg, Intramuscular, Q15 Min PRN, Elver Lara MD    insulin glargine (LANTUS, SEMGLEE) injection 22 Units, 22 Units, Subcutaneous, Q12H, Jordyn Tidwell APRN    Insulin Lispro (humaLOG) injection 3-14 Units, 3-14 Units, Subcutaneous, 4x Daily AC & at Bedtime, Elver Lara MD    Naloxegol Oxalate (MOVANTIK) tablet 25 mg, 25 mg, Oral, QAM, Elver Lara MD    oxyCODONE-acetaminophen (PERCOCET)  MG per tablet 1 tablet, 1 tablet, Oral, Q4H PRN **OR** oxyCODONE-acetaminophen (PERCOCET) 5-325 MG per tablet 1 tablet, 1 tablet, Oral, Q4H PRN, Elver Lara MD    polyethylene glycol (MIRALAX) packet 17 g, 17 g, Oral, Daily, Elver Lara MD    pravastatin (PRAVACHOL) tablet 10 mg, 10 mg, Oral, Daily, Elver Lara MD    sennosides-docusate (PERICOLACE) 8.6-50 MG per tablet 1 tablet, 1 tablet, Oral, Daily, Elver Lara MD    sodium chloride 0.9 % flush 10 mL, 10 mL, Intravenous, Q12H, Elver Lara MD    sodium chloride 0.9 % flush 10 mL, 10 mL, Intravenous, PRN, Elver Lara MD    sodium chloride 0.9 % flush 10 mL, 10 mL, Intravenous, Q12H, Elver Lara MD    sodium chloride  "0.9 % flush 10 mL, 10 mL, Intravenous, PRN, Elver Lara MD    sodium chloride 0.9 % flush 20 mL, 20 mL, Intravenous, PRN, Elver Lara MD    sodium chloride 0.9 % infusion 40 mL, 40 mL, Intravenous, PRN, Elver Lara MD    sodium chloride 0.9 % infusion 40 mL, 40 mL, Intravenous, PRNMarco Richard Scott, MD    tiZANidine (ZANAFLEX) tablet 4 mg, 4 mg, Oral, Q6H PRN, Elver Lara MD    Data:     Code Status:    There are no questions and answers to display.       No family history on file.    Social History     Socioeconomic History    Marital status:    Tobacco Use    Smoking status: Never    Smokeless tobacco: Never   Vaping Use    Vaping status: Never Used   Substance and Sexual Activity    Alcohol use: Never    Drug use: Never    Sexual activity: Defer       Vitals:  Ht 180.3 cm (71\")   Wt 103 kg (226 lb)   BMI 31.52 kg/m²   T 98.2 P 100 R 18 /66 Sp02 97% (room air)          I/O (24Hr):  No intake or output data in the 24 hours ending 05/30/25 0946    Labs and imaging:      Recent Results (from the past 12 hours)   POC Glucose Once    Collection Time: 05/30/25  7:14 AM    Specimen: Blood   Result Value Ref Range    Glucose 182 (H) 70 - 130 mg/dL                               Physical Examination:        Physical Exam  Vitals and nursing note reviewed.   Constitutional:       Appearance: He is well-developed.   HENT:      Head: Normocephalic and atraumatic.      Right Ear: External ear normal.      Left Ear: External ear normal.      Nose: Nose normal.      Mouth/Throat:      Mouth: Mucous membranes are dry.      Pharynx: Oropharynx is clear.   Eyes:      Pupils: Pupils are equal, round, and reactive to light.   Cardiovascular:      Rate and Rhythm: Normal rate and regular rhythm.      Heart sounds: Normal heart sounds.   Pulmonary:      Effort: Pulmonary effort is normal.      Breath sounds: Normal breath sounds.   Abdominal:      General: Bowel sounds are " normal.      Palpations: Abdomen is soft.   Musculoskeletal:         General: Normal range of motion.      Cervical back: Normal range of motion and neck supple.      Comments: Generalized weakness   Skin:     General: Skin is warm and dry.      Comments: Incision c/d/I  Hemovac intact   Neurological:      Mental Status: He is alert and oriented to person, place, and time.      Deep Tendon Reflexes: Reflexes are normal and symmetric.   Psychiatric:         Behavior: Behavior normal.           Assessment:               * No active hospital problems. *    Past Medical History:   Diagnosis Date    Congenital heart defect     Diabetes mellitus     Hyperlipidemia     Stroke         Plan:        Strep bacteremia  Multiple lumbar epidural abscesses  Subacute CVA  PFO  Multifactorial anemia  DM2 with hyperglycemia not on long term insulin  PAF  Thrombocytosis  Severe protein calorie malnutrition    Continue current treatment. Monitor counts. Increase activity. Labs Monday. Aggressive therapies as tolerated. Continue IV antibiotics under direction of ID. Maintain patient safety. Continue pain control efforts. Glycemic control - adjust insulin regimen as needed. Appreciate neurology evaluation and recommendations.       Electronically signed by DAVID Hill on 5/30/2025 at 09:46 CDT

## 2025-05-31 LAB
GLUCOSE BLDC GLUCOMTR-MCNC: 144 MG/DL (ref 70–130)
GLUCOSE BLDC GLUCOMTR-MCNC: 270 MG/DL (ref 70–130)
GLUCOSE BLDC GLUCOMTR-MCNC: 295 MG/DL (ref 70–130)
GLUCOSE BLDC GLUCOMTR-MCNC: 366 MG/DL (ref 70–130)

## 2025-05-31 PROCEDURE — 63710000001 INSULIN GLARGINE PER 5 UNITS: Performed by: NURSE PRACTITIONER

## 2025-05-31 PROCEDURE — 63710000001 INSULIN LISPRO (HUMAN) PER 5 UNITS: Performed by: NURSE PRACTITIONER

## 2025-05-31 PROCEDURE — 82948 REAGENT STRIP/BLOOD GLUCOSE: CPT

## 2025-05-31 PROCEDURE — 25010000002 CEFTRIAXONE PER 250 MG: Performed by: INTERNAL MEDICINE

## 2025-05-31 PROCEDURE — 63710000001 INSULIN LISPRO (HUMAN) PER 5 UNITS: Performed by: INTERNAL MEDICINE

## 2025-05-31 NOTE — PROGRESS NOTES
Marco Lara M.D.  DAVID Funez        Internal Medicine Progress Note    5/31/2025   08:05 CDT    Name:  Fredis Wilson  MRN:    [id number]     Acct:     [id number]   Room:  [address] [place] [address] Day: 0     Admit Date: 5/19/2025  5:15 PM  PCP: Milo Verma MD    Subjective:     C/C: back pain, weakness    Interval History: Status:  stayed the same. Resting in bed. No family at bedside. Awakened from sleep. afebrile. Maintaining adequate 02 sats on room air. Progressing with therapy.  No new concerns.     Review of Systems   Constitutional: Positive for malaise/fatigue. Negative for chills, decreased appetite, weight gain and weight loss.   HENT:  Negative for congestion, ear discharge, hoarse voice and tinnitus.    Eyes:  Negative for blurred vision, discharge, visual disturbance and visual halos.   Cardiovascular:  Negative for chest pain, claudication, dyspnea on exertion, irregular heartbeat, leg swelling, orthopnea and paroxysmal nocturnal dyspnea.   Respiratory:  Negative for cough, shortness of breath, sputum production and wheezing.    Endocrine: Negative for cold intolerance, heat intolerance and polyuria.   Hematologic/Lymphatic: Negative for adenopathy. Does not bruise/bleed easily.   Skin:  Negative for dry skin, itching and suspicious lesions.   Musculoskeletal:  Positive for back pain and myalgias. Negative for arthritis, falls, joint pain and muscle weakness.   Gastrointestinal:  Negative for abdominal pain, constipation, diarrhea, dysphagia and hematemesis.   Genitourinary:  Negative for bladder incontinence, dysuria and frequency.   Neurological:  Positive for weakness. Negative for aphonia, disturbances in coordination and dizziness.   Psychiatric/Behavioral:  Negative for altered mental status, depression, memory loss and substance abuse. The patient does not have insomnia and is not nervous/anxious.          Medications:     Allergies: No Known Allergies    Current Meds:    Current Facility-Administered Medications:     acetaminophen (TYLENOL) tablet 650 mg, 650 mg, Oral, Q4H PRN **OR** acetaminophen (TYLENOL) suppository 650 mg, 650 mg, Rectal, Q6H PRN, Elver Lara MD    cefTRIAXone (ROCEPHIN) 2,000 mg in sodium chloride 0.9 % 100 mL MBP, 2,000 mg, Intravenous, Q24H, Anthony Harvey MD    dextrose (D50W) (25 g/50 mL) IV injection 25 g, 25 g, Intravenous, Q15 Min PRN, Elver Lara MD    dextrose (GLUTOSE) oral gel 15 g, 15 g, Oral, Q15 Min PRN, Elver Lara MD    glucagon (GLUCAGEN) injection 1 mg, 1 mg, Intramuscular, Q15 Min PRN, Elver Lara MD    insulin glargine (LANTUS, SEMGLEE) injection 22 Units, 22 Units, Subcutaneous, Q12H, Jordyn Tidwell APRN    Insulin Lispro (humaLOG) injection 3-14 Units, 3-14 Units, Subcutaneous, 4x Daily AC & at Bedtime, Elver Lara MD    Insulin Lispro (humaLOG) injection 5 Units, 5 Units, Subcutaneous, TID With Meals, Jordyn Tidwell APRN    Naloxegol Oxalate (MOVANTIK) tablet 25 mg, 25 mg, Oral, QAM, Elver Lara MD    oxyCODONE-acetaminophen (PERCOCET) 7.5-325 MG per tablet 1 tablet, 1 tablet, Oral, Q6H PRN, Jordyn Tidwell APRN    polyethylene glycol (MIRALAX) packet 17 g, 17 g, Oral, Daily, Elver Lara MD    pravastatin (PRAVACHOL) tablet 10 mg, 10 mg, Oral, Daily, Elver Lara MD    sennosides-docusate (PERICOLACE) 8.6-50 MG per tablet 1 tablet, 1 tablet, Oral, Daily, Elver Lara MD    sodium chloride 0.9 % flush 10 mL, 10 mL, Intravenous, Q12H, Elver Lara MD    sodium chloride 0.9 % flush 10 mL, 10 mL, Intravenous, PRN, Elver Lara MD    sodium chloride 0.9 % flush 10 mL, 10 mL, Intravenous, Q12H, Elver Lara MD    sodium chloride 0.9 % flush 10 mL, 10 mL, Intravenous, PRN, Elver Lara MD    sodium chloride 0.9 % flush 20 mL, 20 mL, Intravenous, PRN, Elver Lara MD    sodium  "chloride 0.9 % infusion 40 mL, 40 mL, Intravenous, PRN, lEver Lara MD    sodium chloride 0.9 % infusion 40 mL, 40 mL, Intravenous, PRN, Elver Lara MD    tiZANidine (ZANAFLEX) tablet 4 mg, 4 mg, Oral, Q6H PRN, Elver Lara MD    Data:     Code Status:    There are no questions and answers to display.       No family history on file.    Social History     Socioeconomic History    Marital status:    Tobacco Use    Smoking status: Never    Smokeless tobacco: Never   Vaping Use    Vaping status: Never Used   Substance and Sexual Activity    Alcohol use: Never    Drug use: Never    Sexual activity: Defer       Vitals:  Ht 180.3 cm (71\")   Wt 103 kg (226 lb)   BMI 31.52 kg/m²   T 98.3 P 86 R 20 /57 Sp02 96% (room air)          I/O (24Hr):  No intake or output data in the 24 hours ending 05/31/25 0805    Labs and imaging:      Recent Results (from the past 12 hours)   POC Glucose Once    Collection Time: 05/30/25  8:25 PM    Specimen: Blood   Result Value Ref Range    Glucose 235 (H) 70 - 130 mg/dL   POC Glucose Once    Collection Time: 05/31/25  7:30 AM    Specimen: Blood   Result Value Ref Range    Glucose 144 (H) 70 - 130 mg/dL                               Physical Examination:        Physical Exam  Vitals and nursing note reviewed.   Constitutional:       Appearance: He is well-developed.   HENT:      Head: Normocephalic and atraumatic.      Right Ear: External ear normal.      Left Ear: External ear normal.      Nose: Nose normal.      Mouth/Throat:      Mouth: Mucous membranes are dry.      Pharynx: Oropharynx is clear.   Eyes:      Pupils: Pupils are equal, round, and reactive to light.   Cardiovascular:      Rate and Rhythm: Normal rate and regular rhythm.      Heart sounds: Normal heart sounds.   Pulmonary:      Effort: Pulmonary effort is normal.      Breath sounds: Normal breath sounds.   Abdominal:      General: Bowel sounds are normal.      Palpations: Abdomen " is soft.   Musculoskeletal:         General: Normal range of motion.      Cervical back: Normal range of motion and neck supple.      Comments: Generalized weakness   Skin:     General: Skin is warm and dry.      Comments: Incision c/d/I  Hemovac intact   Neurological:      Mental Status: He is alert and oriented to person, place, and time.      Deep Tendon Reflexes: Reflexes are normal and symmetric.   Psychiatric:         Behavior: Behavior normal.           Assessment:               * No active hospital problems. *    Past Medical History:   Diagnosis Date    Congenital heart defect     Diabetes mellitus     Hyperlipidemia     Stroke         Plan:        Strep bacteremia  Multiple lumbar epidural abscesses  Subacute CVA  PFO  Multifactorial anemia  DM2 with hyperglycemia not on long term insulin  PAF  Thrombocytosis  Severe protein calorie malnutrition    Continue current treatment. Monitor counts. Increase activity. Labs Monday. Aggressive therapies as tolerated. Continue IV antibiotics under direction of ID. Maintain patient safety. Continue pain control efforts. Glycemic control - adjust insulin regimen as needed. Appreciate neurology evaluation and recommendations.       Electronically signed by DAVID Mojica on 5/31/2025 at 08:05 CDT

## 2025-06-01 LAB
GLUCOSE BLDC GLUCOMTR-MCNC: 148 MG/DL (ref 70–130)
GLUCOSE BLDC GLUCOMTR-MCNC: 275 MG/DL (ref 70–130)
GLUCOSE BLDC GLUCOMTR-MCNC: 290 MG/DL (ref 70–130)
GLUCOSE BLDC GLUCOMTR-MCNC: 345 MG/DL (ref 70–130)

## 2025-06-01 PROCEDURE — 63710000001 INSULIN LISPRO (HUMAN) PER 5 UNITS: Performed by: INTERNAL MEDICINE

## 2025-06-01 PROCEDURE — 25010000002 CEFTRIAXONE PER 250 MG: Performed by: INTERNAL MEDICINE

## 2025-06-01 PROCEDURE — 63710000001 INSULIN LISPRO (HUMAN) PER 5 UNITS: Performed by: NURSE PRACTITIONER

## 2025-06-01 PROCEDURE — 82948 REAGENT STRIP/BLOOD GLUCOSE: CPT

## 2025-06-01 PROCEDURE — 63710000001 INSULIN GLARGINE PER 5 UNITS: Performed by: NURSE PRACTITIONER

## 2025-06-02 ENCOUNTER — TELEPHONE (OUTPATIENT)
Dept: NEUROSURGERY | Age: 65
End: 2025-06-02

## 2025-06-02 LAB
ALBUMIN SERPL-MCNC: 3 G/DL (ref 3.5–5.2)
ALBUMIN/GLOB SERPL: 0.9 G/DL
ALP SERPL-CCNC: 93 U/L (ref 39–117)
ALT SERPL W P-5'-P-CCNC: 15 U/L (ref 1–41)
ANION GAP SERPL CALCULATED.3IONS-SCNC: 11 MMOL/L (ref 5–15)
AST SERPL-CCNC: 14 U/L (ref 1–40)
BASOPHILS # BLD AUTO: 0.11 10*3/MM3 (ref 0–0.2)
BASOPHILS NFR BLD AUTO: 1.1 % (ref 0–1.5)
BILIRUB SERPL-MCNC: 0.5 MG/DL (ref 0–1.2)
BUN SERPL-MCNC: 14.7 MG/DL (ref 8–23)
BUN/CREAT SERPL: 35 (ref 7–25)
CALCIUM SPEC-SCNC: 8.7 MG/DL (ref 8.6–10.5)
CHLORIDE SERPL-SCNC: 98 MMOL/L (ref 98–107)
CO2 SERPL-SCNC: 26 MMOL/L (ref 22–29)
CREAT SERPL-MCNC: 0.42 MG/DL (ref 0.76–1.27)
CRP SERPL-MCNC: 3.51 MG/DL (ref 0–0.5)
DEPRECATED RDW RBC AUTO: 48.2 FL (ref 37–54)
EGFRCR SERPLBLD CKD-EPI 2021: 120.8 ML/MIN/1.73
EOSINOPHIL # BLD AUTO: 0.76 10*3/MM3 (ref 0–0.4)
EOSINOPHIL NFR BLD AUTO: 7.8 % (ref 0.3–6.2)
ERYTHROCYTE [DISTWIDTH] IN BLOOD BY AUTOMATED COUNT: 14.3 % (ref 12.3–15.4)
ERYTHROCYTE [SEDIMENTATION RATE] IN BLOOD: 89 MM/HR (ref 0–20)
GLOBULIN UR ELPH-MCNC: 3.2 GM/DL
GLUCOSE BLDC GLUCOMTR-MCNC: 150 MG/DL (ref 70–130)
GLUCOSE BLDC GLUCOMTR-MCNC: 266 MG/DL (ref 70–130)
GLUCOSE BLDC GLUCOMTR-MCNC: 269 MG/DL (ref 70–130)
GLUCOSE BLDC GLUCOMTR-MCNC: 306 MG/DL (ref 70–130)
GLUCOSE SERPL-MCNC: 136 MG/DL (ref 65–99)
HCT VFR BLD AUTO: 33.9 % (ref 37.5–51)
HGB BLD-MCNC: 10.3 G/DL (ref 13–17.7)
IMM GRANULOCYTES # BLD AUTO: 0.1 10*3/MM3 (ref 0–0.05)
IMM GRANULOCYTES NFR BLD AUTO: 1 % (ref 0–0.5)
LYMPHOCYTES # BLD AUTO: 1.45 10*3/MM3 (ref 0.7–3.1)
LYMPHOCYTES NFR BLD AUTO: 14.9 % (ref 19.6–45.3)
MCH RBC QN AUTO: 27.9 PG (ref 26.6–33)
MCHC RBC AUTO-ENTMCNC: 30.4 G/DL (ref 31.5–35.7)
MCV RBC AUTO: 91.9 FL (ref 79–97)
MONOCYTES # BLD AUTO: 0.74 10*3/MM3 (ref 0.1–0.9)
MONOCYTES NFR BLD AUTO: 7.6 % (ref 5–12)
NEUTROPHILS NFR BLD AUTO: 6.59 10*3/MM3 (ref 1.7–7)
NEUTROPHILS NFR BLD AUTO: 67.6 % (ref 42.7–76)
NRBC BLD AUTO-RTO: 0 /100 WBC (ref 0–0.2)
PLATELET # BLD AUTO: 361 10*3/MM3 (ref 140–450)
PMV BLD AUTO: 9.2 FL (ref 6–12)
POTASSIUM SERPL-SCNC: 3.8 MMOL/L (ref 3.5–5.2)
PROT SERPL-MCNC: 6.2 G/DL (ref 6–8.5)
RBC # BLD AUTO: 3.69 10*6/MM3 (ref 4.14–5.8)
SODIUM SERPL-SCNC: 135 MMOL/L (ref 136–145)
WBC NRBC COR # BLD AUTO: 9.75 10*3/MM3 (ref 3.4–10.8)

## 2025-06-02 PROCEDURE — 86140 C-REACTIVE PROTEIN: CPT | Performed by: INTERNAL MEDICINE

## 2025-06-02 PROCEDURE — 80053 COMPREHEN METABOLIC PANEL: CPT | Performed by: INTERNAL MEDICINE

## 2025-06-02 PROCEDURE — 82948 REAGENT STRIP/BLOOD GLUCOSE: CPT

## 2025-06-02 PROCEDURE — 63710000001 INSULIN LISPRO (HUMAN) PER 5 UNITS: Performed by: INTERNAL MEDICINE

## 2025-06-02 PROCEDURE — 25010000002 CEFTRIAXONE PER 250 MG: Performed by: INTERNAL MEDICINE

## 2025-06-02 PROCEDURE — 63710000001 INSULIN GLARGINE PER 5 UNITS: Performed by: NURSE PRACTITIONER

## 2025-06-02 PROCEDURE — 85025 COMPLETE CBC W/AUTO DIFF WBC: CPT | Performed by: INTERNAL MEDICINE

## 2025-06-02 PROCEDURE — 85652 RBC SED RATE AUTOMATED: CPT | Performed by: INTERNAL MEDICINE

## 2025-06-02 PROCEDURE — 63710000001 INSULIN LISPRO (HUMAN) PER 5 UNITS: Performed by: NURSE PRACTITIONER

## 2025-06-02 PROCEDURE — 99232 SBSQ HOSP IP/OBS MODERATE 35: CPT | Performed by: INTERNAL MEDICINE

## 2025-06-02 NOTE — PROGRESS NOTES
Marco Lara M.D.  DAVID Funez        Internal Medicine Progress Note    6/2/2025   11:03 CDT    Name:  Fredis Wilson  MRN:    [id number]     Acct:     [id number]   Room:  [address] [place] [address] Day: 0     Admit Date: 5/19/2025  5:15 PM  PCP: Milo Verma MD    Subjective:     C/C: back pain, weakness    Interval History: Status:  stayed the same. Up to chair. No family at bedside. Afebrile. Maintaining adequate 02 sats on room air. Having some increased back pain at times. Counts stable. Hopeful for arrangements to be made to discharge home to complete IV antibiotic therapy.     Review of Systems   Constitutional: Positive for malaise/fatigue. Negative for chills, decreased appetite, weight gain and weight loss.   HENT:  Negative for congestion, ear discharge, hoarse voice and tinnitus.    Eyes:  Negative for blurred vision, discharge, visual disturbance and visual halos.   Cardiovascular:  Negative for chest pain, claudication, dyspnea on exertion, irregular heartbeat, leg swelling, orthopnea and paroxysmal nocturnal dyspnea.   Respiratory:  Negative for cough, shortness of breath, sputum production and wheezing.    Endocrine: Negative for cold intolerance, heat intolerance and polyuria.   Hematologic/Lymphatic: Negative for adenopathy. Does not bruise/bleed easily.   Skin:  Negative for dry skin, itching and suspicious lesions.   Musculoskeletal:  Positive for back pain and myalgias. Negative for arthritis, falls, joint pain and muscle weakness.   Gastrointestinal:  Negative for abdominal pain, constipation, diarrhea, dysphagia and hematemesis.   Genitourinary:  Negative for bladder incontinence, dysuria and frequency.   Neurological:  Positive for weakness. Negative for aphonia, disturbances in coordination and dizziness.   Psychiatric/Behavioral:  Negative for altered mental status, depression, memory loss and substance abuse. The patient does not have insomnia and is not nervous/anxious.           Medications:     Allergies: No Known Allergies    Current Meds:   Current Facility-Administered Medications:     acetaminophen (TYLENOL) tablet 650 mg, 650 mg, Oral, Q4H PRN **OR** acetaminophen (TYLENOL) suppository 650 mg, 650 mg, Rectal, Q6H PRN, Elver Lara MD    cefTRIAXone (ROCEPHIN) 2,000 mg in sodium chloride 0.9 % 100 mL MBP, 2,000 mg, Intravenous, Q24H, Anthony Harvey MD    dextrose (D50W) (25 g/50 mL) IV injection 25 g, 25 g, Intravenous, Q15 Min PRN, Elver Lara MD    dextrose (GLUTOSE) oral gel 15 g, 15 g, Oral, Q15 Min PRN, Elver Lara MD    glucagon (GLUCAGEN) injection 1 mg, 1 mg, Intramuscular, Q15 Min PRN, Elver Lara MD    insulin glargine (LANTUS, SEMGLEE) injection 22 Units, 22 Units, Subcutaneous, Q12H, Jordyn Tidwell APRN    Insulin Lispro (humaLOG) injection 3-14 Units, 3-14 Units, Subcutaneous, 4x Daily AC & at Bedtime, Elver Lara MD    Insulin Lispro (humaLOG) injection 5 Units, 5 Units, Subcutaneous, TID With Meals, Jordyn Tidwell APRN    Naloxegol Oxalate (MOVANTIK) tablet 25 mg, 25 mg, Oral, QAM, Elver Lara MD    oxyCODONE-acetaminophen (PERCOCET) 7.5-325 MG per tablet 1 tablet, 1 tablet, Oral, Q6H PRN, Jordyn Tidwell APRN    polyethylene glycol (MIRALAX) packet 17 g, 17 g, Oral, Daily, Elver Lara MD    pravastatin (PRAVACHOL) tablet 10 mg, 10 mg, Oral, Daily, Elver Lara MD    sennosides-docusate (PERICOLACE) 8.6-50 MG per tablet 1 tablet, 1 tablet, Oral, Daily, Elver Lara MD    sodium chloride 0.9 % flush 10 mL, 10 mL, Intravenous, Q12H, Elver Lara MD    sodium chloride 0.9 % flush 10 mL, 10 mL, Intravenous, PRN, Elver Lara MD    sodium chloride 0.9 % flush 10 mL, 10 mL, Intravenous, Q12H, Elver Lara MD    sodium chloride 0.9 % flush 10 mL, 10 mL, Intravenous, PRN, Elver Lara MD    sodium chloride 0.9 %  "flush 20 mL, 20 mL, Intravenous, PRN, Elver Lara MD    sodium chloride 0.9 % infusion 40 mL, 40 mL, Intravenous, PRN, Elver Lara MD    sodium chloride 0.9 % infusion 40 mL, 40 mL, Intravenous, PRN, Elver Lara MD    tiZANidine (ZANAFLEX) tablet 4 mg, 4 mg, Oral, Q6H PRN, Elver Lara MD    Data:     Code Status:    There are no questions and answers to display.       No family history on file.    Social History     Socioeconomic History    Marital status:    Tobacco Use    Smoking status: Never    Smokeless tobacco: Never   Vaping Use    Vaping status: Never Used   Substance and Sexual Activity    Alcohol use: Never    Drug use: Never    Sexual activity: Defer       Vitals:  Ht 180.3 cm (71\")   Wt 101 kg (222 lb 11.2 oz)   BMI 31.06 kg/m²   T 98.1 P 80 R 17 /63 Sp02 96% (room air)          I/O (24Hr):  No intake or output data in the 24 hours ending 06/02/25 1103    Labs and imaging:      Recent Results (from the past 12 hours)   CBC Auto Differential    Collection Time: 06/02/25  5:56 AM    Specimen: Blood   Result Value Ref Range    WBC 9.75 3.40 - 10.80 10*3/mm3    RBC 3.69 (L) 4.14 - 5.80 10*6/mm3    Hemoglobin 10.3 (L) 13.0 - 17.7 g/dL    Hematocrit 33.9 (L) 37.5 - 51.0 %    MCV 91.9 79.0 - 97.0 fL    MCH 27.9 26.6 - 33.0 pg    MCHC 30.4 (L) 31.5 - 35.7 g/dL    RDW 14.3 12.3 - 15.4 %    RDW-SD 48.2 37.0 - 54.0 fl    MPV 9.2 6.0 - 12.0 fL    Platelets 361 140 - 450 10*3/mm3    Neutrophil % 67.6 42.7 - 76.0 %    Lymphocyte % 14.9 (L) 19.6 - 45.3 %    Monocyte % 7.6 5.0 - 12.0 %    Eosinophil % 7.8 (H) 0.3 - 6.2 %    Basophil % 1.1 0.0 - 1.5 %    Immature Grans % 1.0 (H) 0.0 - 0.5 %    Neutrophils, Absolute 6.59 1.70 - 7.00 10*3/mm3    Lymphocytes, Absolute 1.45 0.70 - 3.10 10*3/mm3    Monocytes, Absolute 0.74 0.10 - 0.90 10*3/mm3    Eosinophils, Absolute 0.76 (H) 0.00 - 0.40 10*3/mm3    Basophils, Absolute 0.11 0.00 - 0.20 10*3/mm3    Immature Grans, " Absolute 0.10 (H) 0.00 - 0.05 10*3/mm3    nRBC 0.0 0.0 - 0.2 /100 WBC   Sedimentation Rate    Collection Time: 06/02/25  5:56 AM    Specimen: Blood   Result Value Ref Range    Sed Rate 89 (H) 0 - 20 mm/hr   Comprehensive Metabolic Panel    Collection Time: 06/02/25  5:57 AM    Specimen: Blood   Result Value Ref Range    Glucose 136 (H) 65 - 99 mg/dL    BUN 14.7 8.0 - 23.0 mg/dL    Creatinine 0.42 (L) 0.76 - 1.27 mg/dL    Sodium 135 (L) 136 - 145 mmol/L    Potassium 3.8 3.5 - 5.2 mmol/L    Chloride 98 98 - 107 mmol/L    CO2 26.0 22.0 - 29.0 mmol/L    Calcium 8.7 8.6 - 10.5 mg/dL    Total Protein 6.2 6.0 - 8.5 g/dL    Albumin 3.0 (L) 3.5 - 5.2 g/dL    ALT (SGPT) 15 1 - 41 U/L    AST (SGOT) 14 1 - 40 U/L    Alkaline Phosphatase 93 39 - 117 U/L    Total Bilirubin 0.5 0.0 - 1.2 mg/dL    Globulin 3.2 gm/dL    A/G Ratio 0.9 g/dL    BUN/Creatinine Ratio 35.0 (H) 7.0 - 25.0    Anion Gap 11.0 5.0 - 15.0 mmol/L    eGFR 120.8 >60.0 mL/min/1.73   C-reactive Protein    Collection Time: 06/02/25  5:57 AM    Specimen: Blood   Result Value Ref Range    C-Reactive Protein 3.51 (H) 0.00 - 0.50 mg/dL   POC Glucose Once    Collection Time: 06/02/25  7:05 AM    Specimen: Blood   Result Value Ref Range    Glucose 150 (H) 70 - 130 mg/dL                               Physical Examination:        Physical Exam  Vitals and nursing note reviewed.   Constitutional:       Appearance: He is well-developed.   HENT:      Head: Normocephalic and atraumatic.      Right Ear: External ear normal.      Left Ear: External ear normal.      Nose: Nose normal.      Mouth/Throat:      Mouth: Mucous membranes are dry.      Pharynx: Oropharynx is clear.   Eyes:      Pupils: Pupils are equal, round, and reactive to light.   Cardiovascular:      Rate and Rhythm: Normal rate and regular rhythm.      Heart sounds: Normal heart sounds.   Pulmonary:      Effort: Pulmonary effort is normal.      Breath sounds: Normal breath sounds.   Abdominal:      General: Bowel  sounds are normal.      Palpations: Abdomen is soft.   Musculoskeletal:         General: Normal range of motion.      Cervical back: Normal range of motion and neck supple.      Comments: Generalized weakness   Skin:     General: Skin is warm and dry.      Comments: Incision c/d/I  Hemovac intact   Neurological:      Mental Status: He is alert and oriented to person, place, and time.      Deep Tendon Reflexes: Reflexes are normal and symmetric.   Psychiatric:         Behavior: Behavior normal.           Assessment:               * No active hospital problems. *    Past Medical History:   Diagnosis Date    Congenital heart defect     Diabetes mellitus     Hyperlipidemia     Stroke         Plan:        Strep bacteremia  Multiple lumbar epidural abscesses  Subacute CVA  PFO  Multifactorial anemia  DM2 with hyperglycemia not on long term insulin  PAF  Thrombocytosis  Severe protein calorie malnutrition    Continue current treatment. Monitor counts. Increase activity. Labs Thursday. Aggressive therapies as tolerated. Continue IV antibiotics under direction of ID. Maintain patient safety. Continue pain control efforts. Glycemic control - adjust insulin regimen as needed. Discharge planning.     Electronically signed by DAVID Hill on 6/2/2025 at 11:03 CDT     I have discussed the care of Fredis Wilson, including pertinent history and exam findings, with the nurse practitioner.    I have seen and examined the patient and the key elements of all parts of the encounter have been performed by me.  I agree with the assessment, plan and orders as documented by DAVID Funez, after I modified the exam findings and the plan of treatments and the final version is my approved version of the assessment.        Electronically signed by Elver Lara MD on 6/2/2025 at 11:13 CDT

## 2025-06-02 NOTE — PROGRESS NOTES
Infectious Diseases Progress Note    Patient:  Fredis Wilson  YOB: 1960  MRN: [id number]   Admit date: 5/19/2025   Admitting Physician: Elver Lara MD  Primary Care Physician: Angel White MD    Chief Complaint: Seen today in follow-up of Streptococcus intermedius bloodstream infection, lumbar paraspinal infection/epidural abscess secondary to Streptococcus intermedius.    Interval History: He indicates overall he continues to do well.  He indicates he has been up walking with walker.  He does indicate a little bit of increased soreness in the left low back area.  He has been up and moving more overall.  He indicates sometimes he will get a bit of a sharp pain in the left lower back area.  He does not report any problems with bowel or bladder function.  He feels he is emptying his bladder completely.  He has had no suprapubic or flank pain.  He reports no lower extremity weakness or numbness.  He feels strength in both lower extremities has shown improvement.  He has no pain or discomfort at his PICC line site.  He has had no diarrhea, rash, or skin itching.  I spoke with nursing.  They are in the process of recording his drain output over the weekend.  I again encouraged him to contact Dr. White of neurosurgery at [facility] as I suspect he will clear them to remove his operative drain that remains in place.  He is hoping to go home soon.  He ask if I had made a comment about his ability to return to work.  I explained I had outlined my thoughts and previous progress note at the end of last week.  It would appear to me that he could manage work at home while he is on IV antibiotic treatment. From ID standpoint feel he could return to work as he indicates the majority of his work at home or at the office is desk work and utilization of laptop computer     No intake or output data in the 24 hours ending 06/02/25 1063  Allergies: No Known Allergies  Current Scheduled  "Medications:   cefTRIAXone, 2,000 mg, Intravenous, Q24H  insulin glargine, 22 Units, Subcutaneous, Q12H  insulin lispro, 3-14 Units, Subcutaneous, 4x Daily AC & at Bedtime  Insulin Lispro, 5 Units, Subcutaneous, TID With Meals  Naloxegol Oxalate, 25 mg, Oral, QAM  polyethylene glycol, 17 g, Oral, Daily  pravastatin, 10 mg, Oral, Daily  senna-docusate sodium, 1 tablet, Oral, Daily  sodium chloride, 10 mL, Intravenous, Q12H  sodium chloride, 10 mL, Intravenous, Q12H      Current PRN Medications:  acetaminophen **OR** acetaminophen    dextrose    dextrose    glucagon (human recombinant)    oxyCODONE-acetaminophen    sodium chloride    sodium chloride    sodium chloride    sodium chloride    sodium chloride    tiZANidine    Review of Systems see HPI    Vital Signs:  No data recorded.  Ht 180.3 cm (71\")   Wt 101 kg (222 lb 11.2 oz)   BMI 31.06 kg/m²     Physical Exam  Vital signs - reviewed.  Line/IV (PICC) site - No erythema, warmth, induration, or tenderness.  Alert, pleasant, no distress  Bilateral lower extremity strength and sensation intact  Visualized area of skin without drug rash    Lab Results:  CBC:   Results from last 7 days   Lab Units 06/02/25  0556 05/29/25  0409   WBC 10*3/mm3 9.75 8.58   HEMOGLOBIN g/dL 10.3* 9.7*   HEMATOCRIT % 33.9* 30.9*   PLATELETS 10*3/mm3 361 483*     BMP:  Results from last 7 days   Lab Units 06/02/25  0557 05/29/25  0409   SODIUM mmol/L 135* 135*   POTASSIUM mmol/L 3.8 3.8   CHLORIDE mmol/L 98 99   CO2 mmol/L 26.0 25.0   BUN mg/dL 14.7 14.3   CREATININE mg/dL 0.42* 0.44*   GLUCOSE mg/dL 136* 186*   CALCIUM mg/dL 8.7 8.4*   ALT (SGPT) U/L 15  --        6/2/2025 15:45 CDT: I have personally reviewed the following sections of the medical record and pertinent findings and updates are outlined below. CBL:    [x] Microbiology  No change in previous culture results which were reviewed and outlined below.  Blood and surgical culture results from his hospitalization at [facility].  Blood and surgical cultures reviewed.    Surgical cultures May 13, 2025:  Culture #1 paraspinal swab-few white blood cells, no organisms, no growth on primary media but growth in broth only.  Staphylococcus warneri (susceptibility below)     Susceptibility  Staphylococcus warneri (1)  Antibiotic Interpretation VANI   Method Status     doxycycline Sensitive <=0.5 mcg/mL BACTERIAL SUSCEPTIBILITY PANEL BY VANI       erythromycin Resistant >=8 mcg/mL BACTERIAL SUSCEPTIBILITY PANEL BY VANI       inducible clindamycin resistance   Neg mcg/mL BACTERIAL SUSCEPTIBILITY PANEL BY VANI       levofloxacin Sensitive <=0.12 mcg/mL BACTERIAL SUSCEPTIBILITY PANEL BY VANI       oxacillin Sensitive <=0.25 mcg/mL BACTERIAL SUSCEPTIBILITY PANEL BY VANI       tetracycline Sensitive <=1 mcg/mL BACTERIAL SUSCEPTIBILITY PANEL BY VANI       vancomycin Sensitive <=0.5 mcg/mL BACTERIAL SUSCEPTIBILITY PANEL BY VANI             Culture #2 paraspinal tissue-rare white blood cells, no organisms, culture no growth to date  Culture #3 epidural purulence (swab)-rare white blood cells, no organisms, culture growing Streptococcus intermedius  Anaerobic Culture No anaerobes isolated  5 days   Organism Streptococcus intermedius Abnormal    Culture Surgical Isolated from Broth   Resulting Agency [facility]   Susceptibility     Organism Antibiotic Method Susceptibility   Streptococcus intermedius ampicillin BACTERIAL SUSCEPTIBILITY PANEL BY VANI <=0.25 mcg/mL: Sensitive   Streptococcus intermedius benzylpenicillin BACTERIAL SUSCEPTIBILITY PANEL BY VANI <=0.06 mcg/mL: Sensitive   Streptococcus intermedius cefotaxime BACTERIAL SUSCEPTIBILITY PANEL BY VANI <=0.12 mcg/mL: Sensitive   Streptococcus intermedius cefTRIAXone BACTERIAL SUSCEPTIBILITY PANEL BY VANI <=0.12 mcg/mL: Sensitive   Streptococcus intermedius erythromycin BACTERIAL SUSCEPTIBILITY PANEL BY VANI <=0.12 mcg/mL: Sensitive   Streptococcus intermedius levofloxacin BACTERIAL  "SUSCEPTIBILITY PANEL BY VANI <=0.25 mcg/mL: Sensitive   Streptococcus intermedius vancomycin BACTERIAL SUSCEPTIBILITY PANEL BY VANI 0.25 mcg/mL: Sensitive      Culture #4 labeled L4-5 facet-result canceled by ancillary.  Staphylococcus epidermidis (susceptibility below)   Susceptibility  Staphylococcus epidermidis (1)     Antibiotic Interpretation VANI   Method Status     clindamycin Sensitive <=0.12 mcg/mL BACTERIAL SUSCEPTIBILITY PANEL BY VANI       doxycycline Sensitive <=0.5 mcg/mL BACTERIAL SUSCEPTIBILITY PANEL BY VANI       erythromycin Sensitive <=0.25 mcg/mL BACTERIAL SUSCEPTIBILITY PANEL BY VANI       inducible clindamycin resistance   Neg mcg/mL BACTERIAL SUSCEPTIBILITY PANEL BY VANI       levofloxacin Sensitive <=0.12 mcg/mL BACTERIAL SUSCEPTIBILITY PANEL BY VANI       linezolid Sensitive 1 mcg/mL BACTERIAL SUSCEPTIBILITY PANEL BY VANI       oxacillin Sensitive <=0.25 mcg/mL BACTERIAL SUSCEPTIBILITY PANEL BY VANI       tetracycline Sensitive <=1 mcg/mL BACTERIAL SUSCEPTIBILITY PANEL BY VANI       vancomycin Sensitive 2 mcg/mL BACTERIAL SUSCEPTIBILITY PANEL BY VANI          Blood cultures May 5, 2025-\"susceptibility now available, reviewed, and outlined below \"  Blood cultures May 6, 2025-Streptococcus intermedius  Blood cultures May 7, 2025-Streptococcus intermedius  Blood cultures May 10, 2025-no growth  Blood cultures May 11, 2025-no growth     Susceptibility     Streptococcus intermedius (1)     Antibiotic Interpretation VANI   Method Status     ampicillin Sensitive <=0.25 mcg/mL BACTERIAL SUSCEPTIBILITY PANEL BY VANI       benzylpenicillin Sensitive <=0.06 mcg/mL BACTERIAL SUSCEPTIBILITY PANEL BY VANI       cefotaxime Sensitive <=0.12 mcg/mL BACTERIAL SUSCEPTIBILITY PANEL BY VANI       cefTRIAXone Sensitive <=0.12 mcg/mL BACTERIAL SUSCEPTIBILITY PANEL BY VANI       erythromycin Sensitive <=0.12 mcg/mL BACTERIAL SUSCEPTIBILITY PANEL BY VANI       levofloxacin Sensitive 0.5 mcg/mL BACTERIAL SUSCEPTIBILITY PANEL BY VANI    "    vancomycin Sensitive 0.5 mcg/mL BACTERIAL SUSCEPTIBILITY PANEL BY VANI       [] Cardiology/Vascular    [] Pathology    [] Radiology    [x] Other:   CRP profile:  Component  Ref Range & Units (hover) 05:57  (6/2/25)  Elvira/Cammal 4 d ago  (5/29/25)  Elvira/Cammal 7 d ago  (5/26/25)  Elvira/Cammal 11 d ago  (5/22/25)  Elvira/Cammal 3 wk ago  (5/12/25)  Elvira/[facility] 4 wk ago  (5/5/25)  [place]   C-Reactive Protein 3.51 High  3.41 High  3.83 High  10.69 High  228.0 High  R, .0 High  R,     Additional Studies Reviewed: None    Impression:   1.  Streptococcus intermedius bloodstream infection-he is completed treatment for the bloodstream infection.  He is continuing extended antibiotic treatment for lumbar paraspinal infection/epidural abscess.  2.  Lumbar paraspinal infection/epidural abscess-Streptococcus intermedius.  Tolerating antibiotic treatment without line or antibiotic related problem.  3.  Weakness/deconditioning-continuing to show improvement.  Feel he is strong enough to manage at home at this point.  4.  PFO-needs structural cardiology follow-up following discharge.    Recommendations:   Continue IV ceftriaxone  Confirmed with nursing and discharge planning today that they are working on home intravenous antibiotic arrangements  No change in home intravenous antibiotic orders which have been outlined below  Recommend contacting Dr. White neurosurgery at [facility] to get clearance to remove operative drain  He should have follow-up with Dr. White few weeks after hospital discharge  Okay with me for release home when ready for release per others and home IV antibiotic arrangements in place  As outlined in previous note from ID standpoint feel he could return to work as he indicates the majority of his work at home or at the office is desk work and involves utilization of a laptop computer.  He may not have the stamina to return to work full-time initially, but feel he  could manage part-time from an infectious diseases standpoint.    Home IV antibiotic recommendations:  Antibiotic recommendations:  1.  Diagnosis-Streptococcus intermedius bacteremia.  Lumbar discitis/paraspinal infection-most likely secondary to Streptococcus intermedius.  Underwent surgical exploration/irrigation/hardware placement on May 13, 2025.  2.  IV access-PICC line left arm  3.  Spine surgeon-Angel Washington MD  4.  Antibiotic recommendation:  Ceftriaxone 2 g IV daily  Last dose  ceftriaxone on June 24, 2025 (6 weeks following surgical drainage) or July 8, 2025 (8 weeks following surgical drainage)  5.  Laboratory monitoring:  CMP, CBC, CRP every Monday while on intravenous antibiotic treatment  6.  Follow-up appointment 2 to 3 weeks after hospital discharge    Anthony Hale MD

## 2025-06-02 NOTE — TELEPHONE ENCOUNTER
Patient is currently at [facility]. Evonne, charge nurse, called to ask Dr Rivera if it was okay if she took off patient's drains. I stated that I will ask and call her back.

## 2025-06-02 NOTE — PROGRESS NOTES
Marco Lara M.D.  DAVID Funez        Internal Medicine Progress Note    6/2/2025   09:36 CDT    Name:  Fredis Wilson  MRN:    [id number]     Acct:     [id number]   Room:  [address] [place] [address] Day: 0     Admit Date: 5/19/2025  5:15 PM  PCP: Milo Verma MD    Subjective:     C/C: back pain, weakness    Interval History: Status:  stayed the same. Up to bedside chair. No family at bedside. Afebrile. No complaints at present.      Review of Systems   Constitutional: Positive for malaise/fatigue. Negative for chills, decreased appetite, weight gain and weight loss.   HENT:  Negative for congestion, ear discharge, hoarse voice and tinnitus.    Eyes:  Negative for blurred vision, discharge, visual disturbance and visual halos.   Cardiovascular:  Negative for chest pain, claudication, dyspnea on exertion, irregular heartbeat, leg swelling, orthopnea and paroxysmal nocturnal dyspnea.   Respiratory:  Negative for cough, shortness of breath, sputum production and wheezing.    Endocrine: Negative for cold intolerance, heat intolerance and polyuria.   Hematologic/Lymphatic: Negative for adenopathy. Does not bruise/bleed easily.   Skin:  Negative for dry skin, itching and suspicious lesions.   Musculoskeletal:  Positive for back pain and myalgias. Negative for arthritis, falls, joint pain and muscle weakness.   Gastrointestinal:  Negative for abdominal pain, constipation, diarrhea, dysphagia and hematemesis.   Genitourinary:  Negative for bladder incontinence, dysuria and frequency.   Neurological:  Positive for weakness. Negative for aphonia, disturbances in coordination and dizziness.   Psychiatric/Behavioral:  Negative for altered mental status, depression, memory loss and substance abuse. The patient does not have insomnia and is not nervous/anxious.          Medications:     Allergies: No Known Allergies    Current Meds:   Current Facility-Administered Medications:     acetaminophen (TYLENOL) tablet  650 mg, 650 mg, Oral, Q4H PRN **OR** acetaminophen (TYLENOL) suppository 650 mg, 650 mg, Rectal, Q6H PRN, Elver Lara MD    cefTRIAXone (ROCEPHIN) 2,000 mg in sodium chloride 0.9 % 100 mL MBP, 2,000 mg, Intravenous, Q24H, Anthony Harvey MD    dextrose (D50W) (25 g/50 mL) IV injection 25 g, 25 g, Intravenous, Q15 Min PRN, Elver Lara MD    dextrose (GLUTOSE) oral gel 15 g, 15 g, Oral, Q15 Min PRN, Elver Lara MD    glucagon (GLUCAGEN) injection 1 mg, 1 mg, Intramuscular, Q15 Min PRN, Elver Lara MD    insulin glargine (LANTUS, SEMGLEE) injection 22 Units, 22 Units, Subcutaneous, Q12H, Jordyn Tidwell APRN    Insulin Lispro (humaLOG) injection 3-14 Units, 3-14 Units, Subcutaneous, 4x Daily AC & at Bedtime, Elver Lara MD    Insulin Lispro (humaLOG) injection 5 Units, 5 Units, Subcutaneous, TID With Meals, Jordyn Tidwell APRN    Naloxegol Oxalate (MOVANTIK) tablet 25 mg, 25 mg, Oral, QAM, Elver Lara MD    oxyCODONE-acetaminophen (PERCOCET) 7.5-325 MG per tablet 1 tablet, 1 tablet, Oral, Q6H PRN, Jrodyn Tidwell APRN    polyethylene glycol (MIRALAX) packet 17 g, 17 g, Oral, Daily, Elver Lara MD    pravastatin (PRAVACHOL) tablet 10 mg, 10 mg, Oral, Daily, Elver Lara MD    sennosides-docusate (PERICOLACE) 8.6-50 MG per tablet 1 tablet, 1 tablet, Oral, Daily, Elver Lara MD    sodium chloride 0.9 % flush 10 mL, 10 mL, Intravenous, Q12H, Elver Lara MD    sodium chloride 0.9 % flush 10 mL, 10 mL, Intravenous, PRN, Elver Lara MD    sodium chloride 0.9 % flush 10 mL, 10 mL, Intravenous, Q12H, Elver Lara MD    sodium chloride 0.9 % flush 10 mL, 10 mL, Intravenous, PRN, Elver Lara MD    sodium chloride 0.9 % flush 20 mL, 20 mL, Intravenous, PRN, Elver Lara MD    sodium chloride 0.9 % infusion 40 mL, 40 mL, Intravenous, PRN, Elver Lara,  "MD    sodium chloride 0.9 % infusion 40 mL, 40 mL, Intravenous, PRN, Elver Lara MD    tiZANidine (ZANAFLEX) tablet 4 mg, 4 mg, Oral, Q6H PRN, Elver Lara MD    Data:     Code Status:    There are no questions and answers to display.       No family history on file.    Social History     Socioeconomic History    Marital status:    Tobacco Use    Smoking status: Never    Smokeless tobacco: Never   Vaping Use    Vaping status: Never Used   Substance and Sexual Activity    Alcohol use: Never    Drug use: Never    Sexual activity: Defer       Vitals:  Ht 180.3 cm (71\")   Wt 101 kg (222 lb 11.2 oz)   BMI 31.06 kg/m²   T 98.3 P 86 R 20 /57 Sp02 96% (room air)          I/O (24Hr):  No intake or output data in the 24 hours ending 06/02/25 0936    Labs and imaging:      Recent Results (from the past 12 hours)   CBC Auto Differential    Collection Time: 06/02/25  5:56 AM    Specimen: Blood   Result Value Ref Range    WBC 9.75 3.40 - 10.80 10*3/mm3    RBC 3.69 (L) 4.14 - 5.80 10*6/mm3    Hemoglobin 10.3 (L) 13.0 - 17.7 g/dL    Hematocrit 33.9 (L) 37.5 - 51.0 %    MCV 91.9 79.0 - 97.0 fL    MCH 27.9 26.6 - 33.0 pg    MCHC 30.4 (L) 31.5 - 35.7 g/dL    RDW 14.3 12.3 - 15.4 %    RDW-SD 48.2 37.0 - 54.0 fl    MPV 9.2 6.0 - 12.0 fL    Platelets 361 140 - 450 10*3/mm3    Neutrophil % 67.6 42.7 - 76.0 %    Lymphocyte % 14.9 (L) 19.6 - 45.3 %    Monocyte % 7.6 5.0 - 12.0 %    Eosinophil % 7.8 (H) 0.3 - 6.2 %    Basophil % 1.1 0.0 - 1.5 %    Immature Grans % 1.0 (H) 0.0 - 0.5 %    Neutrophils, Absolute 6.59 1.70 - 7.00 10*3/mm3    Lymphocytes, Absolute 1.45 0.70 - 3.10 10*3/mm3    Monocytes, Absolute 0.74 0.10 - 0.90 10*3/mm3    Eosinophils, Absolute 0.76 (H) 0.00 - 0.40 10*3/mm3    Basophils, Absolute 0.11 0.00 - 0.20 10*3/mm3    Immature Grans, Absolute 0.10 (H) 0.00 - 0.05 10*3/mm3    nRBC 0.0 0.0 - 0.2 /100 WBC   Sedimentation Rate    Collection Time: 06/02/25  5:56 AM    Specimen: Blood   Result " Value Ref Range    Sed Rate 89 (H) 0 - 20 mm/hr   Comprehensive Metabolic Panel    Collection Time: 06/02/25  5:57 AM    Specimen: Blood   Result Value Ref Range    Glucose 136 (H) 65 - 99 mg/dL    BUN 14.7 8.0 - 23.0 mg/dL    Creatinine 0.42 (L) 0.76 - 1.27 mg/dL    Sodium 135 (L) 136 - 145 mmol/L    Potassium 3.8 3.5 - 5.2 mmol/L    Chloride 98 98 - 107 mmol/L    CO2 26.0 22.0 - 29.0 mmol/L    Calcium 8.7 8.6 - 10.5 mg/dL    Total Protein 6.2 6.0 - 8.5 g/dL    Albumin 3.0 (L) 3.5 - 5.2 g/dL    ALT (SGPT) 15 1 - 41 U/L    AST (SGOT) 14 1 - 40 U/L    Alkaline Phosphatase 93 39 - 117 U/L    Total Bilirubin 0.5 0.0 - 1.2 mg/dL    Globulin 3.2 gm/dL    A/G Ratio 0.9 g/dL    BUN/Creatinine Ratio 35.0 (H) 7.0 - 25.0    Anion Gap 11.0 5.0 - 15.0 mmol/L    eGFR 120.8 >60.0 mL/min/1.73   C-reactive Protein    Collection Time: 06/02/25  5:57 AM    Specimen: Blood   Result Value Ref Range    C-Reactive Protein 3.51 (H) 0.00 - 0.50 mg/dL   POC Glucose Once    Collection Time: 06/02/25  7:05 AM    Specimen: Blood   Result Value Ref Range    Glucose 150 (H) 70 - 130 mg/dL                               Physical Examination:        Physical Exam  Vitals and nursing note reviewed.   Constitutional:       Appearance: He is well-developed.   HENT:      Head: Normocephalic and atraumatic.      Right Ear: External ear normal.      Left Ear: External ear normal.      Nose: Nose normal.      Mouth/Throat:      Mouth: Mucous membranes are dry.      Pharynx: Oropharynx is clear.   Eyes:      Pupils: Pupils are equal, round, and reactive to light.   Cardiovascular:      Rate and Rhythm: Normal rate and regular rhythm.      Heart sounds: Normal heart sounds.   Pulmonary:      Effort: Pulmonary effort is normal.      Breath sounds: Normal breath sounds.   Abdominal:      General: Bowel sounds are normal.      Palpations: Abdomen is soft.   Musculoskeletal:         General: Normal range of motion.      Cervical back: Normal range of motion and  neck supple.      Comments: Generalized weakness   Skin:     General: Skin is warm and dry.      Comments: Incision c/d/I  Hemovac intact   Neurological:      Mental Status: He is alert and oriented to person, place, and time.      Deep Tendon Reflexes: Reflexes are normal and symmetric.   Psychiatric:         Behavior: Behavior normal.           Assessment:               * No active hospital problems. *    Past Medical History:   Diagnosis Date    Congenital heart defect     Diabetes mellitus     Hyperlipidemia     Stroke         Plan:        Strep bacteremia  Multiple lumbar epidural abscesses  Subacute CVA  PFO  Multifactorial anemia  DM2 with hyperglycemia not on long term insulin  PAF  Thrombocytosis  Severe protein calorie malnutrition    Continue current treatment. Monitor counts. Increase activity. Labs Monday. Aggressive therapies as tolerated. Continue IV antibiotics under direction of ID. Maintain patient safety. Continue pain control efforts. Glycemic control - adjust insulin regimen as needed. Appreciate neurology evaluation and recommendations.       Electronically signed by DAVID Mojica on 6/2/2025 at 09:36 CDT

## 2025-06-03 ENCOUNTER — APPOINTMENT (OUTPATIENT)
Dept: MRI IMAGING | Facility: HOSPITAL | Age: 65
End: 2025-06-03
Payer: COMMERCIAL

## 2025-06-03 LAB
FUNGUS SPEC CULT: NORMAL
GLUCOSE BLDC GLUCOMTR-MCNC: 196 MG/DL (ref 70–130)
GLUCOSE BLDC GLUCOMTR-MCNC: 276 MG/DL (ref 70–130)
GLUCOSE BLDC GLUCOMTR-MCNC: 284 MG/DL (ref 70–130)
GLUCOSE BLDC GLUCOMTR-MCNC: 287 MG/DL (ref 70–130)
KOH PREP SPEC: NORMAL

## 2025-06-03 PROCEDURE — 82948 REAGENT STRIP/BLOOD GLUCOSE: CPT

## 2025-06-03 PROCEDURE — 25010000002 CEFTRIAXONE PER 250 MG: Performed by: INTERNAL MEDICINE

## 2025-06-03 PROCEDURE — A9573 GADOPICLENOL 0.5 MMOL/ML SOLUTION: HCPCS | Performed by: INTERNAL MEDICINE

## 2025-06-03 PROCEDURE — 72158 MRI LUMBAR SPINE W/O & W/DYE: CPT

## 2025-06-03 PROCEDURE — 63710000001 INSULIN LISPRO (HUMAN) PER 5 UNITS: Performed by: INTERNAL MEDICINE

## 2025-06-03 PROCEDURE — 63710000001 INSULIN GLARGINE PER 5 UNITS: Performed by: NURSE PRACTITIONER

## 2025-06-03 PROCEDURE — 25510000001 GADOPICLENOL 0.5 MMOL/ML SOLUTION: Performed by: INTERNAL MEDICINE

## 2025-06-03 PROCEDURE — 63710000001 INSULIN LISPRO (HUMAN) PER 5 UNITS: Performed by: NURSE PRACTITIONER

## 2025-06-03 RX ADMIN — GADOPICLENOL 10 ML: 485.1 INJECTION INTRAVENOUS at 17:55

## 2025-06-03 NOTE — PROGRESS NOTES
JUVE Jimenez APRN        Internal Medicine Progress Note    6/3/2025   11:41 CDT    Name:  Fredis Wilson  MRN:    [id number]     Acct:     [id number]   Room:  [address] [place] [address] Day: 0     Admit Date: 5/19/2025  5:15 PM  PCP: Angel White MD    Subjective:     C/C: back pain, weakness    Interval History: Status:  stayed the same. Up to chair. No family at bedside. Afebrile. Maintaining adequate 02 sats on room air. Continues with increased back pain today. Hopeful for arrangements to be made to discharge home to complete IV antibiotic therapy. Hemovac discontinued yesterday.     Review of Systems   Constitutional: Positive for malaise/fatigue. Negative for chills, decreased appetite, weight gain and weight loss.   HENT:  Negative for congestion, ear discharge, hoarse voice and tinnitus.    Eyes:  Negative for blurred vision, discharge, visual disturbance and visual halos.   Cardiovascular:  Negative for chest pain, claudication, dyspnea on exertion, irregular heartbeat, leg swelling, orthopnea and paroxysmal nocturnal dyspnea.   Respiratory:  Negative for cough, shortness of breath, sputum production and wheezing.    Endocrine: Negative for cold intolerance, heat intolerance and polyuria.   Hematologic/Lymphatic: Negative for adenopathy. Does not bruise/bleed easily.   Skin:  Negative for dry skin, itching and suspicious lesions.   Musculoskeletal:  Positive for back pain and myalgias. Negative for arthritis, falls, joint pain and muscle weakness.   Gastrointestinal:  Negative for abdominal pain, constipation, diarrhea, dysphagia and hematemesis.   Genitourinary:  Negative for bladder incontinence, dysuria and frequency.   Neurological:  Positive for weakness. Negative for aphonia, disturbances in coordination and dizziness.   Psychiatric/Behavioral:  Negative for altered mental status, depression, memory loss and substance abuse. The patient does not have insomnia and  is not nervous/anxious.          Medications:     Allergies: No Known Allergies    Current Meds:   Current Facility-Administered Medications:     acetaminophen (TYLENOL) tablet 650 mg, 650 mg, Oral, Q4H PRN **OR** acetaminophen (TYLENOL) suppository 650 mg, 650 mg, Rectal, Q6H PRN, Elver Lara MD    cefTRIAXone (ROCEPHIN) 2,000 mg in sodium chloride 0.9 % 100 mL MBP, 2,000 mg, Intravenous, Q24H, Anthony Harvey MD    dextrose (D50W) (25 g/50 mL) IV injection 25 g, 25 g, Intravenous, Q15 Min PRN, Elver Lara MD    dextrose (GLUTOSE) oral gel 15 g, 15 g, Oral, Q15 Min PRN, Elver Lara MD    glucagon (GLUCAGEN) injection 1 mg, 1 mg, Intramuscular, Q15 Min PRN, Elver Lara MD    insulin glargine (LANTUS, SEMGLEE) injection 22 Units, 22 Units, Subcutaneous, Q12H, Jordyn Tidwell APRN    Insulin Lispro (humaLOG) injection 3-14 Units, 3-14 Units, Subcutaneous, 4x Daily AC & at Bedtime, Elver Lara MD    Insulin Lispro (humaLOG) injection 5 Units, 5 Units, Subcutaneous, TID With Meals, Jordyn Tidwell APRN    Naloxegol Oxalate (MOVANTIK) tablet 25 mg, 25 mg, Oral, QAM, Elver Lara MD    oxyCODONE-acetaminophen (PERCOCET) 7.5-325 MG per tablet 1 tablet, 1 tablet, Oral, Q6H PRN, Jordyn Tidwell APRN    polyethylene glycol (MIRALAX) packet 17 g, 17 g, Oral, Daily, Elver Lara MD    pravastatin (PRAVACHOL) tablet 10 mg, 10 mg, Oral, Daily, Elver Lara MD    sennosides-docusate (PERICOLACE) 8.6-50 MG per tablet 1 tablet, 1 tablet, Oral, Daily, Elver Lara MD    sodium chloride 0.9 % flush 10 mL, 10 mL, Intravenous, Q12H, Elver Lara MD    sodium chloride 0.9 % flush 10 mL, 10 mL, Intravenous, PRN, Elver Lara MD    sodium chloride 0.9 % flush 10 mL, 10 mL, Intravenous, Q12H, Elver Lara MD    sodium chloride 0.9 % flush 10 mL, 10 mL, Intravenous, PRN, Elver Lara MD     "sodium chloride 0.9 % flush 20 mL, 20 mL, Intravenous, PRN, Elver Lara MD    sodium chloride 0.9 % infusion 40 mL, 40 mL, Intravenous, PRN, Elver Lara MD    sodium chloride 0.9 % infusion 40 mL, 40 mL, Intravenous, PRN, Elver Lara MD    tiZANidine (ZANAFLEX) tablet 4 mg, 4 mg, Oral, Q6H PRN, Elver Lara MD    Data:     Code Status:    There are no questions and answers to display.       No family history on file.    Social History     Socioeconomic History    Marital status:    Tobacco Use    Smoking status: Never    Smokeless tobacco: Never   Vaping Use    Vaping status: Never Used   Substance and Sexual Activity    Alcohol use: Never    Drug use: Never    Sexual activity: Defer       Vitals:  Ht 180.3 cm (71\")   Wt 101 kg (222 lb 11.2 oz)   BMI 31.06 kg/m²   T 98 P 81 R 15 /62 Sp02 90% (room air)          I/O (24Hr):  No intake or output data in the 24 hours ending 06/03/25 1141    Labs and imaging:      Recent Results (from the past 12 hours)   POC Glucose Once    Collection Time: 06/03/25  6:49 AM    Specimen: Blood   Result Value Ref Range    Glucose 287 (H) 70 - 130 mg/dL   POC Glucose Once    Collection Time: 06/03/25 10:54 AM    Specimen: Blood   Result Value Ref Range    Glucose 196 (H) 70 - 130 mg/dL                               Physical Examination:        Physical Exam  Vitals and nursing note reviewed.   Constitutional:       Appearance: He is well-developed.   HENT:      Head: Normocephalic and atraumatic.      Right Ear: External ear normal.      Left Ear: External ear normal.      Nose: Nose normal.      Mouth/Throat:      Mouth: Mucous membranes are dry.      Pharynx: Oropharynx is clear.   Eyes:      Pupils: Pupils are equal, round, and reactive to light.   Cardiovascular:      Rate and Rhythm: Normal rate and regular rhythm.      Heart sounds: Normal heart sounds.   Pulmonary:      Effort: Pulmonary effort is normal.      Breath " sounds: Normal breath sounds.   Abdominal:      General: Bowel sounds are normal.      Palpations: Abdomen is soft.   Musculoskeletal:         General: Normal range of motion.      Cervical back: Normal range of motion and neck supple.      Comments: Generalized weakness   Skin:     General: Skin is warm and dry.      Comments: Incision c/d/I   Neurological:      Mental Status: He is alert and oriented to person, place, and time.      Deep Tendon Reflexes: Reflexes are normal and symmetric.   Psychiatric:         Behavior: Behavior normal.           Assessment:               * No active hospital problems. *    Past Medical History:   Diagnosis Date    Congenital heart defect     Diabetes mellitus     Hyperlipidemia     Stroke         Plan:        Strep bacteremia  Multiple lumbar epidural abscesses  Subacute CVA  PFO  Multifactorial anemia  DM2 with hyperglycemia not on long term insulin  PAF  Thrombocytosis  Severe protein calorie malnutrition    Continue current treatment. Monitor counts. Increase activity. Labs Thursday. Aggressive therapies as tolerated. Continue IV antibiotics under direction of ID. Maintain patient safety. Continue pain control efforts. Glycemic control - adjust insulin regimen as needed. Repeat MRI of lumbar spine tomorrow. Discharge planning.     Electronically signed by DAVID Hill on 6/3/2025 at 11:41 CDT

## 2025-06-03 NOTE — PROGRESS NOTES
Adult Nutrition  Assessment    Patient Name:  Fredis Wilson  YOB: 1960  MRN: [id number]  Admit Date:  5/19/2025    Assessment Date:  6/3/2025     Reason for Assessment       Row Name 06/03/25 1551          Reason for Assessment    Reason For Assessment follow-up protocol                    Nutrition/Diet History       Row Name 06/03/25 1551          Nutrition/Diet History    Typical Intake (Food/Fluid/EN/PN) Nutrition follow up:  Pt appetite remains good.  He is eating 100% of his diet at his time.  Receiving consistent CHO with thing liquids.  He is drinking his Boost Glucose Control daily with breakfast.  Fluid intake with meals over the last 72 hours is ~1558 cc.  Current wt is 222.7#.  Down from 226# last week but not significant.  Labs reviewed: Na 135, Crea .42, Alb 3.0, CRP 3.51.  Glu ranges 136-345.  UOP ~1709 cc/day with Last BM on 6/2..  Will continue to monitor and f/u per protocol.                                 Electronically signed by:  Joaquín Pal RD  06/03/25 15:54 CDT

## 2025-06-04 LAB
GLUCOSE BLDC GLUCOMTR-MCNC: 156 MG/DL (ref 70–130)
GLUCOSE BLDC GLUCOMTR-MCNC: 242 MG/DL (ref 70–130)
GLUCOSE BLDC GLUCOMTR-MCNC: 246 MG/DL (ref 70–130)
GLUCOSE BLDC GLUCOMTR-MCNC: 321 MG/DL (ref 70–130)

## 2025-06-04 PROCEDURE — 63710000001 INSULIN LISPRO (HUMAN) PER 5 UNITS: Performed by: INTERNAL MEDICINE

## 2025-06-04 PROCEDURE — 63710000001 INSULIN GLARGINE PER 5 UNITS: Performed by: NURSE PRACTITIONER

## 2025-06-04 PROCEDURE — 82948 REAGENT STRIP/BLOOD GLUCOSE: CPT

## 2025-06-04 PROCEDURE — 25010000002 CEFTRIAXONE PER 250 MG: Performed by: INTERNAL MEDICINE

## 2025-06-04 PROCEDURE — 63710000001 INSULIN LISPRO (HUMAN) PER 5 UNITS: Performed by: NURSE PRACTITIONER

## 2025-06-04 RX ORDER — OXYCODONE AND ACETAMINOPHEN 7.5; 325 MG/1; MG/1
1 TABLET ORAL EVERY 6 HOURS PRN
Refills: 0 | Status: DISPENSED | OUTPATIENT
Start: 2025-06-04 | End: 2025-06-09

## 2025-06-04 NOTE — PROGRESS NOTES
Infectious Diseases Progress Note    Patient:  Fredis Wilson  YOB: 1960  MRN: [id number]   Admit date: 5/19/2025   Admitting Physician: Elver Lara MD  Primary Care Physician: Angel White MD    Chief Complaint: Seen today in follow-up of Streptococcus intermedius bloodstream infection and lumbar paraspinal infection/epidural abscess secondary to Streptococcus intermedius.    Interval History: Starting Friday of last week and through the beginning of this week he has had some increased pain in the low back area.  It extends some to the left hip and leg.  He indicates at times he feels okay but sometimes when he is up trying to move he gets a sharp catch in this area.  His drain was removed yesterday.  He indicates he had requested an MRI to make sure everything was okay.  The MRI results are outlined below.  Read through the MRI results with him on his cell phone and discussed the findings.  Did not see anything immediately surgical on his follow-up MRI.  He denies any problems with bowel or bladder function.  He ask whether increased discomfort would delay his discharge home.  I explained he would not be able to go home until IV antibiotic arrangements are in place and his pain is controlled such that he is able to do his activities of daily living and do routine activities around the home that he would need to be able to do on his own without assistance.    No intake or output data in the 24 hours ending 06/04/25 1430  Allergies: No Known Allergies  Current Scheduled Medications:   cefTRIAXone, 2,000 mg, Intravenous, Q24H  insulin glargine, 22 Units, Subcutaneous, Q12H  insulin lispro, 3-14 Units, Subcutaneous, 4x Daily AC & at Bedtime  Insulin Lispro, 5 Units, Subcutaneous, TID With Meals  Naloxegol Oxalate, 25 mg, Oral, QAM  polyethylene glycol, 17 g, Oral, Daily  pravastatin, 10 mg, Oral, Daily  senna-docusate sodium, 1 tablet, Oral, Daily  sodium chloride, 10 mL,  "Intravenous, Q12H  sodium chloride, 10 mL, Intravenous, Q12H      Current PRN Medications:  acetaminophen **OR** acetaminophen    dextrose    dextrose    glucagon (human recombinant)    oxyCODONE-acetaminophen    sodium chloride    sodium chloride    sodium chloride    sodium chloride    sodium chloride    tiZANidine    Review of Systems he has no diarrhea, rash, or skin itching.  No pain or discomfort at PICC line site.    Vital Signs:  No data recorded.  Ht 180.3 cm (71\")   Wt 101 kg (222 lb 11.2 oz)   BMI 31.06 kg/m²     Physical Exam  Vital signs - reviewed.  Line/IV site - No erythema, warmth, induration, or tenderness.  He is moving all extremities equally  General He was alert, pleasant, interactive, and in no distress    Lab Results:  CBC:   Results from last 7 days   Lab Units 06/02/25  0556 05/29/25  0409   WBC 10*3/mm3 9.75 8.58   HEMOGLOBIN g/dL 10.3* 9.7*   HEMATOCRIT % 33.9* 30.9*   PLATELETS 10*3/mm3 361 483*     BMP:  Results from last 7 days   Lab Units 06/02/25  0557 05/29/25  0409   SODIUM mmol/L 135* 135*   POTASSIUM mmol/L 3.8 3.8   CHLORIDE mmol/L 98 99   CO2 mmol/L 26.0 25.0   BUN mg/dL 14.7 14.3   CREATININE mg/dL 0.42* 0.44*   GLUCOSE mg/dL 136* 186*   CALCIUM mg/dL 8.7 8.4*   ALT (SGPT) U/L 15  --      6/4/2025 14:30 CDT: I have personally reviewed the following sections of the medical record and pertinent findings and updates are outlined below. CBL:    [] Microbiology    [] Cardiology/Vascular    [] Pathology    [x] Radiology    MRI lumbar spine with and without contrast done yesterday:  MPRESSION:     1. Previous posterior L3-L5 fusion. Associated susceptibility artifact  markedly limits evaluation.  2. There appears to be severe spinal canal narrowing at L3-L4 and L4-L5.  There is some mild heterogeneous T2 signal changes within the ventral  and posterior epidural space at this level, however this is limited in  evaluation on postcontrast imaging. This is nonspecific and " could  represent residual epidural collections, however sterility  indeterminate.  3. Partially visualized suspected abscess measuring up to 2 cm in the  left retroperitoneal fat.  4. There appears to be mild marrow edema within the posterior L5  vertebral body without obvious destructive cortical changes. This may be  reactive, however given recent history, early osteomyelitis cannot be  excluded.  5. Edema within the posterior paraspinal soft tissues without an  organized fluid collection. Favored postoperative.  This report was signed and finalized on 6/3/2025 6:21 PM by Obinna Gasca    [] Other:     Additional Studies Reviewed: None    Impression:   1.  Streptococcus intermedius bloodstream infection-completed treatment  2.  Lumbar paraspinal infection/epidural abscess secondary to Streptococcus intermedius-continuing ceftriaxone.  I do not see anything on his MRI report above that would require urgent surgical intervention.    3.  Overall weakness/deconditioning-this had been showing improvement  4.  PFO-needs structural cardiology follow-up after discharge    Recommendations:   Continue ceftriaxone  Reconsult physical therapy to assist with range of motion, mobility etc. to see if it will help with back discomfort  He asked about starting a baby aspirin-he indicated neurology did not want him on more extensive blood thinner, but suggested he could take baby aspirin daily  Confirmed with nursing and charge nurse that there are efforts being made to help make his home IV antibiotic arrangements  Would not discharge home until he has better pain control and is again improving with ambulation and is able to do what he would need to do at home with regard to his activities of daily living.      Anthony Hale MD

## 2025-06-04 NOTE — PROGRESS NOTES
JUVE Jimenez APRN        Internal Medicine Progress Note    6/4/2025   08:41 CDT    Name:  Fredis Wilson  MRN:    [id number]     Acct:     [id number]   Room:  [address] [place] [address] Day: 0     Admit Date: 5/19/2025  5:15 PM  PCP: Angel White MD    Subjective:     C/C: back pain, weakness    Interval History: Status:  stayed the same. Resting in bed. No family at bedside. Afebrile. Maintaining adequate 02 sats on room air. Continues with increased back pain today. Hopeful for arrangements to be made to discharge home to complete IV antibiotic therapy. MRI revealed spinal canal narrowing L3-4, L4-5 and edema in posterior paraspinal soft tissue without organized fluid collection.     Review of Systems   Constitutional: Positive for malaise/fatigue. Negative for chills, decreased appetite, weight gain and weight loss.   HENT:  Negative for congestion, ear discharge, hoarse voice and tinnitus.    Eyes:  Negative for blurred vision, discharge, visual disturbance and visual halos.   Cardiovascular:  Negative for chest pain, claudication, dyspnea on exertion, irregular heartbeat, leg swelling, orthopnea and paroxysmal nocturnal dyspnea.   Respiratory:  Negative for cough, shortness of breath, sputum production and wheezing.    Endocrine: Negative for cold intolerance, heat intolerance and polyuria.   Hematologic/Lymphatic: Negative for adenopathy. Does not bruise/bleed easily.   Skin:  Negative for dry skin, itching and suspicious lesions.   Musculoskeletal:  Positive for back pain and myalgias. Negative for arthritis, falls, joint pain and muscle weakness.   Gastrointestinal:  Negative for abdominal pain, constipation, diarrhea, dysphagia and hematemesis.   Genitourinary:  Negative for bladder incontinence, dysuria and frequency.   Neurological:  Positive for weakness. Negative for aphonia, disturbances in coordination and dizziness.   Psychiatric/Behavioral:  Negative for altered  mental status, depression, memory loss and substance abuse. The patient does not have insomnia and is not nervous/anxious.          Medications:     Allergies: No Known Allergies    Current Meds:   Current Facility-Administered Medications:     acetaminophen (TYLENOL) tablet 650 mg, 650 mg, Oral, Q4H PRN **OR** acetaminophen (TYLENOL) suppository 650 mg, 650 mg, Rectal, Q6H PRN, Elver Lara MD    cefTRIAXone (ROCEPHIN) 2,000 mg in sodium chloride 0.9 % 100 mL MBP, 2,000 mg, Intravenous, Q24H, Anthony Harvey MD    dextrose (D50W) (25 g/50 mL) IV injection 25 g, 25 g, Intravenous, Q15 Min PRN, Elver Lara MD    dextrose (GLUTOSE) oral gel 15 g, 15 g, Oral, Q15 Min PRN, Elver Lara MD    glucagon (GLUCAGEN) injection 1 mg, 1 mg, Intramuscular, Q15 Min PRN, Elver Lara MD    insulin glargine (LANTUS, SEMGLEE) injection 22 Units, 22 Units, Subcutaneous, Q12H, Jordyn Tidwell, APRN    Insulin Lispro (humaLOG) injection 3-14 Units, 3-14 Units, Subcutaneous, 4x Daily AC & at Bedtime, Elver Lara MD    Insulin Lispro (humaLOG) injection 5 Units, 5 Units, Subcutaneous, TID With Meals, Jordyn Tidwell, APRN    Naloxegol Oxalate (MOVANTIK) tablet 25 mg, 25 mg, Oral, QAM, Elver Lara MD    oxyCODONE-acetaminophen (PERCOCET) 7.5-325 MG per tablet 1 tablet, 1 tablet, Oral, Q6H PRN, Elver Lara MD    polyethylene glycol (MIRALAX) packet 17 g, 17 g, Oral, Daily, Elver Lara MD    pravastatin (PRAVACHOL) tablet 10 mg, 10 mg, Oral, Daily, Elver Lara MD    sennosides-docusate (PERICOLACE) 8.6-50 MG per tablet 1 tablet, 1 tablet, Oral, Daily, Elver Lara MD    sodium chloride 0.9 % flush 10 mL, 10 mL, Intravenous, Q12H, Elver Lara MD    sodium chloride 0.9 % flush 10 mL, 10 mL, Intravenous, PRN, Elver Lara MD    sodium chloride 0.9 % flush 10 mL, 10 mL, Intravenous, Q12H, Elver Lara  "MD Tj    sodium chloride 0.9 % flush 10 mL, 10 mL, Intravenous, PRN, Elver Lara MD    sodium chloride 0.9 % flush 20 mL, 20 mL, Intravenous, PRN, Elver Lara MD    sodium chloride 0.9 % infusion 40 mL, 40 mL, Intravenous, PRN, Elver Lara MD    sodium chloride 0.9 % infusion 40 mL, 40 mL, Intravenous, PRN, Elver Lara MD    tiZANidine (ZANAFLEX) tablet 4 mg, 4 mg, Oral, Q6H PRN, Elver Lara MD    Data:     Code Status:    There are no questions and answers to display.       No family history on file.    Social History     Socioeconomic History    Marital status:    Tobacco Use    Smoking status: Never    Smokeless tobacco: Never   Vaping Use    Vaping status: Never Used   Substance and Sexual Activity    Alcohol use: Never    Drug use: Never    Sexual activity: Defer       Vitals:  Ht 180.3 cm (71\")   Wt 101 kg (222 lb 11.2 oz)   BMI 31.06 kg/m²   T 98.1 P 70 R 15 /65 Sp02 98% (room air)          I/O (24Hr):  No intake or output data in the 24 hours ending 06/04/25 0841    Labs and imaging:      Recent Results (from the past 12 hours)   POC Glucose Once    Collection Time: 06/04/25  7:03 AM    Specimen: Blood   Result Value Ref Range    Glucose 156 (H) 70 - 130 mg/dL                               Physical Examination:        Physical Exam  Vitals and nursing note reviewed.   Constitutional:       Appearance: He is well-developed.   HENT:      Head: Normocephalic and atraumatic.      Right Ear: External ear normal.      Left Ear: External ear normal.      Nose: Nose normal.      Mouth/Throat:      Mouth: Mucous membranes are dry.      Pharynx: Oropharynx is clear.   Eyes:      Pupils: Pupils are equal, round, and reactive to light.   Cardiovascular:      Rate and Rhythm: Normal rate and regular rhythm.      Heart sounds: Normal heart sounds.   Pulmonary:      Effort: Pulmonary effort is normal.      Breath sounds: Normal breath sounds. "   Abdominal:      General: Bowel sounds are normal.      Palpations: Abdomen is soft.   Musculoskeletal:         General: Normal range of motion.      Cervical back: Normal range of motion and neck supple.      Comments: Generalized weakness   Skin:     General: Skin is warm and dry.      Comments: Incision c/d/I   Neurological:      Mental Status: He is alert and oriented to person, place, and time.      Deep Tendon Reflexes: Reflexes are normal and symmetric.   Psychiatric:         Behavior: Behavior normal.           Assessment:               * No active hospital problems. *    Past Medical History:   Diagnosis Date    Congenital heart defect     Diabetes mellitus     Hyperlipidemia     Stroke         Plan:        Strep bacteremia  Multiple lumbar epidural abscesses  Subacute CVA  PFO  Multifactorial anemia  DM2 with hyperglycemia not on long term insulin  PAF  Thrombocytosis  Severe protein calorie malnutrition    Continue current treatment. Monitor counts. Increase activity. Labs in am. Aggressive therapies as tolerated. Continue IV antibiotics under direction of ID. Maintain patient safety. Continue pain control efforts. Glycemic control - adjust insulin regimen as needed. Discussed MRI results. Discharge planning.     Electronically signed by DAVID Hill on 6/4/2025 at 08:41 CDT

## 2025-06-04 NOTE — PROGRESS NOTES
[facility] Fall Risk Assessment Note    Name: Fredis Wilson  MRN: [id number]  CSN: [id number]  Admit Date/Time: 5/19/2025  5:15 PM.    Currently ordered medications associated with an increased risk for fall include:    Scheduled Meds:  insulin glargine, 22 Units, Subcutaneous, Q12H  insulin lispro, 3-14 Units, Subcutaneous, 4x Daily AC & at Bedtime  Insulin Lispro, 5 Units, Subcutaneous, TID With Meals  Naloxegol Oxalate, 25 mg, Oral, QAM  polyethylene glycol, 17 g, Oral, Daily  senna-docusate sodium, 1 tablet, Oral, Daily    PRN Meds:    oxyCODONE-acetaminophen    tiZANidine    Comments/Recommendations:  Opioid de-escalation efforts underway; consolidated oxycodone-acetaminophen orders on 05/30.   Transition off naloxegol if feasible, as opioid intake is decreased.    John Norton, PharmD  06/04/25 10:39 CDT

## 2025-06-05 ENCOUNTER — TELEPHONE (OUTPATIENT)
Dept: NEUROSURGERY | Age: 65
End: 2025-06-05

## 2025-06-05 LAB
ALBUMIN SERPL-MCNC: 3 G/DL (ref 3.5–5.2)
ALBUMIN/GLOB SERPL: 1 G/DL
ALP SERPL-CCNC: 90 U/L (ref 39–117)
ALT SERPL W P-5'-P-CCNC: 13 U/L (ref 1–41)
ANION GAP SERPL CALCULATED.3IONS-SCNC: 7 MMOL/L (ref 5–15)
AST SERPL-CCNC: 12 U/L (ref 1–40)
BASOPHILS # BLD MANUAL: 0 10*3/MM3 (ref 0–0.2)
BASOPHILS NFR BLD MANUAL: 0 % (ref 0–1.5)
BILIRUB SERPL-MCNC: 0.5 MG/DL (ref 0–1.2)
BUN SERPL-MCNC: 15.1 MG/DL (ref 8–23)
BUN/CREAT SERPL: 29.6 (ref 7–25)
CALCIUM SPEC-SCNC: 8.9 MG/DL (ref 8.6–10.5)
CHLORIDE SERPL-SCNC: 99 MMOL/L (ref 98–107)
CO2 SERPL-SCNC: 27 MMOL/L (ref 22–29)
CREAT SERPL-MCNC: 0.51 MG/DL (ref 0.76–1.27)
DEPRECATED RDW RBC AUTO: 46.8 FL (ref 37–54)
ECHO BSA: 2.18 M2
EGFRCR SERPLBLD CKD-EPI 2021: 113.9 ML/MIN/1.73
EOSINOPHIL # BLD MANUAL: 0.49 10*3/MM3 (ref 0–0.4)
EOSINOPHIL NFR BLD MANUAL: 6.1 % (ref 0.3–6.2)
ERYTHROCYTE [DISTWIDTH] IN BLOOD BY AUTOMATED COUNT: 14.1 % (ref 12.3–15.4)
ERYTHROCYTE [SEDIMENTATION RATE] IN BLOOD: 65 MM/HR (ref 0–20)
GLOBULIN UR ELPH-MCNC: 3.1 GM/DL
GLUCOSE BLDC GLUCOMTR-MCNC: 199 MG/DL (ref 70–130)
GLUCOSE BLDC GLUCOMTR-MCNC: 223 MG/DL (ref 70–130)
GLUCOSE BLDC GLUCOMTR-MCNC: 228 MG/DL (ref 70–130)
GLUCOSE BLDC GLUCOMTR-MCNC: 258 MG/DL (ref 70–130)
GLUCOSE SERPL-MCNC: 193 MG/DL (ref 65–99)
HCT VFR BLD AUTO: 32.5 % (ref 37.5–51)
HGB BLD-MCNC: 10.1 G/DL (ref 13–17.7)
LYMPHOCYTES # BLD MANUAL: 1.85 10*3/MM3 (ref 0.7–3.1)
LYMPHOCYTES NFR BLD MANUAL: 10.1 % (ref 5–12)
MCH RBC QN AUTO: 28.1 PG (ref 26.6–33)
MCHC RBC AUTO-ENTMCNC: 31.1 G/DL (ref 31.5–35.7)
MCV RBC AUTO: 90.5 FL (ref 79–97)
METAMYELOCYTES NFR BLD MANUAL: 1 % (ref 0–0)
MONOCYTES # BLD: 0.8 10*3/MM3 (ref 0.1–0.9)
NEUTROPHILS # BLD AUTO: 4.74 10*3/MM3 (ref 1.7–7)
NEUTROPHILS NFR BLD MANUAL: 59.6 % (ref 42.7–76)
PLAT MORPH BLD: NORMAL
PLATELET # BLD AUTO: 294 10*3/MM3 (ref 140–450)
PMV BLD AUTO: 8.8 FL (ref 6–12)
POIKILOCYTOSIS BLD QL SMEAR: ABNORMAL
POTASSIUM SERPL-SCNC: 4 MMOL/L (ref 3.5–5.2)
PROT SERPL-MCNC: 6.1 G/DL (ref 6–8.5)
RBC # BLD AUTO: 3.59 10*6/MM3 (ref 4.14–5.8)
SODIUM SERPL-SCNC: 133 MMOL/L (ref 136–145)
VARIANT LYMPHS NFR BLD MANUAL: 20.2 % (ref 19.6–45.3)
VARIANT LYMPHS NFR BLD MANUAL: 3 % (ref 0–5)
WBC MORPH BLD: NORMAL
WBC NRBC COR # BLD AUTO: 7.96 10*3/MM3 (ref 3.4–10.8)

## 2025-06-05 PROCEDURE — 85007 BL SMEAR W/DIFF WBC COUNT: CPT | Performed by: INTERNAL MEDICINE

## 2025-06-05 PROCEDURE — 85025 COMPLETE CBC W/AUTO DIFF WBC: CPT | Performed by: INTERNAL MEDICINE

## 2025-06-05 PROCEDURE — 85652 RBC SED RATE AUTOMATED: CPT | Performed by: INTERNAL MEDICINE

## 2025-06-05 PROCEDURE — 63710000001 INSULIN LISPRO (HUMAN) PER 5 UNITS: Performed by: INTERNAL MEDICINE

## 2025-06-05 PROCEDURE — 63710000001 INSULIN LISPRO (HUMAN) PER 5 UNITS: Performed by: NURSE PRACTITIONER

## 2025-06-05 PROCEDURE — 63710000001 INSULIN GLARGINE PER 5 UNITS: Performed by: NURSE PRACTITIONER

## 2025-06-05 PROCEDURE — 80053 COMPREHEN METABOLIC PANEL: CPT | Performed by: INTERNAL MEDICINE

## 2025-06-05 PROCEDURE — 82948 REAGENT STRIP/BLOOD GLUCOSE: CPT

## 2025-06-05 PROCEDURE — 25010000002 CEFTRIAXONE PER 250 MG: Performed by: INTERNAL MEDICINE

## 2025-06-05 RX ORDER — ASPIRIN 81 MG/1
81 TABLET ORAL DAILY
Status: DISCONTINUED | OUTPATIENT
Start: 2025-06-05 | End: 2025-06-10 | Stop reason: HOSPADM

## 2025-06-05 NOTE — PROGRESS NOTES
Marco Lara M.D.  DAVID Funez        Internal Medicine Progress Note    6/5/2025   12:45 CDT    Name:  Fredis Wilson  MRN:    [id number]     Acct:     [id number]   Room:  [address] [place] [address] Day: 0     Admit Date: 5/19/2025  5:15 PM  PCP: Angel White MD    Subjective:     C/C: back pain, weakness    Interval History: Status:  stayed the same. Up to chair. No family at bedside. Afebrile. Maintaining adequate 02 sats on room air. Continues with increased back pain today. Hopeful for arrangements to be made to discharge home to complete IV antibiotic therapy. Discussed MRI results with the patient again today. Results have been discussed with patient and Dr. Harvey. Results have been sent to Dr. Flores for review. Counts stable.     Review of Systems   Constitutional: Positive for malaise/fatigue. Negative for chills, decreased appetite, weight gain and weight loss.   HENT:  Negative for congestion, ear discharge, hoarse voice and tinnitus.    Eyes:  Negative for blurred vision, discharge, visual disturbance and visual halos.   Cardiovascular:  Negative for chest pain, claudication, dyspnea on exertion, irregular heartbeat, leg swelling, orthopnea and paroxysmal nocturnal dyspnea.   Respiratory:  Negative for cough, shortness of breath, sputum production and wheezing.    Endocrine: Negative for cold intolerance, heat intolerance and polyuria.   Hematologic/Lymphatic: Negative for adenopathy. Does not bruise/bleed easily.   Skin:  Negative for dry skin, itching and suspicious lesions.   Musculoskeletal:  Positive for back pain and myalgias. Negative for arthritis, falls, joint pain and muscle weakness.   Gastrointestinal:  Negative for abdominal pain, constipation, diarrhea, dysphagia and hematemesis.   Genitourinary:  Negative for bladder incontinence, dysuria and frequency.   Neurological:  Positive for weakness. Negative for aphonia, disturbances in coordination and dizziness.    Psychiatric/Behavioral:  Negative for altered mental status, depression, memory loss and substance abuse. The patient does not have insomnia and is not nervous/anxious.          Medications:     Allergies: No Known Allergies    Current Meds:   Current Facility-Administered Medications:     acetaminophen (TYLENOL) tablet 650 mg, 650 mg, Oral, Q4H PRN **OR** acetaminophen (TYLENOL) suppository 650 mg, 650 mg, Rectal, Q6H PRN, Elver Lara MD    cefTRIAXone (ROCEPHIN) 2,000 mg in sodium chloride 0.9 % 100 mL MBP, 2,000 mg, Intravenous, Q24H, Anthony Harvey MD    dextrose (D50W) (25 g/50 mL) IV injection 25 g, 25 g, Intravenous, Q15 Min PRN, Elver Lara MD    dextrose (GLUTOSE) oral gel 15 g, 15 g, Oral, Q15 Min PRN, Elver Lara MD    glucagon (GLUCAGEN) injection 1 mg, 1 mg, Intramuscular, Q15 Min PRN, Elver Lara MD    insulin glargine (LANTUS, SEMGLEE) injection 22 Units, 22 Units, Subcutaneous, Q12H, Jordyn Tidwell, APRN    Insulin Lispro (humaLOG) injection 3-14 Units, 3-14 Units, Subcutaneous, 4x Daily AC & at Bedtime, Evler Lara MD    Insulin Lispro (humaLOG) injection 5 Units, 5 Units, Subcutaneous, TID With Meals, Jordyn Tidwell, APRN    Naloxegol Oxalate (MOVANTIK) tablet 25 mg, 25 mg, Oral, QAM, Elver Lara MD    oxyCODONE-acetaminophen (PERCOCET) 7.5-325 MG per tablet 1 tablet, 1 tablet, Oral, Q6H PRN, Elver Lara MD    polyethylene glycol (MIRALAX) packet 17 g, 17 g, Oral, Daily, Elver Lara MD    pravastatin (PRAVACHOL) tablet 10 mg, 10 mg, Oral, Daily, Elver Lara MD    sennosides-docusate (PERICOLACE) 8.6-50 MG per tablet 1 tablet, 1 tablet, Oral, Daily, Elver Lara MD    sodium chloride 0.9 % flush 10 mL, 10 mL, Intravenous, Q12H, Elver Lara MD    sodium chloride 0.9 % flush 10 mL, 10 mL, Intravenous, PRN, Elver Lara MD    sodium chloride 0.9 % flush 10 mL,  "10 mL, Intravenous, Q12H, Elver Lara MD    sodium chloride 0.9 % flush 10 mL, 10 mL, Intravenous, PRN, Elver Lara MD    sodium chloride 0.9 % flush 20 mL, 20 mL, Intravenous, PRN, Elver Lara MD    sodium chloride 0.9 % infusion 40 mL, 40 mL, Intravenous, PRN, Elver Lara MD    sodium chloride 0.9 % infusion 40 mL, 40 mL, Intravenous, PRN, Elver Lara MD    tiZANidine (ZANAFLEX) tablet 4 mg, 4 mg, Oral, Q6H PRN, Elver Lara MD    Data:     Code Status:    There are no questions and answers to display.       No family history on file.    Social History     Socioeconomic History    Marital status:    Tobacco Use    Smoking status: Never    Smokeless tobacco: Never   Vaping Use    Vaping status: Never Used   Substance and Sexual Activity    Alcohol use: Never    Drug use: Never    Sexual activity: Defer       Vitals:  Ht 180.3 cm (71\")   Wt 101 kg (222 lb 11.2 oz)   BMI 31.06 kg/m²   T 98.0 P 71 R 17 /56 Sp02 90% (room air)          I/O (24Hr):  No intake or output data in the 24 hours ending 06/05/25 1245    Labs and imaging:      Recent Results (from the past 12 hours)   Sedimentation Rate    Collection Time: 06/05/25  5:27 AM    Specimen: Blood   Result Value Ref Range    Sed Rate 65 (H) 0 - 20 mm/hr   Comprehensive Metabolic Panel    Collection Time: 06/05/25  5:27 AM    Specimen: Blood   Result Value Ref Range    Glucose 193 (H) 65 - 99 mg/dL    BUN 15.1 8.0 - 23.0 mg/dL    Creatinine 0.51 (L) 0.76 - 1.27 mg/dL    Sodium 133 (L) 136 - 145 mmol/L    Potassium 4.0 3.5 - 5.2 mmol/L    Chloride 99 98 - 107 mmol/L    CO2 27.0 22.0 - 29.0 mmol/L    Calcium 8.9 8.6 - 10.5 mg/dL    Total Protein 6.1 6.0 - 8.5 g/dL    Albumin 3.0 (L) 3.5 - 5.2 g/dL    ALT (SGPT) 13 1 - 41 U/L    AST (SGOT) 12 1 - 40 U/L    Alkaline Phosphatase 90 39 - 117 U/L    Total Bilirubin 0.5 0.0 - 1.2 mg/dL    Globulin 3.1 gm/dL    A/G Ratio 1.0 g/dL    BUN/Creatinine " Ratio 29.6 (H) 7.0 - 25.0    Anion Gap 7.0 5.0 - 15.0 mmol/L    eGFR 113.9 >60.0 mL/min/1.73   CBC Auto Differential    Collection Time: 06/05/25  5:27 AM    Specimen: Blood   Result Value Ref Range    WBC 7.96 3.40 - 10.80 10*3/mm3    RBC 3.59 (L) 4.14 - 5.80 10*6/mm3    Hemoglobin 10.1 (L) 13.0 - 17.7 g/dL    Hematocrit 32.5 (L) 37.5 - 51.0 %    MCV 90.5 79.0 - 97.0 fL    MCH 28.1 26.6 - 33.0 pg    MCHC 31.1 (L) 31.5 - 35.7 g/dL    RDW 14.1 12.3 - 15.4 %    RDW-SD 46.8 37.0 - 54.0 fl    MPV 8.8 6.0 - 12.0 fL    Platelets 294 140 - 450 10*3/mm3   Manual Differential    Collection Time: 06/05/25  5:27 AM    Specimen: Blood   Result Value Ref Range    Neutrophil % 59.6 42.7 - 76.0 %    Lymphocyte % 20.2 19.6 - 45.3 %    Monocyte % 10.1 5.0 - 12.0 %    Eosinophil % 6.1 0.3 - 6.2 %    Basophil % 0.0 0.0 - 1.5 %    Metamyelocyte % 1.0 (H) 0.0 - 0.0 %    Atypical Lymphocyte % 3.0 0.0 - 5.0 %    Neutrophils Absolute 4.74 1.70 - 7.00 10*3/mm3    Lymphocytes Absolute 1.85 0.70 - 3.10 10*3/mm3    Monocytes Absolute 0.80 0.10 - 0.90 10*3/mm3    Eosinophils Absolute 0.49 (H) 0.00 - 0.40 10*3/mm3    Basophils Absolute 0.00 0.00 - 0.20 10*3/mm3    Poikilocytes Slight/1+ None Seen    WBC Morphology Normal Normal    Platelet Morphology Normal Normal   POC Glucose Once    Collection Time: 06/05/25  7:04 AM    Specimen: Blood   Result Value Ref Range    Glucose 223 (H) 70 - 130 mg/dL   POC Glucose Once    Collection Time: 06/05/25 10:59 AM    Specimen: Blood   Result Value Ref Range    Glucose 228 (H) 70 - 130 mg/dL                               Physical Examination:        Physical Exam  Vitals and nursing note reviewed.   Constitutional:       Appearance: He is well-developed.   HENT:      Head: Normocephalic and atraumatic.      Right Ear: External ear normal.      Left Ear: External ear normal.      Nose: Nose normal.      Mouth/Throat:      Mouth: Mucous membranes are dry.      Pharynx: Oropharynx is clear.   Eyes:      Pupils:  Pupils are equal, round, and reactive to light.   Cardiovascular:      Rate and Rhythm: Normal rate and regular rhythm.      Heart sounds: Normal heart sounds.   Pulmonary:      Effort: Pulmonary effort is normal.      Breath sounds: Normal breath sounds.   Abdominal:      General: Bowel sounds are normal.      Palpations: Abdomen is soft.   Musculoskeletal:         General: Normal range of motion.      Cervical back: Normal range of motion and neck supple.      Comments: Generalized weakness   Skin:     General: Skin is warm and dry.      Comments: Incision c/d/I   Neurological:      Mental Status: He is alert and oriented to person, place, and time.      Deep Tendon Reflexes: Reflexes are normal and symmetric.   Psychiatric:         Behavior: Behavior normal.           Assessment:               * No active hospital problems. *    Past Medical History:   Diagnosis Date    Congenital heart defect     Diabetes mellitus     Hyperlipidemia     Stroke         Plan:        Strep bacteremia  Multiple lumbar epidural abscesses  Subacute CVA  PFO  Multifactorial anemia  DM2 with hyperglycemia not on long term insulin  PAF  Thrombocytosis  Severe protein calorie malnutrition    Continue current treatment. Monitor counts. Increase activity. Labs Monday. Aggressive therapies as tolerated. Continue IV antibiotics under direction of ID. Maintain patient safety. Continue pain control efforts. Glycemic control - adjust insulin regimen as needed. Discussed MRI results. Awaiting feedback from Dr. White re: MRI results. Begin low dose ASA. Discharge planning.     Electronically signed by DAVID Hill on 6/5/2025 at 12:45 CDT     I have discussed the care of Fredis Wilson, including pertinent history and exam findings, with the nurse practitioner.    I have seen and examined the patient and the key elements of all parts of the encounter have been performed by me.  I agree with the assessment, plan and orders as  documented by DAVID Funez, after I modified the exam findings and the plan of treatments and the final version is my approved version of the assessment.        Electronically signed by Elver Lara MD on 6/6/2025 at 07:52 CDT

## 2025-06-05 NOTE — TELEPHONE ENCOUNTER
Alison called for Dr Rivera to review MRI results. Patient had MRI L at [facility], in pacs/report in media. Please review.    Alison requested call back with Dr Rivera's results.

## 2025-06-05 NOTE — PROGRESS NOTES
"Infectious Diseases Progress Note    Patient:  Fredis Wilson  YOB: 1960  MRN: [id number]   Admit date: 5/19/2025   Admitting Physician: Elver Lara MD  Primary Care Physician: Angel White MD    Chief Complaint: Seeing in follow-up of Streptococcus intermedius bloodstream infection, lumbar paraspinal infection/epidural abscess secondary to Streptococcus intermedius.    Interval History: Back sore with movement.  Similar to yesterday.  Did not have much pain overnight in bed.  Hopes to get a shower today.  Confirmed there are physical therapy orders in place for them to reassess today.  No new symptoms.    No intake or output data in the 24 hours ending 06/05/25 0822  Allergies: No Known Allergies  Current Scheduled Medications:   cefTRIAXone, 2,000 mg, Intravenous, Q24H  insulin glargine, 22 Units, Subcutaneous, Q12H  insulin lispro, 3-14 Units, Subcutaneous, 4x Daily AC & at Bedtime  Insulin Lispro, 5 Units, Subcutaneous, TID With Meals  Naloxegol Oxalate, 25 mg, Oral, QAM  polyethylene glycol, 17 g, Oral, Daily  pravastatin, 10 mg, Oral, Daily  senna-docusate sodium, 1 tablet, Oral, Daily  sodium chloride, 10 mL, Intravenous, Q12H  sodium chloride, 10 mL, Intravenous, Q12H      Current PRN Medications:  acetaminophen **OR** acetaminophen    dextrose    dextrose    glucagon (human recombinant)    oxyCODONE-acetaminophen    sodium chloride    sodium chloride    sodium chloride    sodium chloride    sodium chloride    tiZANidine    Review of Systems see HPI    Vital Signs:  No data recorded.  Ht 180.3 cm (71\")   Wt 101 kg (222 lb 11.2 oz)   BMI 31.06 kg/m²     Physical Exam  Vital signs - reviewed.  Line/IV site - No erythema, warmth, induration, or tenderness.  Intact strength and sensation both lower extremities    Lab Results:  CBC:   Results from last 7 days   Lab Units 06/05/25  0527 06/02/25  0556   WBC 10*3/mm3 7.96 9.75   HEMOGLOBIN g/dL 10.1* 10.3*   HEMATOCRIT % 32.5* " 33.9*   PLATELETS 10*3/mm3 294 361     BMP:  Results from last 7 days   Lab Units 06/05/25  0527 06/02/25  0557   SODIUM mmol/L 133* 135*   POTASSIUM mmol/L 4.0 3.8   CHLORIDE mmol/L 99 98   CO2 mmol/L 27.0 26.0   BUN mg/dL 15.1 14.7   CREATININE mg/dL 0.51* 0.42*   GLUCOSE mg/dL 193* 136*   CALCIUM mg/dL 8.9 8.7   ALT (SGPT) U/L 13 15     6/5/2025 08:22 CDT: I have personally reviewed the following sections of the medical record and pertinent findings and updates are outlined below. CBL:    [] Microbiology    [] Cardiology/Vascular    [] Pathology    [] Radiology    [] Other:     Additional Studies Reviewed: None    Impression:   1.  Streptococcus intermedius bloodstream infection  2.  Lumbar paraspinal infection/epidural abscess-Streptococcus intermedius.  He has had some increased pain in the last 3 to 4 days.  It may have correlated to some degree with decreasing his pain medicine.  Does not appear to have a new issue on MRI imaging that would require additional surgery at this time.  Have asked for copy of report to be sent to his neurosurgeon for review.  3.  Weakness/deconditioning.  This has been showing improvement with therapy and activity.  4.  PFO-needs outpatient follow-up with cardiology after discharge    Recommendations:   Continue ceftriaxone  Physical therapy reconsulted to assist in evaluation or treatment  Copy of MRI sent to his spine surgeon (Dr. White)  Continue efforts with home IV antibiotic arrangements  No change in home IV antibiotic recommendations-copy of orders placed below    Home IV antibiotic recommendations:  Antibiotic recommendations:  1.  Diagnosis-Streptococcus intermedius bacteremia.  Lumbar discitis/paraspinal infection-most likely secondary to Streptococcus intermedius.  Underwent surgical exploration/irrigation/hardware placement on May 13, 2025.  2.  IV access-PICC line left arm  3.  Spine surgeon-Angel Washington MD  4.  Antibiotic recommendation:  Ceftriaxone 2 g IV  daily  Last dose  ceftriaxone on June 24, 2025 (6 weeks following surgical drainage) or July 8, 2025 (8 weeks following surgical drainage)  5.  Laboratory monitoring:  CMP, CBC, CRP every Monday while on intravenous antibiotic treatment  6.  Follow-up appointment 2 to 3 weeks after hospital discharge    Anthony Hale MD

## 2025-06-06 LAB
GLUCOSE BLDC GLUCOMTR-MCNC: 156 MG/DL (ref 70–130)
GLUCOSE BLDC GLUCOMTR-MCNC: 196 MG/DL (ref 70–130)
GLUCOSE BLDC GLUCOMTR-MCNC: 270 MG/DL (ref 70–130)
GLUCOSE BLDC GLUCOMTR-MCNC: 287 MG/DL (ref 70–130)

## 2025-06-06 PROCEDURE — 63710000001 INSULIN LISPRO (HUMAN) PER 5 UNITS: Performed by: NURSE PRACTITIONER

## 2025-06-06 PROCEDURE — 82948 REAGENT STRIP/BLOOD GLUCOSE: CPT

## 2025-06-06 PROCEDURE — 63710000001 INSULIN LISPRO (HUMAN) PER 5 UNITS: Performed by: INTERNAL MEDICINE

## 2025-06-06 PROCEDURE — 63710000001 INSULIN GLARGINE PER 5 UNITS: Performed by: NURSE PRACTITIONER

## 2025-06-06 PROCEDURE — 25010000002 CEFTRIAXONE PER 250 MG: Performed by: INTERNAL MEDICINE

## 2025-06-06 RX ORDER — INSULIN LISPRO 100 [IU]/ML
3-14 INJECTION, SOLUTION INTRAVENOUS; SUBCUTANEOUS
Status: DISCONTINUED | OUTPATIENT
Start: 2025-06-06 | End: 2025-06-10 | Stop reason: HOSPADM

## 2025-06-06 RX ORDER — AMOXICILLIN 250 MG
1 CAPSULE ORAL 2 TIMES DAILY
Status: DISCONTINUED | OUTPATIENT
Start: 2025-06-07 | End: 2025-06-10 | Stop reason: HOSPADM

## 2025-06-06 NOTE — PROGRESS NOTES
JUVE Jimenez APRN        Internal Medicine Progress Note    6/6/2025   08:16 CDT    Name:  Fredis Wilson  MRN:    [id number]     Acct:     [id number]   Room:  [address] [place] [address] Day: 0     Admit Date: 5/19/2025  5:15 PM  PCP: Angel White MD    Subjective:     C/C: back pain, weakness    Interval History: Status:  stayed the same. Resting in bed.  No family at bedside. Afebrile. Maintaining adequate 02 sats on room air. Back pain somewhat improved. Hopeful for arrangements to be made to discharge home to complete IV antibiotic therapy. Blood sugars stable.     Review of Systems   Constitutional: Positive for malaise/fatigue. Negative for chills, decreased appetite, weight gain and weight loss.   HENT:  Negative for congestion, ear discharge, hoarse voice and tinnitus.    Eyes:  Negative for blurred vision, discharge, visual disturbance and visual halos.   Cardiovascular:  Negative for chest pain, claudication, dyspnea on exertion, irregular heartbeat, leg swelling, orthopnea and paroxysmal nocturnal dyspnea.   Respiratory:  Negative for cough, shortness of breath, sputum production and wheezing.    Endocrine: Negative for cold intolerance, heat intolerance and polyuria.   Hematologic/Lymphatic: Negative for adenopathy. Does not bruise/bleed easily.   Skin:  Negative for dry skin, itching and suspicious lesions.   Musculoskeletal:  Positive for back pain and myalgias. Negative for arthritis, falls, joint pain and muscle weakness.   Gastrointestinal:  Negative for abdominal pain, constipation, diarrhea, dysphagia and hematemesis.   Genitourinary:  Negative for bladder incontinence, dysuria and frequency.   Neurological:  Positive for weakness. Negative for aphonia, disturbances in coordination and dizziness.   Psychiatric/Behavioral:  Negative for altered mental status, depression, memory loss and substance abuse. The patient does not have insomnia and is not  nervous/anxious.          Medications:     Allergies: No Known Allergies    Current Meds:   Current Facility-Administered Medications:     acetaminophen (TYLENOL) tablet 650 mg, 650 mg, Oral, Q4H PRN **OR** acetaminophen (TYLENOL) suppository 650 mg, 650 mg, Rectal, Q6H PRN, Elver Lara MD    aspirin EC tablet 81 mg, 81 mg, Oral, Daily, Jordyn Tidwell, DAVID    cefTRIAXone (ROCEPHIN) 2,000 mg in sodium chloride 0.9 % 100 mL MBP, 2,000 mg, Intravenous, Q24H, Anthony Harvey MD    dextrose (D50W) (25 g/50 mL) IV injection 25 g, 25 g, Intravenous, Q15 Min PRN, Elver Lara MD    dextrose (GLUTOSE) oral gel 15 g, 15 g, Oral, Q15 Min PRN, Elver Lara MD    glucagon (GLUCAGEN) injection 1 mg, 1 mg, Intramuscular, Q15 Min PRN, Elver Lara MD    insulin glargine (LANTUS, SEMGLEE) injection 22 Units, 22 Units, Subcutaneous, Q12H, Jordyn Tidwell, DAVID    Insulin Lispro (humaLOG) injection 3-14 Units, 3-14 Units, Subcutaneous, 4x Daily AC & at Bedtime, Elver Lara MD    Insulin Lispro (humaLOG) injection 5 Units, 5 Units, Subcutaneous, TID With Meals, Jordyn Tidwell, APRN    Naloxegol Oxalate (MOVANTIK) tablet 25 mg, 25 mg, Oral, QAM, Elver Lara MD    oxyCODONE-acetaminophen (PERCOCET) 7.5-325 MG per tablet 1 tablet, 1 tablet, Oral, Q6H PRN, Elver Lara MD    polyethylene glycol (MIRALAX) packet 17 g, 17 g, Oral, Daily, Elver Lara MD    pravastatin (PRAVACHOL) tablet 10 mg, 10 mg, Oral, Daily, Elver Lara MD    sennosides-docusate (PERICOLACE) 8.6-50 MG per tablet 1 tablet, 1 tablet, Oral, Daily, Elver Lara MD    sodium chloride 0.9 % flush 10 mL, 10 mL, Intravenous, Q12H, Elver Lara MD    sodium chloride 0.9 % flush 10 mL, 10 mL, Intravenous, PRN, Elver Lara MD    sodium chloride 0.9 % flush 10 mL, 10 mL, Intravenous, Q12H, Elver Lara MD    sodium chloride 0.9 %  "flush 10 mL, 10 mL, Intravenous, PRN, Elver Lara MD    sodium chloride 0.9 % flush 20 mL, 20 mL, Intravenous, Marco CUEVAS Richard Scott, MD    sodium chloride 0.9 % infusion 40 mL, 40 mL, Intravenous, Marco CUEVAS Richard Scott, MD    sodium chloride 0.9 % infusion 40 mL, 40 mL, Intravenous, Marco CUEVAS Richard Scott, MD    tiZANidine (ZANAFLEX) tablet 4 mg, 4 mg, Oral, Q6H PRN, Elver Lara MD    Data:     Code Status:    There are no questions and answers to display.       No family history on file.    Social History     Socioeconomic History    Marital status:    Tobacco Use    Smoking status: Never    Smokeless tobacco: Never   Vaping Use    Vaping status: Never Used   Substance and Sexual Activity    Alcohol use: Never    Drug use: Never    Sexual activity: Defer       Vitals:  Ht 180.3 cm (71\")   Wt 101 kg (222 lb 11.2 oz)   BMI 31.06 kg/m²   T 98.1 P 78 R 20 /67 Sp02 94% (room air)          I/O (24Hr):  No intake or output data in the 24 hours ending 06/06/25 0816    Labs and imaging:      Recent Results (from the past 12 hours)   POC Glucose Once    Collection Time: 06/05/25  8:50 PM    Specimen: Blood   Result Value Ref Range    Glucose 199 (H) 70 - 130 mg/dL   POC Glucose Once    Collection Time: 06/06/25  6:58 AM    Specimen: Blood   Result Value Ref Range    Glucose 156 (H) 70 - 130 mg/dL                               Physical Examination:        Physical Exam  Vitals and nursing note reviewed.   Constitutional:       Appearance: He is well-developed.   HENT:      Head: Normocephalic and atraumatic.      Right Ear: External ear normal.      Left Ear: External ear normal.      Nose: Nose normal.      Mouth/Throat:      Mouth: Mucous membranes are dry.      Pharynx: Oropharynx is clear.   Eyes:      Pupils: Pupils are equal, round, and reactive to light.   Cardiovascular:      Rate and Rhythm: Normal rate and regular rhythm.      Heart sounds: Normal heart sounds. "   Pulmonary:      Effort: Pulmonary effort is normal.      Breath sounds: Normal breath sounds.   Abdominal:      General: Bowel sounds are normal.      Palpations: Abdomen is soft.   Musculoskeletal:         General: Normal range of motion.      Cervical back: Normal range of motion and neck supple.      Comments: Generalized weakness   Skin:     General: Skin is warm and dry.      Comments: Incision c/d/I   Neurological:      Mental Status: He is alert and oriented to person, place, and time.      Deep Tendon Reflexes: Reflexes are normal and symmetric.   Psychiatric:         Behavior: Behavior normal.           Assessment:               * No active hospital problems. *    Past Medical History:   Diagnosis Date    Congenital heart defect     Diabetes mellitus     Hyperlipidemia     Stroke         Plan:        Strep bacteremia  Multiple lumbar epidural abscesses  Subacute CVA  PFO  Multifactorial anemia  DM2 with hyperglycemia not on long term insulin  PAF  Thrombocytosis  Severe protein calorie malnutrition    Continue current treatment. Monitor counts. Increase activity. Labs Monday. Aggressive therapies as tolerated. Continue IV antibiotics under direction of ID. Maintain patient safety. Continue pain control efforts. Glycemic control - adjust insulin regimen as needed. Discharge planning.     Electronically signed by DAVID Hill on 6/6/2025 at 08:16 CDT

## 2025-06-06 NOTE — PROGRESS NOTES
Infectious Diseases Progress Note    Patient:  Fredis Wilson  YOB: 1960  MRN: [id number]   Admit date: 5/19/2025   Admitting Physician: Elver Lara MD  Primary Care Physician: Angel White MD    Chief Complaint: Streptococcus intermedius lumbar paraspinal infection/epidural abscess.    Interval History: He indicates his back feels a little bit better today.  Spoke with nursing.  She indicates she was able to help him walk with a walker a good portion of the hallway.  Patient indicates he still gets a catch in his back.  He indicates he has to power through this discomfort.  I get the impression he will get periodic catch when he is changing positions such as going from lying down to trying to sit or stand or when he is sitting back down.  He is not experiencing lower extremity numbness.  He is not having any urinary hesitancy.  Spoke with discharge planning.  They are having a very difficult time working with his insurance to get a suitable facility where he can get weekly lab draws and weekly PICC line dressing changes.  My understanding is that the home infusion company arrangements are in place and that they can ship and start outpatient home IV antibiotics as soon as some of the other services are available for his ongoing outpatient treatment.    No intake or output data in the 24 hours ending 06/06/25 1206  Allergies: No Known Allergies  Current Scheduled Medications:   aspirin, 81 mg, Oral, Daily  cefTRIAXone, 2,000 mg, Intravenous, Q24H  insulin glargine, 22 Units, Subcutaneous, Q12H  insulin lispro, 3-14 Units, Subcutaneous, 4x Daily AC & at Bedtime  Insulin Lispro, 5 Units, Subcutaneous, TID With Meals  Naloxegol Oxalate, 25 mg, Oral, QAM  polyethylene glycol, 17 g, Oral, Daily  pravastatin, 10 mg, Oral, Daily  senna-docusate sodium, 1 tablet, Oral, Daily  sodium chloride, 10 mL, Intravenous, Q12H  sodium chloride, 10 mL, Intravenous, Q12H      Current PRN Medications:   "acetaminophen **OR** acetaminophen    dextrose    dextrose    glucagon (human recombinant)    oxyCODONE-acetaminophen    sodium chloride    sodium chloride    sodium chloride    sodium chloride    sodium chloride    tiZANidine    Review of Systems no cardiopulmonary symptoms.  No nausea, diarrhea, rash, or skin itching.    Vital Signs:  No data recorded.  Ht 180.3 cm (71\")   Wt 101 kg (222 lb 11.2 oz)   BMI 31.06 kg/m²     Physical Exam  Vital signs - reviewed.  Line/IV site - No erythema, warmth, induration, or tenderness.    Lab Results:  CBC:   Results from last 7 days   Lab Units 06/05/25  0527 06/02/25  0556   WBC 10*3/mm3 7.96 9.75   HEMOGLOBIN g/dL 10.1* 10.3*   HEMATOCRIT % 32.5* 33.9*   PLATELETS 10*3/mm3 294 361     BMP:  Results from last 7 days   Lab Units 06/05/25 0527 06/02/25  0557   SODIUM mmol/L 133* 135*   POTASSIUM mmol/L 4.0 3.8   CHLORIDE mmol/L 99 98   CO2 mmol/L 27.0 26.0   BUN mg/dL 15.1 14.7   CREATININE mg/dL 0.51* 0.42*   GLUCOSE mg/dL 193* 136*   CALCIUM mg/dL 8.9 8.7   ALT (SGPT) U/L 13 15       6/6/2025 12:06 CDT: I have personally reviewed the following sections of the medical record and pertinent findings and updates are outlined below. CBL:    [] Microbiology    [] Cardiology/Vascular    [] Pathology    [] Radiology    [] Other:     Additional Studies Reviewed: None    Impression:   1.  Streptococcus intermedius lumbar paraspinal infection/epidural abscess-under treatment  2.  History Streptococcus intermedius bloodstream infection-treated  3.  Weakness/deconditioning-back pain a little bit better today.  He is moving better per report.  4.  PFO-needs outpatient follow-up with cardiology after discharge    Recommendations:   Continue ceftriaxone  Encouraging activity/ambulation/up to chair  Home IV antibiotic arrangements in process  Discussed with -indicated he could come to the [facility] outpatient department to receive dressing changes and lab draws weekly.  " Explained he could also come to the infectious diseases office for PICC line dressing change weekly, but we do not have a phlebotomist and he would have to go to other lab to have his weekly labs drawn.  Will see again Monday-please call in interim if new problems or additional questions for infectious diseases    Anthony Hale MD

## 2025-06-07 LAB
GLUCOSE BLDC GLUCOMTR-MCNC: 164 MG/DL (ref 70–130)
GLUCOSE BLDC GLUCOMTR-MCNC: 201 MG/DL (ref 70–130)
GLUCOSE BLDC GLUCOMTR-MCNC: 269 MG/DL (ref 70–130)

## 2025-06-07 PROCEDURE — 63710000001 INSULIN LISPRO (HUMAN) PER 5 UNITS: Performed by: NURSE PRACTITIONER

## 2025-06-07 PROCEDURE — 82948 REAGENT STRIP/BLOOD GLUCOSE: CPT

## 2025-06-07 PROCEDURE — 25010000002 CEFTRIAXONE PER 250 MG: Performed by: INTERNAL MEDICINE

## 2025-06-07 PROCEDURE — 63710000001 INSULIN GLARGINE PER 5 UNITS: Performed by: NURSE PRACTITIONER

## 2025-06-07 NOTE — PROGRESS NOTES
JUVE Jimenez APRN        Internal Medicine Progress Note    6/7/2025   08:54 CDT    Name:  Fredis Wilson  MRN:    [id number]     Acct:     [id number]   Room:  [address] [place] [address] Day: 0     Admit Date: 5/19/2025  5:15 PM  PCP: Angel White MD    Subjective:     C/C: back pain, weakness    Interval History: Status:  stayed the same. Resting in bed.  No family at bedside. Afebrile. Sleeping at present. Discharge barriers,  is trying to find approval for home infusions.  No new concerns.      Review of Systems   Constitutional: Positive for malaise/fatigue. Negative for chills, decreased appetite, weight gain and weight loss.   HENT:  Negative for congestion, ear discharge, hoarse voice and tinnitus.    Eyes:  Negative for blurred vision, discharge, visual disturbance and visual halos.   Cardiovascular:  Negative for chest pain, claudication, dyspnea on exertion, irregular heartbeat, leg swelling, orthopnea and paroxysmal nocturnal dyspnea.   Respiratory:  Negative for cough, shortness of breath, sputum production and wheezing.    Endocrine: Negative for cold intolerance, heat intolerance and polyuria.   Hematologic/Lymphatic: Negative for adenopathy. Does not bruise/bleed easily.   Skin:  Negative for dry skin, itching and suspicious lesions.   Musculoskeletal:  Positive for back pain and myalgias. Negative for arthritis, falls, joint pain and muscle weakness.   Gastrointestinal:  Negative for abdominal pain, constipation, diarrhea, dysphagia and hematemesis.   Genitourinary:  Negative for bladder incontinence, dysuria and frequency.   Neurological:  Positive for weakness. Negative for aphonia, disturbances in coordination and dizziness.   Psychiatric/Behavioral:  Negative for altered mental status, depression, memory loss and substance abuse. The patient does not have insomnia and is not nervous/anxious.          Medications:     Allergies: No Known  Allergies    Current Meds:   Current Facility-Administered Medications:     acetaminophen (TYLENOL) tablet 650 mg, 650 mg, Oral, Q4H PRN **OR** acetaminophen (TYLENOL) suppository 650 mg, 650 mg, Rectal, Q6H PRN, Elver Lara MD    aspirin EC tablet 81 mg, 81 mg, Oral, Daily, Jordyn Tidwell APRN    cefTRIAXone (ROCEPHIN) 2,000 mg in sodium chloride 0.9 % 100 mL MBP, 2,000 mg, Intravenous, Q24H, Anthony Harvey MD    dextrose (D50W) (25 g/50 mL) IV injection 25 g, 25 g, Intravenous, Q15 Min PRN, Elevr Lara MD    dextrose (GLUTOSE) oral gel 15 g, 15 g, Oral, Q15 Min PRN, Elver Lara MD    glucagon (GLUCAGEN) injection 1 mg, 1 mg, Intramuscular, Q15 Min PRN, Elver Lara MD    insulin glargine (LANTUS, SEMGLEE) injection 25 Units, 25 Units, Subcutaneous, Q12H, Jordyn Tidwell APRN    Insulin Lispro (humaLOG) injection 3-14 Units, 3-14 Units, Subcutaneous, TID With Meals, Jordyn Tidwell APRN    Insulin Lispro (humaLOG) injection 5 Units, 5 Units, Subcutaneous, TID With Meals, Jordyn Tidwell APRN    oxyCODONE-acetaminophen (PERCOCET) 7.5-325 MG per tablet 1 tablet, 1 tablet, Oral, Q6H PRN, Elver Lara MD    polyethylene glycol (MIRALAX) packet 17 g, 17 g, Oral, Daily, Elver Lara MD    pravastatin (PRAVACHOL) tablet 10 mg, 10 mg, Oral, Daily, Elver Lara MD    sennosides-docusate (PERICOLACE) 8.6-50 MG per tablet 1 tablet, 1 tablet, Oral, BID, Jordyn Tidwell APRN    sodium chloride 0.9 % flush 10 mL, 10 mL, Intravenous, Q12H, Elver Lara MD    sodium chloride 0.9 % flush 10 mL, 10 mL, Intravenous, PRN, Elver Lara MD    sodium chloride 0.9 % flush 10 mL, 10 mL, Intravenous, Q12H, Elver Lara MD    sodium chloride 0.9 % flush 10 mL, 10 mL, Intravenous, PRN, Elver Lara MD    sodium chloride 0.9 % flush 20 mL, 20 mL, Intravenous, PRN, Elver Lara MD    sodium  "chloride 0.9 % infusion 40 mL, 40 mL, Intravenous, PRN, Elver Lara MD    sodium chloride 0.9 % infusion 40 mL, 40 mL, Intravenous, PRN, Elver Lara MD    tiZANidine (ZANAFLEX) tablet 4 mg, 4 mg, Oral, Q6H PRN, Elver Lara MD    Data:     Code Status:    There are no questions and answers to display.       No family history on file.    Social History     Socioeconomic History    Marital status:    Tobacco Use    Smoking status: Never    Smokeless tobacco: Never   Vaping Use    Vaping status: Never Used   Substance and Sexual Activity    Alcohol use: Never    Drug use: Never    Sexual activity: Defer       Vitals:  Ht 180.3 cm (71\")   Wt 101 kg (222 lb 11.2 oz)   BMI 31.06 kg/m²   T 98.3 P 70 R 16 BP 98/53 Sp02 96% (room air)          I/O (24Hr):  No intake or output data in the 24 hours ending 06/07/25 0854    Labs and imaging:      Recent Results (from the past 12 hours)   POC Glucose Once    Collection Time: 06/07/25  6:30 AM    Specimen: Blood   Result Value Ref Range    Glucose 164 (H) 70 - 130 mg/dL                               Physical Examination:        Physical Exam  Vitals and nursing note reviewed.   Constitutional:       Appearance: He is well-developed.   HENT:      Head: Normocephalic and atraumatic.      Right Ear: External ear normal.      Left Ear: External ear normal.      Nose: Nose normal.      Mouth/Throat:      Mouth: Mucous membranes are dry.      Pharynx: Oropharynx is clear.   Eyes:      Pupils: Pupils are equal, round, and reactive to light.   Cardiovascular:      Rate and Rhythm: Normal rate and regular rhythm.      Heart sounds: Normal heart sounds.   Pulmonary:      Effort: Pulmonary effort is normal.      Breath sounds: Normal breath sounds.   Abdominal:      General: Bowel sounds are normal.      Palpations: Abdomen is soft.   Musculoskeletal:         General: Normal range of motion.      Cervical back: Normal range of motion and neck supple. "      Comments: Generalized weakness   Skin:     General: Skin is warm and dry.      Comments: Incision c/d/I   Neurological:      Mental Status: He is alert and oriented to person, place, and time.      Deep Tendon Reflexes: Reflexes are normal and symmetric.   Psychiatric:         Behavior: Behavior normal.           Assessment:               * No active hospital problems. *    Past Medical History:   Diagnosis Date    Congenital heart defect     Diabetes mellitus     Hyperlipidemia     Stroke         Plan:        Strep bacteremia  Multiple lumbar epidural abscesses  Subacute CVA  PFO  Multifactorial anemia  DM2 with hyperglycemia not on long term insulin  PAF  Thrombocytosis  Severe protein calorie malnutrition    Continue current treatment. Monitor counts. Increase activity. Labs Monday. Aggressive therapies as tolerated. Continue IV antibiotics under direction of ID. Maintain patient safety. Continue pain control efforts. Glycemic control - adjust insulin regimen as needed. Discharge planning.     Electronically signed by DAVID Mojica on 6/7/2025 at 08:54 CDT

## 2025-06-08 LAB
GLUCOSE BLDC GLUCOMTR-MCNC: 201 MG/DL (ref 70–130)
GLUCOSE BLDC GLUCOMTR-MCNC: 208 MG/DL (ref 70–130)
GLUCOSE BLDC GLUCOMTR-MCNC: 232 MG/DL (ref 70–130)
GLUCOSE BLDC GLUCOMTR-MCNC: 266 MG/DL (ref 70–130)

## 2025-06-08 PROCEDURE — 86140 C-REACTIVE PROTEIN: CPT | Performed by: INTERNAL MEDICINE

## 2025-06-08 PROCEDURE — 85025 COMPLETE CBC W/AUTO DIFF WBC: CPT | Performed by: INTERNAL MEDICINE

## 2025-06-08 PROCEDURE — 82948 REAGENT STRIP/BLOOD GLUCOSE: CPT

## 2025-06-08 PROCEDURE — 63710000001 INSULIN GLARGINE PER 5 UNITS: Performed by: NURSE PRACTITIONER

## 2025-06-08 PROCEDURE — 80053 COMPREHEN METABOLIC PANEL: CPT | Performed by: INTERNAL MEDICINE

## 2025-06-08 PROCEDURE — 85652 RBC SED RATE AUTOMATED: CPT | Performed by: INTERNAL MEDICINE

## 2025-06-08 PROCEDURE — 63710000001 INSULIN LISPRO (HUMAN) PER 5 UNITS: Performed by: NURSE PRACTITIONER

## 2025-06-08 PROCEDURE — 25010000002 CEFTRIAXONE PER 250 MG: Performed by: INTERNAL MEDICINE

## 2025-06-08 NOTE — PROGRESS NOTES
JUVE Jimenez APRN        Internal Medicine Progress Note    6/8/2025   07:59 CDT    Name:  Fredis Wilson  MRN:    [id number]     Acct:     [id number]   Room:  [address] [place] [address] Day: 0     Admit Date: 5/19/2025  5:15 PM  PCP: Angel White MD    Subjective:     C/C: back pain, weakness    Interval History: Status:  stayed the same. Resting in bed.  No family at bedside. Afebrile. Sleeping at present appears to be resting comfortably.      Review of Systems   Constitutional: Positive for malaise/fatigue. Negative for chills, decreased appetite, weight gain and weight loss.   HENT:  Negative for congestion, ear discharge, hoarse voice and tinnitus.    Eyes:  Negative for blurred vision, discharge, visual disturbance and visual halos.   Cardiovascular:  Negative for chest pain, claudication, dyspnea on exertion, irregular heartbeat, leg swelling, orthopnea and paroxysmal nocturnal dyspnea.   Respiratory:  Negative for cough, shortness of breath, sputum production and wheezing.    Endocrine: Negative for cold intolerance, heat intolerance and polyuria.   Hematologic/Lymphatic: Negative for adenopathy. Does not bruise/bleed easily.   Skin:  Negative for dry skin, itching and suspicious lesions.   Musculoskeletal:  Positive for back pain and myalgias. Negative for arthritis, falls, joint pain and muscle weakness.   Gastrointestinal:  Negative for abdominal pain, constipation, diarrhea, dysphagia and hematemesis.   Genitourinary:  Negative for bladder incontinence, dysuria and frequency.   Neurological:  Positive for weakness. Negative for aphonia, disturbances in coordination and dizziness.   Psychiatric/Behavioral:  Negative for altered mental status, depression, memory loss and substance abuse. The patient does not have insomnia and is not nervous/anxious.          Medications:     Allergies: No Known Allergies    Current Meds:   Current Facility-Administered Medications:      acetaminophen (TYLENOL) tablet 650 mg, 650 mg, Oral, Q4H PRN **OR** acetaminophen (TYLENOL) suppository 650 mg, 650 mg, Rectal, Q6H PRN, Elver Lara MD    aspirin EC tablet 81 mg, 81 mg, Oral, Daily, Jordyn Tidwell APRN    cefTRIAXone (ROCEPHIN) 2,000 mg in sodium chloride 0.9 % 100 mL MBP, 2,000 mg, Intravenous, Q24H, Anthony Harvey MD    dextrose (D50W) (25 g/50 mL) IV injection 25 g, 25 g, Intravenous, Q15 Min PRN, Elver Lara MD    dextrose (GLUTOSE) oral gel 15 g, 15 g, Oral, Q15 Min PRN, Elver Lara MD    glucagon (GLUCAGEN) injection 1 mg, 1 mg, Intramuscular, Q15 Min PRN, Elver Lara MD    insulin glargine (LANTUS, SEMGLEE) injection 25 Units, 25 Units, Subcutaneous, Q12H, Jordyn Tidwell APRN    Insulin Lispro (humaLOG) injection 3-14 Units, 3-14 Units, Subcutaneous, TID With Meals, Jordyn Tidwell APRN    Insulin Lispro (humaLOG) injection 5 Units, 5 Units, Subcutaneous, TID With Meals, Jordyn Tidwell APRN    oxyCODONE-acetaminophen (PERCOCET) 7.5-325 MG per tablet 1 tablet, 1 tablet, Oral, Q6H PRN, Elver Lara MD    polyethylene glycol (MIRALAX) packet 17 g, 17 g, Oral, Daily, Elver Lara MD    pravastatin (PRAVACHOL) tablet 10 mg, 10 mg, Oral, Daily, Elver Lara MD    sennosides-docusate (PERICOLACE) 8.6-50 MG per tablet 1 tablet, 1 tablet, Oral, BID, Jordyn Tidwell APRN    sodium chloride 0.9 % flush 10 mL, 10 mL, Intravenous, Q12H, Elver Lara MD    sodium chloride 0.9 % flush 10 mL, 10 mL, Intravenous, PRN, Elevr Lara MD    sodium chloride 0.9 % flush 10 mL, 10 mL, Intravenous, Q12H, Elver Lara MD    sodium chloride 0.9 % flush 10 mL, 10 mL, Intravenous, PRN, Elver Lara MD    sodium chloride 0.9 % flush 20 mL, 20 mL, Intravenous, PRN, Elver Lara MD    sodium chloride 0.9 % infusion 40 mL, 40 mL, Intravenous, PRN, Elver Lara  "MD Tj    sodium chloride 0.9 % infusion 40 mL, 40 mL, Intravenous, PRN, Elver Lara MD    tiZANidine (ZANAFLEX) tablet 4 mg, 4 mg, Oral, Q6H PRN, Elver Lara MD    Data:     Code Status:    There are no questions and answers to display.       No family history on file.    Social History     Socioeconomic History    Marital status:    Tobacco Use    Smoking status: Never    Smokeless tobacco: Never   Vaping Use    Vaping status: Never Used   Substance and Sexual Activity    Alcohol use: Never    Drug use: Never    Sexual activity: Defer       Vitals:  Ht 180.3 cm (71\")   Wt 101 kg (222 lb 11.2 oz)   BMI 31.06 kg/m²   T 97.6 P 70 R 14 /63 Sp02 92% (room air)          I/O (24Hr):  No intake or output data in the 24 hours ending 06/08/25 0759    Labs and imaging:      Recent Results (from the past 12 hours)   POC Glucose Once    Collection Time: 06/08/25  6:18 AM    Specimen: Blood   Result Value Ref Range    Glucose 201 (H) 70 - 130 mg/dL                               Physical Examination:        Physical Exam  Vitals and nursing note reviewed.   Constitutional:       Appearance: He is well-developed.   HENT:      Head: Normocephalic and atraumatic.      Right Ear: External ear normal.      Left Ear: External ear normal.      Nose: Nose normal.      Mouth/Throat:      Mouth: Mucous membranes are dry.      Pharynx: Oropharynx is clear.   Eyes:      Pupils: Pupils are equal, round, and reactive to light.   Cardiovascular:      Rate and Rhythm: Normal rate and regular rhythm.      Heart sounds: Normal heart sounds.   Pulmonary:      Effort: Pulmonary effort is normal.      Breath sounds: Normal breath sounds.   Abdominal:      General: Bowel sounds are normal.      Palpations: Abdomen is soft.   Musculoskeletal:         General: Normal range of motion.      Cervical back: Normal range of motion and neck supple.      Comments: Generalized weakness   Skin:     General: Skin is warm " and dry.      Comments: Incision c/d/I   Neurological:      Mental Status: He is alert and oriented to person, place, and time.      Deep Tendon Reflexes: Reflexes are normal and symmetric.   Psychiatric:         Behavior: Behavior normal.           Assessment:               * No active hospital problems. *    Past Medical History:   Diagnosis Date    Congenital heart defect     Diabetes mellitus     Hyperlipidemia     Stroke         Plan:        Strep bacteremia  Multiple lumbar epidural abscesses  Subacute CVA  PFO  Multifactorial anemia  DM2 with hyperglycemia not on long term insulin  PAF  Thrombocytosis  Severe protein calorie malnutrition    Continue current treatment. Monitor counts. Increase activity. Labs Monday. Aggressive therapies as tolerated. Continue IV antibiotics under direction of ID. Maintain patient safety. Continue pain control efforts. Glycemic control - adjust insulin regimen as needed. Discharge planning.     Electronically signed by DAVID Mojica on 6/8/2025 at 07:59 CDT     I have discussed the care of Fredis Wilson, including pertinent history and exam findings, with the nurse practitioner.    I have seen and examined the patient and the key elements of all parts of the encounter have been performed by me.  I agree with the assessment, plan and orders as documented by DAVID Fuentes, after I modified the exam findings and the plan of treatments and the final version is my approved version of the assessment.        Electronically signed by Elver Lara MD on 6/9/2025 at 06:53 CDT

## 2025-06-09 VITALS — BODY MASS INDEX: 31.74 KG/M2 | WEIGHT: 226.7 LBS | HEIGHT: 71 IN

## 2025-06-09 LAB
ALBUMIN SERPL-MCNC: 3.2 G/DL (ref 3.5–5.2)
ALBUMIN/GLOB SERPL: 1 G/DL
ALP SERPL-CCNC: 94 U/L (ref 39–117)
ALT SERPL W P-5'-P-CCNC: 16 U/L (ref 1–41)
ANION GAP SERPL CALCULATED.3IONS-SCNC: 9 MMOL/L (ref 5–15)
AST SERPL-CCNC: 14 U/L (ref 1–40)
BASOPHILS # BLD AUTO: 0.09 10*3/MM3 (ref 0–0.2)
BASOPHILS NFR BLD AUTO: 0.9 % (ref 0–1.5)
BILIRUB SERPL-MCNC: 0.3 MG/DL (ref 0–1.2)
BUN SERPL-MCNC: 18.9 MG/DL (ref 8–23)
BUN/CREAT SERPL: 33.8 (ref 7–25)
CALCIUM SPEC-SCNC: 8.9 MG/DL (ref 8.6–10.5)
CHLORIDE SERPL-SCNC: 98 MMOL/L (ref 98–107)
CO2 SERPL-SCNC: 29 MMOL/L (ref 22–29)
CREAT SERPL-MCNC: 0.56 MG/DL (ref 0.76–1.27)
CRP SERPL-MCNC: 3.54 MG/DL (ref 0–0.5)
DEPRECATED RDW RBC AUTO: 45.9 FL (ref 37–54)
EGFRCR SERPLBLD CKD-EPI 2021: 110.8 ML/MIN/1.73
EOSINOPHIL # BLD AUTO: 0.9 10*3/MM3 (ref 0–0.4)
EOSINOPHIL NFR BLD AUTO: 9.3 % (ref 0.3–6.2)
ERYTHROCYTE [DISTWIDTH] IN BLOOD BY AUTOMATED COUNT: 13.9 % (ref 12.3–15.4)
ERYTHROCYTE [SEDIMENTATION RATE] IN BLOOD: 67 MM/HR (ref 0–20)
GLOBULIN UR ELPH-MCNC: 3.3 GM/DL
GLUCOSE BLDC GLUCOMTR-MCNC: 215 MG/DL (ref 70–130)
GLUCOSE BLDC GLUCOMTR-MCNC: 258 MG/DL (ref 70–130)
GLUCOSE SERPL-MCNC: 237 MG/DL (ref 65–99)
HCT VFR BLD AUTO: 34.2 % (ref 37.5–51)
HGB BLD-MCNC: 10.7 G/DL (ref 13–17.7)
IMM GRANULOCYTES # BLD AUTO: 0.07 10*3/MM3 (ref 0–0.05)
IMM GRANULOCYTES NFR BLD AUTO: 0.7 % (ref 0–0.5)
LYMPHOCYTES # BLD AUTO: 1.92 10*3/MM3 (ref 0.7–3.1)
LYMPHOCYTES NFR BLD AUTO: 19.9 % (ref 19.6–45.3)
MCH RBC QN AUTO: 28.2 PG (ref 26.6–33)
MCHC RBC AUTO-ENTMCNC: 31.3 G/DL (ref 31.5–35.7)
MCV RBC AUTO: 90 FL (ref 79–97)
MONOCYTES # BLD AUTO: 0.83 10*3/MM3 (ref 0.1–0.9)
MONOCYTES NFR BLD AUTO: 8.6 % (ref 5–12)
NEUTROPHILS NFR BLD AUTO: 5.82 10*3/MM3 (ref 1.7–7)
NEUTROPHILS NFR BLD AUTO: 60.6 % (ref 42.7–76)
NRBC BLD AUTO-RTO: 0 /100 WBC (ref 0–0.2)
PLATELET # BLD AUTO: 365 10*3/MM3 (ref 140–450)
PMV BLD AUTO: 9.2 FL (ref 6–12)
POTASSIUM SERPL-SCNC: 4 MMOL/L (ref 3.5–5.2)
PROT SERPL-MCNC: 6.5 G/DL (ref 6–8.5)
RBC # BLD AUTO: 3.8 10*6/MM3 (ref 4.14–5.8)
SODIUM SERPL-SCNC: 136 MMOL/L (ref 136–145)
WBC NRBC COR # BLD AUTO: 9.63 10*3/MM3 (ref 3.4–10.8)

## 2025-06-09 PROCEDURE — 25010000002 CEFTRIAXONE PER 250 MG: Performed by: INTERNAL MEDICINE

## 2025-06-09 PROCEDURE — 82948 REAGENT STRIP/BLOOD GLUCOSE: CPT

## 2025-06-09 PROCEDURE — 63710000001 INSULIN LISPRO (HUMAN) PER 5 UNITS: Performed by: NURSE PRACTITIONER

## 2025-06-09 PROCEDURE — 63710000001 INSULIN GLARGINE PER 5 UNITS: Performed by: NURSE PRACTITIONER

## 2025-06-09 RX ORDER — OXYCODONE AND ACETAMINOPHEN 7.5; 325 MG/1; MG/1
1 TABLET ORAL EVERY 6 HOURS PRN
Refills: 0 | Status: DISCONTINUED | OUTPATIENT
Start: 2025-06-09 | End: 2025-06-10 | Stop reason: HOSPADM

## 2025-06-09 NOTE — PROGRESS NOTES
JUVE Jimenez APRN        Internal Medicine Progress Note    6/9/2025   08:33 CDT    Name:  Fredis Wilson  MRN:    [id number]     Acct:     [id number]   Room:  [address] [place] [address] Day: 0     Admit Date: 5/19/2025  5:15 PM  PCP: Angel White MD    Subjective:     C/C: back pain, weakness    Interval History: Status:  stayed the same. Resting in bed.  No family at bedside. Afebrile. Maintaining adequate 02 sats on room air. Blood sugars elevated, but stable. He states that he does not want to take insulin at home and will go back to his prior regimen of jardiance, glipizide and metformin for glycemic control. Anxious for discharge.     Review of Systems   Constitutional: Positive for malaise/fatigue. Negative for chills, decreased appetite, weight gain and weight loss.   HENT:  Negative for congestion, ear discharge, hoarse voice and tinnitus.    Eyes:  Negative for blurred vision, discharge, visual disturbance and visual halos.   Cardiovascular:  Negative for chest pain, claudication, dyspnea on exertion, irregular heartbeat, leg swelling, orthopnea and paroxysmal nocturnal dyspnea.   Respiratory:  Negative for cough, shortness of breath, sputum production and wheezing.    Endocrine: Negative for cold intolerance, heat intolerance and polyuria.   Hematologic/Lymphatic: Negative for adenopathy. Does not bruise/bleed easily.   Skin:  Negative for dry skin, itching and suspicious lesions.   Musculoskeletal:  Positive for back pain and myalgias. Negative for arthritis, falls, joint pain and muscle weakness.   Gastrointestinal:  Negative for abdominal pain, constipation, diarrhea, dysphagia and hematemesis.   Genitourinary:  Negative for bladder incontinence, dysuria and frequency.   Neurological:  Positive for weakness. Negative for aphonia, disturbances in coordination and dizziness.   Psychiatric/Behavioral:  Negative for altered mental status, depression, memory loss and  substance abuse. The patient does not have insomnia and is not nervous/anxious.          Medications:     Allergies: No Known Allergies    Current Meds:   Current Facility-Administered Medications:     acetaminophen (TYLENOL) tablet 650 mg, 650 mg, Oral, Q4H PRN **OR** acetaminophen (TYLENOL) suppository 650 mg, 650 mg, Rectal, Q6H PRN, Elver Lara MD    aspirin EC tablet 81 mg, 81 mg, Oral, Daily, Jordyn Tidwell APRN    cefTRIAXone (ROCEPHIN) 2,000 mg in sodium chloride 0.9 % 100 mL MBP, 2,000 mg, Intravenous, Q24H, Anthony Harvey MD    dextrose (D50W) (25 g/50 mL) IV injection 25 g, 25 g, Intravenous, Q15 Min PRN, Elver Lara MD    dextrose (GLUTOSE) oral gel 15 g, 15 g, Oral, Q15 Min PRN, Elver Lara MD    glucagon (GLUCAGEN) injection 1 mg, 1 mg, Intramuscular, Q15 Min PRN, Elver Lara MD    insulin glargine (LANTUS, SEMGLEE) injection 25 Units, 25 Units, Subcutaneous, Q12H, Jordyn Tidwell APRN    Insulin Lispro (humaLOG) injection 3-14 Units, 3-14 Units, Subcutaneous, TID With Meals, Jordyn Tidwell APRN    Insulin Lispro (humaLOG) injection 5 Units, 5 Units, Subcutaneous, TID With Meals, Jordyn Tidwell APRN    oxyCODONE-acetaminophen (PERCOCET) 7.5-325 MG per tablet 1 tablet, 1 tablet, Oral, Q6H PRN, lEver Lara MD    polyethylene glycol (MIRALAX) packet 17 g, 17 g, Oral, Daily, Elver Lara MD    pravastatin (PRAVACHOL) tablet 10 mg, 10 mg, Oral, Daily, Elver Lara MD    sennosides-docusate (PERICOLACE) 8.6-50 MG per tablet 1 tablet, 1 tablet, Oral, BID, Jordyn Tidwell APRN    sodium chloride 0.9 % flush 10 mL, 10 mL, Intravenous, Q12H, Elver Lara MD    sodium chloride 0.9 % flush 10 mL, 10 mL, Intravenous, PRN, Elver Lara MD    sodium chloride 0.9 % flush 10 mL, 10 mL, Intravenous, Q12H, Elver Lara MD    sodium chloride 0.9 % flush 10 mL, 10 mL, Intravenous, PRN,  "Elver Lara MD    sodium chloride 0.9 % flush 20 mL, 20 mL, Intravenous, PRN, Elver Lara MD    sodium chloride 0.9 % infusion 40 mL, 40 mL, Intravenous, PRN, Elver Lara MD    sodium chloride 0.9 % infusion 40 mL, 40 mL, Intravenous, PRN, Elver Lara MD    tiZANidine (ZANAFLEX) tablet 4 mg, 4 mg, Oral, Q6H PRN, Elver Lara MD    Data:     Code Status:    There are no questions and answers to display.       No family history on file.    Social History     Socioeconomic History    Marital status:    Tobacco Use    Smoking status: Never    Smokeless tobacco: Never   Vaping Use    Vaping status: Never Used   Substance and Sexual Activity    Alcohol use: Never    Drug use: Never    Sexual activity: Defer       Vitals:  Ht 180.3 cm (71\")   Wt 103 kg (226 lb 11.2 oz)   BMI 31.62 kg/m²   T 97.5 P 73 R 18 /59 Sp02 92% (room air)          I/O (24Hr):  No intake or output data in the 24 hours ending 06/09/25 0833    Labs and imaging:      Recent Results (from the past 12 hours)   POC Glucose Once    Collection Time: 06/08/25  9:15 PM    Specimen: Blood   Result Value Ref Range    Glucose 232 (H) 70 - 130 mg/dL   Comprehensive Metabolic Panel    Collection Time: 06/08/25 11:54 PM    Specimen: Blood   Result Value Ref Range    Glucose 237 (H) 65 - 99 mg/dL    BUN 18.9 8.0 - 23.0 mg/dL    Creatinine 0.56 (L) 0.76 - 1.27 mg/dL    Sodium 136 136 - 145 mmol/L    Potassium 4.0 3.5 - 5.2 mmol/L    Chloride 98 98 - 107 mmol/L    CO2 29.0 22.0 - 29.0 mmol/L    Calcium 8.9 8.6 - 10.5 mg/dL    Total Protein 6.5 6.0 - 8.5 g/dL    Albumin 3.2 (L) 3.5 - 5.2 g/dL    ALT (SGPT) 16 1 - 41 U/L    AST (SGOT) 14 1 - 40 U/L    Alkaline Phosphatase 94 39 - 117 U/L    Total Bilirubin 0.3 0.0 - 1.2 mg/dL    Globulin 3.3 gm/dL    A/G Ratio 1.0 g/dL    BUN/Creatinine Ratio 33.8 (H) 7.0 - 25.0    Anion Gap 9.0 5.0 - 15.0 mmol/L    eGFR 110.8 >60.0 mL/min/1.73   C-reactive Protein    " Collection Time: 06/08/25 11:54 PM    Specimen: Blood   Result Value Ref Range    C-Reactive Protein 3.54 (H) 0.00 - 0.50 mg/dL   CBC Auto Differential    Collection Time: 06/08/25 11:54 PM    Specimen: Blood   Result Value Ref Range    WBC 9.63 3.40 - 10.80 10*3/mm3    RBC 3.80 (L) 4.14 - 5.80 10*6/mm3    Hemoglobin 10.7 (L) 13.0 - 17.7 g/dL    Hematocrit 34.2 (L) 37.5 - 51.0 %    MCV 90.0 79.0 - 97.0 fL    MCH 28.2 26.6 - 33.0 pg    MCHC 31.3 (L) 31.5 - 35.7 g/dL    RDW 13.9 12.3 - 15.4 %    RDW-SD 45.9 37.0 - 54.0 fl    MPV 9.2 6.0 - 12.0 fL    Platelets 365 140 - 450 10*3/mm3    Neutrophil % 60.6 42.7 - 76.0 %    Lymphocyte % 19.9 19.6 - 45.3 %    Monocyte % 8.6 5.0 - 12.0 %    Eosinophil % 9.3 (H) 0.3 - 6.2 %    Basophil % 0.9 0.0 - 1.5 %    Immature Grans % 0.7 (H) 0.0 - 0.5 %    Neutrophils, Absolute 5.82 1.70 - 7.00 10*3/mm3    Lymphocytes, Absolute 1.92 0.70 - 3.10 10*3/mm3    Monocytes, Absolute 0.83 0.10 - 0.90 10*3/mm3    Eosinophils, Absolute 0.90 (H) 0.00 - 0.40 10*3/mm3    Basophils, Absolute 0.09 0.00 - 0.20 10*3/mm3    Immature Grans, Absolute 0.07 (H) 0.00 - 0.05 10*3/mm3    nRBC 0.0 0.0 - 0.2 /100 WBC   Sedimentation Rate    Collection Time: 06/08/25 11:54 PM    Specimen: Blood   Result Value Ref Range    Sed Rate 67 (H) 0 - 20 mm/hr   POC Glucose Once    Collection Time: 06/09/25  7:12 AM    Specimen: Blood   Result Value Ref Range    Glucose 215 (H) 70 - 130 mg/dL                               Physical Examination:        Physical Exam  Vitals and nursing note reviewed.   Constitutional:       Appearance: He is well-developed.   HENT:      Head: Normocephalic and atraumatic.      Right Ear: External ear normal.      Left Ear: External ear normal.      Nose: Nose normal.      Mouth/Throat:      Mouth: Mucous membranes are dry.      Pharynx: Oropharynx is clear.   Eyes:      Pupils: Pupils are equal, round, and reactive to light.   Cardiovascular:      Rate and Rhythm: Normal rate and regular  rhythm.      Heart sounds: Normal heart sounds.   Pulmonary:      Effort: Pulmonary effort is normal.      Breath sounds: Normal breath sounds.   Abdominal:      General: Bowel sounds are normal.      Palpations: Abdomen is soft.   Musculoskeletal:         General: Normal range of motion.      Cervical back: Normal range of motion and neck supple.      Comments: Generalized weakness   Skin:     General: Skin is warm and dry.      Comments: Incision c/d/I   Neurological:      Mental Status: He is alert and oriented to person, place, and time.      Deep Tendon Reflexes: Reflexes are normal and symmetric.   Psychiatric:         Behavior: Behavior normal.           Assessment:               * No active hospital problems. *    Past Medical History:   Diagnosis Date    Congenital heart defect     Diabetes mellitus     Hyperlipidemia     Stroke         Plan:        Strep bacteremia  Multiple lumbar epidural abscesses  Subacute CVA  PFO  Multifactorial anemia  DM2 with hyperglycemia not on long term insulin  PAF  Thrombocytosis  Severe protein calorie malnutrition    Continue current treatment. Monitor counts. Increase activity. Aggressive therapies as tolerated. Continue IV antibiotics under direction of ID. Maintain patient safety. Continue pain control efforts. Glycemic control - adjust insulin regimen as needed. Discharge planning.     Electronically signed by DAVID Hill on 6/9/2025 at 08:33 CDT

## 2025-06-09 NOTE — PROGRESS NOTES
"Infectious Diseases Progress Note    Patient:  Fredis Wilson  YOB: 1960  MRN: [id number]   Admit date: 5/19/2025   Admitting Physician: Elver Lara MD  Primary Care Physician: Angel White MD    Chief Complaint: Seeing in follow-up of Streptococcus intermedius lumbar paraspinal infection/epidural abscess.      Interval History: He is doing well.  He has only taken 1 pain medicine today.  He took that to help him tolerate the drive home more easily.  His home IV antibiotics are in place.  He has been trained.  He feels comfortable taking the antibiotics at home.  He does not have a walker at home.  Spoke with nursing.  They were going to send him home with a walker from the floor.  He will return at next week after he secures a walker at home.  He is pleased to be going home and feels ready.  He is not having any lower extremity weakness or numbness.  No problems with bowel or bladder function.    No intake or output data in the 24 hours ending 06/09/25 1150  Allergies: No Known Allergies  Current Scheduled Medications:   aspirin, 81 mg, Oral, Daily  cefTRIAXone, 2,000 mg, Intravenous, Q24H  insulin glargine, 25 Units, Subcutaneous, Q12H  insulin lispro, 3-14 Units, Subcutaneous, TID With Meals  Insulin Lispro, 5 Units, Subcutaneous, TID With Meals  polyethylene glycol, 17 g, Oral, Daily  pravastatin, 10 mg, Oral, Daily  senna-docusate sodium, 1 tablet, Oral, BID  sodium chloride, 10 mL, Intravenous, Q12H  sodium chloride, 10 mL, Intravenous, Q12H      Current PRN Medications:  acetaminophen **OR** acetaminophen    dextrose    dextrose    glucagon (human recombinant)    oxyCODONE-acetaminophen    sodium chloride    sodium chloride    sodium chloride    sodium chloride    sodium chloride    tiZANidine    Review of Systems see HPI (left arm PICC)    Vital Signs:  No data recorded.  Ht 180.3 cm (71\")   Wt 103 kg (226 lb 11.2 oz)   BMI 31.62 kg/m²     Physical Exam  Vital signs - " reviewed.  Line/IV (lower extremity strength and sensation intact  Left arm PICC) site - No erythema, warmth, induration, or tenderness.  He is comfortable and in no distress    Lab Results:  CBC:   Results from last 7 days   Lab Units 06/08/25 2354 06/05/25  0527   WBC 10*3/mm3 9.63 7.96   HEMOGLOBIN g/dL 10.7* 10.1*   HEMATOCRIT % 34.2* 32.5*   PLATELETS 10*3/mm3 365 294     BMP:  Results from last 7 days   Lab Units 06/08/25 2354 06/05/25 0527   SODIUM mmol/L 136 133*   POTASSIUM mmol/L 4.0 4.0   CHLORIDE mmol/L 98 99   CO2 mmol/L 29.0 27.0   BUN mg/dL 18.9 15.1   CREATININE mg/dL 0.56* 0.51*   GLUCOSE mg/dL 237* 193*   CALCIUM mg/dL 8.9 8.9   ALT (SGPT) U/L 16 13       6/9/2025 11:50 CDT: I have personally reviewed the following sections of the medical record and pertinent findings and updates are outlined below. CBL:    [] Microbiology  No change in previous culture results which were reviewed and outlined below.  Blood and surgical culture results from his hospitalization at [facility].  Blood and surgical cultures reviewed.    Surgical cultures May 13, 2025:  Culture #1 paraspinal swab-few white blood cells, no organisms, no growth on primary media but growth in broth only.  Staphylococcus warneri (susceptibility below)     Susceptibility  Staphylococcus warneri (1)  Antibiotic Interpretation VANI   Method Status     doxycycline Sensitive <=0.5 mcg/mL BACTERIAL SUSCEPTIBILITY PANEL BY VANI       erythromycin Resistant >=8 mcg/mL BACTERIAL SUSCEPTIBILITY PANEL BY VANI       inducible clindamycin resistance   Neg mcg/mL BACTERIAL SUSCEPTIBILITY PANEL BY VANI       levofloxacin Sensitive <=0.12 mcg/mL BACTERIAL SUSCEPTIBILITY PANEL BY VANI       oxacillin Sensitive <=0.25 mcg/mL BACTERIAL SUSCEPTIBILITY PANEL BY VANI       tetracycline Sensitive <=1 mcg/mL BACTERIAL SUSCEPTIBILITY PANEL BY VANI       vancomycin Sensitive <=0.5 mcg/mL BACTERIAL SUSCEPTIBILITY PANEL BY VANI             Culture #2 paraspinal  tissue-rare white blood cells, no organisms, culture no growth to date  Culture #3 epidural purulence (swab)-rare white blood cells, no organisms, culture growing Streptococcus intermedius  Anaerobic Culture No anaerobes isolated  5 days   Organism Streptococcus intermedius Abnormal    Culture Surgical Isolated from Broth   Resulting Agency [facility]   Susceptibility     Organism Antibiotic Method Susceptibility   Streptococcus intermedius ampicillin BACTERIAL SUSCEPTIBILITY PANEL BY VANI <=0.25 mcg/mL: Sensitive   Streptococcus intermedius benzylpenicillin BACTERIAL SUSCEPTIBILITY PANEL BY VANI <=0.06 mcg/mL: Sensitive   Streptococcus intermedius cefotaxime BACTERIAL SUSCEPTIBILITY PANEL BY VANI <=0.12 mcg/mL: Sensitive   Streptococcus intermedius cefTRIAXone BACTERIAL SUSCEPTIBILITY PANEL BY VANI <=0.12 mcg/mL: Sensitive   Streptococcus intermedius erythromycin BACTERIAL SUSCEPTIBILITY PANEL BY VANI <=0.12 mcg/mL: Sensitive   Streptococcus intermedius levofloxacin BACTERIAL SUSCEPTIBILITY PANEL BY VANI <=0.25 mcg/mL: Sensitive   Streptococcus intermedius vancomycin BACTERIAL SUSCEPTIBILITY PANEL BY VANI 0.25 mcg/mL: Sensitive      Culture #4 labeled L4-5 facet-result canceled by ancillary.  Staphylococcus epidermidis (susceptibility below)   Susceptibility  Staphylococcus epidermidis (1)     Antibiotic Interpretation VANI   Method Status     clindamycin Sensitive <=0.12 mcg/mL BACTERIAL SUSCEPTIBILITY PANEL BY VANI       doxycycline Sensitive <=0.5 mcg/mL BACTERIAL SUSCEPTIBILITY PANEL BY VANI       erythromycin Sensitive <=0.25 mcg/mL BACTERIAL SUSCEPTIBILITY PANEL BY VANI       inducible clindamycin resistance   Neg mcg/mL BACTERIAL SUSCEPTIBILITY PANEL BY VANI       levofloxacin Sensitive <=0.12 mcg/mL BACTERIAL SUSCEPTIBILITY PANEL BY VANI       linezolid Sensitive 1 mcg/mL BACTERIAL SUSCEPTIBILITY PANEL BY VANI       oxacillin Sensitive <=0.25 mcg/mL BACTERIAL SUSCEPTIBILITY PANEL BY VANI      "  tetracycline Sensitive <=1 mcg/mL BACTERIAL SUSCEPTIBILITY PANEL BY VANI       vancomycin Sensitive 2 mcg/mL BACTERIAL SUSCEPTIBILITY PANEL BY VANI          Blood cultures May 5, 2025-\"susceptibility now available, reviewed, and outlined below \"  Blood cultures May 6, 2025-Streptococcus intermedius  Blood cultures May 7, 2025-Streptococcus intermedius  Blood cultures May 10, 2025-no growth  Blood cultures May 11, 2025-no growth    [] Cardiology/Vascular    [] Pathology    [] Radiology    [] Other:       Additional Studies Reviewed: None    Impression:   1.  Streptococcus intermedius bloodstream infection  2.  Lumbar paraspinal infection/epidural abscess  3.  Weakness/deconditioning-improving  4.  PFO-needs outpatient follow-up with cardiology    Recommendations:   Okay with me for home  IV antibiotics as outlined below  He will call for earlier appointment if any problems  He otherwise will follow-up in 2 weeks  He indicates he has follow-up with Dr. White of neurosurgery as well      Home IV antibiotic recommendations:  Antibiotic recommendations:  1.  Diagnosis-Streptococcus intermedius bacteremia.  Lumbar discitis/paraspinal infection-most likely secondary to Streptococcus intermedius.  Underwent surgical exploration/irrigation/hardware placement on May 13, 2025.  2.  IV access-PICC line left arm  3.  Spine surgeon-Angel Washington MD  4.  Antibiotic recommendation:  Ceftriaxone 2 g IV daily  Last dose  ceftriaxone on June 24, 2025 (6 weeks following surgical drainage) or July 8, 2025 (8 weeks following surgical drainage)  5.  Laboratory monitoring:  CMP, CBC, CRP every Monday while on intravenous antibiotic treatment  6.  Follow-up appointment 2 to 3 weeks after hospital discharge  Anthony Hale MD  "

## 2025-06-10 LAB
FUNGUS SPEC CULT: NORMAL
KOH PREP SPEC: NORMAL

## 2025-06-10 NOTE — DISCHARGE SUMMARY
JUVE Jimenez APRN      Internal Medicine Discharge Summary    Patient ID: Fredis Wilson  MRN: [id number]     Acct:  [id number]       Patient's PCP: Angel White MD    Admit Date: 5/19/2025     Discharge Date: 6/9/2025      Admitting Physician: Elver Lara MD    Discharge Physician: DAVID Hill     Active Discharge Diagnoses:  Strep bacteremia  Multiple lumbar epidural abscesses  Subacute CVA  PFO  Multifactorial anemia  DM2 with hyperglycemia not on long term insulin  PAF  Thrombocytosis  Severe protein calorie malnutrition  CAA    Hospital Problems    * No active hospital problems. *     Past Medical History:   Diagnosis Date    Congenital heart defect     Diabetes mellitus     Hyperlipidemia     Stroke        The patient was seen and examined on the day of discharge and this discharge summary is in conjunction with any daily progress note from day of discharge.    Code Status:    There are no questions and answers to display.       Hospital Course: Fredis Wilson is a  64 y.o.  male who presents with need for continued IV antibiotic therapy and rehabilitation efforts following a recent acute care stay. The patient had been in his usual state of health when he developed increased weakness and difficulty with ambulation leading to a fall. He had recently been evaluated out an outside facility and diagnosed with CVA as well as strep intermedius bacteremia. He had been seen in follow up after that hospitalization with a new complaint of left hip pain.  He presented to ER following the fall and was noted to have hyponatremia, leukocytosis and hyperglycemia. CT of the chest, lumbar spine, left hip and head were negative. He was found positive for rhinovirus. Blood cultures returned positive for strep intermedius and ID was consulted for antibiotic management. Imaging of the left hip revealed evidence of cellulitis without abscess.  Neurology was consulted and MRI of the brain revealed interval hematomas and anticoagulation was discontinued. MRI lumbar spine revealed multiple epidural abscesses with intramuscular abscesses with a left psoas abscess.Surveillance cultures returned negative. Patient underwent L5 interbody fusion per neurosurgery on 5/13 and 1 culture returned positive for staph epidermidis. He required transfusion of blood products due to worsening anemia with hemoglobin down to 6.8. Due to his need for continued IV antibiotic therapy through July 8 and ongoing rehabilitation efforts, he transferred to our facility.   While at our facility, he was seen in consultation by neurology and ID for continuation of care. He is to avoid anticoagulants due to CAA and was started on low dose aspirin.  He maintained adequate 02 sats on room air and tolerated IV antibiotic therapy. He required adjustments to his insulin regimen due to worsening hyperglycemia. He remained largely independent for ADLs. Hemovac output decreased and hemovac was removed without incident. Arrangements were made for transition to home with outpatient IV antibiotic therapy and as these were being finalized, the patient developed increased back pain. Follow up MRI on 6/3 revealed:  1. Previous posterior L3-L5 fusion. Associated susceptibility artifact markedly limits evaluation.  2. There appears to be severe spinal canal narrowing at L3-L4 and L4-L5.  There is some mild heterogeneous T2 signal changes within the ventral  and posterior epidural space at this level, however this is limited in  evaluation on postcontrast imaging. This is nonspecific and could  represent residual epidural collections, however sterility  indeterminate.  3. Partially visualized suspected abscess measuring up to 2 cm in the  left retroperitoneal fat.  4. There appears to be mild marrow edema within the posterior L5  vertebral body without obvious destructive cortical changes. This may  be  reactive, however given recent history, early osteomyelitis cannot be  excluded.  5. Edema within the posterior paraspinal soft tissues without an  organized fluid collection. Favored postoperative.  Results were discussed with the patient and forward to Dr. White for review. The patient's pain improved and activity resumed and arrangements were finalized for discharge to home with close outpatient follow up. He is to continue ceftriaxone 2 gm IV daily through 6/24 or 7/8 per ID. He is to have CBC, CMP, and CRP weekly on Mondays while continuing IV antibiotic therapy with results to Dr. Harvey.     Consults:  Dr. Drake (neurology)  Dr. Harvey (ID)    Disposition: home    Physical Exam  Vitals and nursing note reviewed.   Constitutional:       Appearance: He is well-developed.   HENT:      Head: Normocephalic and atraumatic.      Right Ear: External ear normal.      Left Ear: External ear normal.      Nose: Nose normal.      Mouth/Throat:      Mouth: Mucous membranes are dry.      Pharynx: Oropharynx is clear.   Eyes:      Pupils: Pupils are equal, round, and reactive to light.   Cardiovascular:      Rate and Rhythm: Normal rate and regular rhythm.      Heart sounds: Normal heart sounds.   Pulmonary:      Effort: Pulmonary effort is normal.      Breath sounds: Normal breath sounds.   Abdominal:      General: Bowel sounds are normal.      Palpations: Abdomen is soft.   Musculoskeletal:         General: Normal range of motion.      Cervical back: Normal range of motion and neck supple.      Comments: Generalized weakness   Skin:     General: Skin is warm and dry.      Comments: Incision c/d/I   Neurological:      Mental Status: He is alert and oriented to person, place, and time.      Deep Tendon Reflexes: Reflexes are normal and symmetric.   Psychiatric:         Behavior: Behavior normal.     Discharged Condition: Stable    Follow Up: PCP 1 week  Neurosurgery 2 weeks  ID 2-3 weeks    Diet:  Diabetic    Discharge Medications:   See computer generated medication reconciliation form      Time Spent on discharge is  32 minutes in patient examination, evaluation, patient/family counseling as well as medication reconciliation, prescriptions for required medications, discharge plan and follow up.     Electronically signed by DAVID Hill on 6/10/2025 at 12:37 CDT     I have discussed the care of Fredis Wilson, including pertinent history and exam findings, with the nurse practitioner.    I have seen and examined the patient and the key elements of all parts of the encounter have been performed by me.  I agree with the assessment, plan and orders as documented by DAVID Funez, after I modified the exam findings and the plan of treatments and the final version is my approved version of the assessment.        Electronically signed by Elver Lara MD on 6/11/2025 at 22:06 CDT

## 2025-06-12 ENCOUNTER — TELEPHONE (OUTPATIENT)
Age: 65
End: 2025-06-12
Payer: COMMERCIAL

## 2025-06-12 DIAGNOSIS — R78.81 BACTEREMIA DUE TO STREPTOCOCCUS: Primary | ICD-10-CM

## 2025-06-12 DIAGNOSIS — B95.5 BACTEREMIA DUE TO STREPTOCOCCUS: Primary | ICD-10-CM

## 2025-06-12 RX ORDER — SODIUM CHLORIDE 0.9 % (FLUSH) 0.9 %
10 SYRINGE (ML) INJECTION AS NEEDED
OUTPATIENT
Start: 2025-06-16

## 2025-06-12 RX ORDER — HEPARIN SODIUM (PORCINE) LOCK FLUSH IV SOLN 100 UNIT/ML 100 UNIT/ML
500 SOLUTION INTRAVENOUS AS NEEDED
OUTPATIENT
Start: 2025-06-16

## 2025-06-12 RX ORDER — HEPARIN SODIUM (PORCINE) LOCK FLUSH IV SOLN 100 UNIT/ML 100 UNIT/ML
300 SOLUTION INTRAVENOUS ONCE
OUTPATIENT
Start: 2025-06-16

## 2025-06-12 RX ORDER — SODIUM CHLORIDE 0.9 % (FLUSH) 0.9 %
20 SYRINGE (ML) INJECTION AS NEEDED
OUTPATIENT
Start: 2025-06-16

## 2025-06-12 NOTE — TELEPHONE ENCOUNTER
Patient called to verify his hospital follow-up appt with Dr. Hale.  He was discharged home from [facility] on 6/9/25.  He was told to come to Dr. Hale's office on Monday (6/16/25) for his PICC line dressing change and labs.  I informed him that we do not schedule labs and PICC dressing changes at the office and explained he should be scheduled weekly at [facility].  I informed him that his hospital follow-up appt with Dr. Hale is scheduled for Monday 6/30/25 at 1:30 PM.  I told him I will contact the [facility] and schedule his weekly PICC line dressing changes and labs for the next few weeks and I will return his call.        12:07 CDT  I called patient back and informed him of appt for PICC line dressing change on Monday 6/16/25 at 2 PM at [facility].  I informed him of the address.  They will give him another appt for the following week.  His appt with Dr. Hale is on 6/30/25 at 1:30 PM.  I informed him of our office address/location.

## 2025-06-16 ENCOUNTER — LAB (OUTPATIENT)
Dept: LAB | Facility: HOSPITAL | Age: 65
End: 2025-06-16
Payer: COMMERCIAL

## 2025-06-16 ENCOUNTER — INFUSION (OUTPATIENT)
Dept: ONCOLOGY | Facility: HOSPITAL | Age: 65
End: 2025-06-16
Payer: COMMERCIAL

## 2025-06-16 VITALS
TEMPERATURE: 96.8 F | BODY MASS INDEX: 29.82 KG/M2 | DIASTOLIC BLOOD PRESSURE: 74 MMHG | RESPIRATION RATE: 18 BRPM | HEART RATE: 104 BPM | OXYGEN SATURATION: 94 % | HEIGHT: 71 IN | SYSTOLIC BLOOD PRESSURE: 127 MMHG | WEIGHT: 213 LBS

## 2025-06-16 DIAGNOSIS — R78.81 BACTEREMIA DUE TO STREPTOCOCCUS: Primary | ICD-10-CM

## 2025-06-16 DIAGNOSIS — B95.5 BACTEREMIA DUE TO STREPTOCOCCUS: Primary | ICD-10-CM

## 2025-06-16 LAB
ALBUMIN SERPL-MCNC: 3.9 G/DL (ref 3.5–5.2)
ALBUMIN/GLOB SERPL: 1.1 G/DL
ALP SERPL-CCNC: 88 U/L (ref 39–117)
ALT SERPL W P-5'-P-CCNC: 11 U/L (ref 1–41)
ANION GAP SERPL CALCULATED.3IONS-SCNC: 14 MMOL/L (ref 5–15)
AST SERPL-CCNC: 10 U/L (ref 1–40)
BILIRUB SERPL-MCNC: 0.4 MG/DL (ref 0–1.2)
BUN SERPL-MCNC: 13.1 MG/DL (ref 8–23)
BUN/CREAT SERPL: 27.3 (ref 7–25)
CALCIUM SPEC-SCNC: 9.8 MG/DL (ref 8.6–10.5)
CHLORIDE SERPL-SCNC: 101 MMOL/L (ref 98–107)
CO2 SERPL-SCNC: 24 MMOL/L (ref 22–29)
CREAT SERPL-MCNC: 0.48 MG/DL (ref 0.76–1.27)
CRP SERPL-MCNC: 4.04 MG/DL (ref 0–0.5)
EGFRCR SERPLBLD CKD-EPI 2021: 116 ML/MIN/1.73
GLOBULIN UR ELPH-MCNC: 3.6 GM/DL
GLUCOSE SERPL-MCNC: 139 MG/DL (ref 65–99)
POTASSIUM SERPL-SCNC: 4.4 MMOL/L (ref 3.5–5.2)
PROT SERPL-MCNC: 7.5 G/DL (ref 6–8.5)
SODIUM SERPL-SCNC: 139 MMOL/L (ref 136–145)
WHOLE BLOOD HOLD SPECIMEN: NORMAL

## 2025-06-16 PROCEDURE — 80053 COMPREHEN METABOLIC PANEL: CPT

## 2025-06-16 PROCEDURE — G0463 HOSPITAL OUTPT CLINIC VISIT: HCPCS

## 2025-06-16 PROCEDURE — 85025 COMPLETE CBC W/AUTO DIFF WBC: CPT

## 2025-06-16 PROCEDURE — 36415 COLL VENOUS BLD VENIPUNCTURE: CPT

## 2025-06-16 PROCEDURE — 86140 C-REACTIVE PROTEIN: CPT

## 2025-06-17 ENCOUNTER — TELEPHONE (OUTPATIENT)
Age: 65
End: 2025-06-17
Payer: COMMERCIAL

## 2025-06-17 DIAGNOSIS — B95.5 BACTEREMIA DUE TO STREPTOCOCCUS: Primary | ICD-10-CM

## 2025-06-17 DIAGNOSIS — R78.81 BACTEREMIA DUE TO STREPTOCOCCUS: Primary | ICD-10-CM

## 2025-06-17 LAB
BASOPHILS # BLD AUTO: 0.09 10*3/MM3 (ref 0–0.2)
BASOPHILS NFR BLD AUTO: 0.7 % (ref 0–1.5)
DEPRECATED RDW RBC AUTO: 47 FL (ref 37–54)
EOSINOPHIL # BLD AUTO: 0.5 10*3/MM3 (ref 0–0.4)
EOSINOPHIL NFR BLD AUTO: 3.7 % (ref 0.3–6.2)
ERYTHROCYTE [DISTWIDTH] IN BLOOD BY AUTOMATED COUNT: 14.1 % (ref 12.3–15.4)
FUNGUS SPEC CULT: NORMAL
HCT VFR BLD AUTO: 39.8 % (ref 37.5–51)
HGB BLD-MCNC: 12.2 G/DL (ref 13–17.7)
IMM GRANULOCYTES # BLD AUTO: 0.12 10*3/MM3 (ref 0–0.05)
IMM GRANULOCYTES NFR BLD AUTO: 0.9 % (ref 0–0.5)
KOH PREP SPEC: NORMAL
LYMPHOCYTES # BLD AUTO: 1.81 10*3/MM3 (ref 0.7–3.1)
LYMPHOCYTES NFR BLD AUTO: 13.4 % (ref 19.6–45.3)
MCH RBC QN AUTO: 27.7 PG (ref 26.6–33)
MCHC RBC AUTO-ENTMCNC: 30.7 G/DL (ref 31.5–35.7)
MCV RBC AUTO: 90.2 FL (ref 79–97)
MONOCYTES # BLD AUTO: 0.78 10*3/MM3 (ref 0.1–0.9)
MONOCYTES NFR BLD AUTO: 5.8 % (ref 5–12)
NEUTROPHILS NFR BLD AUTO: 10.22 10*3/MM3 (ref 1.7–7)
NEUTROPHILS NFR BLD AUTO: 75.5 % (ref 42.7–76)
NRBC BLD AUTO-RTO: 0 /100 WBC (ref 0–0.2)
PLATELET # BLD AUTO: 459 10*3/MM3 (ref 140–450)
PMV BLD AUTO: 9.3 FL (ref 6–12)
RBC # BLD AUTO: 4.41 10*6/MM3 (ref 4.14–5.8)
WBC NRBC COR # BLD AUTO: 13.52 10*3/MM3 (ref 3.4–10.8)

## 2025-06-18 ENCOUNTER — OFFICE VISIT (OUTPATIENT)
Dept: NEUROSURGERY | Age: 65
End: 2025-06-18

## 2025-06-18 VITALS
WEIGHT: 212.3 LBS | HEART RATE: 80 BPM | HEIGHT: 71 IN | BODY MASS INDEX: 29.72 KG/M2 | SYSTOLIC BLOOD PRESSURE: 122 MMHG | DIASTOLIC BLOOD PRESSURE: 78 MMHG

## 2025-06-18 DIAGNOSIS — M43.16 SPONDYLOLISTHESIS OF LUMBAR REGION: ICD-10-CM

## 2025-06-18 DIAGNOSIS — G06.1 SPINAL EPIDURAL ABSCESS: Primary | ICD-10-CM

## 2025-06-18 PROCEDURE — 99024 POSTOP FOLLOW-UP VISIT: CPT | Performed by: NEUROLOGICAL SURGERY

## 2025-06-18 RX ORDER — TAMSULOSIN HYDROCHLORIDE 0.4 MG/1
0.4 CAPSULE ORAL NIGHTLY
COMMUNITY
Start: 2025-04-14

## 2025-06-18 RX ORDER — ATORVASTATIN CALCIUM 40 MG/1
40 TABLET, FILM COATED ORAL NIGHTLY
COMMUNITY
Start: 2025-04-15

## 2025-06-18 RX ORDER — ONDANSETRON 4 MG/1
TABLET, ORALLY DISINTEGRATING ORAL
COMMUNITY
Start: 2025-04-07

## 2025-06-18 RX ORDER — CELECOXIB 200 MG/1
CAPSULE ORAL
COMMUNITY
Start: 2025-06-17

## 2025-06-18 RX ORDER — CLOPIDOGREL BISULFATE 75 MG/1
75 TABLET ORAL DAILY
COMMUNITY
Start: 2025-04-17

## 2025-06-18 RX ORDER — LINEZOLID 600 MG/1
600 TABLET, FILM COATED ORAL 2 TIMES DAILY
COMMUNITY
Start: 2025-04-14 | End: 2025-04-21

## 2025-06-18 RX ORDER — SPIRONOLACTONE 25 MG/1
25 TABLET ORAL DAILY
COMMUNITY
Start: 2025-04-14

## 2025-06-18 RX ORDER — GLIPIZIDE 10 MG/1
TABLET ORAL
COMMUNITY
Start: 2025-04-21

## 2025-06-18 RX ORDER — METOPROLOL SUCCINATE 50 MG/1
50 TABLET, EXTENDED RELEASE ORAL DAILY
COMMUNITY
Start: 2025-04-15

## 2025-06-18 NOTE — PROGRESS NOTES
NEUROSURGERY FOLLOW-UP NOTE      Chief Complaint:   Chief Complaint   Patient presents with    Follow-Up from Hospital     Wound check         Date of Surgery: 5/13/2025    Procedure Performed:    1. L3-4 and L4-5 laminectomy and bilateral facetectomy for drainage of epidural abscess and debridement of septic facets.  2. L3-4 and L4-5 midline lumbar interbody fusion using Medtronic Solera cortical screws and Catalyft interbody cages filled with [place] and i-FACTOR DBM.      Interval Update:    Patient presents for his first follow-up visit after hospital discharge.  He has been discharged home from [facility].  He is doing well.  He reports dramatic improvement in his back and leg pain.  He is only requiring tylenol for pain.  He has progressed to ambulating with a cane.  He is wearing his brace as-instructed.       HPI:     The patient is a 64 y.o. male who presented to the [facility] ED with complaints of weakness, hip pain and multiple recent falls.  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  He has also been treated for a stroke within the past month.  He has continued with complaints of dizziness, weakness and multiple falls.  In the ED he was found to be tachycardic, tachypneic, with an elevated WBC count, lactic acid and procaclitonin.  He was admitted with possible sepsis and blood cultures have grown strep.  He has complained of back pain and hip pain since admission.  He does have a history of chronic back pain and was recently evaluated for surgery in [place] and he was told he was not a candidate for surgery because his Hgb A1c was 9.4%.  He does see Dr. Lloyd in pain management and receives injections.       He underwent an MRI as part of his infectious workup and this revealed evidence of widespread infection in the lumbar spine with a likely epidural

## 2025-06-23 ENCOUNTER — LAB (OUTPATIENT)
Dept: LAB | Facility: HOSPITAL | Age: 65
End: 2025-06-23
Payer: COMMERCIAL

## 2025-06-23 ENCOUNTER — INFUSION (OUTPATIENT)
Dept: ONCOLOGY | Facility: HOSPITAL | Age: 65
End: 2025-06-23
Payer: COMMERCIAL

## 2025-06-23 VITALS
HEART RATE: 112 BPM | TEMPERATURE: 98.7 F | SYSTOLIC BLOOD PRESSURE: 107 MMHG | OXYGEN SATURATION: 93 % | RESPIRATION RATE: 20 BRPM | DIASTOLIC BLOOD PRESSURE: 75 MMHG

## 2025-06-23 DIAGNOSIS — B95.5 BACTEREMIA DUE TO STREPTOCOCCUS: Primary | ICD-10-CM

## 2025-06-23 DIAGNOSIS — R78.81 BACTEREMIA DUE TO STREPTOCOCCUS: Primary | ICD-10-CM

## 2025-06-23 DIAGNOSIS — R78.81 BACTEREMIA DUE TO STREPTOCOCCUS: ICD-10-CM

## 2025-06-23 DIAGNOSIS — B95.5 BACTEREMIA DUE TO STREPTOCOCCUS: ICD-10-CM

## 2025-06-23 LAB
ALBUMIN SERPL-MCNC: 3.9 G/DL (ref 3.5–5.2)
ALBUMIN/GLOB SERPL: 1.1 G/DL
ALP SERPL-CCNC: 74 U/L (ref 39–117)
ALT SERPL W P-5'-P-CCNC: 8 U/L (ref 1–41)
ANION GAP SERPL CALCULATED.3IONS-SCNC: 17 MMOL/L (ref 5–15)
AST SERPL-CCNC: 10 U/L (ref 1–40)
BILIRUB SERPL-MCNC: 0.3 MG/DL (ref 0–1.2)
BUN SERPL-MCNC: 11 MG/DL (ref 8–23)
BUN/CREAT SERPL: 25.6 (ref 7–25)
CALCIUM SPEC-SCNC: 9.3 MG/DL (ref 8.6–10.5)
CHLORIDE SERPL-SCNC: 101 MMOL/L (ref 98–107)
CO2 SERPL-SCNC: 22 MMOL/L (ref 22–29)
CREAT SERPL-MCNC: 0.43 MG/DL (ref 0.76–1.27)
CRP SERPL-MCNC: 7.28 MG/DL (ref 0–0.5)
EGFRCR SERPLBLD CKD-EPI 2021: 119.9 ML/MIN/1.73
GLOBULIN UR ELPH-MCNC: 3.4 GM/DL
GLUCOSE SERPL-MCNC: 187 MG/DL (ref 65–99)
POTASSIUM SERPL-SCNC: 3.6 MMOL/L (ref 3.5–5.2)
PROT SERPL-MCNC: 7.3 G/DL (ref 6–8.5)
SODIUM SERPL-SCNC: 140 MMOL/L (ref 136–145)

## 2025-06-23 PROCEDURE — 86140 C-REACTIVE PROTEIN: CPT

## 2025-06-23 PROCEDURE — 80053 COMPREHEN METABOLIC PANEL: CPT

## 2025-06-23 PROCEDURE — G0463 HOSPITAL OUTPT CLINIC VISIT: HCPCS

## 2025-06-23 NOTE — PROGRESS NOTES
I called [facility] lab to see if CBC can be added to today's specimen.  No CBC order was on file so the correct tube wasn't drawn.  He must be re-drawn for CBC.

## 2025-06-26 ENCOUNTER — TELEPHONE (OUTPATIENT)
Age: 65
End: 2025-06-26
Payer: COMMERCIAL

## 2025-06-26 DIAGNOSIS — R78.81 BACTEREMIA DUE TO STREPTOCOCCUS: Primary | ICD-10-CM

## 2025-06-26 DIAGNOSIS — R19.7 DIARRHEA, UNSPECIFIED TYPE: ICD-10-CM

## 2025-06-26 DIAGNOSIS — B95.5 BACTEREMIA DUE TO STREPTOCOCCUS: Primary | ICD-10-CM

## 2025-06-26 NOTE — TELEPHONE ENCOUNTER
Patient called to report he has vomited twice today and has had about 10-12 bowel movements today and describes them as completely liquid at this point.  He has taken some sublingual Zofran twice today and is no longer vomiting but still a little nauseated.  He asked for something for diarrhea.  He is on ceftriaxone 2 grams IV daily for 6-8 weeks (through at least Monday 6/30/25) when he is scheduled for a follow-up appt.      Dr. Hale said to order stool for C-diff.  I also informed him that his CBC was not drawn this week.      16:04 CDT  Patient called back already and said he would rather take his specimen to [place].      16:10 CDT  I called patient back and told him I have orders to fax to [facility] ([facility]).  He said he would rather not submit a specimen and thinks his diarrhea is easing up and might have been a virus.  I told him I will go ahead and fax orders in case he changes his mind.  I will check on him tomorrow.

## 2025-06-27 ENCOUNTER — TELEPHONE (OUTPATIENT)
Age: 65
End: 2025-06-27
Payer: COMMERCIAL

## 2025-06-27 NOTE — TELEPHONE ENCOUNTER
Patient called this morning and reported he is much better and no need to submit a stool sample.  I thanked him for calling.

## 2025-06-30 ENCOUNTER — OFFICE VISIT (OUTPATIENT)
Age: 65
End: 2025-06-30
Payer: COMMERCIAL

## 2025-06-30 ENCOUNTER — LAB (OUTPATIENT)
Dept: LAB | Facility: HOSPITAL | Age: 65
End: 2025-06-30
Payer: COMMERCIAL

## 2025-06-30 ENCOUNTER — TELEPHONE (OUTPATIENT)
Age: 65
End: 2025-06-30

## 2025-06-30 ENCOUNTER — INFUSION (OUTPATIENT)
Dept: ONCOLOGY | Facility: HOSPITAL | Age: 65
End: 2025-06-30
Payer: COMMERCIAL

## 2025-06-30 ENCOUNTER — TELEPHONE (OUTPATIENT)
Age: 65
End: 2025-06-30
Payer: COMMERCIAL

## 2025-06-30 VITALS
WEIGHT: 213 LBS | HEART RATE: 94 BPM | SYSTOLIC BLOOD PRESSURE: 127 MMHG | TEMPERATURE: 97.2 F | BODY MASS INDEX: 29.82 KG/M2 | DIASTOLIC BLOOD PRESSURE: 73 MMHG | RESPIRATION RATE: 18 BRPM | HEIGHT: 71 IN | OXYGEN SATURATION: 95 %

## 2025-06-30 VITALS
HEART RATE: 106 BPM | SYSTOLIC BLOOD PRESSURE: 122 MMHG | HEIGHT: 71 IN | WEIGHT: 209 LBS | BODY MASS INDEX: 29.26 KG/M2 | TEMPERATURE: 98.2 F | DIASTOLIC BLOOD PRESSURE: 80 MMHG | OXYGEN SATURATION: 94 %

## 2025-06-30 DIAGNOSIS — R78.81 BACTEREMIA DUE TO STREPTOCOCCUS: Primary | ICD-10-CM

## 2025-06-30 DIAGNOSIS — M60.08 ABSCESS OF PARASPINAL MUSCLES: ICD-10-CM

## 2025-06-30 DIAGNOSIS — B95.5 BACTEREMIA DUE TO STREPTOCOCCUS: Primary | ICD-10-CM

## 2025-06-30 DIAGNOSIS — R78.81 BACTEREMIA DUE TO STREPTOCOCCUS: ICD-10-CM

## 2025-06-30 DIAGNOSIS — R19.7 DIARRHEA, UNSPECIFIED TYPE: ICD-10-CM

## 2025-06-30 DIAGNOSIS — B95.5 BACTEREMIA DUE TO STREPTOCOCCUS: ICD-10-CM

## 2025-06-30 LAB
ALBUMIN SERPL-MCNC: 4 G/DL (ref 3.5–5.2)
ALBUMIN/GLOB SERPL: 1.3 G/DL
ALP SERPL-CCNC: 90 U/L (ref 39–117)
ALT SERPL W P-5'-P-CCNC: 20 U/L (ref 1–41)
ANION GAP SERPL CALCULATED.3IONS-SCNC: 17 MMOL/L (ref 5–15)
AST SERPL-CCNC: 18 U/L (ref 1–40)
BILIRUB SERPL-MCNC: 0.3 MG/DL (ref 0–1.2)
BUN SERPL-MCNC: 10.1 MG/DL (ref 8–23)
BUN/CREAT SERPL: 20.6 (ref 7–25)
CALCIUM SPEC-SCNC: 9.3 MG/DL (ref 8.6–10.5)
CHLORIDE SERPL-SCNC: 99 MMOL/L (ref 98–107)
CO2 SERPL-SCNC: 23 MMOL/L (ref 22–29)
CREAT SERPL-MCNC: 0.49 MG/DL (ref 0.76–1.27)
CRP SERPL-MCNC: 2.72 MG/DL (ref 0–0.5)
EGFRCR SERPLBLD CKD-EPI 2021: 115.3 ML/MIN/1.73
GLOBULIN UR ELPH-MCNC: 3.1 GM/DL
GLUCOSE SERPL-MCNC: 150 MG/DL (ref 65–99)
POTASSIUM SERPL-SCNC: 3.6 MMOL/L (ref 3.5–5.2)
PROT SERPL-MCNC: 7.1 G/DL (ref 6–8.5)
SODIUM SERPL-SCNC: 139 MMOL/L (ref 136–145)

## 2025-06-30 PROCEDURE — 80053 COMPREHEN METABOLIC PANEL: CPT

## 2025-06-30 PROCEDURE — G0463 HOSPITAL OUTPT CLINIC VISIT: HCPCS

## 2025-06-30 PROCEDURE — 86140 C-REACTIVE PROTEIN: CPT

## 2025-06-30 PROCEDURE — 99214 OFFICE O/P EST MOD 30 MIN: CPT | Performed by: INTERNAL MEDICINE

## 2025-06-30 RX ORDER — CEFADROXIL 500 MG/1
500 CAPSULE ORAL EVERY 12 HOURS
Qty: 60 CAPSULE | Refills: 0 | Status: SHIPPED | OUTPATIENT
Start: 2025-07-09 | End: 2025-08-08

## 2025-06-30 NOTE — TELEPHONE ENCOUNTER
I called [facility] and chose option to speak with a pharmacist.  I spoke with Storm and informed him that Dr. Hale saw patient today and ordered to continue ceftriaxone 2 grams IV daily through 7/8/25.  His PICC will be pulled following his last dose.  Also, he is short 1 antibiotic bag because it was leaking.  He needs a delivery today as he is out of medication.  Storm said they will take care of.

## 2025-06-30 NOTE — TELEPHONE ENCOUNTER
Patient called stating he has not received his next shipment from [facility] and he is going to be 1 dose short because 1 of his bags was leaking.  I told him I will call and see what I can find out.

## 2025-06-30 NOTE — PROGRESS NOTES
[facility] [place] - Infectious Diseases Progress Note    Patient:  Fredis Wilson  YOB: 1960  MRN: [id number]   Primary Care Physician: Angel White MD  Referring Physician: No ref. provider found     Chief Complaint: streptococcus intermedius bacteremia/lumbr discitis/paraspinal infection   >>>> no complaints ... C JANINE Mark<<<    Interval History/HPI: Here for follow-up of Streptococcus intermedius bacteremia/lumbar discitis/paraspinal infection.  He indicates he is doing very well.  He indicates his back pain is almost resolved.  He is wearing his back brace.  He is not noticing any lower extremity weakness or numbness.  He reports no problems with bowel or bladder function.  He indicates the diarrhea he was experiencing previously has resolved.  He has formed bowel movement.  He is not having increase in stool frequency.  He indicates he is currently having 1 formed bowel movement per day.  He reports no pain or discomfort at his PICC line site.  He has no rash or skin itching.  He has had no fevers or chills.  He has follow-up with Dr. White of neurosurgery on July 8.    Allergies: No Known Allergies  Current Scheduled Medications:   Current Outpatient Medications on File Prior to Visit   Medication Sig    cefTRIAXone Sodium (CEFTRIAXONE 2 G/20ML IV PUSH SYRINGE KIT, PAD,) Infuse 20 mL into a venous catheter Daily.    glipizide (GLUCOTROL) 10 MG tablet Take 1 tablet by mouth 2 (Two) Times a Day Before Meals.    lisinopril (PRINIVIL,ZESTRIL) 5 MG tablet Take 1 tablet by mouth Daily.    meloxicam (MOBIC) 15 MG tablet Take 1 tablet by mouth Daily. Not currently using    metFORMIN (GLUCOPHAGE) 1000 MG tablet Take 1 tablet by mouth 2 (Two) Times a Day With Meals.    pravastatin (PRAVACHOL) 10 MG tablet Take 1 tablet by mouth Daily.    methylPREDNISolone (MEDROL) 4 MG dose pack Take as directed on package instructions. (Patient not taking: Reported on 6/16/2025)    montelukast (SINGULAIR) 10 MG tablet  "Take 1 tablet by mouth Daily. (Patient not taking: Reported on 6/16/2025)     No current facility-administered medications on file prior to visit.      Venous Access Review  Line/IV site: PICC upper arm left, condition no redness    Antimicrobial Review  Currently on antibiotics/antifungals: YES/NO: YES  Start Date of Therapy: 05-: IV ceftriaxone   If therapy completed, date complete: 06- (6 weeks)  Estimated end of treatment date (EOT): 07- (8 weeks)    Review of Systems See HPI.    Vital Signs:  /80 (BP Location: Right arm, Patient Position: Sitting, Cuff Size: Adult)   Pulse 106   Temp 98.2 °F (36.8 °C) (Oral)   Ht 180.3 cm (71\")   Wt 94.8 kg (209 lb)   SpO2 94%   BMI 29.15 kg/m²     Physical Exam  Vital signs - reviewed.  Lower extremity strength and sensation intact  He is ambulating in the clinic without limitation  PICC line site without signs of infection  Alert, pleasant, comfortable appearing    Lab/Imaging/Other Information:  Laboratory work and laboratory flowsheet below reviewed.  Discussed his labs from today with him.  Explained his CRP has improved and almost normalized.  Hemoglobin normal, platelet count normal, creatinine normal, and white blood cell count normal.  LABORATORY - SCAN - LAB FLOW SHEET - ID - 6/30/25 (06/30/2025)         NEUROSURGERY - SCAN - NEIDA VALDES MD - NEUROSURGERY - 06/18/2025 (06/18/2025)     Microbiology  No change in previous culture results which were reviewed and outlined below.  Blood and surgical culture results from his hospitalization at [facility].  Blood and surgical cultures reviewed.    Surgical cultures May 13, 2025:  Culture #1 paraspinal swab-few white blood cells, no organisms, no growth on primary media but growth in broth only.  Staphylococcus warneri (susceptibility below)     Susceptibility  Staphylococcus warneri (1)  Antibiotic Interpretation VANI   Method Status     doxycycline Sensitive <=0.5 mcg/mL " BACTERIAL SUSCEPTIBILITY PANEL BY VANI       erythromycin Resistant >=8 mcg/mL BACTERIAL SUSCEPTIBILITY PANEL BY VANI       inducible clindamycin resistance   Neg mcg/mL BACTERIAL SUSCEPTIBILITY PANEL BY VANI       levofloxacin Sensitive <=0.12 mcg/mL BACTERIAL SUSCEPTIBILITY PANEL BY VANI       oxacillin Sensitive <=0.25 mcg/mL BACTERIAL SUSCEPTIBILITY PANEL BY VANI       tetracycline Sensitive <=1 mcg/mL BACTERIAL SUSCEPTIBILITY PANEL BY VANI       vancomycin Sensitive <=0.5 mcg/mL BACTERIAL SUSCEPTIBILITY PANEL BY VANI             Culture #2 paraspinal tissue-rare white blood cells, no organisms, culture no growth to date  Culture #3 epidural purulence (swab)-rare white blood cells, no organisms, culture growing Streptococcus intermedius  Anaerobic Culture No anaerobes isolated  5 days   Organism Streptococcus intermedius Abnormal    Culture Surgical Isolated from Broth   Resulting Agency [facility]   Susceptibility     Organism Antibiotic Method Susceptibility   Streptococcus intermedius ampicillin BACTERIAL SUSCEPTIBILITY PANEL BY VANI <=0.25 mcg/mL: Sensitive   Streptococcus intermedius benzylpenicillin BACTERIAL SUSCEPTIBILITY PANEL BY VANI <=0.06 mcg/mL: Sensitive   Streptococcus intermedius cefotaxime BACTERIAL SUSCEPTIBILITY PANEL BY VANI <=0.12 mcg/mL: Sensitive   Streptococcus intermedius cefTRIAXone BACTERIAL SUSCEPTIBILITY PANEL BY VANI <=0.12 mcg/mL: Sensitive   Streptococcus intermedius erythromycin BACTERIAL SUSCEPTIBILITY PANEL BY VANI <=0.12 mcg/mL: Sensitive   Streptococcus intermedius levofloxacin BACTERIAL SUSCEPTIBILITY PANEL BY VANI <=0.25 mcg/mL: Sensitive   Streptococcus intermedius vancomycin BACTERIAL SUSCEPTIBILITY PANEL BY VANI 0.25 mcg/mL: Sensitive      Culture #4 labeled L4-5 facet-result canceled by ancillary.  Staphylococcus epidermidis (susceptibility below)   Susceptibility  Staphylococcus epidermidis (1)     Antibiotic Interpretation VANI   Method Status     clindamycin  "Sensitive <=0.12 mcg/mL BACTERIAL SUSCEPTIBILITY PANEL BY VANI       doxycycline Sensitive <=0.5 mcg/mL BACTERIAL SUSCEPTIBILITY PANEL BY VANI       erythromycin Sensitive <=0.25 mcg/mL BACTERIAL SUSCEPTIBILITY PANEL BY VANI       inducible clindamycin resistance   Neg mcg/mL BACTERIAL SUSCEPTIBILITY PANEL BY VANI       levofloxacin Sensitive <=0.12 mcg/mL BACTERIAL SUSCEPTIBILITY PANEL BY VANI       linezolid Sensitive 1 mcg/mL BACTERIAL SUSCEPTIBILITY PANEL BY VANI       oxacillin Sensitive <=0.25 mcg/mL BACTERIAL SUSCEPTIBILITY PANEL BY VANI       tetracycline Sensitive <=1 mcg/mL BACTERIAL SUSCEPTIBILITY PANEL BY VANI       vancomycin Sensitive 2 mcg/mL BACTERIAL SUSCEPTIBILITY PANEL BY VANI          Blood cultures May 5, 2025-\"susceptibility now available, reviewed, and outlined below \"  Blood cultures May 6, 2025-Streptococcus intermedius  Blood cultures May 7, 2025-Streptococcus intermedius  Blood cultures May 10, 2025-no growth  Blood cultures May 11, 2025-no growth    Impression & Recommendations:   Diagnoses and all orders for this visit:    1. Bacteremia due to Streptococcus (Primary)  Overview:  Streptococcus intermedius bacteremia  Lumbar discitis/paraspinal infection-most likely secondary to Streptococcus intermedius          2. Abscess of paraspinal muscles    3. Diarrhea, unspecified type    Suspect the diarrhea may have been a viral gastroenteritis of some type.  It has resolved.  No signs of antibiotic related toxicity at present.  Would like to continue treatment with ceftriaxone through 8 weeks of treatment.  Would like to begin oral antibiotic treatment following completion of his IV therapy  Remove PICC following completion of his IV treatment  See orders outlined below  Encouraged him to keep his follow-up with Dr. White on July 8  Would like to see him back in a month but would be happy to see sooner if any new or worsening problems in the interim    Orders for today's visit:  1.  Continue " ceftriaxone 2 g IV daily through July 8, 2025  2.  Continue lab every Monday while on intravenous antibiotic therapy-CMP, CBC, CRP  3.  Pull PICC line at completion of his IV treatment on July 8 (he could potentially have the line pulled at Dr. White's office visit on July 9 or he could come by the clinic following that appointment to have his PICC line removed)  4.  Begin cefadroxil 500 mg orally every 12 hours on July 9 2025-[facility] [place])  5.  Follow-up appointment in 1 month    Addendum: He had asked for a release to return to work.  Cleared by infectious diseases to return to work after July 9, 2025 if he is cleared to return to work by his neurosurgeon.    Follow Up:   There are no Patient Instructions on file for this visit.  Return in about 1 month (around 7/30/2025).  Patient was provided After Visit Summary.     Anthony Hale MD      CC: MD Milo Cordova MD

## 2025-07-07 ENCOUNTER — TELEPHONE (OUTPATIENT)
Age: 65
End: 2025-07-07
Payer: COMMERCIAL

## 2025-07-07 ENCOUNTER — INFUSION (OUTPATIENT)
Dept: ONCOLOGY | Facility: HOSPITAL | Age: 65
End: 2025-07-07
Payer: COMMERCIAL

## 2025-07-07 ENCOUNTER — LAB (OUTPATIENT)
Dept: LAB | Facility: HOSPITAL | Age: 65
End: 2025-07-07
Payer: COMMERCIAL

## 2025-07-07 DIAGNOSIS — B95.5 BACTEREMIA DUE TO STREPTOCOCCUS: ICD-10-CM

## 2025-07-07 DIAGNOSIS — R78.81 BACTEREMIA DUE TO STREPTOCOCCUS: ICD-10-CM

## 2025-07-07 DIAGNOSIS — R78.81 BACTEREMIA DUE TO STREPTOCOCCUS: Primary | ICD-10-CM

## 2025-07-07 DIAGNOSIS — B95.5 BACTEREMIA DUE TO STREPTOCOCCUS: Primary | ICD-10-CM

## 2025-07-07 LAB
ALBUMIN SERPL-MCNC: 3.9 G/DL (ref 3.5–5.2)
ALBUMIN/GLOB SERPL: 1.2 G/DL
ALP SERPL-CCNC: 90 U/L (ref 39–117)
ALT SERPL W P-5'-P-CCNC: 17 U/L (ref 1–41)
ANION GAP SERPL CALCULATED.3IONS-SCNC: 14 MMOL/L (ref 5–15)
AST SERPL-CCNC: 14 U/L (ref 1–40)
BASOPHILS # BLD AUTO: 0.1 10*3/MM3 (ref 0–0.2)
BASOPHILS NFR BLD AUTO: 0.9 % (ref 0–1.5)
BILIRUB SERPL-MCNC: 0.3 MG/DL (ref 0–1.2)
BUN SERPL-MCNC: 6.9 MG/DL (ref 8–23)
BUN/CREAT SERPL: 14.1 (ref 7–25)
CALCIUM SPEC-SCNC: 9.4 MG/DL (ref 8.6–10.5)
CHLORIDE SERPL-SCNC: 103 MMOL/L (ref 98–107)
CO2 SERPL-SCNC: 21 MMOL/L (ref 22–29)
CREAT SERPL-MCNC: 0.49 MG/DL (ref 0.76–1.27)
CRP SERPL-MCNC: 2.23 MG/DL (ref 0–0.5)
DEPRECATED RDW RBC AUTO: 46.5 FL (ref 37–54)
EGFRCR SERPLBLD CKD-EPI 2021: 115.3 ML/MIN/1.73
EOSINOPHIL # BLD AUTO: 0.8 10*3/MM3 (ref 0–0.4)
EOSINOPHIL NFR BLD AUTO: 6.9 % (ref 0.3–6.2)
ERYTHROCYTE [DISTWIDTH] IN BLOOD BY AUTOMATED COUNT: 14.6 % (ref 12.3–15.4)
GLOBULIN UR ELPH-MCNC: 3.2 GM/DL
GLUCOSE SERPL-MCNC: 165 MG/DL (ref 65–99)
HCT VFR BLD AUTO: 43.1 % (ref 37.5–51)
HGB BLD-MCNC: 13.3 G/DL (ref 13–17.7)
IMM GRANULOCYTES # BLD AUTO: 0.09 10*3/MM3 (ref 0–0.05)
IMM GRANULOCYTES NFR BLD AUTO: 0.8 % (ref 0–0.5)
LYMPHOCYTES # BLD AUTO: 2.05 10*3/MM3 (ref 0.7–3.1)
LYMPHOCYTES NFR BLD AUTO: 17.8 % (ref 19.6–45.3)
MCH RBC QN AUTO: 26.9 PG (ref 26.6–33)
MCHC RBC AUTO-ENTMCNC: 30.9 G/DL (ref 31.5–35.7)
MCV RBC AUTO: 87.2 FL (ref 79–97)
MONOCYTES # BLD AUTO: 0.8 10*3/MM3 (ref 0.1–0.9)
MONOCYTES NFR BLD AUTO: 6.9 % (ref 5–12)
NEUTROPHILS NFR BLD AUTO: 66.7 % (ref 42.7–76)
NEUTROPHILS NFR BLD AUTO: 7.69 10*3/MM3 (ref 1.7–7)
NRBC BLD AUTO-RTO: 0 /100 WBC (ref 0–0.2)
PLATELET # BLD AUTO: 469 10*3/MM3 (ref 140–450)
PMV BLD AUTO: 9 FL (ref 6–12)
POTASSIUM SERPL-SCNC: 4.1 MMOL/L (ref 3.5–5.2)
PROT SERPL-MCNC: 7.1 G/DL (ref 6–8.5)
RBC # BLD AUTO: 4.94 10*6/MM3 (ref 4.14–5.8)
SODIUM SERPL-SCNC: 138 MMOL/L (ref 136–145)
WBC NRBC COR # BLD AUTO: 11.53 10*3/MM3 (ref 3.4–10.8)

## 2025-07-07 PROCEDURE — G0463 HOSPITAL OUTPT CLINIC VISIT: HCPCS

## 2025-07-07 PROCEDURE — 85025 COMPLETE CBC W/AUTO DIFF WBC: CPT

## 2025-07-07 PROCEDURE — 86140 C-REACTIVE PROTEIN: CPT

## 2025-07-07 PROCEDURE — 80053 COMPREHEN METABOLIC PANEL: CPT

## 2025-07-07 PROCEDURE — 36415 COLL VENOUS BLD VENIPUNCTURE: CPT

## 2025-07-07 NOTE — TELEPHONE ENCOUNTER
Patient called asking if he was going to have his picc line removed by neurosurgery the day of his appt which is on 7/9/2025 @ 945 am he stated that he could not make that appt he asked if he could be scheduled to 1 pm I told him that I would change it.

## 2025-07-07 NOTE — TELEPHONE ENCOUNTER
Jen @ [facility] [phone] called she asked if I would fax over most recent labs dated 6/30/2025  She asked if I would fax them to [phone]. I faxed labs to number that was given

## 2025-07-09 ENCOUNTER — OFFICE VISIT (OUTPATIENT)
Dept: NEUROSURGERY | Age: 65
End: 2025-07-09

## 2025-07-09 ENCOUNTER — OFFICE VISIT (OUTPATIENT)
Age: 65
End: 2025-07-09
Payer: COMMERCIAL

## 2025-07-09 ENCOUNTER — HOSPITAL ENCOUNTER (OUTPATIENT)
Dept: GENERAL RADIOLOGY | Age: 65
Discharge: HOME | End: 2025-07-09
Payer: COMMERCIAL

## 2025-07-09 VITALS
HEIGHT: 71 IN | BODY MASS INDEX: 29.72 KG/M2 | SYSTOLIC BLOOD PRESSURE: 110 MMHG | DIASTOLIC BLOOD PRESSURE: 72 MMHG | HEART RATE: 70 BPM | WEIGHT: 212.3 LBS

## 2025-07-09 DIAGNOSIS — M60.08 ABSCESS OF PARASPINAL MUSCLES: ICD-10-CM

## 2025-07-09 DIAGNOSIS — B95.5 BACTEREMIA DUE TO STREPTOCOCCUS: Primary | ICD-10-CM

## 2025-07-09 DIAGNOSIS — G06.1 SPINAL EPIDURAL ABSCESS: ICD-10-CM

## 2025-07-09 DIAGNOSIS — M43.16 SPONDYLOLISTHESIS OF LUMBAR REGION: ICD-10-CM

## 2025-07-09 DIAGNOSIS — M43.16 SPONDYLOLISTHESIS OF LUMBAR REGION: Primary | ICD-10-CM

## 2025-07-09 DIAGNOSIS — R78.81 BACTEREMIA DUE TO STREPTOCOCCUS: Primary | ICD-10-CM

## 2025-07-09 PROCEDURE — 72100 X-RAY EXAM L-S SPINE 2/3 VWS: CPT

## 2025-07-09 PROCEDURE — 99024 POSTOP FOLLOW-UP VISIT: CPT | Performed by: NEUROLOGICAL SURGERY

## 2025-07-09 RX ORDER — CEFADROXIL 500 MG/1
CAPSULE ORAL
COMMUNITY
Start: 2025-07-03

## 2025-07-09 NOTE — PROGRESS NOTES
He was recently started on Eliquis due to his history of stroke.      Objective:    /72   Pulse 70   Ht 1.803 m (5' 10.98\")   Wt 96.3 kg (212 lb 4.9 oz)   BMI 29.62 kg/m²             Physical Exam:    General: alert, cooperative, no distress  Cardiorespiratory: unlabored breathing  Abdomen: soft and nondistended  Wound:  C/D/I, healing well      Neurologic Exam:    Mental Status: Alert, oriented, thought content appropriate  Cranial Nerves: PERRL, EOMI, symmetric facies, tongue midline  Motor: Motor exam is symmetrical 5 out of 5 all extremities bilaterally  Somatosensory: normal light touch sensation          Pertinent Labs:  Lab Results   Component Value Date    WBC 13.3 (H) 05/19/2025    HGB 8.7 (L) 05/19/2025    HCT 28.3 (L) 05/19/2025    MCV 95.0 (H) 05/19/2025     (H) 05/19/2025     Lab Results   Component Value Date/Time     05/19/2025 02:43 AM    K 3.4 05/19/2025 02:43 AM     05/19/2025 02:43 AM    CO2 26 05/19/2025 02:43 AM    BUN 9 05/19/2025 02:43 AM    CREATININE 0.5 05/19/2025 02:43 AM    GLUCOSE 115 05/19/2025 02:43 AM    CALCIUM 8.3 05/19/2025 02:43 AM          Imaging:    X-rays of the lumbar spine reviewed.  Expected postoperative changes after L3/4 and L4/5 instrumented interbody fusion without complication.        Assessment and Plan:    64 y.o. M s/p L3-5 MIDLIF with evacuation of epidural abscess on 5/13/2025.  He is doing well postoperatively.  His pain has dramatically improved and he has no radicular pain, numbness or lower extremity weakness.  His incision is healing well and he has completed an extended course of IV antibiotics.  His x-rays look as-expected.  I advised him that he may return to work with restrictions to avoid bending, twisting and lifting > 10 lbs.  I also recommended that he follow the same restrictions with his physical therapy.  I advised him that he may discontinue his LSO brace.  I will plan to follow-up with him in 3 months with new

## 2025-07-09 NOTE — PROGRESS NOTES
Left arm PICC removed without difficulty with tip intact and measured 43 cm.  Patient tolerated without problems.

## 2025-08-11 ENCOUNTER — LAB (OUTPATIENT)
Dept: LAB | Facility: HOSPITAL | Age: 65
End: 2025-08-11
Payer: COMMERCIAL

## 2025-08-11 ENCOUNTER — OFFICE VISIT (OUTPATIENT)
Age: 65
End: 2025-08-11
Payer: COMMERCIAL

## 2025-08-11 VITALS
SYSTOLIC BLOOD PRESSURE: 127 MMHG | BODY MASS INDEX: 30.02 KG/M2 | OXYGEN SATURATION: 96 % | DIASTOLIC BLOOD PRESSURE: 74 MMHG | HEIGHT: 71 IN | TEMPERATURE: 97.6 F | HEART RATE: 76 BPM | WEIGHT: 214.4 LBS

## 2025-08-11 DIAGNOSIS — R78.81 BACTEREMIA DUE TO STREPTOCOCCUS: ICD-10-CM

## 2025-08-11 DIAGNOSIS — R78.81 BACTEREMIA DUE TO STREPTOCOCCUS: Primary | ICD-10-CM

## 2025-08-11 DIAGNOSIS — B95.5 BACTEREMIA DUE TO STREPTOCOCCUS: Primary | ICD-10-CM

## 2025-08-11 DIAGNOSIS — M60.08 ABSCESS OF PARASPINAL MUSCLES: ICD-10-CM

## 2025-08-11 DIAGNOSIS — B95.5 BACTEREMIA DUE TO STREPTOCOCCUS: ICD-10-CM

## 2025-08-11 LAB
ALBUMIN SERPL-MCNC: 4.1 G/DL (ref 3.5–5)
ALBUMIN/GLOB SERPL: 1.3 G/DL (ref 1.1–2.5)
ALP SERPL-CCNC: 93 U/L (ref 24–120)
ALT SERPL W P-5'-P-CCNC: 18 U/L (ref 0–50)
ANION GAP SERPL CALCULATED.3IONS-SCNC: 10 MMOL/L (ref 4–13)
AST SERPL-CCNC: 14 U/L (ref 7–45)
AUTO MIXED CELLS #: 0.9 10*3/MM3 (ref 0.1–2.6)
AUTO MIXED CELLS %: 9.3 % (ref 0.1–24)
BILIRUB SERPL-MCNC: 0.4 MG/DL (ref 0.1–1)
BUN SERPL-MCNC: 11 MG/DL (ref 5–21)
BUN/CREAT SERPL: 22
CALCIUM SPEC-SCNC: 9.4 MG/DL (ref 8.6–10.5)
CHLORIDE SERPL-SCNC: 104 MMOL/L (ref 98–110)
CO2 SERPL-SCNC: 25 MMOL/L (ref 24–31)
CREAT SERPL-MCNC: 0.5 MG/DL (ref 0.5–1.4)
EGFRCR SERPLBLD CKD-EPI 2021: 113.9 ML/MIN/1.73
ERYTHROCYTE [DISTWIDTH] IN BLOOD BY AUTOMATED COUNT: 15.3 % (ref 12.3–15.4)
GLOBULIN UR ELPH-MCNC: 3.2 GM/DL
GLUCOSE SERPL-MCNC: 195 MG/DL (ref 65–99)
HCT VFR BLD AUTO: 43.5 % (ref 37.5–51)
HGB BLD-MCNC: 13.8 G/DL (ref 13–17.7)
LYMPHOCYTES # BLD AUTO: 2.5 10*3/MM3 (ref 0.7–3.1)
LYMPHOCYTES NFR BLD AUTO: 27 % (ref 19.6–45.3)
MCH RBC QN AUTO: 27 PG (ref 26.6–33)
MCHC RBC AUTO-ENTMCNC: 31.7 G/DL (ref 31.5–35.7)
MCV RBC AUTO: 85.1 FL (ref 79–97)
NEUTROPHILS NFR BLD AUTO: 5.9 10*3/MM3 (ref 1.7–7)
NEUTROPHILS NFR BLD AUTO: 63.7 % (ref 42.7–76)
PLATELET # BLD AUTO: 413 10*3/MM3 (ref 140–450)
PMV BLD AUTO: 8.3 FL (ref 6–12)
POTASSIUM SERPL-SCNC: 3.8 MMOL/L (ref 3.5–5.3)
PROT SERPL-MCNC: 7.3 G/DL (ref 6.3–8.7)
RBC # BLD AUTO: 5.11 10*6/MM3 (ref 4.14–5.8)
SODIUM SERPL-SCNC: 139 MMOL/L (ref 135–145)
WBC NRBC COR # BLD AUTO: 9.3 10*3/MM3 (ref 3.4–10.8)

## 2025-08-11 PROCEDURE — 85025 COMPLETE CBC W/AUTO DIFF WBC: CPT

## 2025-08-11 PROCEDURE — 36415 COLL VENOUS BLD VENIPUNCTURE: CPT

## 2025-08-11 PROCEDURE — 99214 OFFICE O/P EST MOD 30 MIN: CPT | Performed by: INTERNAL MEDICINE

## 2025-08-11 PROCEDURE — 80053 COMPREHEN METABOLIC PANEL: CPT

## 2025-08-11 PROCEDURE — 86140 C-REACTIVE PROTEIN: CPT

## 2025-08-12 LAB — CRP SERPL-MCNC: 3.29 MG/DL (ref 0–0.5)

## (undated) DEVICE — KIT COLL REG FLOCKED SWAB VIABILITY FLEXIBILITY EASE OF USE

## (undated) DEVICE — CLEANER,CAUTERY TIP,2X2",STERILE: Brand: MEDLINE

## (undated) DEVICE — BLADE ES L2.5IN PTFE STD TIP BOVIE E-Z CLN

## (undated) DEVICE — COAXIAL HIGH FLOW TIP WITH SOFT SHIELD

## (undated) DEVICE — TIBURON LAPAROTOMY DRAPE: Brand: CONVERTORS

## (undated) DEVICE — AGENT HEMOSTATIC SURGIFLOW MATRIX KIT W/THROMBIN

## (undated) DEVICE — BAND BAG 36" X 36": Brand: TIDI

## (undated) DEVICE — AGENT HEMOSTATIC SURG ORIGINAL ABS 4X8IN LOOSE KNIT 12/CA

## (undated) DEVICE — SYSTEM SKIN CLSR 22CM DERMBND PRINEO

## (undated) DEVICE — SPONGE,NEURO,0.5"X3",XR,STRL,LF,10/PK: Brand: MEDLINE

## (undated) DEVICE — SUTURE VICRYL + SZ 1 L18IN ABSRB UD L36MM CT-1 1/2 CIR VCP841D

## (undated) DEVICE — DISPOSABLE REFLECTIVE SPHERES ATTACHED TO REFERENCE FRAMES AND SURGICAL INSTRUMENTS FOR USE DURING SURGICAL NAVIGATION IN IMAGE GUIDED SURGERY. ONE RETAIL CARTON IS MADE UP OF 4 SPHERES PER TRAY IN A POUCH. THERE ARE 12 TRAY-IN-POUCHES FOR A TOTAL OF 48 SPHERES.: Brand: NDI PASSIVE SPHERE

## (undated) DEVICE — UNDERGLOVE SURG SZ 8 FNGR THK0.21MIL GRN LTX BEAD CUF

## (undated) DEVICE — PENCIL BTTN S S CAUT TIP W HOLSTER 25 50

## (undated) DEVICE — KIT POS FOAM ARM CRADL BILAT CHST PD CVR HIP HNG CVR L

## (undated) DEVICE — SUTURE VICRYL + SZ 2-0 L18IN ABSRB UD CT1 L36MM 1/2 CIR VCP839D

## (undated) DEVICE — KIT EVAC 0.13IN RECT TB DIA10FR 400CC PVC 3 SPR Y CONN DRN

## (undated) DEVICE — C-ARMOR C-ARM EQUIPMENT COVERS CLEAR STERILE UNIVERSAL FIT 12 PER CASE: Brand: C-ARMOR

## (undated) DEVICE — CURAVIEW LED LARYNGOSCOPE BLADE & HANDLE,DISPOSABLE,MILLER 2: Brand: CURAPLEX

## (undated) DEVICE — BIPOLAR IRRIGATOR INTEGRATED TUBING AND BIPOLAR CORD SET, DISPOSABLE

## (undated) DEVICE — TOOL MR8-14MH30 MR8 14CM MATCH 3MM: Brand: MIDAS REX MR8

## (undated) DEVICE — PLASMABLADE X PS210-030S-LIGHT 3.0SL: Brand: PLASMABLADE™ X

## (undated) DEVICE — Device

## (undated) DEVICE — FORCEP BPLR IRIS

## (undated) DEVICE — DISPOSABLE REFLECTIVE SPHERES ATTACHED TO REFERENCE FRAMES AND SURGICAL INSTRUMENTS FOR USE DURING SURGICAL NAVIGATION IN IMAGE GUIDED SURGERY.ONE RETAIL CARTON IS MADE UP OF 1 SPHERE PER TRAY IN A POUCH. THERE ARE 12 TRAY-IN-POUCHES FOR A TOTAL OF 12 SPHERES.: Brand: NDI PASSIVE SPHERE

## (undated) DEVICE — TUBE ET 7.5MM NSL ORAL BASIC CUF INTMED MURPHY EYE RADPQ

## (undated) DEVICE — ELECTRODE ES L 6.5IN BLDE MPLR OPN APPRCH EZ TO CLN

## (undated) DEVICE — SPK10281 JACKSON TABLE KIT: Brand: SPK10281 JACKSON TABLE KIT

## (undated) DEVICE — DRAPE MICSCP W54XL150IN W/ EXT MONOCULAR OR STEREO OBSERVER